# Patient Record
Sex: FEMALE | Race: BLACK OR AFRICAN AMERICAN | NOT HISPANIC OR LATINO | URBAN - METROPOLITAN AREA
[De-identification: names, ages, dates, MRNs, and addresses within clinical notes are randomized per-mention and may not be internally consistent; named-entity substitution may affect disease eponyms.]

---

## 2017-02-08 ENCOUNTER — INPATIENT (INPATIENT)
Facility: HOSPITAL | Age: 57
LOS: 1 days | Discharge: HOME | End: 2017-02-10
Attending: INTERNAL MEDICINE | Admitting: INTERNAL MEDICINE
Payer: COMMERCIAL

## 2017-02-08 VITALS
RESPIRATION RATE: 22 BRPM | HEART RATE: 93 BPM | WEIGHT: 190.04 LBS | TEMPERATURE: 98 F | SYSTOLIC BLOOD PRESSURE: 166 MMHG | HEIGHT: 65 IN | OXYGEN SATURATION: 100 % | DIASTOLIC BLOOD PRESSURE: 108 MMHG

## 2017-02-08 DIAGNOSIS — Z98.89 OTHER SPECIFIED POSTPROCEDURAL STATES: Chronic | ICD-10-CM

## 2017-02-08 DIAGNOSIS — Z98.2 PRESENCE OF CEREBROSPINAL FLUID DRAINAGE DEVICE: Chronic | ICD-10-CM

## 2017-02-08 DIAGNOSIS — Z43.4 ENCOUNTER FOR ATTENTION TO OTHER ARTIFICIAL OPENINGS OF DIGESTIVE TRACT: Chronic | ICD-10-CM

## 2017-02-08 DIAGNOSIS — I72.9 ANEURYSM OF UNSPECIFIED SITE: Chronic | ICD-10-CM

## 2017-02-08 DIAGNOSIS — Z95.5 PRESENCE OF CORONARY ANGIOPLASTY IMPLANT AND GRAFT: Chronic | ICD-10-CM

## 2017-02-08 LAB
ALBUMIN SERPL ELPH-MCNC: 3.9 G/DL — SIGNIFICANT CHANGE UP (ref 3.3–5)
ALP SERPL-CCNC: 124 U/L — HIGH (ref 40–120)
ALT FLD-CCNC: 19 U/L — SIGNIFICANT CHANGE UP (ref 12–78)
ANION GAP SERPL CALC-SCNC: 11 MMOL/L — SIGNIFICANT CHANGE UP (ref 5–17)
APPEARANCE UR: CLEAR — SIGNIFICANT CHANGE UP
AST SERPL-CCNC: 19 U/L — SIGNIFICANT CHANGE UP (ref 15–37)
BACTERIA # UR AUTO: ABNORMAL
BASOPHILS # BLD AUTO: 0.1 K/UL — SIGNIFICANT CHANGE UP (ref 0–0.2)
BASOPHILS NFR BLD AUTO: 1 % — SIGNIFICANT CHANGE UP (ref 0–2)
BILIRUB SERPL-MCNC: 0.3 MG/DL — SIGNIFICANT CHANGE UP (ref 0.2–1.2)
BILIRUB UR-MCNC: NEGATIVE — SIGNIFICANT CHANGE UP
BUN SERPL-MCNC: 13 MG/DL — SIGNIFICANT CHANGE UP (ref 7–23)
CALCIUM SERPL-MCNC: 8.5 MG/DL — SIGNIFICANT CHANGE UP (ref 8.5–10.1)
CARBAMAZEPINE SERPL-MCNC: <0.5 UG/ML — LOW (ref 4–12)
CHLORIDE SERPL-SCNC: 113 MMOL/L — HIGH (ref 96–108)
CO2 SERPL-SCNC: 22 MMOL/L — SIGNIFICANT CHANGE UP (ref 22–31)
COLOR SPEC: YELLOW — SIGNIFICANT CHANGE UP
CREAT SERPL-MCNC: 0.93 MG/DL — SIGNIFICANT CHANGE UP (ref 0.5–1.3)
DIFF PNL FLD: ABNORMAL
EOSINOPHIL # BLD AUTO: 0.1 K/UL — SIGNIFICANT CHANGE UP (ref 0–0.5)
EOSINOPHIL NFR BLD AUTO: 1.2 % — SIGNIFICANT CHANGE UP (ref 0–6)
EPI CELLS # UR: SIGNIFICANT CHANGE UP
ETHANOL SERPL-MCNC: <10 MG/DL — SIGNIFICANT CHANGE UP (ref 0–10)
GLUCOSE SERPL-MCNC: 96 MG/DL — SIGNIFICANT CHANGE UP (ref 70–99)
GLUCOSE UR QL: NEGATIVE MG/DL — SIGNIFICANT CHANGE UP
HCT VFR BLD CALC: 38.4 % — SIGNIFICANT CHANGE UP (ref 34.5–45)
HGB BLD-MCNC: 12.9 G/DL — SIGNIFICANT CHANGE UP (ref 11.5–15.5)
INR BLD: 1.52 RATIO — HIGH (ref 0.88–1.16)
KETONES UR-MCNC: NEGATIVE — SIGNIFICANT CHANGE UP
LEUKOCYTE ESTERASE UR-ACNC: ABNORMAL
LYMPHOCYTES # BLD AUTO: 2.5 K/UL — SIGNIFICANT CHANGE UP (ref 1–3.3)
LYMPHOCYTES # BLD AUTO: 35.2 % — SIGNIFICANT CHANGE UP (ref 13–44)
MAGNESIUM SERPL-MCNC: 2.6 MG/DL — HIGH (ref 1.8–2.4)
MCHC RBC-ENTMCNC: 30.6 PG — SIGNIFICANT CHANGE UP (ref 27–34)
MCHC RBC-ENTMCNC: 33.6 GM/DL — SIGNIFICANT CHANGE UP (ref 32–36)
MCV RBC AUTO: 91 FL — SIGNIFICANT CHANGE UP (ref 80–100)
MONOCYTES # BLD AUTO: 0.7 K/UL — SIGNIFICANT CHANGE UP (ref 0–0.9)
MONOCYTES NFR BLD AUTO: 9.6 % — SIGNIFICANT CHANGE UP (ref 2–14)
NEUTROPHILS # BLD AUTO: 3.8 K/UL — SIGNIFICANT CHANGE UP (ref 1.8–7.4)
NEUTROPHILS NFR BLD AUTO: 53.1 % — SIGNIFICANT CHANGE UP (ref 43–77)
NITRITE UR-MCNC: NEGATIVE — SIGNIFICANT CHANGE UP
PH UR: 7 — SIGNIFICANT CHANGE UP (ref 4.8–8)
PHENOBARB SERPL-MCNC: <2.1 UG/ML — LOW (ref 15–40)
PLATELET # BLD AUTO: 248 K/UL — SIGNIFICANT CHANGE UP (ref 150–400)
POTASSIUM SERPL-MCNC: 3.7 MMOL/L — SIGNIFICANT CHANGE UP (ref 3.5–5.3)
POTASSIUM SERPL-SCNC: 3.7 MMOL/L — SIGNIFICANT CHANGE UP (ref 3.5–5.3)
PROT SERPL-MCNC: 8.8 GM/DL — HIGH (ref 6–8.3)
PROT UR-MCNC: 100 MG/DL
PROTHROM AB SERPL-ACNC: 17.1 SEC — HIGH (ref 10–13.1)
RBC # BLD: 4.22 M/UL — SIGNIFICANT CHANGE UP (ref 3.8–5.2)
RBC # FLD: 13.8 % — SIGNIFICANT CHANGE UP (ref 11–15)
RBC CASTS # UR COMP ASSIST: ABNORMAL /HPF (ref 0–4)
SODIUM SERPL-SCNC: 146 MMOL/L — HIGH (ref 135–145)
SP GR SPEC: 1 — LOW (ref 1.01–1.02)
UROBILINOGEN FLD QL: NEGATIVE MG/DL — SIGNIFICANT CHANGE UP
VALPROATE SERPL-MCNC: 3 UG/ML — LOW (ref 50–100)
WBC # BLD: 7.1 K/UL — SIGNIFICANT CHANGE UP (ref 3.8–10.5)
WBC # FLD AUTO: 7.1 K/UL — SIGNIFICANT CHANGE UP (ref 3.8–10.5)
WBC UR QL: SIGNIFICANT CHANGE UP

## 2017-02-08 PROCEDURE — 70450 CT HEAD/BRAIN W/O DYE: CPT | Mod: 26

## 2017-02-08 PROCEDURE — 99285 EMERGENCY DEPT VISIT HI MDM: CPT

## 2017-02-08 PROCEDURE — 99223 1ST HOSP IP/OBS HIGH 75: CPT

## 2017-02-08 PROCEDURE — 71010: CPT | Mod: 26

## 2017-02-08 RX ORDER — LEVETIRACETAM 250 MG/1
1000 TABLET, FILM COATED ORAL
Qty: 0 | Refills: 0 | Status: DISCONTINUED | OUTPATIENT
Start: 2017-02-08 | End: 2017-02-09

## 2017-02-08 RX ORDER — LEVETIRACETAM 250 MG/1
1000 TABLET, FILM COATED ORAL ONCE
Qty: 0 | Refills: 0 | Status: COMPLETED | OUTPATIENT
Start: 2017-02-08 | End: 2017-02-08

## 2017-02-08 RX ORDER — SODIUM CHLORIDE 9 MG/ML
2000 INJECTION INTRAMUSCULAR; INTRAVENOUS; SUBCUTANEOUS ONCE
Qty: 0 | Refills: 0 | Status: COMPLETED | OUTPATIENT
Start: 2017-02-08 | End: 2017-02-08

## 2017-02-08 RX ADMIN — SODIUM CHLORIDE 2000 MILLILITER(S): 9 INJECTION INTRAMUSCULAR; INTRAVENOUS; SUBCUTANEOUS at 17:38

## 2017-02-08 RX ADMIN — LEVETIRACETAM 400 MILLIGRAM(S): 250 TABLET, FILM COATED ORAL at 19:30

## 2017-02-08 NOTE — H&P ADULT. - RS GEN PE MLT RESP DETAILS PC
no rhonchi/good air movement/no rales/clear to auscultation bilaterally/no wheezes/airway patent/respirations non-labored/breath sounds equal

## 2017-02-08 NOTE — H&P ADULT. - RADIOLOGY RESULTS AND INTERPRETATION
CXR no infiltrate  CT head: There is no acute intracranial hemorrhage, mass effect, midline shift, or   herniation.

## 2017-02-08 NOTE — ED ADULT NURSE NOTE - ED STAT RN HANDOFF DETAILS
Report given to JAMIE Azevedo of 2C for continuity of care. patient is alert and oriented x3 no acute distress and vitals stable.

## 2017-02-08 NOTE — ED PROVIDER NOTE - OBJECTIVE STATEMENT
Pertinent PMH/PSH/FHx/SHx and Review of Systems contained within:  56f hx of seizures pw seizure. ems arrived and patient had been seizing for 10 minutes already. they gave 5mg valium in, then 2 and 2 iv. patient finally stopped seizing. she was with a relative at the house who could not account for her symptoms before the seziing.    Fh and Sh not otherwise contributory  ROS otherwise negative Pertinent PMH/PSH/FHx/SHx and Review of Systems contained within:  56f hx of seizures, brain aneurysm, dvt on warfarin pw seizure. ems arrived and patient had been seizing for 10 minutes already. they gave 5mg valium in, then 2 and 2 iv. patient finally stopped seizing. she was with a relative at the house who could not account for her symptoms before the seziing.    Fh and Sh not otherwise contributory  ROS otherwise negative

## 2017-02-08 NOTE — ED PROVIDER NOTE - MEDICAL DECISION MAKING DETAILS
seizure. unable to acquire further history in present state. reassess. seizure. unable to acquire further history in present state. admit for status. rx keppra.

## 2017-02-08 NOTE — ED ADULT NURSE NOTE - OBJECTIVE STATEMENT
Received pt in bed F, Pt A&Ox2 w/ aphasia. Pt was post-ictal upon arrival had witness seizure by family. Pt has hx of brain aneurysms and seizures.  Pt has 4cm by 3cm wound on left leg, being tx by wound care, hx of venous statis ulcers

## 2017-02-08 NOTE — H&P ADULT. - ASSESSMENT
Pt with seizure possibly due to antiseizure medication, head CT shows no acute event, pt seen by neurologist in ED, given keppra.

## 2017-02-08 NOTE — ED PROVIDER NOTE - PHYSICAL EXAMINATION
Gen: obtunded.   Head: NC, AT   Eyes: PERRL, EOMI, normal lids/conjunctiva  ENT: normal hearing, patent oropharynx without erythema/exudate, uvula midline  Neck: supple, no tenderness, Trachea midline  Pulm: Bilateral BS, normal resp effort, no wheeze/stridor/retractions  CV: RRR, no M/R/G, 2+ radial and dp pulses bl, no edema  Abd: soft, NT/ND, +BS, no hepatosplenomegaly  Mskel: extremities x4 with normal ROM and no joint effusions. no ctl spine ttp.   Skin: no rash, no bruising   Neuro: obtunded (pharmacologic) no seizure activity. doesn't follow commands

## 2017-02-08 NOTE — H&P ADULT. - HISTORY OF PRESENT ILLNESS
55 y/o woman PMH seizures, brain aneurysm, DVT on warfarin had seizure today witnessed by family, no aura, pt became non verbal, then developed clonic activity of extremities. EMS called, when they  arrived  patient had been seizing for about 10 minutes already according to family. They gave 5mg valium IV, then additional 2mg IV x2, then patient stopped seizing.    Pt awake and alert, claims compliance with seizure medications, no recent fever, headache, neck pain, trauma chest pain, cough, SOB chest or abdominal pain. Pt on coumadin for DVT, no bleeding noted, no melena or hematuria. 57 y/o woman PMH seizures, brain aneurysm, DVT on warfarin had seizure today witnessed by family, no aura, pt became non verbal, then developed clonic activity of extremities. EMS called, when they  arrived  patient had been seizing for about 10 minutes already according to family. They gave 5mg valium IV, then additional 2mg IV x2, then patient stopped seizing.    Pt awake and alert, claims compliance with seizure medications, no recent fever, headache, neck pain, trauma chest pain, cough, SOB chest or abdominal pain. Pt on coumadin for DVT, no bleeding noted, no melena or hematuria. Pt given keppra in ER.

## 2017-02-08 NOTE — ED ADULT NURSE NOTE - PSH
Aneurysm  see HPI  s/p cerebral aneurysm clipping in Dec 2013, after which pt was in 30 day induced coma, had  shunt,  tracheostomy and reversal, followed by rehab  Cholecystostomy care    H/O craniotomy    History of cranioplasty  B/L 04/14  Presence of IVC filter    S/P  shunt

## 2017-02-08 NOTE — CONSULT NOTE ADULT - SUBJECTIVE AND OBJECTIVE BOX
Subjective Complaints:  Historian:  Patient is poor historian.  ER notes reviewed.     HPI:    BELLE LONG.   · Chief Complaint: The patient is a 56y Female complaining of seizures.  · HPI Objective Statement: Pertinent PMH/PSH/FHx/SHx and Review of Systems contained within:  56f hx of seizures, brain aneurysm, dvt on warfarin pw seizure. ems arrived and patient had been seizing for 10 minutes already. they gave 5mg valium in, then 2 and 2 iv. patient finally stopped seizing. she was with a relative at the house who could not account for her symptoms before the seziing.    Fh and Sh not otherwise contributory. ROS otherwise negative.  RADIOLOGY & ADDITIONAL STUDIES:  Reviewed brain CT:  There is extensive bifrontal encephalomalacia and gliosis which appears similar to the prior CT examination. There is also encephalomalacia and gliosis within the left occipital lobe compatible with a prior infarct. There is also a small focal area of encephalomalacia and gliosis involving the right cingulate gyrus at its posterior aspect. A chronic lacunar infarct within the left putamen is again notable.  There is no acute intracranial hemorrhage, mass effect, midline shift, or herniation.    PAST MEDICAL & SURGICAL HISTORY:  Hyperlipidemia; Brain aneurysm; Coronary artery disease; Epilepsy; HTN (hypertension); Presence of IVC filter;   H/O craniotomy; History of cranioplasty: B/L ; S/P  shunt; Aneurysm: see HPI  s/p cerebral aneurysm clipping in Dec 2013, after which pt was in 30 day induced coma, had  shunt,  tracheostomy and reversal, followed by rehab  Cholecystostomy care    MEDICATIONS  (STANDING):  MEDICATIONS  (PRN):    Allergies: penicillin (Hives)  sulfa drugs (Other)  Intolerances  FAMILY HISTORY:    Vital Signs Last 24 Hrs  T(C): 36.8, Max: 36.8 (02-08 @ 19:55)  T(F): 98.2, Max: 98.2 (02-08 @ 19:55)  HR: 88 (71 - 93)  BP: 160/90 (160/90 - 171/94)  BP(mean): --  RR: 18 (18 - 22)  SpO2: 100% (98% - 100%)    NEUROLOGICAL EXAM:  HENT:  Normocephalic head;  Neck supple.  ENT: normal looking.  Mental State:    Alert and awake.  Poorly oriented to person, place and date.  Incoherent with inappropriate responses. Speech appears intact.  Cooperative.      Cranial Nerves:  II-XII:   Pupils round and reactive to light and accommodation.  Extraocular movements full.  Visual fields full.  Facial symmetry intact.  Tongue midline.  Motor Functions:  Moves all extremities.  No pronator drift of UE.  Claps hand well.  Hand  intact bilaterally.      Sensory Functions:   Intact to touch and pinprick to face and extremities.    Reflexes:  Deep tendon reflexes normoactive to knees and ankles.  Babinski absent   Cerebellar Testing:    Finger to nose intact.  Nystagmus absent.  Neurovascular: Carotid auscultation full without bruits.      LABS:                        12.9   7.1   )-----------( 248      ( 2017 17:45 )             38.4     2017 17:45    146    |  113    |  13     ----------------------------<  96     3.7     |  22     |  0.93     Ca    8.5        2017 17:45  Mg     2.6       2017 17:45    TPro  8.8    /  Alb  3.9    /  TBili  0.3    /  DBili  x      /  AST  19     /  ALT  19     /  AlkPhos  124    2017 17:45    PT/INR - ( 2017 18:23 )   PT: 17.1 sec;   INR: 1.52 ratio       Urinalysis Basic - ( 2017 19:28 )    Color: Yellow / Appearance: Clear / S.005 / pH: x  Gluc: x / Ketone: Negative  / Bili: Negative / Urobili: Negative mg/dL   Blood: x / Protein: 100 mg/dL / Nitrite: Negative   Leuk Esterase: Small / RBC: 3-5 /HPF / WBC 3-5   Sq Epi: x / Non Sq Epi: Few / Bacteria: Few    Complete Blood Count + Automated Diff: STAT ( @ 17:17)  Comprehensive Metabolic Panel: STAT ( @ 17:17)  Magnesium, Serum: STAT ( @ 17:17)  Carbamazepine Level, Serum: STAT ( @ 17:17)  Phenobarbital Level, Serum: STAT ( @ 17:17)  Phenytoin Level, Free: STAT ( @ 17:17)  Valproic Acid Level, Serum: STAT ( @ 17:17)  Culture - Urine: Routine  Specimen Source: Catheterized ( @ 17:17)  Urinalysis: STAT ( @ 17:17)  sodium chloride 0.9% Bolus:   2000 milliLiter(s), IV Bolus, once, infuse over 1 Hour(s), Stop After 1 Doses  Provider&#x27;s Contact #: [phone] ( @ 17:17) [completed]  Xray Chest 1 View AP/PA.: Urgent   Indication: seizure  Transport: Portable  Exam Completed  Provider&#x27;s Contact #: [phone] ( @ 17:17)  CT Head No Cont: Urgent   Indication: seizure  Transport: Stretcher-Crib  Exam Completed  Provider&#x27;s Contact #: [phone] ( @ 17:17)  Alcohol, Blood: STAT  Cancel Reason: Lab Reord. ( @ 17:21)  Prothrombin Time and INR, Plasma:  Start Date:2017. STAT ( @ 17:33)  Alcohol, Blood: 17:20 ( @ 17:48)  levETIRAcetam  IVPB: [Ordered as KEPPRA IVPB]  1000 milliGRAM(s) in IV Solution 100 milliLiter(s), IV Intermittent, once, infuse over 15 Minute(s), Stop After 1 Doses  Provider&#x27;s Contact #: [phone] ( @ 18:59) [completed]  Admit to Inpatient Level of Care:     Service:  MEDICAL/SURGICAL    Physician:  Danis Bryant    Diagnosis:  Seizure disorder    Additional Instructions:  Diagnosis: Seizure disorder  Isolation: ,None ( @ 19:09)  (Floorstock):   Qty Removed: 1 each ( @ 19:29) [completed]  Urine Microscopic-Add On (NC): 19:20 ( @ 19:31)  Admit from ED ( @ 19:49)    Assessment & Opinion:  Status Epilepticus.  Known  Brain aneurysm; Epilepsy; HTN (hypertension); H/O craniotomy;   History of cranioplasty: B/L ; S/P  shunt;     Recommendations:  Brain MRI.  EEG.   DVT prophylaxis as ordered.  PT/OT.  Medications:  Keppra 1000 mg IV load.  Maintain on 1000 mg BID.

## 2017-02-08 NOTE — H&P ADULT. - PROBLEM SELECTOR PROBLEM 2
Deep vein thrombosis (DVT) of lower extremity, unspecified chronicity, unspecified laterality, unspecified vein

## 2017-02-08 NOTE — H&P ADULT. - MUSCULOSKELETAL
details… detailed exam no calf tenderness/normal strength/no joint swelling/no joint erythema/ROM intact

## 2017-02-09 DIAGNOSIS — I10 ESSENTIAL (PRIMARY) HYPERTENSION: ICD-10-CM

## 2017-02-09 DIAGNOSIS — G40.909 EPILEPSY, UNSPECIFIED, NOT INTRACTABLE, WITHOUT STATUS EPILEPTICUS: ICD-10-CM

## 2017-02-09 DIAGNOSIS — I82.409 ACUTE EMBOLISM AND THROMBOSIS OF UNSPECIFIED DEEP VEINS OF UNSPECIFIED LOWER EXTREMITY: ICD-10-CM

## 2017-02-09 DIAGNOSIS — T14.90 INJURY, UNSPECIFIED: ICD-10-CM

## 2017-02-09 LAB
ALBUMIN SERPL ELPH-MCNC: 3.5 G/DL — SIGNIFICANT CHANGE UP (ref 3.3–5)
ALP SERPL-CCNC: 112 U/L — SIGNIFICANT CHANGE UP (ref 40–120)
ALT FLD-CCNC: 19 U/L — SIGNIFICANT CHANGE UP (ref 12–78)
ANION GAP SERPL CALC-SCNC: 8 MMOL/L — SIGNIFICANT CHANGE UP (ref 5–17)
AST SERPL-CCNC: 16 U/L — SIGNIFICANT CHANGE UP (ref 15–37)
BASOPHILS # BLD AUTO: 0 K/UL — SIGNIFICANT CHANGE UP (ref 0–0.2)
BASOPHILS NFR BLD AUTO: 0.5 % — SIGNIFICANT CHANGE UP (ref 0–2)
BILIRUB SERPL-MCNC: 0.2 MG/DL — SIGNIFICANT CHANGE UP (ref 0.2–1.2)
BUN SERPL-MCNC: 8 MG/DL — SIGNIFICANT CHANGE UP (ref 7–23)
CALCIUM SERPL-MCNC: 8.3 MG/DL — LOW (ref 8.5–10.1)
CHLORIDE SERPL-SCNC: 117 MMOL/L — HIGH (ref 96–108)
CO2 SERPL-SCNC: 26 MMOL/L — SIGNIFICANT CHANGE UP (ref 22–31)
CREAT SERPL-MCNC: 0.65 MG/DL — SIGNIFICANT CHANGE UP (ref 0.5–1.3)
EOSINOPHIL # BLD AUTO: 0 K/UL — SIGNIFICANT CHANGE UP (ref 0–0.5)
EOSINOPHIL NFR BLD AUTO: 1 % — SIGNIFICANT CHANGE UP (ref 0–6)
GLUCOSE SERPL-MCNC: 84 MG/DL — SIGNIFICANT CHANGE UP (ref 70–99)
HCT VFR BLD CALC: 34.8 % — SIGNIFICANT CHANGE UP (ref 34.5–45)
HGB BLD-MCNC: 11.9 G/DL — SIGNIFICANT CHANGE UP (ref 11.5–15.5)
INR BLD: 1.46 RATIO — HIGH (ref 0.88–1.16)
LYMPHOCYTES # BLD AUTO: 1.2 K/UL — SIGNIFICANT CHANGE UP (ref 1–3.3)
LYMPHOCYTES # BLD AUTO: 26.2 % — SIGNIFICANT CHANGE UP (ref 13–44)
MCHC RBC-ENTMCNC: 30.2 PG — SIGNIFICANT CHANGE UP (ref 27–34)
MCHC RBC-ENTMCNC: 34.1 GM/DL — SIGNIFICANT CHANGE UP (ref 32–36)
MCV RBC AUTO: 88.5 FL — SIGNIFICANT CHANGE UP (ref 80–100)
MONOCYTES # BLD AUTO: 0.5 K/UL — SIGNIFICANT CHANGE UP (ref 0–0.9)
MONOCYTES NFR BLD AUTO: 9.4 % — SIGNIFICANT CHANGE UP (ref 2–14)
NEUTROPHILS # BLD AUTO: 3 K/UL — SIGNIFICANT CHANGE UP (ref 1.8–7.4)
NEUTROPHILS NFR BLD AUTO: 62.8 % — SIGNIFICANT CHANGE UP (ref 43–77)
PHENYTOIN FREE SERPL-MCNC: 16 UG/ML — SIGNIFICANT CHANGE UP (ref 10–20)
PLATELET # BLD AUTO: 249 K/UL — SIGNIFICANT CHANGE UP (ref 150–400)
POTASSIUM SERPL-MCNC: 3.4 MMOL/L — LOW (ref 3.5–5.3)
POTASSIUM SERPL-SCNC: 3.4 MMOL/L — LOW (ref 3.5–5.3)
PROT SERPL-MCNC: 7.9 GM/DL — SIGNIFICANT CHANGE UP (ref 6–8.3)
PROTHROM AB SERPL-ACNC: 16.4 SEC — HIGH (ref 10–13.1)
RBC # BLD: 3.93 M/UL — SIGNIFICANT CHANGE UP (ref 3.8–5.2)
RBC # FLD: 13.7 % — SIGNIFICANT CHANGE UP (ref 11–15)
SODIUM SERPL-SCNC: 151 MMOL/L — HIGH (ref 135–145)
WBC # BLD: 4.8 K/UL — SIGNIFICANT CHANGE UP (ref 3.8–10.5)
WBC # FLD AUTO: 4.8 K/UL — SIGNIFICANT CHANGE UP (ref 3.8–10.5)

## 2017-02-09 PROCEDURE — 99233 SBSQ HOSP IP/OBS HIGH 50: CPT

## 2017-02-09 RX ORDER — POTASSIUM CHLORIDE 20 MEQ
40 PACKET (EA) ORAL ONCE
Qty: 0 | Refills: 0 | Status: COMPLETED | OUTPATIENT
Start: 2017-02-09 | End: 2017-02-09

## 2017-02-09 RX ORDER — WARFARIN SODIUM 2.5 MG/1
5 TABLET ORAL ONCE
Qty: 0 | Refills: 0 | Status: COMPLETED | OUTPATIENT
Start: 2017-02-09 | End: 2018-01-08

## 2017-02-09 RX ORDER — WARFARIN SODIUM 2.5 MG/1
5 TABLET ORAL ONCE
Qty: 0 | Refills: 0 | Status: COMPLETED | OUTPATIENT
Start: 2017-02-09 | End: 2017-02-09

## 2017-02-09 RX ORDER — SODIUM CHLORIDE 9 MG/ML
1000 INJECTION, SOLUTION INTRAVENOUS
Qty: 0 | Refills: 0 | Status: DISCONTINUED | OUTPATIENT
Start: 2017-02-09 | End: 2017-02-10

## 2017-02-09 RX ORDER — LOSARTAN POTASSIUM 100 MG/1
25 TABLET, FILM COATED ORAL DAILY
Qty: 0 | Refills: 0 | Status: DISCONTINUED | OUTPATIENT
Start: 2017-02-09 | End: 2017-02-10

## 2017-02-09 RX ORDER — PANTOPRAZOLE SODIUM 20 MG/1
40 TABLET, DELAYED RELEASE ORAL DAILY
Qty: 0 | Refills: 0 | Status: DISCONTINUED | OUTPATIENT
Start: 2017-02-09 | End: 2017-02-10

## 2017-02-09 RX ORDER — LEVETIRACETAM 250 MG/1
1250 TABLET, FILM COATED ORAL
Qty: 0 | Refills: 0 | Status: DISCONTINUED | OUTPATIENT
Start: 2017-02-09 | End: 2017-02-10

## 2017-02-09 RX ORDER — HEPARIN SODIUM 5000 [USP'U]/ML
5000 INJECTION INTRAVENOUS; SUBCUTANEOUS EVERY 12 HOURS
Qty: 0 | Refills: 0 | Status: DISCONTINUED | OUTPATIENT
Start: 2017-02-09 | End: 2017-02-10

## 2017-02-09 RX ORDER — LABETALOL HCL 100 MG
100 TABLET ORAL EVERY 12 HOURS
Qty: 0 | Refills: 0 | Status: DISCONTINUED | OUTPATIENT
Start: 2017-02-09 | End: 2017-02-10

## 2017-02-09 RX ADMIN — HEPARIN SODIUM 5000 UNIT(S): 5000 INJECTION INTRAVENOUS; SUBCUTANEOUS at 17:48

## 2017-02-09 RX ADMIN — Medication 100 MILLIGRAM(S): at 14:23

## 2017-02-09 RX ADMIN — Medication 40 MILLIEQUIVALENT(S): at 14:22

## 2017-02-09 RX ADMIN — LEVETIRACETAM 1000 MILLIGRAM(S): 250 TABLET, FILM COATED ORAL at 06:09

## 2017-02-09 RX ADMIN — Medication 100 MILLIGRAM(S): at 17:48

## 2017-02-09 RX ADMIN — Medication 100 MILLIGRAM(S): at 06:09

## 2017-02-09 RX ADMIN — LOSARTAN POTASSIUM 25 MILLIGRAM(S): 100 TABLET, FILM COATED ORAL at 06:09

## 2017-02-09 RX ADMIN — Medication 100 MILLIGRAM(S): at 21:38

## 2017-02-09 RX ADMIN — LEVETIRACETAM 1250 MILLIGRAM(S): 250 TABLET, FILM COATED ORAL at 17:48

## 2017-02-09 RX ADMIN — PANTOPRAZOLE SODIUM 40 MILLIGRAM(S): 20 TABLET, DELAYED RELEASE ORAL at 12:39

## 2017-02-09 RX ADMIN — SODIUM CHLORIDE 50 MILLILITER(S): 9 INJECTION, SOLUTION INTRAVENOUS at 14:22

## 2017-02-09 NOTE — PROGRESS NOTE ADULT - PROBLEM SELECTOR PLAN 3
on coumadin, monitor inr, (for chronic DVT in 2013)  7.5 mg x T,Th, Sat, and 5 mg x 4 times a week rest of the days.

## 2017-02-09 NOTE — PROGRESS NOTE ADULT - ATTENDING COMMENTS
DVT Px with coumadin. DVT Px with coumadin.  Hypotonic fluids to correct sodium and potassium supplementation for hypokalemia.  labs and imaging reviewed, care discussed with consultants , labs ordered for am.

## 2017-02-09 NOTE — PROGRESS NOTE ADULT - SUBJECTIVE AND OBJECTIVE BOX
INTERVAL HPI/OVERNIGHT EVENTS:  Subjective Complaints:  No overnight events.  No seizures.  Now fully oriented and with intact speech.  Normal neurologic examination.  RECENT RADIOLOGY & ADDITIONAL TESTS:  EEG done with no seizure foci.  Now on Keppra 1250 mg BID and Dilantin 100 mg TID.    NEUROLOGICAL EXAM (Pertinent):  Vital Signs Last 24 Hrs  T(C): 36.7, Max: 37.4 ( @ 02:45)  T(F): 98.1, Max: 99.4 ( @ 02:45)  HR: 64 (62 - 93)  BP: 148/75 (138/75 - 172/87)  BP(mean): --  RR: 16 (16 - 22)  SpO2: 100% (98% - 100%)      MEDICATIONS  (STANDING):  losartan 25milliGRAM(s) Oral daily  labetalol 100milliGRAM(s) Oral every 12 hours  pantoprazole  Injectable 40milliGRAM(s) IV Push daily  levETIRAcetam 1250milliGRAM(s) Oral two times a day  phenytoin   Capsule 100milliGRAM(s) Oral three times a day  dextrose 5% + sodium chloride 0.45%. 1000milliLiter(s) IV Continuous <Continuous>  heparin  Injectable 5000Unit(s) SubCutaneous every 12 hours  warfarin 5milliGRAM(s) Oral once    MEDICATIONS  (PRN):    LABS:                        11.9   4.8   )-----------( 249      ( 2017 05:11 )             34.8     2017 05:11    151    |  117    |  8      ----------------------------<  84     3.4     |  26     |  0.65     Ca    8.3        2017 05:11  Mg     2.6       2017 17:45    TPro  7.9    /  Alb  3.5    /  TBili  0.2    /  DBili  x      /  AST  16     /  ALT  19     /  AlkPhos  112    2017 05:11    PT/INR - ( 2017 15:09 )   PT: 16.4 sec;   INR: 1.46 ratio         Urinalysis Basic - ( 2017 19:28 )    Color: Yellow / Appearance: Clear / S.005 / pH: x  Gluc: x / Ketone: Negative  / Bili: Negative / Urobili: Negative mg/dL   Blood: x / Protein: 100 mg/dL / Nitrite: Negative   Leuk Esterase: Small / RBC: 3-5 /HPF / WBC 3-5   Sq Epi: x / Non Sq Epi: Few / Bacteria: Few    ASSESSMENT & OPINION:  Stable SEizure Disorder.  No overnight issues.    RECOMMENDATIONS: Continue current anti-convulsants.  Neurologically stable for discharge by 2/10/17.  Patient has own neurologist for follow up care.

## 2017-02-09 NOTE — PROGRESS NOTE ADULT - SUBJECTIVE AND OBJECTIVE BOX
CHIEF COMPLAINT/INTERVAL HISTORY:    Patient is a 56y old  Female who presents with a chief complaint of Seizure today. (2017 23:58)      HPI:  55 y/o woman PMH seizures, brain aneurysm, DVT on warfarin had seizure today witnessed by family, no aura, pt became non verbal, then developed clonic activity of extremities. EMS called, when they  arrived  patient had been seizing for about 10 minutes already according to family. They gave 5mg valium IV, then additional 2mg IV x2, then patient stopped seizing.    Pt awake and alert, claims compliance with seizure medications, no recent fever, headache, neck pain, trauma chest pain, cough, SOB chest or abdominal pain. Pt on coumadin for DVT, no bleeding noted, no melena or hematuria. Pt given keppra in ER. (2017 23:58)      SUBJECTIVE & OBJECTIVE: Pt seen and examined at bedside.   Denies chest pain/SOB, nausea/vomiting/diarrhea, No cough, dizziness, HA or blurry vision, all other ROS negative.  No further seizures.On keppra 1250mg bid and dilantin 100 mg tid at home (Dr. pruitt is neuro)    Vital Signs Last 24 Hrs  T(C): 36.7, Max: 37.4 (02- @ 02:45)  T(F): 98.1, Max: 99.4 (02- @ 02:45)  HR: 64 (62 - 93)  BP: 148/75 (138/75 - 172/87)  BP(mean): --  ABP: --  ABP(mean): --  RR: 16 (16 - 22)  SpO2: 100% (98% - 100%)        MEDICATIONS  (STANDING):  losartan 25milliGRAM(s) Oral daily  labetalol 100milliGRAM(s) Oral every 12 hours  pantoprazole  Injectable 40milliGRAM(s) IV Push daily  levETIRAcetam 1250milliGRAM(s) Oral two times a day  phenytoin   Capsule 100milliGRAM(s) Oral three times a day  potassium chloride    Tablet ER 40milliEquivalent(s) Oral once  dextrose 5% + sodium chloride 0.45%. 1000milliLiter(s) IV Continuous <Continuous>  heparin  Injectable 5000Unit(s) SubCutaneous every 12 hours    MEDICATIONS  (PRN):        PHYSICAL EXAM:    GENERAL: NAD, well-groomed, well-developed  HEAD:  Atraumatic, Normocephalic  EYES: EOMI, PERRLA, conjunctiva and sclera clear  ENMT: Moist mucous membranes  NECK: Supple, No JVD  NERVOUS SYSTEM:  Alert & Oriented X3, Motor Strength 5/5 B/L upper and lower extremities; DTRs 2+ intact and symmetric  CHEST/LUNG: Clear to auscultation bilaterally; No rales, rhonchi, wheezing, or rubs  HEART: Regular rate and rhythm; No murmurs, rubs, or gallops  ABDOMEN: Soft, Nontender, Nondistended; Bowel sounds present  EXTREMITIES:  No cyanosis, or edema, chronic venous ulcer LLE for >2 years as per pt.    LABS:                        11.9   4.8   )-----------( 249      ( 2017 05:11 )             34.8     2017 05:11    151    |  117    |  8      ----------------------------<  84     3.4     |  26     |  0.65     Ca    8.3        2017 05:11  Mg     2.6       2017 17:45    TPro  7.9    /  Alb  3.5    /  TBili  0.2    /  DBili  x      /  AST  16     /  ALT  19     /  AlkPhos  112    2017 05:11    PT/INR - ( 2017 18:23 )   PT: 17.1 sec;   INR: 1.52 ratio           Urinalysis Basic - ( 2017 19:28 )    Color: Yellow / Appearance: Clear / S.005 / pH: x  Gluc: x / Ketone: Negative  / Bili: Negative / Urobili: Negative mg/dL   Blood: x / Protein: 100 mg/dL / Nitrite: Negative   Leuk Esterase: Small / RBC: 3-5 /HPF / WBC 3-5   Sq Epi: x / Non Sq Epi: Few / Bacteria: Few

## 2017-02-09 NOTE — PROGRESS NOTE ADULT - ASSESSMENT
57 y/o F with pmh of HTN, brain aneurysm s/p surgery in 2013, seizure d/o and h/o DVT p/w seizure at home, now doing fine, and stable, CT brain negative for acute pathology, await EEG and neuro eval. also sodium high today likely ivf NS induced received in ED.

## 2017-02-10 VITALS
HEART RATE: 72 BPM | DIASTOLIC BLOOD PRESSURE: 75 MMHG | RESPIRATION RATE: 16 BRPM | SYSTOLIC BLOOD PRESSURE: 149 MMHG | TEMPERATURE: 98 F | OXYGEN SATURATION: 98 %

## 2017-02-10 LAB
ANION GAP SERPL CALC-SCNC: 8 MMOL/L — SIGNIFICANT CHANGE UP (ref 5–17)
BUN SERPL-MCNC: 11 MG/DL — SIGNIFICANT CHANGE UP (ref 7–23)
CALCIUM SERPL-MCNC: 8.7 MG/DL — SIGNIFICANT CHANGE UP (ref 8.5–10.1)
CHLORIDE SERPL-SCNC: 111 MMOL/L — HIGH (ref 96–108)
CO2 SERPL-SCNC: 27 MMOL/L — SIGNIFICANT CHANGE UP (ref 22–31)
CREAT SERPL-MCNC: 0.79 MG/DL — SIGNIFICANT CHANGE UP (ref 0.5–1.3)
CULTURE RESULTS: SIGNIFICANT CHANGE UP
GLUCOSE SERPL-MCNC: 95 MG/DL — SIGNIFICANT CHANGE UP (ref 70–99)
INR BLD: 1.28 RATIO — HIGH (ref 0.88–1.16)
POTASSIUM SERPL-MCNC: 3.7 MMOL/L — SIGNIFICANT CHANGE UP (ref 3.5–5.3)
POTASSIUM SERPL-SCNC: 3.7 MMOL/L — SIGNIFICANT CHANGE UP (ref 3.5–5.3)
PROTHROM AB SERPL-ACNC: 14.4 SEC — HIGH (ref 10–13.1)
SODIUM SERPL-SCNC: 146 MMOL/L — HIGH (ref 135–145)
SPECIMEN SOURCE: SIGNIFICANT CHANGE UP

## 2017-02-10 PROCEDURE — 99239 HOSP IP/OBS DSCHRG MGMT >30: CPT

## 2017-02-10 RX ORDER — LEVETIRACETAM 250 MG/1
5 TABLET, FILM COATED ORAL
Qty: 0 | Refills: 0 | DISCHARGE
Start: 2017-02-10

## 2017-02-10 RX ORDER — LABETALOL HCL 100 MG
1 TABLET ORAL
Qty: 0 | Refills: 0 | DISCHARGE
Start: 2017-02-10

## 2017-02-10 RX ORDER — COLLAGENASE CLOSTRIDIUM HIST. 250 UNIT/G
1 OINTMENT (GRAM) TOPICAL
Qty: 1 | Refills: 0
Start: 2017-02-10

## 2017-02-10 RX ADMIN — PANTOPRAZOLE SODIUM 40 MILLIGRAM(S): 20 TABLET, DELAYED RELEASE ORAL at 12:31

## 2017-02-10 RX ADMIN — Medication 100 MILLIGRAM(S): at 14:33

## 2017-02-10 RX ADMIN — Medication 100 MILLIGRAM(S): at 05:26

## 2017-02-10 RX ADMIN — LEVETIRACETAM 1250 MILLIGRAM(S): 250 TABLET, FILM COATED ORAL at 05:25

## 2017-02-10 RX ADMIN — LOSARTAN POTASSIUM 25 MILLIGRAM(S): 100 TABLET, FILM COATED ORAL at 05:26

## 2017-02-10 RX ADMIN — HEPARIN SODIUM 5000 UNIT(S): 5000 INJECTION INTRAVENOUS; SUBCUTANEOUS at 05:26

## 2017-02-10 RX ADMIN — WARFARIN SODIUM 5 MILLIGRAM(S): 2.5 TABLET ORAL at 00:07

## 2017-02-10 NOTE — PHYSICAL THERAPY INITIAL EVALUATION ADULT - GENERAL OBSERVATIONS, REHAB EVAL
Patient encountered supine in bed, vital signs as charted. AAOx4.Denies pain or shortness of breath at rest. Attachments: cardiac monitor.. PT wound care initial evaluation performed with all wounds documented in flowsheet 2 4.0 A&I.

## 2017-02-10 NOTE — PHYSICAL THERAPY INITIAL EVALUATION ADULT - CRITERIA FOR SKILLED THERAPEUTIC INTERVENTIONS
risk reduction/prevention/functional limitations in following categories/predicted duration of therapy intervention/rehab potential/anticipated discharge recommendation/impairments found/therapy frequency

## 2017-02-10 NOTE — DISCHARGE NOTE ADULT - MEDICATION SUMMARY - MEDICATIONS TO CHANGE
I will SWITCH the dose or number of times a day I take the medications listed below when I get home from the hospital:    labetalol  -- 400 milligram(s) by mouth 2 times a day

## 2017-02-10 NOTE — DISCHARGE NOTE ADULT - CARE PROVIDER_API CALL
Lynette,   Follow with PCP x 3 days  Phone: (   )    -  Fax: (   )    -    Wolfgang,   Follow with Neurologist within one week  Phone: (   )    -  Fax: (   )    -    Sheryl,   Follow with vascular surgeon/wound clinic within 1 week  Phone: (   )    -  Fax: (   )    -

## 2017-02-10 NOTE — DISCHARGE NOTE ADULT - PLAN OF CARE
avoid further seizures take medications as directed, follow with primary neurologist Dr. Goss. take medications as directed, follow with PCP Chronic wound LLE, daily wound care, follow with primary vascular surgeon/wound clinic Dr. sandy Saucedo continue coumadin, (take 7.5 mg dose tonight and tomorrow night and get INR checked by PCP on Monday 2/13 as your INR is less than 2,) follow with PCP for dose adjustment. s/p craniotomy  stable mental status, no symptoms

## 2017-02-10 NOTE — DISCHARGE NOTE ADULT - ADDITIONAL INSTRUCTIONS
Follow with PCP x 3 days, get INR checked on 2/13 by PCP office  Follow with primary neurologist Dr. pruitt within one week   follow with vascular surgeon/wound clinic within 1 week.

## 2017-02-10 NOTE — DISCHARGE NOTE ADULT - MEDICATION SUMMARY - MEDICATIONS TO STOP TAKING
I will STOP taking the medications listed below when I get home from the hospital:    amlodipine 10 mg oral tablet  -- 1 tab(s) by mouth once a day

## 2017-02-10 NOTE — DISCHARGE NOTE ADULT - CONDITION (STATED IN TERMS THAT PERMIT A SPECIFIC MEASURABLE COMPARISON WITH CONDITION ON ADMISSION):
Patient is stable: tolerating diet, voiding, ambulating, no pain  Vital Signs Last 24 Hrs  T(C): 36.7, Max: 36.9 (02-09 @ 17:19)  T(F): 98, Max: 98.4 (02-09 @ 17:19)  HR: 72 (60 - 76)  BP: 149/75 (138/74 - 149/75)  BP(mean): --  RR: 16 (15 - 16)  SpO2: 98% (94% - 100%)  Gen: NAD, AAOX3, well developed  HEENT: PERRLA, EOMI, No pallor, no icterus  CVS: S1, S2, Regular  Lungs: CTA b/l, No crepts/wheezing/rhonchi  Abd: soft, non tender, no organomegaly, BS present  Extremities: No cyanosis, no edema  Neuro: Non Focal, strength 5/5 all 4 extremities, Cranial nerves intact  70 minutes spent in direct patient care including physical examination, discharge instructions , medication instructions and follow up, patient verbalized understanding and agreed.

## 2017-02-10 NOTE — PHYSICAL THERAPY INITIAL EVALUATION ADULT - PERTINENT HX OF CURRENT PROBLEM, REHAB EVAL
Patient brought to ED c witnessed seizure activity. Chart reviewed and noted cerebral slowing on EEG, no acute changes on CT scan head, CXR: NAD, cardiac enlargement.

## 2017-02-10 NOTE — PHYSICAL THERAPY INITIAL EVALUATION ADULT - MODIFIED CLINICAL TEST OF SENSORY INTEGRATION IN BALANCE TEST
Barthel Index: Feeding Score _10__, Bathing Score _5__, Grooming Score _5__, Dressing Score _10__, Bowels Score _10__, Bladder Score _10__, Toilet Score _ 10__, Transfers Score _10__, Mobility Score _0__, Stairs Score _5__,     Total Score _75__

## 2017-02-10 NOTE — DISCHARGE NOTE ADULT - CARE PLAN
Principal Discharge DX:	Seizure disorder  Goal:	avoid further seizures  Instructions for follow-up, activity and diet:	take medications as directed, follow with primary neurologist Dr. Goss.  Secondary Diagnosis:	Essential hypertension  Instructions for follow-up, activity and diet:	take medications as directed, follow with PCP  Secondary Diagnosis:	Wound  Instructions for follow-up, activity and diet:	Chronic wound LLE, daily wound care, follow with primary vascular surgeon/wound clinic Dr. sandy Saucedo  Secondary Diagnosis:	Deep vein thrombosis (DVT) of lower extremity, unspecified chronicity, unspecified laterality, unspecified vein  Instructions for follow-up, activity and diet:	continue coumadin, (take 7.5 mg dose tonight and tomorrow night and get INR checked by PCP on Monday 2/13 as your INR is less than 2,) follow with PCP for dose adjustment.  Secondary Diagnosis:	Brain aneurysm  Instructions for follow-up, activity and diet:	s/p craniotomy  stable mental status, no symptoms

## 2017-02-10 NOTE — DISCHARGE NOTE ADULT - PATIENT PORTAL LINK FT
“You can access the [facility] Patient Portal, offered by [facility], by registering with the following website: [email or web address]”

## 2017-02-10 NOTE — PHYSICAL THERAPY INITIAL EVALUATION ADULT - PLANNED THERAPY INTERVENTIONS, PT EVAL
bed mobility training/stretching/transfer training/gait training/manual therapy techniques/ROM/strengthening/balance training/neuromuscular re-education

## 2017-02-10 NOTE — DISCHARGE NOTE ADULT - PROVIDER TOKENS
FREE:[LAST:[Lynette],PHONE:[(   )    -],FAX:[(   )    -],ADDRESS:[Follow with PCP x 3 days]],FREE:[LAST:[Wolfgang],PHONE:[(   )    -],FAX:[(   )    -],ADDRESS:[Follow with Neurologist within one week]],FREE:[LAST:[Sheryl],PHONE:[(   )    -],FAX:[(   )    -],ADDRESS:[Follow with vascular surgeon/wound clinic within 1 week]]

## 2017-02-10 NOTE — PHYSICAL THERAPY INITIAL EVALUATION ADULT - IMPAIRMENTS FOUND, PT EVAL
posture/ROM/cranial and peripheral nerve integrity/neuromotor development and sensory integration/joint integrity and mobility/aerobic capacity/endurance/circulation/integumentary integrity/poor safety awareness

## 2017-02-10 NOTE — PHYSICAL THERAPY INITIAL EVALUATION ADULT - ADDITIONAL COMMENTS
Patient reports lives c family in private house c 3 steps to enter home. Independent in all ADLs and gait without difficulties. Goes to a vascular physician who had been taking care of her wound c collagenase previously.

## 2017-02-10 NOTE — DISCHARGE NOTE ADULT - SECONDARY DIAGNOSIS.
Essential hypertension Wound Deep vein thrombosis (DVT) of lower extremity, unspecified chronicity, unspecified laterality, unspecified vein Brain aneurysm

## 2017-02-10 NOTE — DISCHARGE NOTE ADULT - MEDICATION SUMMARY - MEDICATIONS TO TAKE
I will START or STAY ON the medications listed below when I get home from the hospital:    losartan 50 mg oral tablet  -- 1 tab(s) by mouth once a day  -- Indication: For Essential hypertension    clonidine 0.1 mg oral tablet  --  by mouth every 8 hours  -- Indication: For Essential hypertension    Dilantin  --  by mouth 3 times a day  --  not sure of dose  -- Indication: For Seizure disorder    warfarin 7.5 mg oral tablet  -- 1 tab(s) by mouth once  -- Indication: For Deep vein thrombosis (DVT) of lower extremity, unspecified chronicity, unspecified laterality, unspecified vein    levETIRAcetam 250 mg oral tablet  -- 5 tab(s) by mouth 2 times a day (1250 mg BID)  -- Indication: For Seizure disorder    labetalol 100 mg oral tablet  -- 1 tab(s) by mouth every 12 hours  -- Indication: For Essential hypertension    Santyl 250 units/g topical ointment  -- Apply on skin to affected area /left leg ulcer once a day  -- For external use only.    -- Indication: For Wound    Protonix 40 mg oral delayed release tablet  -- 1 tab(s) by mouth once a day  -- Indication: For gerd

## 2017-02-10 NOTE — DISCHARGE NOTE ADULT - HOSPITAL COURSE
57 y/o woman PMH seizures, brain aneurysm, DVT on warfarin had seizure today witnessed by family, no aura, pt became non verbal, then developed clonic activity of extremities. EMS called, when they  arrived  patient had been seizing for about 10 minutes already according to family. They gave 5mg valium IV, then additional 2mg IV x2, then patient stopped seizing.    On admission, Pt awake and alert, claims compliance with seizure medications, no recent fever, headache, neck pain, trauma chest pain, cough, SOB chest or abdominal pain. Pt on coumadin for DVT, no bleeding noted, no melena or hematuria. Pt given keppra in ER. .  admitted to telemetry, neuro checks done, Neurology evaluation, continued AED as per home, CT h negative for acute changes, for hypernatremia given IV hydration, she remained seizure free, as per neuro Ok for DC home with f/u with primary neurologist.

## 2017-02-11 LAB
LEVETIRACETAM SERPL-MCNC: 17.6 MCG/ML — SIGNIFICANT CHANGE UP (ref 12–46)
PHENYTOIN FREE SERPL-MCNC: 1.2 MG/L — SIGNIFICANT CHANGE UP (ref 1–2)

## 2017-02-14 DIAGNOSIS — Z88.2 ALLERGY STATUS TO SULFONAMIDES: ICD-10-CM

## 2017-02-14 DIAGNOSIS — Z79.01 LONG TERM (CURRENT) USE OF ANTICOAGULANTS: ICD-10-CM

## 2017-02-14 DIAGNOSIS — I82.5Z2 CHRONIC EMBOLISM AND THROMBOSIS OF UNSPECIFIED DEEP VEINS OF LEFT DISTAL LOWER EXTREMITY: ICD-10-CM

## 2017-02-14 DIAGNOSIS — E78.5 HYPERLIPIDEMIA, UNSPECIFIED: ICD-10-CM

## 2017-02-14 DIAGNOSIS — I25.10 ATHEROSCLEROTIC HEART DISEASE OF NATIVE CORONARY ARTERY WITHOUT ANGINA PECTORIS: ICD-10-CM

## 2017-02-14 DIAGNOSIS — E87.6 HYPOKALEMIA: ICD-10-CM

## 2017-02-14 DIAGNOSIS — G40.409 OTHER GENERALIZED EPILEPSY AND EPILEPTIC SYNDROMES, NOT INTRACTABLE, WITHOUT STATUS EPILEPTICUS: ICD-10-CM

## 2017-02-14 DIAGNOSIS — Z88.0 ALLERGY STATUS TO PENICILLIN: ICD-10-CM

## 2017-02-14 DIAGNOSIS — I10 ESSENTIAL (PRIMARY) HYPERTENSION: ICD-10-CM

## 2018-06-19 ENCOUNTER — RESULT REVIEW (OUTPATIENT)
Age: 58
End: 2018-06-19

## 2018-06-20 ENCOUNTER — EMERGENCY (EMERGENCY)
Facility: HOSPITAL | Age: 58
LOS: 0 days | Discharge: HOME | End: 2018-06-20
Attending: EMERGENCY MEDICINE
Payer: COMMERCIAL

## 2018-06-20 VITALS
TEMPERATURE: 98 F | HEART RATE: 68 BPM | DIASTOLIC BLOOD PRESSURE: 112 MMHG | SYSTOLIC BLOOD PRESSURE: 157 MMHG | WEIGHT: 166.89 LBS | RESPIRATION RATE: 17 BRPM | OXYGEN SATURATION: 100 % | HEIGHT: 61 IN

## 2018-06-20 VITALS
OXYGEN SATURATION: 99 % | HEART RATE: 66 BPM | TEMPERATURE: 98 F | SYSTOLIC BLOOD PRESSURE: 153 MMHG | RESPIRATION RATE: 18 BRPM | DIASTOLIC BLOOD PRESSURE: 87 MMHG

## 2018-06-20 DIAGNOSIS — R22.31 LOCALIZED SWELLING, MASS AND LUMP, RIGHT UPPER LIMB: ICD-10-CM

## 2018-06-20 DIAGNOSIS — Z98.89 OTHER SPECIFIED POSTPROCEDURAL STATES: Chronic | ICD-10-CM

## 2018-06-20 DIAGNOSIS — I80.8 PHLEBITIS AND THROMBOPHLEBITIS OF OTHER SITES: ICD-10-CM

## 2018-06-20 DIAGNOSIS — Z88.0 ALLERGY STATUS TO PENICILLIN: ICD-10-CM

## 2018-06-20 DIAGNOSIS — Z88.2 ALLERGY STATUS TO SULFONAMIDES: ICD-10-CM

## 2018-06-20 DIAGNOSIS — Z98.2 PRESENCE OF CEREBROSPINAL FLUID DRAINAGE DEVICE: Chronic | ICD-10-CM

## 2018-06-20 DIAGNOSIS — I72.9 ANEURYSM OF UNSPECIFIED SITE: Chronic | ICD-10-CM

## 2018-06-20 DIAGNOSIS — I10 ESSENTIAL (PRIMARY) HYPERTENSION: ICD-10-CM

## 2018-06-20 DIAGNOSIS — Z43.4 ENCOUNTER FOR ATTENTION TO OTHER ARTIFICIAL OPENINGS OF DIGESTIVE TRACT: Chronic | ICD-10-CM

## 2018-06-20 DIAGNOSIS — Z79.82 LONG TERM (CURRENT) USE OF ASPIRIN: ICD-10-CM

## 2018-06-20 DIAGNOSIS — I67.1 CEREBRAL ANEURYSM, NONRUPTURED: ICD-10-CM

## 2018-06-20 DIAGNOSIS — Z95.5 PRESENCE OF CORONARY ANGIOPLASTY IMPLANT AND GRAFT: Chronic | ICD-10-CM

## 2018-06-20 LAB
ALBUMIN SERPL ELPH-MCNC: 4 G/DL — SIGNIFICANT CHANGE UP (ref 3.3–5)
ALP SERPL-CCNC: 157 U/L — HIGH (ref 40–120)
ALT FLD-CCNC: 32 U/L — SIGNIFICANT CHANGE UP (ref 12–78)
ANION GAP SERPL CALC-SCNC: 6 MMOL/L — SIGNIFICANT CHANGE UP (ref 5–17)
APTT BLD: 29.2 SEC — SIGNIFICANT CHANGE UP (ref 27.5–37.4)
AST SERPL-CCNC: 24 U/L — SIGNIFICANT CHANGE UP (ref 15–37)
BILIRUB SERPL-MCNC: 0.3 MG/DL — SIGNIFICANT CHANGE UP (ref 0.2–1.2)
BUN SERPL-MCNC: 10 MG/DL — SIGNIFICANT CHANGE UP (ref 7–23)
CALCIUM SERPL-MCNC: 8.8 MG/DL — SIGNIFICANT CHANGE UP (ref 8.5–10.1)
CHLORIDE SERPL-SCNC: 111 MMOL/L — HIGH (ref 96–108)
CO2 SERPL-SCNC: 27 MMOL/L — SIGNIFICANT CHANGE UP (ref 22–31)
CREAT SERPL-MCNC: 0.78 MG/DL — SIGNIFICANT CHANGE UP (ref 0.5–1.3)
GLUCOSE SERPL-MCNC: 86 MG/DL — SIGNIFICANT CHANGE UP (ref 70–99)
HCT VFR BLD CALC: 37.4 % — SIGNIFICANT CHANGE UP (ref 34.5–45)
HGB BLD-MCNC: 12.5 G/DL — SIGNIFICANT CHANGE UP (ref 11.5–15.5)
INR BLD: 1.16 RATIO — SIGNIFICANT CHANGE UP (ref 0.88–1.16)
MCHC RBC-ENTMCNC: 29.6 PG — SIGNIFICANT CHANGE UP (ref 27–34)
MCHC RBC-ENTMCNC: 33.4 GM/DL — SIGNIFICANT CHANGE UP (ref 32–36)
MCV RBC AUTO: 88.6 FL — SIGNIFICANT CHANGE UP (ref 80–100)
NRBC # BLD: 0 /100 WBCS — SIGNIFICANT CHANGE UP (ref 0–0)
PLATELET # BLD AUTO: 234 K/UL — SIGNIFICANT CHANGE UP (ref 150–400)
POTASSIUM SERPL-MCNC: 4 MMOL/L — SIGNIFICANT CHANGE UP (ref 3.5–5.3)
POTASSIUM SERPL-SCNC: 4 MMOL/L — SIGNIFICANT CHANGE UP (ref 3.5–5.3)
PROT SERPL-MCNC: 9.1 GM/DL — HIGH (ref 6–8.3)
PROTHROM AB SERPL-ACNC: 12.7 SEC — SIGNIFICANT CHANGE UP (ref 9.8–12.7)
RBC # BLD: 4.22 M/UL — SIGNIFICANT CHANGE UP (ref 3.8–5.2)
RBC # FLD: 15.7 % — HIGH (ref 10.3–14.5)
SODIUM SERPL-SCNC: 144 MMOL/L — SIGNIFICANT CHANGE UP (ref 135–145)
WBC # BLD: 4.47 K/UL — SIGNIFICANT CHANGE UP (ref 3.8–10.5)
WBC # FLD AUTO: 4.47 K/UL — SIGNIFICANT CHANGE UP (ref 3.8–10.5)

## 2018-06-20 PROCEDURE — 93971 EXTREMITY STUDY: CPT | Mod: 26,RT

## 2018-06-20 PROCEDURE — 99284 EMERGENCY DEPT VISIT MOD MDM: CPT

## 2018-06-20 RX ORDER — IBUPROFEN 200 MG
600 TABLET ORAL ONCE
Qty: 0 | Refills: 0 | Status: COMPLETED | OUTPATIENT
Start: 2018-06-20 | End: 2018-06-20

## 2018-06-20 RX ORDER — DIPHENHYDRAMINE HCL 50 MG
25 CAPSULE ORAL ONCE
Qty: 0 | Refills: 0 | Status: COMPLETED | OUTPATIENT
Start: 2018-06-20 | End: 2018-06-20

## 2018-06-20 RX ADMIN — Medication 600 MILLIGRAM(S): at 17:31

## 2018-06-20 RX ADMIN — Medication 25 MILLIGRAM(S): at 17:31

## 2018-06-20 NOTE — ED ADULT TRIAGE NOTE - CHIEF COMPLAINT QUOTE
right arm , redness and swollen , was given clindamycin yesterday and had an allergy reaction to it as per pt.

## 2018-06-20 NOTE — ED PROVIDER NOTE - OBJECTIVE STATEMENT
58 yo F with 1 days of RUE swelling, erythema, tenderness, pain from the wrist down.  Pt. says it started shortly after getting IV clindamycin in the R hand.  Her doctors at the time were concerned for allergic reaction.  Clinda was given because pt. is pen/sulfa allergic and she was post operative procedure--LLE ulcer wash and cleanout.  They told her to take benadryl prn.  Her pcp was consulted today, and he told her to come to the ER to r/o DVT in the RUE.  Pt. has no other complaints.  She feels well otherwise, no constitutional symptoms.   ROS: negative for fever, cough, headache, chest pain, shortness of breath, abd pain, nausea, vomiting, diarrhea, rash, paresthesia, and weakness--all other systems reviewed are negative.   PMH: HTN, HLD, 5 brain aneurisms (2 coiled), Sz, Meds: warfarin, Benicar, Crestor, clonidine, amlodipine, labetalol, irbesartan, asa, levetiracetam, phenytoin, fluocinonide cream; SH: Denies smoking/drinking/drug use

## 2018-06-20 NOTE — ED ADULT NURSE NOTE - PMH
Aneurysm    Brain aneurysm    Coronary artery disease    Coronary artery disease    Deep vein thrombosis (DVT)    Epilepsy    HTN (hypertension)    Hyperlipidemia    Hypertension    Seizure    Stroke

## 2018-06-20 NOTE — ED ADULT NURSE NOTE - PSH
Aneurysm  see HPI  s/p cerebral aneurysm clipping in Dec 2013, after which pt was in 30 day induced coma, had  shunt,  tracheostomy and reversal, followed by rehab  Cholecystostomy care    H/O cerebral aneurysm repair  clipping  H/O craniotomy    History of cranioplasty  B/L 04/14  Presence of IVC filter    S/P primary angioplasty with coronary stent    S/P  shunt

## 2018-06-20 NOTE — ED PROVIDER NOTE - PROGRESS NOTE DETAILS
Pt endorsed by Dr Hurst, present for eval of swelling & erythema to RUE, pending USresults.  US neg for DVT.  Discussed results and outcome of testing with the patient, given copy as well.  Patient advised to please follow up with their primary care doctor within the next 24 hours and return to the Emergency Department for worsening symptoms or any other concerns.  Patient advised that their doctor may call  to follow up on the specific results of the tests performed today in the emergency department.

## 2018-06-20 NOTE — ED ADULT NURSE NOTE - OBJECTIVE STATEMENT
patient received, alert and oriented x4, noted arm redness and swelling, denies chest pain. denies sob.

## 2018-06-20 NOTE — ED PROVIDER NOTE - PHYSICAL EXAMINATION
Vitals: WNL  Gen: AAOx3, NAD, sitting comfortably in stretcher  Head: ncat, perrla, eomi b/l  Neck: supple, no lymphadenopathy, no midline deviation  Heart: rrr, no m/r/g  Lungs: CTA b/l, no rales/ronchi/wheezes  Abd: soft, nontender, non-distended, no rebound or guarding  Ext: no clubbing/cyanosis, RUE has soft tissue edema from R forearm down to wrist and dorsal hand, area is tender to palpation, dark erythema (contused) on surface, no skin break, normal rom at r wrist, normal cap refill in r hand, sensation and muscle strength intact throughout RUE and hand and fingers or RUE  Neuro: sensation and muscle strength intact b/l, steady gait

## 2018-06-20 NOTE — ED PROVIDER NOTE - MEDICAL DECISION MAKING DETAILS
56 yo F with likely thrombophlebitis of RUE, r/o DVT  -basic labs, coags, us duplex RUE, US soft tissue wrist RUE, benadryl for itching, ibuprofen for pain, ice pack  -f/u results, reeval

## 2018-06-20 NOTE — ED PROVIDER NOTE - NS_EDPROVIDERDISPOUSERTYPE_ED_A_ED
First visit with breastfeeding mom. Discussed feeding baby on cue. Opening mouth, turning head from side to side, bringing hands to mouth and sucking movements are all early hunger cues. Crying is a late hunger cue. Do lots of skin to skin to help recognize early feeding cues. If baby does not nurse, continue skin to skin and offer again later. On average newborns eat at least 8 or more times per day without restriction. Cluster feeding is normal.  Reviewed positioning techniques and signs of a good latch. Discussed holding baby so that ear, shoulder and hip are in a straight line. Baby is held close and nose lines up with mom's nipple. Discussed supporting baby's neck and mom's breast.  When baby opens wide bring baby quickly onto the breast.  Try for an assymetrical latch with nipple pointed towards the roof of baby's mouth. Mouth should be wide and lips flanged around the breast.  Encouraged to nurse on the first breast until baby finishes that side. If baby comes off the breast quickly, you can re-offer that same side to ensure good stimulation before moving baby to next side. Offer the second breast, baby can take the second breast as long as desired. It is ok if they do not take the second side when offered. Listen and look for swallows. Once milk is in over the next few days, swallowing will become more frequent and obvious. If baby pausing on the breast longer than 10 seconds, remind baby to nurse. Attempt to burp between breasts and after feeding. Rotate which breast the baby starts on. Discussed expected output based on age. Discussed feed on demand. If necessary rouse for feedings at least until above birth weight. Reviewed Breastfeeding Packet and encouraged mom to write down feedings and output at least until first Ped visit.   In accordance with the 6400 AAP Policy Statement on Breastfeeding, Mothers of healthy term  infants should be delay pacifier use until breastfeeding is well-established, usually about 3 to 4 wk after birth. Pacifiers are not available on the Mother Infant Unit. Artificial nipples should also be avoided until breastfeeding is well established. Attending Attestation (For Attendings USE Only)...

## 2019-03-15 ENCOUNTER — APPOINTMENT (OUTPATIENT)
Dept: CARDIOLOGY | Facility: CLINIC | Age: 59
End: 2019-03-15

## 2019-03-27 LAB
INR PPP: 3.6
PT BLD: 35

## 2019-04-03 LAB
INR PPP: 4.1
PT BLD: 39.3

## 2019-04-10 ENCOUNTER — RX RENEWAL (OUTPATIENT)
Age: 59
End: 2019-04-10

## 2019-04-10 LAB
INR PPP: 1.9
PT BLD: 19.5

## 2019-04-17 LAB
INR PPP: 2.3
PT BLD: 22.6

## 2019-04-20 NOTE — H&P ADULT. - PRESSURE ULCER(S)
----- Message from Chary Weaver MD sent at 4/18/2019  1:41 PM CDT -----  Due for htn visit  
Called patient, no answer.     Left voicemail message for patient to call clinic to schedule an appointment.   
no

## 2019-04-24 ENCOUNTER — RX RENEWAL (OUTPATIENT)
Age: 59
End: 2019-04-24

## 2019-04-24 LAB
INR PPP: 2.1
PT BLD: 20.7

## 2019-05-01 LAB
INR PPP: 1.4
PT BLD: 14.6

## 2019-05-06 ENCOUNTER — MEDICATION RENEWAL (OUTPATIENT)
Age: 59
End: 2019-05-06

## 2019-05-08 LAB
INR PPP: 2.1
PT BLD: 20.9

## 2019-05-16 LAB
INR PPP: 2.7
PT BLD: 26.3

## 2019-05-31 LAB
INR PPP: 1.7
PT BLD: 16.9

## 2019-06-06 LAB — INR PPP: 1.4

## 2019-06-20 LAB
INR PPP: 3.2
PT BLD: 31.1

## 2019-06-28 LAB — INR PPP: 3.5

## 2019-07-03 LAB — INR PPP: 2.3

## 2019-07-10 LAB — INR PPP: 2.8

## 2019-07-25 LAB
INR PPP: 2.6
INR PPP: 2.8
PT BLD: 25.8
PT BLD: 27.8

## 2019-08-02 LAB — INR PPP: 1.7

## 2019-08-07 LAB
INR PPP: 2.9
PT BLD: 27.3

## 2019-08-15 LAB
INR PPP: 3.4
PT BLD: 31.3

## 2019-09-04 DIAGNOSIS — Z79.01 LONG TERM (CURRENT) USE OF ANTICOAGULANTS: ICD-10-CM

## 2019-09-04 LAB
INR PPP: 3.1
INR PPP: 3.2
PT BLD: 28.7
PT BLD: 29.6

## 2019-09-12 PROBLEM — Z79.01 LONG TERM (CURRENT) USE OF ANTICOAGULANTS: Status: ACTIVE | Noted: 2019-09-12

## 2019-09-24 LAB
INR PPP: 2.1
PT BLD: 20.7

## 2019-10-23 LAB
INR PPP: 2.4
INR PPP: 2.8
INR PPP: 3.2

## 2019-11-05 ENCOUNTER — APPOINTMENT (OUTPATIENT)
Dept: CARDIOLOGY | Facility: CLINIC | Age: 59
End: 2019-11-05
Payer: COMMERCIAL

## 2019-11-05 ENCOUNTER — NON-APPOINTMENT (OUTPATIENT)
Age: 59
End: 2019-11-05

## 2019-11-05 VITALS
HEART RATE: 69 BPM | BODY MASS INDEX: 30.55 KG/M2 | SYSTOLIC BLOOD PRESSURE: 140 MMHG | WEIGHT: 166 LBS | OXYGEN SATURATION: 96 % | HEIGHT: 62 IN | DIASTOLIC BLOOD PRESSURE: 90 MMHG | TEMPERATURE: 97.6 F

## 2019-11-05 DIAGNOSIS — Z82.49 FAMILY HISTORY OF ISCHEMIC HEART DISEASE AND OTHER DISEASES OF THE CIRCULATORY SYSTEM: ICD-10-CM

## 2019-11-05 DIAGNOSIS — L03.119 CELLULITIS OF UNSPECIFIED PART OF LIMB: ICD-10-CM

## 2019-11-05 DIAGNOSIS — G47.00 INSOMNIA, UNSPECIFIED: ICD-10-CM

## 2019-11-05 DIAGNOSIS — H53.9 UNSPECIFIED VISUAL DISTURBANCE: ICD-10-CM

## 2019-11-05 PROCEDURE — 93000 ELECTROCARDIOGRAM COMPLETE: CPT

## 2019-11-05 PROCEDURE — 99214 OFFICE O/P EST MOD 30 MIN: CPT

## 2019-11-05 RX ORDER — OLMESARTAN MEDOXOMIL 40 MG/1
40 TABLET, FILM COATED ORAL
Refills: 0 | Status: COMPLETED | COMMUNITY

## 2019-11-05 NOTE — PHYSICAL EXAM
[No Acute Distress] : no acute distress [Well Nourished] : well nourished [Well Developed] : well developed [Well-Appearing] : well-appearing [Normal Sclera/Conjunctiva] : normal sclera/conjunctiva [PERRL] : pupils equal round and reactive to light [EOMI] : extraocular movements intact [Normal Outer Ear/Nose] : the outer ears and nose were normal in appearance [Normal Oropharynx] : the oropharynx was normal [No JVD] : no jugular venous distention [No Lymphadenopathy] : no lymphadenopathy [Supple] : supple [Thyroid Normal, No Nodules] : the thyroid was normal and there were no nodules present [No Respiratory Distress] : no respiratory distress  [No Accessory Muscle Use] : no accessory muscle use [Clear to Auscultation] : lungs were clear to auscultation bilaterally [Normal Rate] : normal rate  [Regular Rhythm] : with a regular rhythm [Normal S1, S2] : normal S1 and S2 [No Murmur] : no murmur heard [No Carotid Bruits] : no carotid bruits [No Abdominal Bruit] : a ~M bruit was not heard ~T in the abdomen [No Varicosities] : no varicosities [Pedal Pulses Present] : the pedal pulses are present [No Edema] : there was no peripheral edema [No Palpable Aorta] : no palpable aorta [No Extremity Clubbing/Cyanosis] : no extremity clubbing/cyanosis [Normal Appearance] : normal in appearance [No Nipple Discharge] : no nipple discharge [No Axillary Lymphadenopathy] : no axillary lymphadenopathy [Soft] : abdomen soft [Non Tender] : non-tender [Non-distended] : non-distended [No Masses] : no abdominal mass palpated [No HSM] : no HSM [Normal Bowel Sounds] : normal bowel sounds [Normal Posterior Cervical Nodes] : no posterior cervical lymphadenopathy [Normal Anterior Cervical Nodes] : no anterior cervical lymphadenopathy [No CVA Tenderness] : no CVA  tenderness [No Spinal Tenderness] : no spinal tenderness [No Joint Swelling] : no joint swelling [Grossly Normal Strength/Tone] : grossly normal strength/tone [No Rash] : no rash [Coordination Grossly Intact] : coordination grossly intact [No Focal Deficits] : no focal deficits [Normal Gait] : normal gait [Deep Tendon Reflexes (DTR)] : deep tendon reflexes were 2+ and symmetric [Normal Affect] : the affect was normal [Normal Insight/Judgement] : insight and judgment were intact [de-identified] : Dense breasts

## 2019-11-05 NOTE — HISTORY OF PRESENT ILLNESS
[Post-hospitalization from ___ Hospital] : Post-hospitalization from [unfilled] Hospital [Admitted on: ___] : The patient was admitted on [unfilled] [FreeTextEntry2] : This is a 59 year old lady with a PMH of Brain Aneurysms, TIA, Epilepsy, HTN, HLD, Chronic Venous Insufficiency, and Vitamin D deficiency presents today after recent hospitalization to [facility]. Patient states that she was following up with her Wound DR on 10/28/2019 and was sent to the ER for Cellulitis. Patient was given Lovenox and IV antibiotics. Patient was started on Levaquin 500 mg for 7 days. Patient states that she is going to follow up with the wound care center at [facility] with Dr. Can Dietrich on 11/08/2019. Patient states that a visiting nurse service is supposed to come to her house tomorrow to change the bandage. pt denies any chest  pain dizziness ,lightheadedness ,nausea vomiting diaphoresis\par

## 2019-11-09 LAB
25(OH)D3 SERPL-MCNC: 13.2 NG/ML
ALBUMIN SERPL ELPH-MCNC: 4.6 G/DL
ALP BLD-CCNC: 177 U/L
ALT SERPL-CCNC: 41 U/L
ANION GAP SERPL CALC-SCNC: 13 MMOL/L
APPEARANCE: CLEAR
AST SERPL-CCNC: 33 U/L
BACTERIA: NEGATIVE
BASOPHILS # BLD AUTO: 0.02 K/UL
BASOPHILS NFR BLD AUTO: 0.4 %
BILIRUB DIRECT SERPL-MCNC: 0.1 MG/DL
BILIRUB INDIRECT SERPL-MCNC: 0.2 MG/DL
BILIRUB SERPL-MCNC: 0.2 MG/DL
BILIRUBIN URINE: NEGATIVE
BLOOD URINE: NEGATIVE
BUN SERPL-MCNC: 13 MG/DL
CALCIUM SERPL-MCNC: 9.5 MG/DL
CHLORIDE SERPL-SCNC: 109 MMOL/L
CHOLEST SERPL-MCNC: 186 MG/DL
CHOLEST/HDLC SERPL: 2.3 RATIO
CK SERPL-CCNC: 101 U/L
CO2 SERPL-SCNC: 24 MMOL/L
COLOR: NORMAL
CREAT SERPL-MCNC: 0.85 MG/DL
EOSINOPHIL # BLD AUTO: 0.08 K/UL
EOSINOPHIL NFR BLD AUTO: 1.4 %
ESTIMATED AVERAGE GLUCOSE: 111 MG/DL
GLUCOSE QUALITATIVE U: NEGATIVE
GLUCOSE SERPL-MCNC: 76 MG/DL
HBA1C MFR BLD HPLC: 5.5 %
HCT VFR BLD CALC: 36.4 %
HDLC SERPL-MCNC: 82 MG/DL
HGB BLD-MCNC: 11.3 G/DL
HYALINE CASTS: 2 /LPF
IMM GRANULOCYTES NFR BLD AUTO: 0.2 %
INR PPP: 1.79 RATIO
KETONES URINE: NEGATIVE
LDLC SERPL CALC-MCNC: 84 MG/DL
LEUKOCYTE ESTERASE URINE: ABNORMAL
LYMPHOCYTES # BLD AUTO: 1.62 K/UL
LYMPHOCYTES NFR BLD AUTO: 28.4 %
MAN DIFF?: NORMAL
MCHC RBC-ENTMCNC: 29.7 PG
MCHC RBC-ENTMCNC: 31 GM/DL
MCV RBC AUTO: 95.5 FL
MICROSCOPIC-UA: NORMAL
MONOCYTES # BLD AUTO: 0.48 K/UL
MONOCYTES NFR BLD AUTO: 8.4 %
NEUTROPHILS # BLD AUTO: 3.49 K/UL
NEUTROPHILS NFR BLD AUTO: 61.2 %
NITRITE URINE: NEGATIVE
PH URINE: 6
PLATELET # BLD AUTO: 245 K/UL
POTASSIUM SERPL-SCNC: 3.9 MMOL/L
PROT SERPL-MCNC: 8.4 G/DL
PROTEIN URINE: ABNORMAL
PT BLD: 20.6 SEC
RBC # BLD: 3.81 M/UL
RBC # FLD: 14.7 %
RED BLOOD CELLS URINE: 2 /HPF
SODIUM SERPL-SCNC: 146 MMOL/L
SPECIFIC GRAVITY URINE: 1.02
SQUAMOUS EPITHELIAL CELLS: 7 /HPF
T4 FREE SERPL-MCNC: 0.9 NG/DL
TRIGL SERPL-MCNC: 98 MG/DL
TSH SERPL-ACNC: 2.35 UIU/ML
UROBILINOGEN URINE: NORMAL
WBC # FLD AUTO: 5.7 K/UL
WHITE BLOOD CELLS URINE: 12 /HPF

## 2019-11-12 DIAGNOSIS — R89.9 UNSPECIFIED ABNORMAL FINDING IN SPECIMENS FROM OTHER ORGANS, SYSTEMS AND TISSUES: ICD-10-CM

## 2019-11-14 LAB — INR PPP: 2.8

## 2019-11-21 LAB
INR PPP: 2.5
PT BLD: 24

## 2019-11-27 LAB
INR PPP: 2.5
PT BLD: 23.6

## 2020-01-09 ENCOUNTER — NON-APPOINTMENT (OUTPATIENT)
Age: 60
End: 2020-01-09

## 2020-01-09 ENCOUNTER — APPOINTMENT (OUTPATIENT)
Dept: CARDIOLOGY | Facility: CLINIC | Age: 60
End: 2020-01-09
Payer: COMMERCIAL

## 2020-01-09 VITALS
BODY MASS INDEX: 30.91 KG/M2 | SYSTOLIC BLOOD PRESSURE: 180 MMHG | WEIGHT: 168 LBS | TEMPERATURE: 97.8 F | DIASTOLIC BLOOD PRESSURE: 90 MMHG | HEART RATE: 55 BPM | HEIGHT: 62 IN | OXYGEN SATURATION: 98 %

## 2020-01-09 PROCEDURE — 93000 ELECTROCARDIOGRAM COMPLETE: CPT

## 2020-01-09 PROCEDURE — 99213 OFFICE O/P EST LOW 20 MIN: CPT

## 2020-01-09 NOTE — HISTORY OF PRESENT ILLNESS
[FreeTextEntry1] : pt presents for f/u pt here prior to cerebral angiogram scheduled .pt with hx of multiple brain aneurysms s/p surgery in past .pt with hx of dvt s/p ivc filter .pt s/p recent seizure 12/25 s/p hospitalization pt denies any chest  pain dizziness ,lightheadedness ,nausea vomiting diaphoresis\par

## 2020-01-09 NOTE — PHYSICAL EXAM
[Normal Appearance] : normal appearance [General Appearance - Well Developed] : well developed [No Deformities] : no deformities [Well Groomed] : well groomed [General Appearance - Well Nourished] : well nourished [General Appearance - In No Acute Distress] : no acute distress [Eyelids - No Xanthelasma] : the eyelids demonstrated no xanthelasmas [Normal Conjunctiva] : the conjunctiva exhibited no abnormalities [No Oral Pallor] : no oral pallor [Normal Oral Mucosa] : normal oral mucosa [No Oral Cyanosis] : no oral cyanosis [Normal Jugular Venous A Waves Present] : normal jugular venous A waves present [No Jugular Venous Navarro A Waves] : no jugular venous navarro A waves [Normal Jugular Venous V Waves Present] : normal jugular venous V waves present [Heart Rate And Rhythm] : heart rate and rhythm were normal [Heart Sounds] : normal S1 and S2 [Murmurs] : no murmurs present [Auscultation Breath Sounds / Voice Sounds] : lungs were clear to auscultation bilaterally [Respiration, Rhythm And Depth] : normal respiratory rhythm and effort [Exaggerated Use Of Accessory Muscles For Inspiration] : no accessory muscle use [Abdomen Tenderness] : non-tender [Abdomen Soft] : soft [Abdomen Mass (___ Cm)] : no abdominal mass palpated [Abnormal Walk] : normal gait [Gait - Sufficient For Exercise Testing] : the gait was sufficient for exercise testing [Skin Color & Pigmentation] : normal skin color and pigmentation [] : no rash [No Venous Stasis] : no venous stasis [Skin Lesions] : no skin lesions [No Skin Ulcers] : no skin ulcer [No Xanthoma] : no  xanthoma was observed [Oriented To Time, Place, And Person] : oriented to person, place, and time [Affect] : the affect was normal [No Anxiety] : not feeling anxious [Mood] : the mood was normal [FreeTextEntry1] : left leg in wrap

## 2020-01-12 LAB
25(OH)D3 SERPL-MCNC: 12.6 NG/ML
ALBUMIN SERPL ELPH-MCNC: 4.4 G/DL
ALP BLD-CCNC: 152 U/L
ALT SERPL-CCNC: 21 U/L
ANION GAP SERPL CALC-SCNC: 11 MMOL/L
APPEARANCE: CLEAR
AST SERPL-CCNC: 17 U/L
BACTERIA UR CULT: NORMAL
BACTERIA: NEGATIVE
BASOPHILS # BLD AUTO: 0.02 K/UL
BASOPHILS NFR BLD AUTO: 0.5 %
BILIRUB DIRECT SERPL-MCNC: 0.1 MG/DL
BILIRUB INDIRECT SERPL-MCNC: 0.1 MG/DL
BILIRUB SERPL-MCNC: <0.2 MG/DL
BILIRUBIN URINE: NEGATIVE
BLOOD URINE: NEGATIVE
BUN SERPL-MCNC: 15 MG/DL
CALCIUM SERPL-MCNC: 9.2 MG/DL
CHLORIDE SERPL-SCNC: 110 MMOL/L
CHOLEST SERPL-MCNC: 236 MG/DL
CHOLEST/HDLC SERPL: 3.3 RATIO
CK SERPL-CCNC: 67 U/L
CO2 SERPL-SCNC: 25 MMOL/L
COLOR: NORMAL
CREAT SERPL-MCNC: 0.8 MG/DL
EOSINOPHIL # BLD AUTO: 0.05 K/UL
EOSINOPHIL NFR BLD AUTO: 1.2 %
ESTIMATED AVERAGE GLUCOSE: 117 MG/DL
GGT SERPL-CCNC: 221 U/L
GLUCOSE QUALITATIVE U: NEGATIVE
GLUCOSE SERPL-MCNC: 75 MG/DL
HBA1C MFR BLD HPLC: 5.7 %
HCT VFR BLD CALC: 37 %
HDLC SERPL-MCNC: 72 MG/DL
HGB BLD-MCNC: 11.6 G/DL
HYALINE CASTS: 2 /LPF
IMM GRANULOCYTES NFR BLD AUTO: 0.2 %
INR PPP: 2.55 RATIO
KETONES URINE: NEGATIVE
LDLC SERPL CALC-MCNC: 128 MG/DL
LDLC SERPL DIRECT ASSAY-MCNC: 135 MG/DL
LEUKOCYTE ESTERASE URINE: ABNORMAL
LEVETIRACETAM SERPL-MCNC: 40.2 MCG/ML
LYMPHOCYTES # BLD AUTO: 1.66 K/UL
LYMPHOCYTES NFR BLD AUTO: 40.2 %
MAN DIFF?: NORMAL
MCHC RBC-ENTMCNC: 29.7 PG
MCHC RBC-ENTMCNC: 31.4 GM/DL
MCV RBC AUTO: 94.9 FL
MICROSCOPIC-UA: NORMAL
MONOCYTES # BLD AUTO: 0.4 K/UL
MONOCYTES NFR BLD AUTO: 9.7 %
NEUTROPHILS # BLD AUTO: 1.99 K/UL
NEUTROPHILS NFR BLD AUTO: 48.2 %
NITRITE URINE: NEGATIVE
PH URINE: 6.5
PHENYTOIN SERPL QL: 17.4 UG/ML
PLATELET # BLD AUTO: 227 K/UL
POTASSIUM SERPL-SCNC: 4.2 MMOL/L
PROT SERPL-MCNC: 7.9 G/DL
PROTEIN URINE: NORMAL
PT BLD: 29.7 SEC
RBC # BLD: 3.9 M/UL
RBC # FLD: 16.1 %
RED BLOOD CELLS URINE: 2 /HPF
SODIUM SERPL-SCNC: 146 MMOL/L
SPECIFIC GRAVITY URINE: 1.02
SQUAMOUS EPITHELIAL CELLS: 10 /HPF
TRIGL SERPL-MCNC: 179 MG/DL
URATE SERPL-MCNC: 5.3 MG/DL
UROBILINOGEN URINE: NORMAL
WBC # FLD AUTO: 4.13 K/UL
WHITE BLOOD CELLS URINE: 10 /HPF

## 2020-01-24 DIAGNOSIS — R74.8 ABNORMAL LEVELS OF OTHER SERUM ENZYMES: ICD-10-CM

## 2020-02-05 ENCOUNTER — APPOINTMENT (OUTPATIENT)
Dept: CARDIOLOGY | Facility: CLINIC | Age: 60
End: 2020-02-05

## 2020-02-14 LAB — INR PPP: 2

## 2020-02-21 LAB
INR PPP: 2.6
PT BLD: 24.4

## 2020-02-28 LAB
INR PPP: 2.6
PT BLD: 24.6

## 2020-03-14 LAB — INR PPP: 2.7

## 2020-04-10 LAB
INR PPP: 3.4
PT BLD: 31.6

## 2020-05-08 LAB
INR PPP: 2.8
PT BLD: 26.6

## 2020-05-15 LAB
INR PPP: 2.1
PT BLD: 20.5

## 2020-05-22 LAB
INR PPP: 2.5
PT BLD: 23.5

## 2020-05-29 LAB
INR PPP: 3.3
PT BLD: 30.7

## 2020-06-05 LAB
INR PPP: 2.6
PT BLD: 24.8

## 2020-06-15 LAB
INR PPP: 3
PT BLD: 28.6

## 2020-06-19 LAB
INR PPP: 3.1
PT BLD: 30.3

## 2020-06-26 LAB
INR PPP: 2
PT BLD: 19.3

## 2020-07-07 LAB
INR PPP: 2.8
PT BLD: 27.1

## 2020-07-13 LAB
INR PPP: 2.8
PT BLD: 27.4

## 2020-10-13 ENCOUNTER — NON-APPOINTMENT (OUTPATIENT)
Age: 60
End: 2020-10-13

## 2020-10-13 ENCOUNTER — APPOINTMENT (OUTPATIENT)
Dept: CARDIOLOGY | Facility: CLINIC | Age: 60
End: 2020-10-13
Payer: COMMERCIAL

## 2020-10-13 VITALS
OXYGEN SATURATION: 98 % | DIASTOLIC BLOOD PRESSURE: 90 MMHG | HEART RATE: 54 BPM | TEMPERATURE: 97.9 F | BODY MASS INDEX: 30.36 KG/M2 | WEIGHT: 165 LBS | SYSTOLIC BLOOD PRESSURE: 180 MMHG | HEIGHT: 62 IN

## 2020-10-13 DIAGNOSIS — R74.8 ABNORMAL LEVELS OF OTHER SERUM ENZYMES: ICD-10-CM

## 2020-10-13 PROCEDURE — 99214 OFFICE O/P EST MOD 30 MIN: CPT

## 2020-10-13 PROCEDURE — 93000 ELECTROCARDIOGRAM COMPLETE: CPT

## 2020-10-13 NOTE — HISTORY OF PRESENT ILLNESS
[FreeTextEntry1] : This is a 60 year old lady with a PMH of Brain Aneurysms, Seizures, HTN, HLD, CAD, Vitamin D deficiency and DVT, presents today for hospital follow up. Patient states that she had a seizure on October 4, 3030. She was admitted to [facility] for workup. She has not followed up with Dr. Jenkins (Neurologist). Patient states that she has recently not been watching her diet, and she has been eating foods high in salt. Patient states that since last visit, she did not have abdominal sonogram or follow up with Urologist. Patient denies dyspnea, palpitations, chest pain, nausea, vomiting, dizziness and lightheadedness.\par

## 2020-10-13 NOTE — PHYSICAL EXAM
[General Appearance - Well Developed] : well developed [Normal Appearance] : normal appearance [Well Groomed] : well groomed [General Appearance - Well Nourished] : well nourished [No Deformities] : no deformities [General Appearance - In No Acute Distress] : no acute distress [Normal Conjunctiva] : the conjunctiva exhibited no abnormalities [Eyelids - No Xanthelasma] : the eyelids demonstrated no xanthelasmas [Normal Oral Mucosa] : normal oral mucosa [No Oral Pallor] : no oral pallor [No Oral Cyanosis] : no oral cyanosis [Normal Jugular Venous A Waves Present] : normal jugular venous A waves present [Normal Jugular Venous V Waves Present] : normal jugular venous V waves present [No Jugular Venous Navarro A Waves] : no jugular venous navarro A waves [Respiration, Rhythm And Depth] : normal respiratory rhythm and effort [Exaggerated Use Of Accessory Muscles For Inspiration] : no accessory muscle use [Auscultation Breath Sounds / Voice Sounds] : lungs were clear to auscultation bilaterally [Heart Rate And Rhythm] : heart rate and rhythm were normal [Heart Sounds] : normal S1 and S2 [Murmurs] : no murmurs present [Abdomen Soft] : soft [Abdomen Tenderness] : non-tender [Abdomen Mass (___ Cm)] : no abdominal mass palpated [Abnormal Walk] : normal gait [Gait - Sufficient For Exercise Testing] : the gait was sufficient for exercise testing [Nail Clubbing] : no clubbing of the fingernails [Cyanosis, Localized] : no localized cyanosis [Petechial Hemorrhages (___cm)] : no petechial hemorrhages [Skin Color & Pigmentation] : normal skin color and pigmentation [] : no rash [No Venous Stasis] : no venous stasis [Skin Lesions] : no skin lesions [No Skin Ulcers] : no skin ulcer [No Xanthoma] : no  xanthoma was observed [Oriented To Time, Place, And Person] : oriented to person, place, and time [Affect] : the affect was normal [Mood] : the mood was normal [No Anxiety] : not feeling anxious

## 2020-11-06 ENCOUNTER — NON-APPOINTMENT (OUTPATIENT)
Age: 60
End: 2020-11-06

## 2020-11-11 ENCOUNTER — NON-APPOINTMENT (OUTPATIENT)
Age: 60
End: 2020-11-11

## 2020-11-11 ENCOUNTER — APPOINTMENT (OUTPATIENT)
Dept: CARDIOLOGY | Facility: CLINIC | Age: 60
End: 2020-11-11
Payer: COMMERCIAL

## 2020-11-11 VITALS
BODY MASS INDEX: 30.36 KG/M2 | RESPIRATION RATE: 17 BRPM | HEIGHT: 62 IN | TEMPERATURE: 97.2 F | HEART RATE: 77 BPM | SYSTOLIC BLOOD PRESSURE: 160 MMHG | OXYGEN SATURATION: 99 % | WEIGHT: 165 LBS | DIASTOLIC BLOOD PRESSURE: 92 MMHG

## 2020-11-11 DIAGNOSIS — S81.809A UNSPECIFIED OPEN WOUND, UNSPECIFIED LOWER LEG, INITIAL ENCOUNTER: ICD-10-CM

## 2020-11-11 PROCEDURE — 99214 OFFICE O/P EST MOD 30 MIN: CPT

## 2020-11-11 PROCEDURE — 93000 ELECTROCARDIOGRAM COMPLETE: CPT

## 2020-11-11 NOTE — PHYSICAL EXAM
[General Appearance - Well Developed] : well developed [Normal Appearance] : normal appearance [Well Groomed] : well groomed [General Appearance - Well Nourished] : well nourished [No Deformities] : no deformities [General Appearance - In No Acute Distress] : no acute distress [Normal Conjunctiva] : the conjunctiva exhibited no abnormalities [Eyelids - No Xanthelasma] : the eyelids demonstrated no xanthelasmas [Normal Oral Mucosa] : normal oral mucosa [No Oral Pallor] : no oral pallor [No Oral Cyanosis] : no oral cyanosis [Normal Jugular Venous A Waves Present] : normal jugular venous A waves present [Normal Jugular Venous V Waves Present] : normal jugular venous V waves present [No Jugular Venous Navarro A Waves] : no jugular venous navarro A waves [Respiration, Rhythm And Depth] : normal respiratory rhythm and effort [Exaggerated Use Of Accessory Muscles For Inspiration] : no accessory muscle use [Auscultation Breath Sounds / Voice Sounds] : lungs were clear to auscultation bilaterally [Heart Rate And Rhythm] : heart rate and rhythm were normal [Heart Sounds] : normal S1 and S2 [Murmurs] : no murmurs present [Abdomen Soft] : soft [Abdomen Tenderness] : non-tender [Abdomen Mass (___ Cm)] : no abdominal mass palpated [Abnormal Walk] : normal gait [Gait - Sufficient For Exercise Testing] : the gait was sufficient for exercise testing [Nail Clubbing] : no clubbing of the fingernails [Cyanosis, Localized] : no localized cyanosis [Petechial Hemorrhages (___cm)] : no petechial hemorrhages [Skin Color & Pigmentation] : normal skin color and pigmentation [] : no rash [No Venous Stasis] : no venous stasis [Skin Lesions] : no skin lesions [No Skin Ulcers] : no skin ulcer [No Xanthoma] : no  xanthoma was observed [Oriented To Time, Place, And Person] : oriented to person, place, and time [Affect] : the affect was normal [Mood] : the mood was normal [No Anxiety] : not feeling anxious [FreeTextEntry1] : Wounds to the left leg

## 2020-11-11 NOTE — HISTORY OF PRESENT ILLNESS
[FreeTextEntry1] : This is a 60 year old lady with a PMH of Brain Aneurysms, Seizures, HTN, HLD, CAD, Vitamin D deficiency and DVT, presents today for follow up. Patient states that she is currently taking Cardura and is doing well on the Hydralazine 25 mg BID. Patient to have Dr. Martinez follow up appointment on Nov 30, 2020.  Patient states that she did not have mammogram or sonogram schedule. She states that she has been to Dr. Jenkins, who requested blood work. She did not follow up with Heme or GI as instructed. Patient also states that she is scheduled for a skin graft on 11/16/2020 with a wound specialist Dr. Rowell. Patient inquiring about holding blood thinners. Patient denies dyspnea, palpitations, chest pain, nausea, vomiting, dizziness and lightheadedness.\par

## 2020-11-13 ENCOUNTER — NON-APPOINTMENT (OUTPATIENT)
Age: 60
End: 2020-11-13

## 2020-11-16 ENCOUNTER — NON-APPOINTMENT (OUTPATIENT)
Age: 60
End: 2020-11-16

## 2020-11-19 LAB — INR PPP: 1.3

## 2020-11-30 ENCOUNTER — APPOINTMENT (OUTPATIENT)
Dept: UROLOGY | Facility: CLINIC | Age: 60
End: 2020-11-30
Payer: COMMERCIAL

## 2020-11-30 VITALS
SYSTOLIC BLOOD PRESSURE: 179 MMHG | TEMPERATURE: 97.6 F | BODY MASS INDEX: 30.91 KG/M2 | RESPIRATION RATE: 16 BRPM | HEART RATE: 75 BPM | WEIGHT: 168 LBS | DIASTOLIC BLOOD PRESSURE: 90 MMHG | HEIGHT: 62 IN

## 2020-11-30 DIAGNOSIS — N20.0 CALCULUS OF KIDNEY: ICD-10-CM

## 2020-11-30 LAB — INR PPP: 2.2

## 2020-11-30 PROCEDURE — 99203 OFFICE O/P NEW LOW 30 MIN: CPT

## 2020-11-30 PROCEDURE — 99072 ADDL SUPL MATRL&STAF TM PHE: CPT

## 2020-11-30 NOTE — REVIEW OF SYSTEMS
[Negative] : Heme/Lymph [Eyesight Problems] : eyesight problems [Told you have blood in urine on a urine test] : told blood was present in a urine test [History of kidney stones] : history of kidney stones [Skin Wound] : skin wound

## 2020-11-30 NOTE — PHYSICAL EXAM
[General Appearance - Well Developed] : well developed [General Appearance - Well Nourished] : well nourished [General Appearance - In No Acute Distress] : no acute distress [Edema] : no peripheral edema [Exaggerated Use Of Accessory Muscles For Inspiration] : no accessory muscle use [Abdomen Soft] : soft [Abdomen Tenderness] : non-tender [Costovertebral Angle Tenderness] : no ~M costovertebral angle tenderness [Normal Station and Gait] : the gait and station were normal for the patient's age [Oriented To Time, Place, And Person] : oriented to person, place, and time [Cervical Lymph Nodes Enlarged Anterior Bilaterally] : anterior cervical [Supraclavicular Lymph Nodes Enlarged Bilaterally] : supraclavicular [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

## 2020-11-30 NOTE — HISTORY OF PRESENT ILLNESS
[FreeTextEntry1] : 60 year old F with cc of microscopic hematuria and pyuria. Pt was seen by PCP for routine exam and found to have blood in the urine and WBC. Review of urine also shows many epis so suspect contamination. She has seen  (Dr Tapia) within the last couple of years and had a cystoscopy that was negative by report. She reports that she had a CT and was told she had stones. Denies gross hematuria. Possibly passed one stone prior. ? mom with hx of stones. No issues with UTIs. No tobacco hx. No exposure hx. No family hx of  malignancy. Mom with hx of breast and colon cancers. \par \par At baseline, no urinary complaints. Drinks water, juice and a can of soda with dinner. On a low sodium diet.

## 2020-11-30 NOTE — ASSESSMENT
[FreeTextEntry1] : Microscopic hematuria and pyuria--suspect is from contamination with many epis. In any event, pt has had eval within the last couple of years. Has hx of stones per report and this needs follow-up. \par --renal US and follow-up\par --cont low sodium diet and increase water intake

## 2021-01-03 ENCOUNTER — NON-APPOINTMENT (OUTPATIENT)
Age: 61
End: 2021-01-03

## 2021-01-03 LAB
25(OH)D3 SERPL-MCNC: 17 NG/ML
ALBUMIN SERPL ELPH-MCNC: 4.4 G/DL
ALP BLD-CCNC: 118 U/L
ALT SERPL-CCNC: 19 U/L
ANION GAP SERPL CALC-SCNC: 11 MMOL/L
ANION GAP SERPL CALC-SCNC: 13 MMOL/L
APPEARANCE: CLEAR
AST SERPL-CCNC: 21 U/L
BACTERIA UR CULT: NORMAL
BACTERIA: NEGATIVE
BASOPHILS # BLD AUTO: 0.02 K/UL
BASOPHILS # BLD AUTO: 0.02 K/UL
BASOPHILS NFR BLD AUTO: 0.4 %
BASOPHILS NFR BLD AUTO: 0.5 %
BILIRUB DIRECT SERPL-MCNC: 0.1 MG/DL
BILIRUB INDIRECT SERPL-MCNC: 0.1 MG/DL
BILIRUB SERPL-MCNC: 0.2 MG/DL
BILIRUBIN URINE: NEGATIVE
BLOOD URINE: NEGATIVE
BUN SERPL-MCNC: 11 MG/DL
BUN SERPL-MCNC: 13 MG/DL
CALCIUM SERPL-MCNC: 9.4 MG/DL
CALCIUM SERPL-MCNC: 9.8 MG/DL
CHLORIDE SERPL-SCNC: 105 MMOL/L
CHLORIDE SERPL-SCNC: 111 MMOL/L
CHOLEST SERPL-MCNC: 228 MG/DL
CHOLEST/HDLC SERPL: 3.6 RATIO
CO2 SERPL-SCNC: 24 MMOL/L
CO2 SERPL-SCNC: 24 MMOL/L
COLOR: NORMAL
CREAT SERPL-MCNC: 0.78 MG/DL
CREAT SERPL-MCNC: 0.88 MG/DL
EOSINOPHIL # BLD AUTO: 0.07 K/UL
EOSINOPHIL # BLD AUTO: 0.08 K/UL
EOSINOPHIL NFR BLD AUTO: 1.6 %
EOSINOPHIL NFR BLD AUTO: 1.6 %
ESTIMATED AVERAGE GLUCOSE: 117 MG/DL
FERRITIN SERPL-MCNC: 207 NG/ML
FOLATE SERPL-MCNC: 11.7 NG/ML
GGT SERPL-CCNC: 192 U/L
GLUCOSE QUALITATIVE U: NEGATIVE
GLUCOSE SERPL-MCNC: 69 MG/DL
GLUCOSE SERPL-MCNC: 79 MG/DL
HBA1C MFR BLD HPLC: 5.7 %
HCT VFR BLD CALC: 32.8 %
HCT VFR BLD CALC: 33 %
HDLC SERPL-MCNC: 64 MG/DL
HGB BLD-MCNC: 10.1 G/DL
HGB BLD-MCNC: 10.5 G/DL
HYALINE CASTS: 1 /LPF
IMM GRANULOCYTES NFR BLD AUTO: 0.2 %
IMM GRANULOCYTES NFR BLD AUTO: 0.5 %
INR PPP: 1.4
INR PPP: 1.7 RATIO
INR PPP: 2.81 RATIO
IRON SERPL-MCNC: 56 UG/DL
KETONES URINE: NEGATIVE
LDLC SERPL CALC-MCNC: 141 MG/DL
LEUKOCYTE ESTERASE URINE: NEGATIVE
LEVETIRACETAM SERPL-MCNC: 103.4 UG/ML
LEVETIRACETAM SERPL-MCNC: 39.6 MCG/ML
LYMPHOCYTES # BLD AUTO: 1.27 K/UL
LYMPHOCYTES # BLD AUTO: 1.4 K/UL
LYMPHOCYTES NFR BLD AUTO: 28.6 %
LYMPHOCYTES NFR BLD AUTO: 28.7 %
MAN DIFF?: NORMAL
MAN DIFF?: NORMAL
MCHC RBC-ENTMCNC: 30 PG
MCHC RBC-ENTMCNC: 30.3 PG
MCHC RBC-ENTMCNC: 30.6 GM/DL
MCHC RBC-ENTMCNC: 32 GM/DL
MCV RBC AUTO: 94.5 FL
MCV RBC AUTO: 97.9 FL
MICROSCOPIC-UA: NORMAL
MONOCYTES # BLD AUTO: 0.33 K/UL
MONOCYTES # BLD AUTO: 0.48 K/UL
MONOCYTES NFR BLD AUTO: 10.8 %
MONOCYTES NFR BLD AUTO: 6.8 %
NEUTROPHILS # BLD AUTO: 2.58 K/UL
NEUTROPHILS # BLD AUTO: 3.03 K/UL
NEUTROPHILS NFR BLD AUTO: 58 %
NEUTROPHILS NFR BLD AUTO: 62.3 %
NITRITE URINE: NEGATIVE
PH URINE: 6.5
PHENYTOIN SERPL QL: 4.1 UG/ML
PHENYTOIN SERPL QL: <0.8 UG/ML
PLATELET # BLD AUTO: 219 K/UL
PLATELET # BLD AUTO: 251 K/UL
POTASSIUM SERPL-SCNC: 3.9 MMOL/L
POTASSIUM SERPL-SCNC: 4.1 MMOL/L
PROT SERPL-MCNC: 8.3 G/DL
PROTEIN URINE: ABNORMAL
PT BLD: 14.8
PT BLD: 19.6 SEC
PT BLD: 31.8 SEC
RBC # BLD: 3.37 M/UL
RBC # BLD: 3.47 M/UL
RBC # FLD: 14.6 %
RBC # FLD: 15.4 %
RED BLOOD CELLS URINE: 4 /HPF
SODIUM SERPL-SCNC: 142 MMOL/L
SODIUM SERPL-SCNC: 146 MMOL/L
SPECIFIC GRAVITY URINE: 1.02
SQUAMOUS EPITHELIAL CELLS: 11 /HPF
T4 FREE SERPL-MCNC: 0.9 NG/DL
TRIGL SERPL-MCNC: 113 MG/DL
TSH SERPL-ACNC: 2.85 UIU/ML
UROBILINOGEN URINE: NORMAL
VIT B12 SERPL-MCNC: 414 PG/ML
WBC # FLD AUTO: 4.44 K/UL
WBC # FLD AUTO: 4.87 K/UL
WHITE BLOOD CELLS URINE: 7 /HPF

## 2021-01-06 LAB — INR PPP: 1.6

## 2021-01-14 LAB — INR PPP: 3

## 2021-01-20 LAB — INR PPP: 3.8

## 2021-01-27 LAB — INR PPP: 2.7

## 2021-02-05 LAB — INR PPP: 3.4

## 2021-02-07 LAB
25(OH)D3 SERPL-MCNC: 15.5 NG/ML
ALBUMIN SERPL ELPH-MCNC: 4.2 G/DL
ALP BLD-CCNC: 125 U/L
ALT SERPL-CCNC: 13 U/L
ANION GAP SERPL CALC-SCNC: 13 MMOL/L
AST SERPL-CCNC: 19 U/L
BASOPHILS # BLD AUTO: 0.02 K/UL
BASOPHILS NFR BLD AUTO: 0.5 %
BILIRUB SERPL-MCNC: <0.2 MG/DL
BUN SERPL-MCNC: 14 MG/DL
CALCIUM SERPL-MCNC: 9.4 MG/DL
CHLORIDE SERPL-SCNC: 109 MMOL/L
CHOLEST SERPL-MCNC: 194 MG/DL
CK SERPL-CCNC: 94 U/L
CO2 SERPL-SCNC: 23 MMOL/L
CREAT SERPL-MCNC: 1 MG/DL
EOSINOPHIL # BLD AUTO: 0.04 K/UL
EOSINOPHIL NFR BLD AUTO: 0.9 %
FERRITIN SERPL-MCNC: 131 NG/ML
FOLATE SERPL-MCNC: 9.5 NG/ML
GGT SERPL-CCNC: 38 U/L
GLUCOSE SERPL-MCNC: 109 MG/DL
HAPTOGLOB SERPL-MCNC: 137 MG/DL
HCT VFR BLD CALC: 32.8 %
HDLC SERPL-MCNC: 62 MG/DL
HGB BLD-MCNC: 10.7 G/DL
IMM GRANULOCYTES NFR BLD AUTO: 0.2 %
IRON SERPL-MCNC: 51 UG/DL
LDH SERPL-CCNC: 233 U/L
LDLC SERPL DIRECT ASSAY-MCNC: 113 MG/DL
LEVETIRACETAM SERPL-MCNC: 142 UG/ML
LYMPHOCYTES # BLD AUTO: 1.04 K/UL
LYMPHOCYTES NFR BLD AUTO: 23.8 %
MAN DIFF?: NORMAL
MCHC RBC-ENTMCNC: 29.4 PG
MCHC RBC-ENTMCNC: 32.6 GM/DL
MCV RBC AUTO: 90.1 FL
MONOCYTES # BLD AUTO: 0.34 K/UL
MONOCYTES NFR BLD AUTO: 7.8 %
NEUTROPHILS # BLD AUTO: 2.92 K/UL
NEUTROPHILS NFR BLD AUTO: 66.8 %
PHENYTOIN SERPL QL: <0.8 UG/ML
PLATELET # BLD AUTO: 247 K/UL
POTASSIUM SERPL-SCNC: 3.9 MMOL/L
PROT SERPL-MCNC: 7.7 G/DL
RBC # BLD: 3.64 M/UL
RBC # FLD: 14.6 %
SODIUM SERPL-SCNC: 145 MMOL/L
TRANSFERRIN SERPL-MCNC: 216 MG/DL
TRIGL SERPL-MCNC: 153 MG/DL
VIT B12 SERPL-MCNC: 375 PG/ML
WBC # FLD AUTO: 4.37 K/UL

## 2021-02-08 ENCOUNTER — NON-APPOINTMENT (OUTPATIENT)
Age: 61
End: 2021-02-08

## 2021-02-08 RX ORDER — ERGOCALCIFEROL 1.25 MG/1
1.25 MG CAPSULE ORAL
Qty: 6 | Refills: 0 | Status: DISCONTINUED | COMMUNITY
Start: 2020-01-24 | End: 2021-02-08

## 2021-02-08 RX ORDER — ERGOCALCIFEROL 1.25 MG/1
1.25 MG CAPSULE ORAL
Qty: 6 | Refills: 0 | Status: DISCONTINUED | COMMUNITY
Start: 2019-11-12 | End: 2021-02-08

## 2021-02-08 RX ORDER — EXTENDED PHENYTOIN SODIUM 30 MG/1
30 CAPSULE ORAL
Refills: 0 | Status: DISCONTINUED | COMMUNITY
End: 2021-02-08

## 2021-02-09 ENCOUNTER — LABORATORY RESULT (OUTPATIENT)
Age: 61
End: 2021-02-09

## 2021-02-10 LAB — PROTHROMBIN TIME AND INR, PLASMA: 2.7

## 2021-02-17 LAB — INR PPP: 3.4

## 2021-02-26 LAB — INR PPP: 2.2

## 2021-03-05 LAB — INR PPP: 3.1

## 2021-03-16 NOTE — H&P ADULT. - NS MD HP IMMU CONTRA FLU 1
Is patient requesting a call when authorization has been obtained? Patient did not request a call  Surgery Date: 4/13/21  Primary Surgeon: Melinda Henning (NPI: [id number])  Assisting Surgeon: Not Applicable (N/A)  Facility: [facility] (Tax: [id number] / [id number]: [id number])  Inpatient / Outpatient: Outpatient  Level: 3    Clearance Received: No clearance ordered  Consent Received: Yes, scanned into Epic on 3/16/21  Medication Hold / Last Dose: Hold on Xarelto 2 days prior  Last dose 4/10/21  VQI Spreadsheet: Not Applicable (N/A)  IR Notified: 3/16/21  Rep  Notified: [employer] notified 3/16/21  Equipment Needs: Not Applicable (N/A)  Vas Lab Requested: Not Applicable (N/A)  Patient Contacted: 3/16/21    Diagnosis: [id number]  Procedure/ CPT Code(s): Angiogram // CPT: 10185, [id number], [id number] and 77073    For varicose vein related procedures, last LEVDR? Not Applicable (N/A)    Post Operative Date/ Time: 4/27/21 , 10:30am Lanny Da Silva) with Melinda Henning (NPI: [id number])     S/w pt and scheduled him for his LLE angiogram w/possible intervention on 4/13/21 in [facility] with Dr Neida Diop  He is aware NPO after midnight on 4/12/21  He will have his blood work done at [facility]  no

## 2021-03-24 LAB — INR PPP: 3.7

## 2021-04-08 LAB — INR PPP: 1.9

## 2021-04-14 ENCOUNTER — NON-APPOINTMENT (OUTPATIENT)
Age: 61
End: 2021-04-14

## 2021-04-19 ENCOUNTER — NON-APPOINTMENT (OUTPATIENT)
Age: 61
End: 2021-04-19

## 2021-05-16 ENCOUNTER — NON-APPOINTMENT (OUTPATIENT)
Age: 61
End: 2021-05-16

## 2021-05-20 LAB — INR PPP: 1.5

## 2021-06-01 ENCOUNTER — NON-APPOINTMENT (OUTPATIENT)
Age: 61
End: 2021-06-01

## 2021-06-05 LAB — INR PPP: 1.9

## 2021-06-14 LAB — INR PPP: 2.4

## 2021-06-17 ENCOUNTER — NON-APPOINTMENT (OUTPATIENT)
Age: 61
End: 2021-06-17

## 2021-06-17 ENCOUNTER — LABORATORY RESULT (OUTPATIENT)
Age: 61
End: 2021-06-17

## 2021-06-17 ENCOUNTER — APPOINTMENT (OUTPATIENT)
Dept: CARDIOLOGY | Facility: CLINIC | Age: 61
End: 2021-06-17
Payer: COMMERCIAL

## 2021-06-17 VITALS
DIASTOLIC BLOOD PRESSURE: 80 MMHG | BODY MASS INDEX: 32.02 KG/M2 | TEMPERATURE: 98.4 F | SYSTOLIC BLOOD PRESSURE: 118 MMHG | OXYGEN SATURATION: 98 % | HEART RATE: 50 BPM | HEIGHT: 62 IN | WEIGHT: 174 LBS

## 2021-06-17 DIAGNOSIS — Z12.39 ENCOUNTER FOR OTHER SCREENING FOR MALIGNANT NEOPLASM OF BREAST: ICD-10-CM

## 2021-06-17 PROCEDURE — 99214 OFFICE O/P EST MOD 30 MIN: CPT

## 2021-06-17 PROCEDURE — 93000 ELECTROCARDIOGRAM COMPLETE: CPT

## 2021-06-17 NOTE — HISTORY OF PRESENT ILLNESS
[FreeTextEntry1] : pt presents for f/u pt feels well .pt with ocassional dyspnea ..pt on coumadin for hx of dvt multiple on coumadin pt denies any chest  pain dizziness ,lightheadedness ,nausea vomiting diaphoresis\par

## 2021-06-24 ENCOUNTER — APPOINTMENT (OUTPATIENT)
Dept: CARDIOLOGY | Facility: CLINIC | Age: 61
End: 2021-06-24
Payer: COMMERCIAL

## 2021-06-24 PROCEDURE — 99072 ADDL SUPL MATRL&STAF TM PHE: CPT

## 2021-06-24 PROCEDURE — A9500: CPT

## 2021-06-24 PROCEDURE — 93015 CV STRESS TEST SUPVJ I&R: CPT

## 2021-06-24 PROCEDURE — 78452 HT MUSCLE IMAGE SPECT MULT: CPT

## 2021-06-26 LAB
25(OH)D3 SERPL-MCNC: 16.9 NG/ML
ALBUMIN SERPL ELPH-MCNC: 4.3 G/DL
ALP BLD-CCNC: 99 U/L
ALT SERPL-CCNC: 14 U/L
ANION GAP SERPL CALC-SCNC: 13 MMOL/L
APPEARANCE: CLEAR
AST SERPL-CCNC: 17 U/L
BACTERIA UR CULT: NORMAL
BACTERIA: NEGATIVE
BASOPHILS # BLD AUTO: 0.02 K/UL
BASOPHILS NFR BLD AUTO: 0.4 %
BILIRUB SERPL-MCNC: 0.3 MG/DL
BILIRUBIN URINE: NEGATIVE
BLOOD URINE: NEGATIVE
BUN SERPL-MCNC: 12 MG/DL
CALCIUM SERPL-MCNC: 9.3 MG/DL
CHLORIDE SERPL-SCNC: 109 MMOL/L
CHOLEST SERPL-MCNC: 203 MG/DL
CK SERPL-CCNC: 118 U/L
CO2 SERPL-SCNC: 22 MMOL/L
COLOR: YELLOW
CREAT SERPL-MCNC: 1.02 MG/DL
EOSINOPHIL # BLD AUTO: 0.04 K/UL
EOSINOPHIL NFR BLD AUTO: 0.8 %
ESTIMATED AVERAGE GLUCOSE: 117 MG/DL
FERRITIN SERPL-MCNC: 97 NG/ML
FOLATE SERPL-MCNC: 10.6 NG/ML
GGT SERPL-CCNC: 33 U/L
GLUCOSE QUALITATIVE U: NEGATIVE
GLUCOSE SERPL-MCNC: 93 MG/DL
HAPTOGLOB SERPL-MCNC: 172 MG/DL
HBA1C MFR BLD HPLC: 5.7 %
HCT VFR BLD CALC: 35.2 %
HDLC SERPL-MCNC: 55 MG/DL
HGB BLD-MCNC: 11.5 G/DL
HYALINE CASTS: 1 /LPF
IMM GRANULOCYTES NFR BLD AUTO: 0.2 %
INR PPP: 2.78 RATIO
INR PPP: 3.53 RATIO
IRON SERPL-MCNC: 50 UG/DL
KETONES URINE: NEGATIVE
LDH SERPL-CCNC: 247 U/L
LDLC SERPL CALC-MCNC: 126 MG/DL
LDLC SERPL DIRECT ASSAY-MCNC: 125 MG/DL
LEUKOCYTE ESTERASE URINE: NEGATIVE
LYMPHOCYTES # BLD AUTO: 1.22 K/UL
LYMPHOCYTES NFR BLD AUTO: 22.9 %
MAGNESIUM SERPL-MCNC: 2.2 MG/DL
MAN DIFF?: NORMAL
MCHC RBC-ENTMCNC: 29.6 PG
MCHC RBC-ENTMCNC: 32.7 GM/DL
MCV RBC AUTO: 90.5 FL
MICROSCOPIC-UA: NORMAL
MONOCYTES # BLD AUTO: 0.47 K/UL
MONOCYTES NFR BLD AUTO: 8.8 %
NEUTROPHILS # BLD AUTO: 3.56 K/UL
NEUTROPHILS NFR BLD AUTO: 66.9 %
NITRITE URINE: NEGATIVE
NONHDLC SERPL-MCNC: 147 MG/DL
PH URINE: 6.5
PLATELET # BLD AUTO: 292 K/UL
POTASSIUM SERPL-SCNC: 3.8 MMOL/L
PROT SERPL-MCNC: 7.7 G/DL
PROTEIN URINE: ABNORMAL
PT BLD: 31.5 SEC
PT BLD: 39.6 SEC
RBC # BLD: 3.89 M/UL
RBC # FLD: 15.5 %
RED BLOOD CELLS URINE: 2 /HPF
SODIUM SERPL-SCNC: 143 MMOL/L
SPECIFIC GRAVITY URINE: 1.02
SQUAMOUS EPITHELIAL CELLS: 3 /HPF
T4 FREE SERPL-MCNC: 1 NG/DL
TRANSFERRIN SERPL-MCNC: 228 MG/DL
TRIGL SERPL-MCNC: 107 MG/DL
TSH SERPL-ACNC: 2.08 UIU/ML
UROBILINOGEN URINE: NORMAL
VIT B12 SERPL-MCNC: 427 PG/ML
WBC # FLD AUTO: 5.32 K/UL
WHITE BLOOD CELLS URINE: 2 /HPF

## 2021-06-28 ENCOUNTER — NON-APPOINTMENT (OUTPATIENT)
Age: 61
End: 2021-06-28

## 2021-07-02 ENCOUNTER — APPOINTMENT (OUTPATIENT)
Dept: CARDIOLOGY | Facility: CLINIC | Age: 61
End: 2021-07-02
Payer: COMMERCIAL

## 2021-07-02 DIAGNOSIS — R59.9 ENLARGED LYMPH NODES, UNSPECIFIED: ICD-10-CM

## 2021-07-02 DIAGNOSIS — I31.3 PERICARDIAL EFFUSION (NONINFLAMMATORY): ICD-10-CM

## 2021-07-02 PROCEDURE — 93306 TTE W/DOPPLER COMPLETE: CPT

## 2021-07-02 PROCEDURE — 99072 ADDL SUPL MATRL&STAF TM PHE: CPT

## 2021-07-02 PROCEDURE — 93880 EXTRACRANIAL BILAT STUDY: CPT

## 2021-07-02 PROCEDURE — 93970 EXTREMITY STUDY: CPT

## 2021-07-06 ENCOUNTER — NON-APPOINTMENT (OUTPATIENT)
Age: 61
End: 2021-07-06

## 2021-07-06 LAB
CCP AB SER IA-ACNC: <8 UNITS
CRP SERPL-MCNC: 10 MG/L
DSDNA AB SER-ACNC: 12 IU/ML
ERYTHROCYTE [SEDIMENTATION RATE] IN BLOOD BY WESTERGREN METHOD: 116 MM/HR
RF+CCP IGG SER-IMP: NEGATIVE
RHEUMATOID FACT SER QL: <10 IU/ML

## 2021-07-14 LAB — INR PPP: 1.8

## 2021-07-16 ENCOUNTER — NON-APPOINTMENT (OUTPATIENT)
Age: 61
End: 2021-07-16

## 2021-07-27 LAB — PROTHROMBIN TIME AND INR, PLASMA: 1.8

## 2021-08-09 LAB — INR PPP: 2.3

## 2021-08-15 LAB
ANA PAT FLD IF-IMP: ABNORMAL
ANA PATTERN: ABNORMAL
ANA SER IF-ACNC: ABNORMAL
ANA TITER: ABNORMAL
INR PPP: 2.12 RATIO
PT BLD: 24.1 SEC

## 2021-08-16 LAB — INR PPP: 1.8

## 2021-08-30 LAB — INR PPP: 1.1

## 2021-09-02 LAB — INR PPP: 1.37

## 2021-09-21 LAB
INR PPP: 1.1
INR PPP: 2

## 2021-10-04 LAB — INR PPP: 1.1

## 2021-10-08 ENCOUNTER — NON-APPOINTMENT (OUTPATIENT)
Age: 61
End: 2021-10-08

## 2021-10-12 LAB — INR PPP: 3.22

## 2021-10-18 ENCOUNTER — APPOINTMENT (OUTPATIENT)
Dept: CARDIOLOGY | Facility: CLINIC | Age: 61
End: 2021-10-18
Payer: COMMERCIAL

## 2021-10-18 ENCOUNTER — NON-APPOINTMENT (OUTPATIENT)
Age: 61
End: 2021-10-18

## 2021-10-18 VITALS
WEIGHT: 170 LBS | BODY MASS INDEX: 31.28 KG/M2 | OXYGEN SATURATION: 99 % | DIASTOLIC BLOOD PRESSURE: 70 MMHG | SYSTOLIC BLOOD PRESSURE: 130 MMHG | TEMPERATURE: 97.8 F | HEIGHT: 62 IN | HEART RATE: 50 BPM

## 2021-10-18 DIAGNOSIS — Z86.73 PERSONAL HISTORY OF TRANSIENT ISCHEMIC ATTACK (TIA), AND CEREBRAL INFARCTION W/OUT RESIDUAL DEFICITS: ICD-10-CM

## 2021-10-18 DIAGNOSIS — Z13.820 ENCOUNTER FOR SCREENING FOR OSTEOPOROSIS: ICD-10-CM

## 2021-10-18 DIAGNOSIS — Z23 ENCOUNTER FOR IMMUNIZATION: ICD-10-CM

## 2021-10-18 PROCEDURE — 93000 ELECTROCARDIOGRAM COMPLETE: CPT

## 2021-10-18 PROCEDURE — G0008: CPT

## 2021-10-18 PROCEDURE — 90686 IIV4 VACC NO PRSV 0.5 ML IM: CPT

## 2021-10-18 PROCEDURE — 99214 OFFICE O/P EST MOD 30 MIN: CPT | Mod: 25

## 2021-10-18 NOTE — HISTORY OF PRESENT ILLNESS
[FreeTextEntry1] : This is a 61 year old lady with a PMH of Anemia, Brain Aneurysms, Seizures, HTN, HLD, CAD, Vitamin D deficiency and DVT presents today for follow up. Patient was last seen in our office in July 2021. Patient states that she is overall feeling good. She explains that she has started on her new regimen for Coumadin alternating 7.5 mg with 5 mg. Patient states that since the last visit she has not scheduled her bone density, breast studies,  and colonoscopy. Patient denies dyspnea, palpitations, chest pain, nausea, vomiting, dizziness and lightheadedness.\par

## 2021-10-24 LAB
INR PPP: 3.92 RATIO
PT BLD: 43.7 SEC

## 2021-11-02 LAB — INR PPP: 3.4

## 2022-01-11 LAB — INR PPP: 1.36

## 2022-01-19 LAB — INR PPP: 2.49

## 2022-01-26 LAB — INR PPP: 2.65

## 2022-02-04 ENCOUNTER — NON-APPOINTMENT (OUTPATIENT)
Age: 62
End: 2022-02-04

## 2022-02-10 ENCOUNTER — NON-APPOINTMENT (OUTPATIENT)
Age: 62
End: 2022-02-10

## 2022-02-10 ENCOUNTER — APPOINTMENT (OUTPATIENT)
Dept: CARDIOLOGY | Facility: CLINIC | Age: 62
End: 2022-02-10
Payer: COMMERCIAL

## 2022-02-10 VITALS
WEIGHT: 172 LBS | BODY MASS INDEX: 31.65 KG/M2 | DIASTOLIC BLOOD PRESSURE: 90 MMHG | TEMPERATURE: 97.8 F | HEART RATE: 51 BPM | OXYGEN SATURATION: 99 % | HEIGHT: 62 IN | SYSTOLIC BLOOD PRESSURE: 152 MMHG

## 2022-02-10 DIAGNOSIS — I87.2 VENOUS INSUFFICIENCY (CHRONIC) (PERIPHERAL): ICD-10-CM

## 2022-02-10 DIAGNOSIS — Z12.11 ENCOUNTER FOR SCREENING FOR MALIGNANT NEOPLASM OF COLON: ICD-10-CM

## 2022-02-10 DIAGNOSIS — Z71.85 ENCOUNTER FOR IMMUNIZATION SAFETY COUNSELING: ICD-10-CM

## 2022-02-10 PROCEDURE — 93000 ELECTROCARDIOGRAM COMPLETE: CPT

## 2022-02-10 PROCEDURE — 99214 OFFICE O/P EST MOD 30 MIN: CPT

## 2022-02-10 NOTE — PHYSICAL EXAM

## 2022-02-10 NOTE — HISTORY OF PRESENT ILLNESS
[FreeTextEntry1] : This is a 61 year old lady with a PMH of Anemia, Brain Aneurysms, Seizures, HTN, HLD, CAD, Vitamin D deficiency and DVT presents today for follow up\par \par pt reports she is getting oral surgery with Dr Shabana Coto. - Surgical extraction and curettage and bone graft and endosseous implant \par \par pt with recent left leg debridement of ulcer on 2/7/22\par \par Pt denies any CP, SOB, heart palpitations, dizziness, abdominal pain N/V/D fever or chills\par

## 2022-02-14 ENCOUNTER — NON-APPOINTMENT (OUTPATIENT)
Age: 62
End: 2022-02-14

## 2022-02-17 ENCOUNTER — NON-APPOINTMENT (OUTPATIENT)
Age: 62
End: 2022-02-17

## 2022-02-22 LAB — INR PPP: 2.15

## 2022-03-20 ENCOUNTER — RX RENEWAL (OUTPATIENT)
Age: 62
End: 2022-03-20

## 2022-03-23 LAB — INR PPP: 1.64

## 2022-03-30 LAB — INR PPP: 1.66

## 2022-04-06 LAB — INR PPP: 2.2

## 2022-04-13 ENCOUNTER — APPOINTMENT (OUTPATIENT)
Dept: CARDIOLOGY | Facility: CLINIC | Age: 62
End: 2022-04-13

## 2022-04-21 ENCOUNTER — RX RENEWAL (OUTPATIENT)
Age: 62
End: 2022-04-21

## 2022-05-03 ENCOUNTER — NON-APPOINTMENT (OUTPATIENT)
Age: 62
End: 2022-05-03

## 2022-05-05 LAB
INR PPP: 2.68
PT BLD: 32

## 2022-05-07 ENCOUNTER — INPATIENT (INPATIENT)
Facility: HOSPITAL | Age: 62
LOS: 3 days | Discharge: NURSING, REHAB OR LONG-TERM CARE | End: 2022-05-11
Attending: HOSPITALIST | Admitting: HOSPITALIST
Payer: COMMERCIAL

## 2022-05-07 VITALS
DIASTOLIC BLOOD PRESSURE: 100 MMHG | OXYGEN SATURATION: 100 % | SYSTOLIC BLOOD PRESSURE: 180 MMHG | HEIGHT: 61 IN | RESPIRATION RATE: 16 BRPM | WEIGHT: 195.11 LBS | HEART RATE: 80 BPM

## 2022-05-07 DIAGNOSIS — Z98.2 PRESENCE OF CEREBROSPINAL FLUID DRAINAGE DEVICE: Chronic | ICD-10-CM

## 2022-05-07 DIAGNOSIS — Z98.89 OTHER SPECIFIED POSTPROCEDURAL STATES: Chronic | ICD-10-CM

## 2022-05-07 DIAGNOSIS — Z95.5 PRESENCE OF CORONARY ANGIOPLASTY IMPLANT AND GRAFT: Chronic | ICD-10-CM

## 2022-05-07 DIAGNOSIS — Z43.4 ENCOUNTER FOR ATTENTION TO OTHER ARTIFICIAL OPENINGS OF DIGESTIVE TRACT: Chronic | ICD-10-CM

## 2022-05-07 DIAGNOSIS — I72.9 ANEURYSM OF UNSPECIFIED SITE: Chronic | ICD-10-CM

## 2022-05-07 DIAGNOSIS — I80.229 PHLEBITIS AND THROMBOPHLEBITIS OF UNSPECIFIED POPLITEAL VEIN: ICD-10-CM

## 2022-05-07 LAB
ALBUMIN SERPL ELPH-MCNC: 3.1 G/DL — LOW (ref 3.3–5)
ALP SERPL-CCNC: 99 U/L — SIGNIFICANT CHANGE UP (ref 40–120)
ALT FLD-CCNC: 31 U/L — SIGNIFICANT CHANGE UP (ref 12–78)
ANION GAP SERPL CALC-SCNC: 8 MMOL/L — SIGNIFICANT CHANGE UP (ref 5–17)
APPEARANCE UR: CLEAR — SIGNIFICANT CHANGE UP
APTT BLD: 43.9 SEC — HIGH (ref 27.5–35.5)
AST SERPL-CCNC: 91 U/L — HIGH (ref 15–37)
BACTERIA # UR AUTO: ABNORMAL
BASOPHILS # BLD AUTO: 0.01 K/UL — SIGNIFICANT CHANGE UP (ref 0–0.2)
BASOPHILS NFR BLD AUTO: 0.1 % — SIGNIFICANT CHANGE UP (ref 0–2)
BILIRUB SERPL-MCNC: 0.5 MG/DL — SIGNIFICANT CHANGE UP (ref 0.2–1.2)
BILIRUB UR-MCNC: NEGATIVE — SIGNIFICANT CHANGE UP
BLD GP AB SCN SERPL QL: SIGNIFICANT CHANGE UP
BUN SERPL-MCNC: 13 MG/DL — SIGNIFICANT CHANGE UP (ref 7–23)
CALCIUM SERPL-MCNC: 8.6 MG/DL — SIGNIFICANT CHANGE UP (ref 8.5–10.1)
CHLORIDE SERPL-SCNC: 107 MMOL/L — SIGNIFICANT CHANGE UP (ref 96–108)
CK SERPL-CCNC: 4177 U/L — HIGH (ref 26–192)
CO2 SERPL-SCNC: 25 MMOL/L — SIGNIFICANT CHANGE UP (ref 22–31)
COD CRY URNS QL: ABNORMAL
COLOR SPEC: YELLOW — SIGNIFICANT CHANGE UP
COMMENT - URINE: SIGNIFICANT CHANGE UP
CREAT SERPL-MCNC: 1.23 MG/DL — SIGNIFICANT CHANGE UP (ref 0.5–1.3)
DIFF PNL FLD: ABNORMAL
EGFR: 50 ML/MIN/1.73M2 — LOW
EOSINOPHIL # BLD AUTO: 0 K/UL — SIGNIFICANT CHANGE UP (ref 0–0.5)
EOSINOPHIL NFR BLD AUTO: 0 % — SIGNIFICANT CHANGE UP (ref 0–6)
EPI CELLS # UR: SIGNIFICANT CHANGE UP
FLUAV AG NPH QL: SIGNIFICANT CHANGE UP
FLUBV AG NPH QL: SIGNIFICANT CHANGE UP
GLUCOSE BLDC GLUCOMTR-MCNC: 149 MG/DL — HIGH (ref 70–99)
GLUCOSE SERPL-MCNC: 96 MG/DL — SIGNIFICANT CHANGE UP (ref 70–99)
GLUCOSE UR QL: NEGATIVE MG/DL — SIGNIFICANT CHANGE UP
GRAN CASTS # UR COMP ASSIST: ABNORMAL /LPF
HCT VFR BLD CALC: 33.8 % — LOW (ref 34.5–45)
HGB BLD-MCNC: 11.2 G/DL — LOW (ref 11.5–15.5)
HYALINE CASTS # UR AUTO: ABNORMAL /LPF
IMM GRANULOCYTES NFR BLD AUTO: 0.5 % — SIGNIFICANT CHANGE UP (ref 0–1.5)
INR BLD: 2.69 RATIO — HIGH (ref 0.88–1.16)
KETONES UR-MCNC: NEGATIVE — SIGNIFICANT CHANGE UP
LEUKOCYTE ESTERASE UR-ACNC: NEGATIVE — SIGNIFICANT CHANGE UP
LYMPHOCYTES # BLD AUTO: 1.24 K/UL — SIGNIFICANT CHANGE UP (ref 1–3.3)
LYMPHOCYTES # BLD AUTO: 9.6 % — LOW (ref 13–44)
MCHC RBC-ENTMCNC: 27.6 PG — SIGNIFICANT CHANGE UP (ref 27–34)
MCHC RBC-ENTMCNC: 33.1 G/DL — SIGNIFICANT CHANGE UP (ref 32–36)
MCV RBC AUTO: 83.3 FL — SIGNIFICANT CHANGE UP (ref 80–100)
MONOCYTES # BLD AUTO: 0.7 K/UL — SIGNIFICANT CHANGE UP (ref 0–0.9)
MONOCYTES NFR BLD AUTO: 5.4 % — SIGNIFICANT CHANGE UP (ref 2–14)
NEUTROPHILS # BLD AUTO: 10.85 K/UL — HIGH (ref 1.8–7.4)
NEUTROPHILS NFR BLD AUTO: 84.4 % — HIGH (ref 43–77)
NITRITE UR-MCNC: NEGATIVE — SIGNIFICANT CHANGE UP
NRBC # BLD: 0 /100 WBCS — SIGNIFICANT CHANGE UP (ref 0–0)
PH UR: 7 — SIGNIFICANT CHANGE UP (ref 5–8)
PLATELET # BLD AUTO: 318 K/UL — SIGNIFICANT CHANGE UP (ref 150–400)
POTASSIUM SERPL-MCNC: 5.1 MMOL/L — SIGNIFICANT CHANGE UP (ref 3.5–5.3)
POTASSIUM SERPL-SCNC: 5.1 MMOL/L — SIGNIFICANT CHANGE UP (ref 3.5–5.3)
PROT SERPL-MCNC: 8.2 GM/DL — SIGNIFICANT CHANGE UP (ref 6–8.3)
PROT UR-MCNC: 30 MG/DL
PROTHROM AB SERPL-ACNC: 32.6 SEC — HIGH (ref 10.5–13.4)
RBC # BLD: 4.06 M/UL — SIGNIFICANT CHANGE UP (ref 3.8–5.2)
RBC # FLD: 16.2 % — HIGH (ref 10.3–14.5)
RBC CASTS # UR COMP ASSIST: ABNORMAL /HPF (ref 0–4)
SARS-COV-2 RNA SPEC QL NAA+PROBE: SIGNIFICANT CHANGE UP
SODIUM SERPL-SCNC: 140 MMOL/L — SIGNIFICANT CHANGE UP (ref 135–145)
SP GR SPEC: 1 — LOW (ref 1.01–1.02)
TROPONIN I, HIGH SENSITIVITY RESULT: 139.3 NG/L — HIGH
TROPONIN I, HIGH SENSITIVITY RESULT: 163.1 NG/L — HIGH
UROBILINOGEN FLD QL: NEGATIVE MG/DL — SIGNIFICANT CHANGE UP
WBC # BLD: 12.86 K/UL — HIGH (ref 3.8–10.5)
WBC # FLD AUTO: 12.86 K/UL — HIGH (ref 3.8–10.5)
WBC UR QL: SIGNIFICANT CHANGE UP

## 2022-05-07 PROCEDURE — 70486 CT MAXILLOFACIAL W/O DYE: CPT | Mod: 26,MA

## 2022-05-07 PROCEDURE — 99285 EMERGENCY DEPT VISIT HI MDM: CPT

## 2022-05-07 PROCEDURE — 99222 1ST HOSP IP/OBS MODERATE 55: CPT

## 2022-05-07 PROCEDURE — 0042T: CPT

## 2022-05-07 PROCEDURE — 72125 CT NECK SPINE W/O DYE: CPT | Mod: 26,MA

## 2022-05-07 PROCEDURE — 70498 CT ANGIOGRAPHY NECK: CPT | Mod: 26,MA

## 2022-05-07 PROCEDURE — 99253 IP/OBS CNSLTJ NEW/EST LOW 45: CPT

## 2022-05-07 PROCEDURE — 93010 ELECTROCARDIOGRAM REPORT: CPT

## 2022-05-07 PROCEDURE — 71045 X-RAY EXAM CHEST 1 VIEW: CPT | Mod: 26

## 2022-05-07 PROCEDURE — 72170 X-RAY EXAM OF PELVIS: CPT | Mod: 26

## 2022-05-07 PROCEDURE — 70496 CT ANGIOGRAPHY HEAD: CPT | Mod: 26,MA

## 2022-05-07 RX ORDER — LEVETIRACETAM 250 MG/1
1000 TABLET, FILM COATED ORAL EVERY 12 HOURS
Refills: 0 | Status: DISCONTINUED | OUTPATIENT
Start: 2022-05-07 | End: 2022-05-08

## 2022-05-07 RX ORDER — LEVETIRACETAM 250 MG/1
1250 TABLET, FILM COATED ORAL
Refills: 0 | Status: DISCONTINUED | OUTPATIENT
Start: 2022-05-07 | End: 2022-05-07

## 2022-05-07 RX ORDER — ASPIRIN/CALCIUM CARB/MAGNESIUM 324 MG
81 TABLET ORAL DAILY
Refills: 0 | Status: DISCONTINUED | OUTPATIENT
Start: 2022-05-07 | End: 2022-05-11

## 2022-05-07 RX ORDER — SODIUM CHLORIDE 9 MG/ML
1000 INJECTION INTRAMUSCULAR; INTRAVENOUS; SUBCUTANEOUS
Refills: 0 | Status: DISCONTINUED | OUTPATIENT
Start: 2022-05-07 | End: 2022-05-10

## 2022-05-07 RX ORDER — METOPROLOL TARTRATE 50 MG
50 TABLET ORAL DAILY
Refills: 0 | Status: DISCONTINUED | OUTPATIENT
Start: 2022-05-07 | End: 2022-05-11

## 2022-05-07 RX ORDER — WARFARIN SODIUM 2.5 MG/1
5 TABLET ORAL ONCE
Refills: 0 | Status: COMPLETED | OUTPATIENT
Start: 2022-05-07 | End: 2022-05-07

## 2022-05-07 RX ORDER — LOSARTAN POTASSIUM 100 MG/1
50 TABLET, FILM COATED ORAL DAILY
Refills: 0 | Status: DISCONTINUED | OUTPATIENT
Start: 2022-05-07 | End: 2022-05-11

## 2022-05-07 RX ORDER — ONDANSETRON 8 MG/1
4 TABLET, FILM COATED ORAL ONCE
Refills: 0 | Status: COMPLETED | OUTPATIENT
Start: 2022-05-07 | End: 2022-05-07

## 2022-05-07 RX ORDER — LEVETIRACETAM 250 MG/1
1000 TABLET, FILM COATED ORAL ONCE
Refills: 0 | Status: COMPLETED | OUTPATIENT
Start: 2022-05-07 | End: 2022-05-07

## 2022-05-07 RX ORDER — LEVETIRACETAM 250 MG/1
1000 TABLET, FILM COATED ORAL ONCE
Refills: 0 | Status: DISCONTINUED | OUTPATIENT
Start: 2022-05-07 | End: 2022-05-07

## 2022-05-07 RX ORDER — AMLODIPINE BESYLATE 2.5 MG/1
10 TABLET ORAL DAILY
Refills: 0 | Status: DISCONTINUED | OUTPATIENT
Start: 2022-05-07 | End: 2022-05-11

## 2022-05-07 RX ORDER — SODIUM CHLORIDE 9 MG/ML
1000 INJECTION, SOLUTION INTRAVENOUS
Refills: 0 | Status: DISCONTINUED | OUTPATIENT
Start: 2022-05-07 | End: 2022-05-07

## 2022-05-07 RX ORDER — ATORVASTATIN CALCIUM 80 MG/1
10 TABLET, FILM COATED ORAL AT BEDTIME
Refills: 0 | Status: DISCONTINUED | OUTPATIENT
Start: 2022-05-07 | End: 2022-05-11

## 2022-05-07 RX ADMIN — Medication 81 MILLIGRAM(S): at 13:25

## 2022-05-07 RX ADMIN — LEVETIRACETAM 400 MILLIGRAM(S): 250 TABLET, FILM COATED ORAL at 09:55

## 2022-05-07 RX ADMIN — LEVETIRACETAM 400 MILLIGRAM(S): 250 TABLET, FILM COATED ORAL at 12:57

## 2022-05-07 RX ADMIN — ATORVASTATIN CALCIUM 10 MILLIGRAM(S): 80 TABLET, FILM COATED ORAL at 21:12

## 2022-05-07 RX ADMIN — WARFARIN SODIUM 5 MILLIGRAM(S): 2.5 TABLET ORAL at 21:13

## 2022-05-07 RX ADMIN — SODIUM CHLORIDE 80 MILLILITER(S): 9 INJECTION INTRAMUSCULAR; INTRAVENOUS; SUBCUTANEOUS at 17:08

## 2022-05-07 RX ADMIN — LEVETIRACETAM 400 MILLIGRAM(S): 250 TABLET, FILM COATED ORAL at 20:20

## 2022-05-07 RX ADMIN — ONDANSETRON 4 MILLIGRAM(S): 8 TABLET, FILM COATED ORAL at 09:38

## 2022-05-07 RX ADMIN — AMLODIPINE BESYLATE 10 MILLIGRAM(S): 2.5 TABLET ORAL at 17:45

## 2022-05-07 NOTE — CONSULT NOTE ADULT - ASSESSMENT
60 y/o F PMHx of Brain Aneurysm s/p Hemicraniectomy and Clipping, seizures , on Keppra, DVT and HTN found unresponsive, now improving PE with aaox1 psychomotor slowing esotropia RLE > LE weakness CTH, CTA encephalomalacia and gliosis in anterior frontal lobes s/p crani, clips, chronic left occipital clips no LVA      Impression: seizure    please clarify home Keppra dosage  currently on Keppra 1000 mg BID, consider increasing to 1250 mg BID  VEEG if possible, if not REEG
62 yo lady with a PMH of a prior brain aneurysm s/p hemicraniectomy and clipping, seizure d/o on Keppra - last documented seizure requiring admission was in 2017, DVT on Coumadin, and HTN; found unresponsive,  on the floor by her  on arriving home this morning after working a night shift.  Unknown down time; last known normal was 4 PM last night when the pt spoke to her daughter.  ER: pt with decreased mental status, a hematoma to the forehead, blood on the face, able to answer some commands, but not able to communicate,  Trop 140 -> 160  EKG non-ischemic  CTA  brain unremarkable    Suspect seizure and not ACS.  -monitor on tele  -tend Facundo  -2D echo  -EEG  -neuro eval pending  -suspect a component of rhabdo with elevated CPK... would cont gentle IVFs; creat stable   -Keppra loading  -cont home asa/statin/metoprolol/losartan  -can cont coumadin... would not start hep gtt for +troponin - do not suspect MI

## 2022-05-07 NOTE — CONSULT NOTE ADULT - SUBJECTIVE AND OBJECTIVE BOX
CARDIOLOGY CONSULT NOTE    Patient is a 61y Female with a known history of :  Thrombophlebitis of popliteal vein [[id number]]      HPI:  62 yo lady with a PMH of a prior brain aneurysm s/p hemicraniectomy and clipping, seizure d/o on Keppra - last documented seizure requiring admission was in 2017, DVT on Coumadin, and HTN; found unresponsive,  on the floor by her  on arriving home this morning after working a night shift.  Unknown down time; last known normal was 4 PM last night when the pt spoke to her daughter.  ER: pt with decreased mental status, a hematoma to the forehead, blood on the face, able to answer some commands, but not able to communicate,  Trop 140 -> 160  EKG non-ischemic  CTA  brain unremarkable      REVIEW OF SYSTEMS:  unable to obtain from pt    MEDICATIONS  (STANDING):  amLODIPine   Tablet 10 milliGRAM(s) Oral daily  aspirin enteric coated 81 milliGRAM(s) Oral daily  atorvastatin 10 milliGRAM(s) Oral at bedtime  levETIRAcetam  IVPB 1000 milliGRAM(s) IV Intermittent every 12 hours  losartan 50 milliGRAM(s) Oral daily  metoprolol succinate ER 50 milliGRAM(s) Oral daily  warfarin 5 milliGRAM(s) Oral once    MEDICATIONS  (PRN):      ALLERGIES: penicillin (Hives)  penicillin (Unknown)  sulfa drugs (Other)      FAMILY HISTORY:      PHYSICAL EXAMINATION:  -----------------------------  T(C): 37.2 (05-07-22 @ 13:10), Max: 37.2 (05-07-22 @ 13:10)  HR: 93 (05-07-22 @ 13:10) (80 - 93)  BP: 165/65 (05-07-22 @ 13:10) (160/70 - 180/100)  RR: 18 (05-07-22 @ 13:10) (14 - 18)  SpO2: 98% (05-07-22 @ 13:10) (95% - 100%)    Constitutional: no acute distress.   Eyes: the conjunctiva exhibited no abnormalities and the eyelids demonstrated no xanthelasmas.   HEENT: normal oral mucosa, no oral pallor and no oral cyanosis.   Neck: normal jugular venous A waves present, normal jugular venous V waves present and no jugular venous blum A waves.   Pulmonary: no respiratory distress, normal respiratory rhythm and effort, no accessory muscle use and lungs were clear to auscultation bilaterally.   Cardiovascular: heart rate and rhythm were normal, normal S1 and S2 and no murmur, gallop, rub, heave or thrill are present.   Abdomen: soft, non-tender, no hepato-splenomegaly and no abdominal mass palpated.   Musculoskeletal: the gait could not be assessed..   Extremities: no clubbing of the fingernails, no localized cyanosis, no petechial hemorrhages and no ischemic changes.   Skin: normal skin color and pigmentation, no rash, no venous stasis, no skin lesions, no skin ulcer and no xanthoma was observed.       LABS:   --------  05-07    140  |  107  |  13  ----------------------------<  96  5.1   |  25  |  1.23    Ca    8.6      07 May 2022 09:38    TPro  8.2  /  Alb  3.1<L>  /  TBili  0.5  /  DBili  x   /  AST  91<H>  /  ALT  31  /  AlkPhos  99  05-07                         11.2   12.86 )-----------( 318      ( 07 May 2022 09:38 )             33.8     PT/INR - ( 07 May 2022 09:38 )   PT: 32.6 sec;   INR: 2.69 ratio         PTT - ( 07 May 2022 09:38 )  PTT:43.9 sec            RADIOLOGY:  -----------------    < from: TTE Echo Doppler w/o Cont (02.09.17 @ 11:19) >        Summary:   1. Left ventricular ejection fraction, by visual estimation, is 60 to   65%.   2. There is mild concentric left ventricular hypertrophy.     [id number] Colby Bang MD  Electronically signed on 2/10/2017 at 2:49:21 PM    < end of copied text >      c< from: CT Angio Neck w/ IV Cont (05.07.22 @ 08:58) >    IMPRESSION:        1.   Right carotid system:  No hemodynamically significant stenosis.        2.   Left carotid system:  No hemodynamically significant stenosis.        3.   Intracranial circulation:  No significant vascular lesion.    Aneurysm clips are seen in the anterior cerebral artery cistern        4.   Brain:  Encephalomalacia and gliosis in the anterior frontal   lobes with overlying craniectomy and calcification of the dura and falx.   Aneurysm clips are seen in the anterior cerebral artery cistern.   Old   infarction seen in the LEFT occipital lobe.        5. Perfusion: No core infarct or critically hypoperfused tissue at   risk is identified. Perfusion values listed above are likely artifactual.    < end of copied text >

## 2022-05-07 NOTE — H&P ADULT - NSHPLABSRESULTS_GEN_ALL_CORE
LABS:                        11.2   12.86 )-----------( 318      ( 07 May 2022 09:38 )             33.8     05-07    140  |  107  |  13  ----------------------------<  96  5.1   |  25  |  1.23    Ca    8.6      07 May 2022 09:38    TPro  8.2  /  Alb  3.1<L>  /  TBili  0.5  /  DBili  x   /  AST  91<H>  /  ALT  31  /  AlkPhos  99  05-07    PT/INR - ( 07 May 2022 09:38 )   PT: 32.6 sec;   INR: 2.69 ratio         PTT - ( 07 May 2022 09:38 )  PTT:43.9 sec      Urinalysis Basic - ( 07 May 2022 09:43 )    Color: Yellow / Appearance: Clear / S.005 / pH: x  Gluc: x / Ketone: Negative  / Bili: Negative / Urobili: Negative mg/dL   Blood: x / Protein: 30 mg/dL / Nitrite: Negative   Leuk Esterase: Negative / RBC: x / WBC x   Sq Epi: x / Non Sq Epi: x / Bacteria: x

## 2022-05-07 NOTE — ED ADULT NURSE NOTE - NSIMPLEMENTINTERV_GEN_ALL_ED
Implemented All Fall with Harm Risk Interventions:  [place] to call system. Call bell, personal items and telephone within reach. Instruct patient to call for assistance. Room bathroom lighting operational. Non-slip footwear when patient is off stretcher. Physically safe environment: no spills, clutter or unnecessary equipment. Stretcher in lowest position, wheels locked, appropriate side rails in place. Provide visual cue, wrist band, yellow gown, etc. Monitor gait and stability. Monitor for mental status changes and reorient to person, place, and time. Review medications for side effects contributing to fall risk. Reinforce activity limits and safety measures with patient and family. Provide visual clues: red socks.

## 2022-05-07 NOTE — H&P ADULT - HISTORY OF PRESENT ILLNESS
62 y/o F   with PMHx of Brain Aneurysm s/p Hemicraniectomy and Clipping,   s sarah , on Keppra), DVT  on Coumadin) and HTN     presents to the ED BIBEMS for AMS since earlier today  .As per EMS, pt was found unresponsive,  on the floor  by .. 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   Pt's  works overnight, came home at 745 this morning when he found the pt with decreased mental status, a hematoma to the forehead, blood on the face   able to answer some commands, but not able to communicate.

## 2022-05-07 NOTE — ED PROVIDER NOTE - NSICDXPASTSURGICALHX_GEN_ALL_CORE_FT
PAST SURGICAL HISTORY:  Aneurysm see HPI  s/p cerebral aneurysm clipping in Dec 2013, after which pt was in 30 day induced coma, had  shunt,  tracheostomy and reversal, followed by rehab    Cholecystostomy care     H/O cerebral aneurysm repair clipping    H/O craniotomy     History of cranioplasty B/L 04/14    Presence of IVC filter     S/P primary angioplasty with coronary stent     S/P  shunt

## 2022-05-07 NOTE — ED PROVIDER NOTE - PROGRESS NOTE DETAILS
Pt is more reactive,  says similar to appearance after a seizure in the past.  CT negative for bleed, labs significant for elevated troponin. Pt loaded with keppra, cardiology and neurology consulted, admitted for further care.

## 2022-05-07 NOTE — ED PROVIDER NOTE - CONSTITUTIONAL, MLM
Large hematoma to forehead, dry blood around face, no laceration noted, eyes open and alert, follows some commands. normal...

## 2022-05-07 NOTE — H&P ADULT - NSHPPHYSICALEXAM_GEN_ALL_CORE
PHYSICAL EXAMINATION:  Vital Signs Last 24 Hrs  T(C): 37.1 (07 May 2022 10:05), Max: 37.1 (07 May 2022 10:05)  T(F): 98.7 (07 May 2022 10:05), Max: 98.7 (07 May 2022 10:05)  HR: 87 (07 May 2022 10:05) (80 - 87)  BP: 178/98 (07 May 2022 10:05) (160/70 - 180/100)  BP(mean): --  RR: 16 (07 May 2022 10:05) (16 - 17)  SpO2: 100% (07 May 2022 10:05) (95% - 100%)  CAPILLARY BLOOD GLUCOSE      POCT Blood Glucose.: 149 mg/dL (07 May 2022 08:26)        GENERAL: NAD, well-groomed,  HEAD:  atraumatic, normocephalic  EYES: sclera anicteric  ENMT: mucous membranes moist  NECK: supple, No JVD  CHEST/LUNG: clear to auscultation bilaterally;    no      rales   ,   no rhonchi,   HEART: normal S1, S2  ABDOMEN: BS+, soft, ND, NT   EXTREMITIES:    no    edema    b/l LEs  NEURO: awake, ,/  no focal  deficits  SKIN: no     rash PHYSICAL EXAMINATION:  Vital Signs Last 24 Hrs  T(C): 37.1 (07 May 2022 10:05), Max: 37.1 (07 May 2022 10:05)  T(F): 98.7 (07 May 2022 10:05), Max: 98.7 (07 May 2022 10:05)  HR: 87 (07 May 2022 10:05) (80 - 87)  BP: 178/98 (07 May 2022 10:05) (160/70 - 180/100)  BP(mean): --  RR: 16 (07 May 2022 10:05) (16 - 17)  SpO2: 100% (07 May 2022 10:05) (95% - 100%)  CAPILLARY BLOOD GLUCOSE      POCT Blood Glucose.: 149 mg/dL (07 May 2022 08:26)        GENERAL: NAD, well-groomed,  HEAD:  atraumatic, normocephalic  EYES: sclera anicteric  ENMT: mucous membranes moist  NECK: supple, No JVD  CHEST/LUNG: clear to auscultation bilaterally;    no      rales   ,   no rhonchi,   HEART: normal S1, S2  ABDOMEN: BS+, soft, ND, NT   EXTREMITIES:    no    edema    b/l LEs   c/c  hyperpoigmenetation/   c/c  wound. left  lateral   NEURO: awake, ,/  no focal  deficits  SKIN: no     rash

## 2022-05-07 NOTE — ED ADULT NURSE NOTE - OBJECTIVE STATEMENT
Pt found laying face forward on the floor unknown downtime, as per  hx of seizures in the past, neurologist changed AED because of influx of seizures, BIBA with vomitus and AMS, code stroke called, suctioned placed on ACLS at all times #18 gauge needle to right AC placed by medics, +Incontinence, No active seizures at this time, seizure precautions maintained

## 2022-05-07 NOTE — H&P ADULT - ASSESSMENT
60 y/o F    h/o f Brain Aneurysm s/p Hemicraniectomy and Clipping,   seizures, on Keppra), DVT  on coumadin   and HTN     presents to the r, bibems,  for AMS/  syncope    .As per EMS, pt was found unresponsive,  on the floor  by .. 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     *  syncope/  found  on the   floor   tele   no  witnessed  seizures  CT head,  old  infracts,  aneurysm  clips    *  Seizures   neuro  d r kalakstein   s/p  keppra  loading  by  er/  on keppra   * Syncope    echo pending   *  elevated Troponin/  not  from mi    ekg,  no  acute  ischemic  changes   card  d pearl ramos  called by  er   on asa/  lipitor  *  HTN,    on norvasc.  torpol  *  elevated wbc.  is reactive   *  prior h/o  dvt, on coumadin   follow  daily   inr  neurochecks/   seiure /  fall precautions       rad< from: CT Brain Stroke Protocol (05.07.22 @ 08:32) >  IMPRESSION:      1.   Right carotid system:  No hemodynamically significant stenosis.      2.   Left carotid system:  No hemodynamically significant stenosis.      3.   Intracranial circulation:  No significant vascular lesion.    Aneurysm clips are seen in the anterior cerebral artery cistern      4.   Brain:  Encephalomalacia and gliosis in the anterior frontal   lobes with overlying craniectomy and calcification of the dura and falx.   Aneurysm clips are seen in the anterior cerebral artery cistern.   Old   infarction seen in the LEFT occipital lobe.        5. Perfusion: No core infarct or critically hypoperfused tissue at   risk is identified. Perfusion values listed above are likely artifactual.  Critical value:  I discussedthe finding of this report with Dr. Shen at   9:20 AM on 05/07/2022.  Critical value policy of the hospital was   followed.  Read back and confirmation of receipt of this communication   was performed.  This verbal communication supplements the text report of   this document.  < end of copied text >        60 y/o F    h/o f Brain Aneurysm s/p Hemicraniectomy and Clipping,   seizures, on Keppra), DVT  on coumadin   and HTN     presents to the r, bibems,  for AMS/  syncope    .As per EMS, pt was found unresponsive,  on the floor  by .. 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     *  syncope/  found  on the   floor   tele   no  witnessed  seizures  CT head,  old  infracts,  aneurysm  clips    *  Seizures   neuro  d r kalakstein   s/p  keppra  loading  by  er/  on keppra   * Syncope    echo pending   *  elevated Troponin/  not  from mi    ekg,  no  acute  ischemic  changes   card  d pearl ramos  called by  er   on asa/  lipitor  *  HTN,    on norvasc.  torpol  *  elevated wbc.  is reactive   chronic  wound.  distal  left leg/  f/p  qt wound  care in [place]   *  prior h/o  dvt, on coumadin   follow  daily   inr  neurochecks/   seiure /  fall precautions    spoke  with daughter  at  bedside/         rad< from: CT Brain Stroke Protocol (05.07.22 @ 08:32) >  IMPRESSION:      1.   Right carotid system:  No hemodynamically significant stenosis.      2.   Left carotid system:  No hemodynamically significant stenosis.      3.   Intracranial circulation:  No significant vascular lesion.    Aneurysm clips are seen in the anterior cerebral artery cistern      4.   Brain:  Encephalomalacia and gliosis in the anterior frontal   lobes with overlying craniectomy and calcification of the dura and falx.   Aneurysm clips are seen in the anterior cerebral artery cistern.   Old   infarction seen in the LEFT occipital lobe.        5. Perfusion: No core infarct or critically hypoperfused tissue at   risk is identified. Perfusion values listed above are likely artifactual.  Critical value:  I discussedthe finding of this report with Dr. Shen at   9:20 AM on 05/07/2022.  Critical value policy of the hospital was   followed.  Read back and confirmation of receipt of this communication   was performed.  This verbal communication supplements the text report of   this document.  < end of copied text >        60 y/o F    h/o f Brain Aneurysm s/p Hemicraniectomy and Clipping,   seizures, on Keppra), DVT  on coumadin   and HTN     presents to the r, bibems,  for AMS/  syncope    .As per EMS, pt was found unresponsive,  on the floor  by .. 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     *  syncope/  found  on the   floor   tele   no  witnessed  seizures  CT head,  old  infracts,  aneurysm  clips    *  Seizures   neuro  d r kalakstein   s/p  keppra  loading  by  er/  on keppra   * Syncope    echo pending   *  elevated Troponin/  not  from mi    ekg,  no  acute  ischemic  changes   card  d r richard  called by  er   on asa/  lipitor  *  Elevated cpk  from fall/  rhabdomyolysis.  cpk> 4,000   on iv  fluids  *  HTN,    on norvasc.  torpol  *  elevated wbc.  is reactive   chronic  wound.  distal  left leg/  f/p  qt wound  care in [place]   *  prior h/o  dvt, on coumadin   follow  daily   inr  neurochecks/   seiure /  fall precautions    spoke  with daughter  at  bedside/         rad< from: CT Brain Stroke Protocol (05.07.22 @ 08:32) >  IMPRESSION:      1.   Right carotid system:  No hemodynamically significant stenosis.      2.   Left carotid system:  No hemodynamically significant stenosis.      3.   Intracranial circulation:  No significant vascular lesion.    Aneurysm clips are seen in the anterior cerebral artery cistern      4.   Brain:  Encephalomalacia and gliosis in the anterior frontal   lobes with overlying craniectomy and calcification of the dura and falx.   Aneurysm clips are seen in the anterior cerebral artery cistern.   Old   infarction seen in the LEFT occipital lobe.        5. Perfusion: No core infarct or critically hypoperfused tissue at   risk is identified. Perfusion values listed above are likely artifactual.  Critical value:  I discussedthe finding of this report with Dr. Shen at   9:20 AM on 05/07/2022.  Critical value policy of the hospital was   followed.  Read back and confirmation of receipt of this communication   was performed.  This verbal communication supplements the text report of   this document.  < end of copied text >

## 2022-05-07 NOTE — CONSULT NOTE ADULT - SUBJECTIVE AND OBJECTIVE BOX
Neurology consult    BELLE [id number]     Patient is a 61y old  Female who presents with a chief complaint of ayncope (07 May 2022 14:13)      HPI:  60 y/o F   with PMHx of Brain Aneurysm s/p Hemicraniectomy and Clipping,   s sarah , on Keppra), DVT  on Coumadin) and HTN     presents to the ED BIBEMS for AMS since earlier today  .As per EMS, pt was found unresponsive,  on the floor  by .. 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   Pt's  works overnight, came home at 745 this morning when he found the pt with decreased mental status, a hematoma to the forehead, blood on the face   able to answer some commands, but not able to communicate. (07 May 2022 10:50)      REVIEW OF SYSTEMS:    limited    MEDICATIONS    amLODIPine   Tablet 10 milliGRAM(s) Oral daily  aspirin enteric coated 81 milliGRAM(s) Oral daily  atorvastatin 10 milliGRAM(s) Oral at bedtime  levETIRAcetam  IVPB 1000 milliGRAM(s) IV Intermittent every 12 hours  losartan 50 milliGRAM(s) Oral daily  metoprolol succinate ER 50 milliGRAM(s) Oral daily  sodium chloride 0.9%. 1000 milliLiter(s) IV Continuous <Continuous>      PMH: HTN (hypertension)    Epilepsy    Coronary artery disease    Brain aneurysm    Hyperlipidemia    Coronary artery disease    Deep vein thrombosis (DVT)    Seizure    Hypertension    Stroke    Aneurysm         PSH: Cholecystostomy care    Aneurysm    S/P  shunt    History of cranioplasty    H/O craniotomy    Presence of IVC filter    S/P primary angioplasty with coronary stent    H/O cerebral aneurysm repair        Family history: No history of dementia, strokes, or seizures   FAMILY HISTORY:      SOCIAL HISTORY:  No history of tobacco or alcohol use     Allergies    penicillin (Hives)  penicillin (Unknown)  sulfa drugs (Other)    Intolerances        Height (cm): 154.9 ( @ 08:22)  Weight (kg): 78.1 ( @ 14:16)  BMI (kg/m2): 32.5 ( @ 14:16)    Vital Signs Last 24 Hrs  T(C): 37.4 (07 May 2022 16:00), Max: 37.4 (07 May 2022 14:16)  T(F): 99.4 (07 May 2022 16:00), Max: 99.4 (07 May 2022 14:16)  HR: 96 (07 May 2022 19:44) (80 - 96)  BP: 155/93 (07 May 2022 16:00) (141/91 - 180/100)  BP(mean): --  RR: 18 (07 May 2022 19:44) (14 - 18)  SpO2: 100% (07 May 2022 19:44) (95% - 100%)      On Neurological Examination:    Neuro: AAOto self  could not say age or month; obeys simple commands, no dysarthria psychomotor slowing     CN: PERRL, esotropia, VFF normal gaze preference, no facial palsy,     Motor: no drift in UEs, RLE 4-/5 LLe 4/5    Sensory: Intact to LT and PP no extinction    Coordination: dysmerria on the right      NIHSS 9       LABS:  CBC Full  -  ( 07 May 2022 09:38 )  WBC Count : 12.86 K/uL  RBC Count : 4.06 M/uL  Hemoglobin : 11.2 g/dL  Hematocrit : 33.8 %  Platelet Count - Automated : 318 K/uL  Mean Cell Volume : 83.3 fl  Mean Cell Hemoglobin : 27.6 pg  Mean Cell Hemoglobin Concentration : 33.1 g/dL  Auto Neutrophil # : 10.85 K/uL  Auto Lymphocyte # : 1.24 K/uL  Auto Monocyte # : 0.70 K/uL  Auto Eosinophil # : 0.00 K/uL  Auto Basophil # : 0.01 K/uL  Auto Neutrophil % : 84.4 %  Auto Lymphocyte % : 9.6 %  Auto Monocyte % : 5.4 %  Auto Eosinophil % : 0.0 %  Auto Basophil % : 0.1 %    Urinalysis Basic - ( 07 May 2022 09:43 )    Color: Yellow / Appearance: Clear / S.005 / pH: x  Gluc: x / Ketone: Negative  / Bili: Negative / Urobili: Negative mg/dL   Blood: x / Protein: 30 mg/dL / Nitrite: Negative   Leuk Esterase: Negative / RBC: 6-10 /HPF / WBC 3-5   Sq Epi: x / Non Sq Epi: Few / Bacteria: Few      05-07    140  |  107  |  13  ----------------------------<  96  5.1   |  25  |  1.23    Ca    8.6      07 May 2022 09:38    TPro  8.2  /  Alb  3.1<L>  /  TBili  0.5  /  DBili  x   /  AST  91<H>  /  ALT  31  /  AlkPhos  99  05-07    LIVER FUNCTIONS - ( 07 May 2022 09:38 )  Alb: 3.1 g/dL / Pro: 8.2 gm/dL / ALK PHOS: 99 U/L / ALT: 31 U/L / AST: 91 U/L / GGT: x           Hemoglobin A1C:       PT/INR - ( 07 May 2022 09:38 )   PT: 32.6 sec;   INR: 2.69 ratio         PTT - ( 07 May 2022 09:38 )  PTT:43.9 sec      RADIOLOGY  CTH     < from: CT Angio Head w/ IV Cont (22 @ 08:58) >  IMPRESSION:        1.   Right carotid system:  No hemodynamically significant stenosis.        2.   Left carotid system:  No hemodynamically significant stenosis.        3.   Intracranial circulation:  No significant vascular lesion.    Aneurysm clips are seen in the anterior cerebral artery cistern        4.   Brain:  Encephalomalacia and gliosis in the anterior frontal   lobes with overlying craniectomy and calcification of the dura and falx.   Aneurysm clips are seen in the anterior cerebral artery cistern.   Old   infarction seen in the LEFT occipital lobe.        5. Perfusion: No core infarct or critically hypoperfused tissue at   risk is identified. Perfusion values listed above are likely artifactual.    < end of copied text >

## 2022-05-07 NOTE — ED ADULT TRIAGE NOTE - CHIEF COMPLAINT QUOTE
found on the floor face down by  . unknown how long the patient is down for . last time the family spoke to her was 4pm on 5/6/2022. h/o HTN, brain aneurysms and seizures . pt is currently on coumadin.

## 2022-05-07 NOTE — ED ADULT NURSE NOTE - NSICDXPASTMEDICALHX_GEN_ALL_CORE_FT
PAST MEDICAL HISTORY:  Aneurysm     Brain aneurysm     Coronary artery disease     Coronary artery disease     Deep vein thrombosis (DVT)     Epilepsy     HTN (hypertension)     Hyperlipidemia     Hypertension     Seizure     Stroke

## 2022-05-07 NOTE — ED PROVIDER NOTE - CLINICAL SUMMARY MEDICAL DECISION MAKING FREE TEXT BOX
DDx: r/o ICH, no focal sx suggestive of CVA, post-dictal s/p seizure,   Plan: stroke code, CT head, C-spine, neck brace, labs, reassess. DDx: r/o ICH, no focal sx suggestive of CVA, post-dictal s/p seizure, syncope  Plan: stroke code, CT head, C-spine, neck brace, labs, reassess.

## 2022-05-07 NOTE — PATIENT PROFILE ADULT - FALL HARM RISK - HARM RISK INTERVENTIONS

## 2022-05-08 LAB
ANION GAP SERPL CALC-SCNC: 6 MMOL/L — SIGNIFICANT CHANGE UP (ref 5–17)
BUN SERPL-MCNC: 16 MG/DL — SIGNIFICANT CHANGE UP (ref 7–23)
CALCIUM SERPL-MCNC: 9.1 MG/DL — SIGNIFICANT CHANGE UP (ref 8.5–10.1)
CHLORIDE SERPL-SCNC: 116 MMOL/L — HIGH (ref 96–108)
CHOLEST SERPL-MCNC: 200 MG/DL — HIGH
CK SERPL-CCNC: 5478 U/L — HIGH (ref 26–192)
CO2 SERPL-SCNC: 26 MMOL/L — SIGNIFICANT CHANGE UP (ref 22–31)
CREAT SERPL-MCNC: 1.2 MG/DL — SIGNIFICANT CHANGE UP (ref 0.5–1.3)
EGFR: 52 ML/MIN/1.73M2 — LOW
GLUCOSE SERPL-MCNC: 99 MG/DL — SIGNIFICANT CHANGE UP (ref 70–99)
HCT VFR BLD CALC: 37.4 % — SIGNIFICANT CHANGE UP (ref 34.5–45)
HCV AB S/CO SERPL IA: 0.16 S/CO — SIGNIFICANT CHANGE UP (ref 0–0.99)
HCV AB SERPL-IMP: SIGNIFICANT CHANGE UP
HDLC SERPL-MCNC: 63 MG/DL — SIGNIFICANT CHANGE UP
HGB BLD-MCNC: 12.1 G/DL — SIGNIFICANT CHANGE UP (ref 11.5–15.5)
INR BLD: 1.98 RATIO — HIGH (ref 0.88–1.16)
LIPID PNL WITH DIRECT LDL SERPL: 111 MG/DL — HIGH
MCHC RBC-ENTMCNC: 27.1 PG — SIGNIFICANT CHANGE UP (ref 27–34)
MCHC RBC-ENTMCNC: 32.4 G/DL — SIGNIFICANT CHANGE UP (ref 32–36)
MCV RBC AUTO: 83.9 FL — SIGNIFICANT CHANGE UP (ref 80–100)
NON HDL CHOLESTEROL: 137 MG/DL — HIGH
NRBC # BLD: 0 /100 WBCS — SIGNIFICANT CHANGE UP (ref 0–0)
PLATELET # BLD AUTO: 294 K/UL — SIGNIFICANT CHANGE UP (ref 150–400)
POTASSIUM SERPL-MCNC: 3.3 MMOL/L — LOW (ref 3.5–5.3)
POTASSIUM SERPL-SCNC: 3.3 MMOL/L — LOW (ref 3.5–5.3)
PROTHROM AB SERPL-ACNC: 23.7 SEC — HIGH (ref 10.5–13.4)
RBC # BLD: 4.46 M/UL — SIGNIFICANT CHANGE UP (ref 3.8–5.2)
RBC # FLD: 16.3 % — HIGH (ref 10.3–14.5)
SODIUM SERPL-SCNC: 148 MMOL/L — HIGH (ref 135–145)
TRIGL SERPL-MCNC: 130 MG/DL — SIGNIFICANT CHANGE UP
TSH SERPL-MCNC: 0.41 UIU/ML — SIGNIFICANT CHANGE UP (ref 0.36–3.74)
WBC # BLD: 9.77 K/UL — SIGNIFICANT CHANGE UP (ref 3.8–10.5)
WBC # FLD AUTO: 9.77 K/UL — SIGNIFICANT CHANGE UP (ref 3.8–10.5)

## 2022-05-08 PROCEDURE — 93306 TTE W/DOPPLER COMPLETE: CPT | Mod: 26

## 2022-05-08 PROCEDURE — 99233 SBSQ HOSP IP/OBS HIGH 50: CPT

## 2022-05-08 RX ORDER — LEVETIRACETAM 250 MG/1
1000 TABLET, FILM COATED ORAL EVERY 12 HOURS
Refills: 0 | Status: DISCONTINUED | OUTPATIENT
Start: 2022-05-08 | End: 2022-05-10

## 2022-05-08 RX ORDER — LEVETIRACETAM 250 MG/1
1250 TABLET, FILM COATED ORAL EVERY 12 HOURS
Refills: 0 | Status: DISCONTINUED | OUTPATIENT
Start: 2022-05-08 | End: 2022-05-08

## 2022-05-08 RX ORDER — WARFARIN SODIUM 2.5 MG/1
5 TABLET ORAL ONCE
Refills: 0 | Status: COMPLETED | OUTPATIENT
Start: 2022-05-08 | End: 2022-05-08

## 2022-05-08 RX ADMIN — ATORVASTATIN CALCIUM 10 MILLIGRAM(S): 80 TABLET, FILM COATED ORAL at 21:22

## 2022-05-08 RX ADMIN — LEVETIRACETAM 400 MILLIGRAM(S): 250 TABLET, FILM COATED ORAL at 18:29

## 2022-05-08 RX ADMIN — AMLODIPINE BESYLATE 10 MILLIGRAM(S): 2.5 TABLET ORAL at 06:29

## 2022-05-08 RX ADMIN — Medication 50 MILLIGRAM(S): at 06:30

## 2022-05-08 RX ADMIN — LEVETIRACETAM 400 MILLIGRAM(S): 250 TABLET, FILM COATED ORAL at 06:27

## 2022-05-08 RX ADMIN — SODIUM CHLORIDE 80 MILLILITER(S): 9 INJECTION INTRAMUSCULAR; INTRAVENOUS; SUBCUTANEOUS at 12:01

## 2022-05-08 RX ADMIN — LOSARTAN POTASSIUM 50 MILLIGRAM(S): 100 TABLET, FILM COATED ORAL at 06:30

## 2022-05-08 RX ADMIN — SODIUM CHLORIDE 80 MILLILITER(S): 9 INJECTION INTRAMUSCULAR; INTRAVENOUS; SUBCUTANEOUS at 03:18

## 2022-05-08 RX ADMIN — SODIUM CHLORIDE 80 MILLILITER(S): 9 INJECTION INTRAMUSCULAR; INTRAVENOUS; SUBCUTANEOUS at 17:58

## 2022-05-08 RX ADMIN — WARFARIN SODIUM 5 MILLIGRAM(S): 2.5 TABLET ORAL at 21:22

## 2022-05-08 RX ADMIN — Medication 81 MILLIGRAM(S): at 12:02

## 2022-05-09 LAB
ALBUMIN SERPL ELPH-MCNC: 3 G/DL — LOW (ref 3.3–5)
ALP SERPL-CCNC: 87 U/L — SIGNIFICANT CHANGE UP (ref 40–120)
ALT FLD-CCNC: 39 U/L — SIGNIFICANT CHANGE UP (ref 12–78)
ANION GAP SERPL CALC-SCNC: 6 MMOL/L — SIGNIFICANT CHANGE UP (ref 5–17)
AST SERPL-CCNC: 161 U/L — HIGH (ref 15–37)
BILIRUB SERPL-MCNC: 0.7 MG/DL — SIGNIFICANT CHANGE UP (ref 0.2–1.2)
BUN SERPL-MCNC: 13 MG/DL — SIGNIFICANT CHANGE UP (ref 7–23)
CALCIUM SERPL-MCNC: 9.2 MG/DL — SIGNIFICANT CHANGE UP (ref 8.5–10.1)
CHLORIDE SERPL-SCNC: 119 MMOL/L — HIGH (ref 96–108)
CK SERPL-CCNC: SIGNIFICANT CHANGE UP U/L (ref 26–192)
CO2 SERPL-SCNC: 26 MMOL/L — SIGNIFICANT CHANGE UP (ref 22–31)
CREAT SERPL-MCNC: 1.02 MG/DL — SIGNIFICANT CHANGE UP (ref 0.5–1.3)
EGFR: 63 ML/MIN/1.73M2 — SIGNIFICANT CHANGE UP
GLUCOSE SERPL-MCNC: 90 MG/DL — SIGNIFICANT CHANGE UP (ref 70–99)
HCT VFR BLD CALC: 35.4 % — SIGNIFICANT CHANGE UP (ref 34.5–45)
HGB BLD-MCNC: 11.5 G/DL — SIGNIFICANT CHANGE UP (ref 11.5–15.5)
INR BLD: 1.78 RATIO — HIGH (ref 0.88–1.16)
MCHC RBC-ENTMCNC: 27.7 PG — SIGNIFICANT CHANGE UP (ref 27–34)
MCHC RBC-ENTMCNC: 32.5 G/DL — SIGNIFICANT CHANGE UP (ref 32–36)
MCV RBC AUTO: 85.3 FL — SIGNIFICANT CHANGE UP (ref 80–100)
NRBC # BLD: 0 /100 WBCS — SIGNIFICANT CHANGE UP (ref 0–0)
PLATELET # BLD AUTO: 260 K/UL — SIGNIFICANT CHANGE UP (ref 150–400)
POTASSIUM SERPL-MCNC: 3.7 MMOL/L — SIGNIFICANT CHANGE UP (ref 3.5–5.3)
POTASSIUM SERPL-SCNC: 3.7 MMOL/L — SIGNIFICANT CHANGE UP (ref 3.5–5.3)
PROT SERPL-MCNC: 7.7 GM/DL — SIGNIFICANT CHANGE UP (ref 6–8.3)
PROTHROM AB SERPL-ACNC: 21.5 SEC — HIGH (ref 10.5–13.4)
RBC # BLD: 4.15 M/UL — SIGNIFICANT CHANGE UP (ref 3.8–5.2)
RBC # FLD: 16.5 % — HIGH (ref 10.3–14.5)
SODIUM SERPL-SCNC: 151 MMOL/L — HIGH (ref 135–145)
WBC # BLD: 7.9 K/UL — SIGNIFICANT CHANGE UP (ref 3.8–10.5)
WBC # FLD AUTO: 7.9 K/UL — SIGNIFICANT CHANGE UP (ref 3.8–10.5)

## 2022-05-09 PROCEDURE — 99233 SBSQ HOSP IP/OBS HIGH 50: CPT

## 2022-05-09 RX ADMIN — LEVETIRACETAM 400 MILLIGRAM(S): 250 TABLET, FILM COATED ORAL at 06:39

## 2022-05-09 RX ADMIN — LOSARTAN POTASSIUM 50 MILLIGRAM(S): 100 TABLET, FILM COATED ORAL at 06:43

## 2022-05-09 RX ADMIN — Medication 50 MILLIGRAM(S): at 06:43

## 2022-05-09 RX ADMIN — SODIUM CHLORIDE 80 MILLILITER(S): 9 INJECTION INTRAMUSCULAR; INTRAVENOUS; SUBCUTANEOUS at 18:01

## 2022-05-09 RX ADMIN — SODIUM CHLORIDE 80 MILLILITER(S): 9 INJECTION INTRAMUSCULAR; INTRAVENOUS; SUBCUTANEOUS at 06:39

## 2022-05-09 RX ADMIN — LEVETIRACETAM 400 MILLIGRAM(S): 250 TABLET, FILM COATED ORAL at 17:12

## 2022-05-09 RX ADMIN — Medication 81 MILLIGRAM(S): at 11:55

## 2022-05-09 RX ADMIN — ATORVASTATIN CALCIUM 10 MILLIGRAM(S): 80 TABLET, FILM COATED ORAL at 22:23

## 2022-05-09 RX ADMIN — AMLODIPINE BESYLATE 10 MILLIGRAM(S): 2.5 TABLET ORAL at 06:43

## 2022-05-09 NOTE — SWALLOW BEDSIDE ASSESSMENT ADULT - H & P REVIEW
60 y/o F with PMHx of Brain Aneurysm s/p Hemicraniectomy and Clipping, Seizures (Keppra), DVT (Coumadin) and HTN presents to the ED BIBEMS for AMS since earlier today. As per EMS, pt was found unresponsive by . Last known normal was 1600 PM last night when the pt spoke to her daughter. Pt's  works overnight, came home at 745 this morning when he found the pt with decreased mental status, a hematoma to the forehead, blood on the face and tachypneic. EMS found pt to be GCS: 12, able to answer some commands, but not able to communicate/yes

## 2022-05-09 NOTE — PHYSICAL THERAPY INITIAL EVALUATION ADULT - ORIENTATION, REHAB EVAL
PT to Eval/Treat for Balance Training    Signed, faxed to [phone] and sent to Scanning. Jazzmine Camacho on 6/9/2020 at 1:54 PM    
person/time/situation

## 2022-05-09 NOTE — SWALLOW BEDSIDE ASSESSMENT ADULT - SWALLOW EVAL: DIAGNOSIS
oropharyngeal phases of swallow WFL except slightly increased mastication time however no clinical signs of laryngeal penetration/aspiration.

## 2022-05-09 NOTE — SWALLOW BEDSIDE ASSESSMENT ADULT - SWALLOW EVAL: PATIENT/FAMILY GOALS STATEMENT
pt reported h/o "brain surgery" and "I had a stroke during that" and "I usually have memory loss" after a seizure. pt reported h/o "brain surgery" and "I had a stroke during that" and "I usually have memory loss" after a seizure. pt stated "I feel fine but I fell" with soreness on the right face ie cheek, eye and reported right leg weakness.

## 2022-05-09 NOTE — PHYSICAL THERAPY INITIAL EVALUATION ADULT - PERTINENT HX OF CURRENT PROBLEM, REHAB EVAL
Patient is a 62 y/o female admitted to [facility] due to unwitnessed fall, AMS.

## 2022-05-09 NOTE — PHYSICAL THERAPY INITIAL EVALUATION ADULT - IMPAIRMENTS FOUND, PT EVAL
aerobic capacity/endurance/gait, locomotion, and balance/joint integrity and mobility/muscle strength/neuromotor development and sensory integration/poor safety awareness/posture/ROM

## 2022-05-09 NOTE — PHYSICAL THERAPY INITIAL EVALUATION ADULT - ADDITIONAL COMMENTS
Patient lives c  in a pvt house c 3 entry steps c B/Ls(far apart), all amenities on the 1st floor. Independent c all ADL's and ambulation without device. Pt has own straight cane. As per patient, goes to wound doctor once a week for left leg ulcer/wound.

## 2022-05-09 NOTE — PHYSICAL THERAPY INITIAL EVALUATION ADULT - MODALITIES TREATMENT COMMENTS
Pt presents with hemosiderin staining to left leg consistent with chronic venous insufficiency. Pt presents with venous ulceration over anterolateral aspect of left leg c scant exudate, hyperpigmented periwound,  and no odor.

## 2022-05-09 NOTE — SWALLOW BEDSIDE ASSESSMENT ADULT - SLP GENERAL OBSERVATIONS
pt seen bedside alert and oriented x4. pt responded to autobiographical/orientation questions with good accuracy, she verbalized wants and was able to follow one step directions for oral mech. pt presented with delayed responses ?slow processing and/or STM deficits and noted good to fair speech intelligibility 2/2 imprecise articulation.

## 2022-05-09 NOTE — PHYSICAL THERAPY INITIAL EVALUATION ADULT - GENERAL OBSERVATIONS, REHAB EVAL
Chart (EMR) reviewed. Pt seen for wound care assessment. Received supine c HOB elevated, NAD. +cardiac monitor donned, +primafit, +IV intact, +dressing to left leg intact. Alert. Ox3. Able to follow simple commands/directions.

## 2022-05-10 LAB
ANION GAP SERPL CALC-SCNC: 4 MMOL/L — LOW (ref 5–17)
BUN SERPL-MCNC: 12 MG/DL — SIGNIFICANT CHANGE UP (ref 7–23)
CALCIUM SERPL-MCNC: 8.6 MG/DL — SIGNIFICANT CHANGE UP (ref 8.5–10.1)
CHLORIDE SERPL-SCNC: 113 MMOL/L — HIGH (ref 96–108)
CK SERPL-CCNC: 9585 U/L — HIGH (ref 26–192)
CO2 SERPL-SCNC: 26 MMOL/L — SIGNIFICANT CHANGE UP (ref 22–31)
CREAT SERPL-MCNC: 0.89 MG/DL — SIGNIFICANT CHANGE UP (ref 0.5–1.3)
EGFR: 74 ML/MIN/1.73M2 — SIGNIFICANT CHANGE UP
GLUCOSE SERPL-MCNC: 110 MG/DL — HIGH (ref 70–99)
HCT VFR BLD CALC: 35.9 % — SIGNIFICANT CHANGE UP (ref 34.5–45)
HGB BLD-MCNC: 11.5 G/DL — SIGNIFICANT CHANGE UP (ref 11.5–15.5)
INR BLD: 1.68 RATIO — HIGH (ref 0.88–1.16)
MCHC RBC-ENTMCNC: 27.3 PG — SIGNIFICANT CHANGE UP (ref 27–34)
MCHC RBC-ENTMCNC: 32 G/DL — SIGNIFICANT CHANGE UP (ref 32–36)
MCV RBC AUTO: 85.3 FL — SIGNIFICANT CHANGE UP (ref 80–100)
NRBC # BLD: 0 /100 WBCS — SIGNIFICANT CHANGE UP (ref 0–0)
PLATELET # BLD AUTO: 295 K/UL — SIGNIFICANT CHANGE UP (ref 150–400)
POTASSIUM SERPL-MCNC: 3.4 MMOL/L — LOW (ref 3.5–5.3)
POTASSIUM SERPL-SCNC: 3.4 MMOL/L — LOW (ref 3.5–5.3)
PROTHROM AB SERPL-ACNC: 20.1 SEC — HIGH (ref 10.5–13.4)
RBC # BLD: 4.21 M/UL — SIGNIFICANT CHANGE UP (ref 3.8–5.2)
RBC # FLD: 16.4 % — HIGH (ref 10.3–14.5)
SODIUM SERPL-SCNC: 143 MMOL/L — SIGNIFICANT CHANGE UP (ref 135–145)
WBC # BLD: 7.28 K/UL — SIGNIFICANT CHANGE UP (ref 3.8–10.5)
WBC # FLD AUTO: 7.28 K/UL — SIGNIFICANT CHANGE UP (ref 3.8–10.5)

## 2022-05-10 PROCEDURE — 99233 SBSQ HOSP IP/OBS HIGH 50: CPT

## 2022-05-10 RX ORDER — SODIUM CHLORIDE 9 MG/ML
1000 INJECTION INTRAMUSCULAR; INTRAVENOUS; SUBCUTANEOUS
Refills: 0 | Status: DISCONTINUED | OUTPATIENT
Start: 2022-05-10 | End: 2022-05-11

## 2022-05-10 RX ORDER — WARFARIN SODIUM 2.5 MG/1
5 TABLET ORAL ONCE
Refills: 0 | Status: COMPLETED | OUTPATIENT
Start: 2022-05-10 | End: 2022-05-10

## 2022-05-10 RX ORDER — LEVETIRACETAM 250 MG/1
1000 TABLET, FILM COATED ORAL
Refills: 0 | Status: DISCONTINUED | OUTPATIENT
Start: 2022-05-10 | End: 2022-05-11

## 2022-05-10 RX ORDER — SODIUM CHLORIDE 9 MG/ML
1000 INJECTION INTRAMUSCULAR; INTRAVENOUS; SUBCUTANEOUS
Refills: 0 | Status: DISCONTINUED | OUTPATIENT
Start: 2022-05-10 | End: 2022-05-10

## 2022-05-10 RX ADMIN — ATORVASTATIN CALCIUM 10 MILLIGRAM(S): 80 TABLET, FILM COATED ORAL at 22:57

## 2022-05-10 RX ADMIN — SODIUM CHLORIDE 150 MILLILITER(S): 9 INJECTION INTRAMUSCULAR; INTRAVENOUS; SUBCUTANEOUS at 18:03

## 2022-05-10 RX ADMIN — SODIUM CHLORIDE 80 MILLILITER(S): 9 INJECTION INTRAMUSCULAR; INTRAVENOUS; SUBCUTANEOUS at 11:38

## 2022-05-10 RX ADMIN — WARFARIN SODIUM 5 MILLIGRAM(S): 2.5 TABLET ORAL at 22:58

## 2022-05-10 RX ADMIN — LOSARTAN POTASSIUM 50 MILLIGRAM(S): 100 TABLET, FILM COATED ORAL at 05:26

## 2022-05-10 RX ADMIN — LEVETIRACETAM 1000 MILLIGRAM(S): 250 TABLET, FILM COATED ORAL at 17:32

## 2022-05-10 RX ADMIN — AMLODIPINE BESYLATE 10 MILLIGRAM(S): 2.5 TABLET ORAL at 05:26

## 2022-05-10 RX ADMIN — LEVETIRACETAM 400 MILLIGRAM(S): 250 TABLET, FILM COATED ORAL at 05:31

## 2022-05-10 RX ADMIN — SODIUM CHLORIDE 80 MILLILITER(S): 9 INJECTION INTRAMUSCULAR; INTRAVENOUS; SUBCUTANEOUS at 04:40

## 2022-05-10 RX ADMIN — SODIUM CHLORIDE 150 MILLILITER(S): 9 INJECTION INTRAMUSCULAR; INTRAVENOUS; SUBCUTANEOUS at 23:00

## 2022-05-10 RX ADMIN — Medication 50 MILLIGRAM(S): at 11:36

## 2022-05-10 RX ADMIN — Medication 81 MILLIGRAM(S): at 11:36

## 2022-05-10 NOTE — PROGRESS NOTE ADULT - SUBJECTIVE AND OBJECTIVE BOX
HPI:  62 y/o F   with PMHx of Brain Aneurysm s/p Hemicraniectomy and Clipping,   s sarah , on Keppra), DVT  on Coumadin) and HTN     presents to the ED BIBEMS for AMS since earlier today  .As per EMS, pt was found unresponsive,  on the floor  by .. 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   Pt's  works overnight, came home at 745 this morning when he found the pt with decreased mental status, a hematoma to the forehead, blood on the face   able to answer some commands, but not able to communicate. (07 May 2022 10:50)    Patient is a 61y old  Female who presents with a chief complaint of syncope (09 May 2022 20:44)      INTERVAL HPI/OVERNIGHT EVENTS: no acute events back to baseline     MEDICATIONS  (STANDING):  amLODIPine   Tablet 10 milliGRAM(s) Oral daily  aspirin enteric coated 81 milliGRAM(s) Oral daily  atorvastatin 10 milliGRAM(s) Oral at bedtime  levETIRAcetam 1000 milliGRAM(s) Oral two times a day  losartan 50 milliGRAM(s) Oral daily  metoprolol succinate ER 50 milliGRAM(s) Oral daily  sodium chloride 0.9%. 1000 milliLiter(s) (150 mL/Hr) IV Continuous <Continuous>  warfarin 5 milliGRAM(s) Oral once    MEDICATIONS  (PRN):      Allergies    penicillin (Hives)  penicillin (Unknown)  sulfa drugs (Other)    Intolerances        REVIEW OF SYSTEMS:  CONSTITUTIONAL: No fever, weight loss, or fatigue  EYES: No eye pain, visual disturbances, or discharge  ENMT:  No difficulty hearing, tinnitus, vertigo; No sinus or throat pain  NECK: No pain or stiffness  BREASTS: No pain, masses, or nipple discharge  RESPIRATORY: No cough, wheezing, chills or hemoptysis; No shortness of breath  CARDIOVASCULAR: No chest pain, palpitations, dizziness, or leg swelling  GASTROINTESTINAL: No abdominal or epigastric pain. No nausea, vomiting, or hematemesis; No diarrhea or constipation. No melena or hematochezia.  GENITOURINARY: No dysuria, frequency, hematuria, or incontinence  NEUROLOGICAL: No headaches, memory loss, loss of strength, numbness, or tremors  SKIN: No itching, burning, rashes, or lesions   LYMPH NODES: No enlarged glands  ENDOCRINE: No heat or cold intolerance; No hair loss  MUSCULOSKELETAL: No joint pain or swelling; No muscle, back, or extremity pain  PSYCHIATRIC: No depression, anxiety, mood swings, or difficulty sleeping  HEME/LYMPH: No easy bruising, or bleeding gums  ALLERGY AND IMMUNOLOGIC: No hives or eczema    Vital Signs Last 24 Hrs  T(C): 37.3 (10 May 2022 16:05), Max: 37.3 (10 May 2022 16:05)  T(F): 99.2 (10 May 2022 16:05), Max: 99.2 (10 May 2022 16:05)  HR: 57 (10 May 2022 16:05) (57 - 68)  BP: 137/72 (10 May 2022 16:05) (137/72 - 164/74)  BP(mean): --  RR: 18 (10 May 2022 16:05) (18 - 18)  SpO2: 97% (10 May 2022 16:05) (96% - 99%)    PHYSICAL EXAM:  GENERAL: NAD, well-groomed, well-developed  HEAD:  Atraumatic, Normocephalic  EYES: EOMI, PERRLA, conjunctiva and sclera clear  ENMT: No tonsillar erythema, exudates, or enlargement; Moist mucous membranes, Good dentition, No lesions  NECK: Supple, No JVD, Normal thyroid  NERVOUS SYSTEM:  Alert & Oriented X3, Good concentration; Motor Strength 5/5 B/L upper and lower extremities; DTRs 2+ intact and symmetric  CHEST/LUNG: Clear to percussion bilaterally; No rales, rhonchi, wheezing, or rubs  HEART: Regular rate and rhythm; No murmurs, rubs, or gallops  ABDOMEN: Soft, Nontender, Nondistended; Bowel sounds present  EXTREMITIES:  2+ Peripheral Pulses, No clubbing, cyanosis, or edema  LYMPH: No lymphadenopathy noted  SKIN: No rashes or lesions    LABS:                        11.5   7.28  )-----------( 295      ( 10 May 2022 15:06 )             35.9     05-10    143  |  113<H>  |  12  ----------------------------<  110<H>  3.4<L>   |  26  |  0.89    Ca    8.6      10 May 2022 15:06    TPro  7.7  /  Alb  3.0<L>  /  TBili  0.7  /  DBili  x   /  AST  161<H>  /  ALT  39  /  AlkPhos  87  05-09    PT/INR - ( 10 May 2022 15:06 )   PT: 20.1 sec;   INR: 1.68 ratio             CAPILLARY BLOOD GLUCOSE          RADIOLOGY & ADDITIONAL TESTS:  < from: TTE Echo Complete w/o Contrast w/ Doppler (05.08.22 @ 10:41) >  Summary:   1. Left ventricular ejection fraction, by visual estimation, is 60 to   65%.   2. Normal global left ventricular systolic function.   3. Normal left atrial size.   4. Mild tricuspid regurgitation.   5. No evidence of any thrombus.      [id number] Tj Chris MD, [facility]  Electronically signed on 5/8/2022 at 10:40:03 PM      < end of copied text >    Imaging Personally Reviewed:  [ ] YES  [ ] NO    Consultant(s) Notes Reviewed:  [ ] YES  [ ] NO    Care Discussed with Consultants/Other Providers [ ] YES  [ ] NO
Patient is a 61y old  Female who presents with a chief complaint of syncope (08 May 2022 12:56)    INTERVAL HPI/OVERNIGHT EVENTS: Patients seen and examined at bedside this morning. No acute events overnight. Pt reports she does not recall what happened. States she was in the kitchen and then blacked out. State she bit her right side of tongue. Pt has been compliant with medications. Reports scalp hurts to touch. Hematoma on forehead not bleeding. Her right side hurts as well. Thinks she fell on that. Denies significant pain but aching when laying on that side. Denies SOB or CP.     MEDICATIONS  (STANDING):  amLODIPine   Tablet 10 milliGRAM(s) Oral daily  aspirin enteric coated 81 milliGRAM(s) Oral daily  atorvastatin 10 milliGRAM(s) Oral at bedtime  levETIRAcetam  IVPB 1000 milliGRAM(s) IV Intermittent every 12 hours  losartan 50 milliGRAM(s) Oral daily  metoprolol succinate ER 50 milliGRAM(s) Oral daily  sodium chloride 0.9%. 1000 milliLiter(s) (80 mL/Hr) IV Continuous <Continuous>  warfarin 5 milliGRAM(s) Oral once    MEDICATIONS  (PRN):    Allergies    penicillin (Hives)  penicillin (Unknown)  sulfa drugs (Other)    Intolerances      REVIEW OF SYSTEMS:  All other systems reviewed and are negative    Vital Signs Last 24 Hrs  T(C): 36.8 (08 May 2022 15:35), Max: 37.1 (08 May 2022 00:16)  T(F): 98.2 (08 May 2022 15:35), Max: 98.8 (08 May 2022 10:20)  HR: 66 (08 May 2022 15:35) (66 - 96)  BP: 142/76 (08 May 2022 15:35) (142/76 - 158/91)  BP(mean): --  RR: 18 (08 May 2022 15:35) (18 - 18)  SpO2: 97% (08 May 2022 15:35) (97% - 100%)  Daily     Daily   I&O's Summary    07 May 2022 07:01  -  08 May 2022 07:00  --------------------------------------------------------  IN: 118 mL / OUT: 0 mL / NET: 118 mL    08 May 2022 07:01  -  08 May 2022 19:26  --------------------------------------------------------  IN: 360 mL / OUT: 0 mL / NET: 360 mL      CAPILLARY BLOOD GLUCOSE    Eyes: the conjunctiva exhibited no abnormalities and the eyelids demonstrated no xanthelasmas.   HEENT: normal oral mucosa, no oral pallor and no oral cyanosis.   Neck: normal jugular venous A waves present, normal jugular venous V waves present and no jugular venous blum A waves.   Pulmonary: no respiratory distress, normal respiratory rhythm and effort, no accessory muscle use and lungs were clear to auscultation bilaterally.   Cardiovascular: heart rate and rhythm were normal, normal S1 and S2 and no murmur, gallop, rub, heave or thrill are present.   Abdomen: soft, non-tender, no hepato-splenomegaly and no abdominal mass palpated.   Musculoskeletal: the gait could not be assessed..   Extremities: no clubbing of the fingernails, no localized cyanosis, no petechial hemorrhages and no ischemic changes.   Skin: normal skin color and pigmentation, no rash, no venous stasis, no skin lesions, no skin ulcer and no xanthoma was observed  Neuro: AAOto self  could not say age or month; obeys simple commands, no dysarthria psychomotor slowing  CN: PERRL, esotropia, VFF normal gaze preference, no facial palsy,   Motor: no drift in UEs, RLE 4-/5 LLe 4/5  Sensory: Intact to LT and PP no extinction  Coordination: dysmerria on the right    Labs                          12.1   9.77  )-----------( 294      ( 08 May 2022 08:06 )             37.4     05-08    148<H>  |  116<H>  |  16  ----------------------------<  99  3.3<L>   |  26  |  1.20    Ca    9.1      08 May 2022 08:06    TPro  8.2  /  Alb  3.1<L>  /  TBili  0.5  /  DBili  x   /  AST  91<H>  /  ALT  31  /  AlkPhos  99  05-07    PT/INR - ( 08 May 2022 08:06 )   PT: 23.7 sec;   INR: 1.98 ratio         PTT - ( 07 May 2022 09:38 )  PTT:43.9 sec  CARDIAC MARKERS ( 08 May 2022 08:06 )  x     / x     / 5478 U/L / x     / x      CARDIAC MARKERS ( 07 May 2022 12:40 )  x     / x     / 4177 U/L / x     / x          Urinalysis Basic - ( 07 May 2022 09:43 )    Color: Yellow / Appearance: Clear / S.005 / pH: x  Gluc: x / Ketone: Negative  / Bili: Negative / Urobili: Negative mg/dL   Blood: x / Protein: 30 mg/dL / Nitrite: Negative   Leuk Esterase: Negative / RBC: 6-10 /HPF / WBC 3-5   Sq Epi: x / Non Sq Epi: Few / Bacteria: Few                  
Neurology consult    BELLE [id number]     Patient is a 61y old  Female who presents with a chief complaint of ayncope (07 May 2022 14:13)      HPI:  62 y/o F   with PMHx of Brain Aneurysm s/p Hemicraniectomy and Clipping,   s sarah , on Keppra), DVT  on Coumadin) and HTN     presents to the ED BIBEMS for AMS since earlier today  .As per EMS, pt was found unresponsive,  on the floor  by .. 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   Pt's  works overnight, came home at 745 this morning when he found the pt with decreased mental status, a hematoma to the forehead, blood on the face   able to answer some commands, but not able to communicate. (07 May 2022 10:50)    Patient seen and examined this am. No new events    MEDICATIONS:    amLODIPine   Tablet 10 milliGRAM(s) Oral daily  aspirin enteric coated 81 milliGRAM(s) Oral daily  atorvastatin 10 milliGRAM(s) Oral at bedtime  levETIRAcetam  IVPB 1000 milliGRAM(s) IV Intermittent every 12 hours  losartan 50 milliGRAM(s) Oral daily  metoprolol succinate ER 50 milliGRAM(s) Oral daily  sodium chloride 0.9%. 1000 milliLiter(s) IV Continuous <Continuous>      LABS:                          11.5   7.90  )-----------( 260      ( 09 May 2022 06:53 )             35.4     05-09    151<H>  |  119<H>  |  13  ----------------------------<  90  3.7   |  26  |  1.02    Ca    9.2      09 May 2022 06:53    TPro  7.7  /  Alb  3.0<L>  /  TBili  0.7  /  DBili  x   /  AST  161<H>  /  ALT  39  /  AlkPhos  87  05-09    CAPILLARY BLOOD GLUCOSE        PT/INR - ( 09 May 2022 06:53 )   PT: 21.5 sec;   INR: 1.78 ratio             I&O's Summary    08 May 2022 07:01  -  09 May 2022 07:00  --------------------------------------------------------  IN: 360 mL / OUT: 0 mL / NET: 360 mL    09 May 2022 07:01  -  09 May 2022 11:30  --------------------------------------------------------  IN: 237 mL / OUT: 0 mL / NET: 237 mL      Vital Signs Last 24 Hrs  T(C): 37.1 (09 May 2022 10:57), Max: 37.3 (09 May 2022 00:22)  T(F): 98.8 (09 May 2022 10:57), Max: 99.1 (09 May 2022 00:22)  HR: 64 (09 May 2022 10:57) (62 - 71)  BP: 129/60 (09 May 2022 10:57) (129/60 - 159/74)  BP(mean): --  RR: 18 (09 May 2022 10:57) (18 - 18)  SpO2: 96% (09 May 2022 10:57) (96% - 97%)        On Neurological Examination:    Neuro: AAOto self  could not say age or month; obeys simple commands, no dysarthria psychomotor slowing     CN: PERRL, esotropia, VFF normal gaze preference, no facial palsy,     Motor: no drift in UEs, RLE 4-/5 LLe 4/5    Sensory: Intact to LT and PP no extinction    Coordination: dysmerria on the right      NIHSS 9       RADIOLOGY  CTH     < from: CT Angio Head w/ IV Cont (05.07.22 @ 08:58) >  IMPRESSION:        1.   Right carotid system:  No hemodynamically significant stenosis.        2.   Left carotid system:  No hemodynamically significant stenosis.        3.   Intracranial circulation:  No significant vascular lesion.    Aneurysm clips are seen in the anterior cerebral artery cistern        4.   Brain:  Encephalomalacia and gliosis in the anterior frontal   lobes with overlying craniectomy and calcification of the dura and falx.   Aneurysm clips are seen in the anterior cerebral artery cistern.   Old   infarction seen in the LEFT occipital lobe.        5. Perfusion: No core infarct or critically hypoperfused tissue at   risk is identified. Perfusion values listed above are likely artifactual.    < end of copied text >            
Patient is a 61y old  Female who presents with a chief complaint of syncope (08 May 2022 12:56)    INTERVAL HPI/OVERNIGHT EVENTS: Patients seen and examined at bedside this morning. No acute events overnight. Pt reports she does not recall what happened. States she was in the kitchen and then blacked out. State she bit her right side of tongue. Pt has been compliant with medications. Reports scalp hurts to touch. Hematoma on forehead not bleeding. Her right side hurts as well. Thinks she fell on that. Denies significant pain but aching when laying on that side. Denies SOB or CP.     MEDICATIONS  (STANDING):  amLODIPine   Tablet 10 milliGRAM(s) Oral daily  aspirin enteric coated 81 milliGRAM(s) Oral daily  atorvastatin 10 milliGRAM(s) Oral at bedtime  levETIRAcetam  IVPB 1000 milliGRAM(s) IV Intermittent every 12 hours  losartan 50 milliGRAM(s) Oral daily  metoprolol succinate ER 50 milliGRAM(s) Oral daily  sodium chloride 0.9%. 1000 milliLiter(s) (80 mL/Hr) IV Continuous <Continuous>  warfarin 5 milliGRAM(s) Oral once    MEDICATIONS  (PRN):    Allergies    penicillin (Hives)  penicillin (Unknown)  sulfa drugs (Other)    Intolerances      REVIEW OF SYSTEMS:  All other systems reviewed and are negative    Vital Signs Last 24 Hrs  T(C): 36.8 (08 May 2022 15:35), Max: 37.1 (08 May 2022 00:16)  T(F): 98.2 (08 May 2022 15:35), Max: 98.8 (08 May 2022 10:20)  HR: 66 (08 May 2022 15:35) (66 - 96)  BP: 142/76 (08 May 2022 15:35) (142/76 - 158/91)  BP(mean): --  RR: 18 (08 May 2022 15:35) (18 - 18)  SpO2: 97% (08 May 2022 15:35) (97% - 100%)  Daily     Daily   I&O's Summary    07 May 2022 07:01  -  08 May 2022 07:00  --------------------------------------------------------  IN: 118 mL / OUT: 0 mL / NET: 118 mL    08 May 2022 07:01  -  08 May 2022 19:26  --------------------------------------------------------  IN: 360 mL / OUT: 0 mL / NET: 360 mL      CAPILLARY BLOOD GLUCOSE    Eyes: the conjunctiva exhibited no abnormalities and the eyelids demonstrated no xanthelasmas.   HEENT: normal oral mucosa, no oral pallor and no oral cyanosis.   Neck: normal jugular venous A waves present, normal jugular venous V waves present and no jugular venous blum A waves.   Pulmonary: no respiratory distress, normal respiratory rhythm and effort, no accessory muscle use and lungs were clear to auscultation bilaterally.   Cardiovascular: heart rate and rhythm were normal, normal S1 and S2 and no murmur, gallop, rub, heave or thrill are present.   Abdomen: soft, non-tender, no hepato-splenomegaly and no abdominal mass palpated.   Musculoskeletal: the gait could not be assessed..   Extremities: no clubbing of the fingernails, no localized cyanosis, no petechial hemorrhages and no ischemic changes.   Skin: normal skin color and pigmentation, no rash, no venous stasis, no skin lesions, no skin ulcer and no xanthoma was observed  Neuro: AAOto self  could not say age or month; obeys simple commands, no dysarthria psychomotor slowing  CN: PERRL, esotropia, VFF normal gaze preference, no facial palsy,   Motor: no drift in UEs, RLE 4-/5 LLe 4/5  Sensory: Intact to LT and PP no extinction  Coordination: dysmerria on the right    Labs                          12.1   9.77  )-----------( 294      ( 08 May 2022 08:06 )             37.4     05-08    148<H>  |  116<H>  |  16  ----------------------------<  99  3.3<L>   |  26  |  1.20    Ca    9.1      08 May 2022 08:06    TPro  8.2  /  Alb  3.1<L>  /  TBili  0.5  /  DBili  x   /  AST  91<H>  /  ALT  31  /  AlkPhos  99  05-07    PT/INR - ( 08 May 2022 08:06 )   PT: 23.7 sec;   INR: 1.98 ratio         PTT - ( 07 May 2022 09:38 )  PTT:43.9 sec  CARDIAC MARKERS ( 08 May 2022 08:06 )  x     / x     / 5478 U/L / x     / x      CARDIAC MARKERS ( 07 May 2022 12:40 )  x     / x     / 4177 U/L / x     / x          Urinalysis Basic - ( 07 May 2022 09:43 )    Color: Yellow / Appearance: Clear / S.005 / pH: x  Gluc: x / Ketone: Negative  / Bili: Negative / Urobili: Negative mg/dL   Blood: x / Protein: 30 mg/dL / Nitrite: Negative   Leuk Esterase: Negative / RBC: 6-10 /HPF / WBC 3-5   Sq Epi: x / Non Sq Epi: Few / Bacteria: Few                  
CARDIOLOGY CONSULT NOTE    Patient is a 61y Female with a known history of :  Thrombophlebitis of popliteal vein [[id number]]      HPI:  60 yo lady with a PMH of a prior brain aneurysm s/p hemicraniectomy and clipping, seizure d/o on Keppra - last documented seizure requiring admission was in 2017, DVT on Coumadin, and HTN; found unresponsive,  on the floor by her  on arriving home this morning after working a night shift.  Unknown down time; last known normal was 4 PM last night when the pt spoke to her daughter.  ER: pt with decreased mental status, a hematoma to the forehead, blood on the face, able to answer some commands, but not able to communicate,  Trop 140 -> 160  EKG non-ischemic  CTA  brain unremarkable    Today: MS improved; sitting up in bed  Echo prelim: unremarkable study with nl LVEF and no significant valvular lesions      REVIEW OF SYSTEMS:  negative    MEDICATIONS  (STANDING):  amLODIPine   Tablet 10 milliGRAM(s) Oral daily  aspirin enteric coated 81 milliGRAM(s) Oral daily  atorvastatin 10 milliGRAM(s) Oral at bedtime  levETIRAcetam  IVPB 1000 milliGRAM(s) IV Intermittent every 12 hours  losartan 50 milliGRAM(s) Oral daily  metoprolol succinate ER 50 milliGRAM(s) Oral daily  sodium chloride 0.9%. 1000 milliLiter(s) (80 mL/Hr) IV Continuous <Continuous>    MEDICATIONS  (PRN):        ALLERGIES: penicillin (Hives)  penicillin (Unknown)  sulfa drugs (Other)      FAMILY HISTORY:      PHYSICAL EXAMINATION:  -----------------------------  Vital Signs Last 24 Hrs  T(C): 37.1 (08 May 2022 10:20), Max: 37.4 (07 May 2022 14:16)  T(F): 98.8 (08 May 2022 10:20), Max: 99.4 (07 May 2022 14:16)  HR: 74 (08 May 2022 10:20) (74 - 96)  BP: 151/77 (08 May 2022 10:20) (141/91 - 165/65)  RR: 18 (08 May 2022 10:20) (18 - 18)  SpO2: 97% (08 May 2022 10:20) (97% - 100%)      Constitutional: no acute distress.   Eyes: the conjunctiva exhibited no abnormalities and the eyelids demonstrated no xanthelasmas.   HEENT: normal oral mucosa, no oral pallor and no oral cyanosis.   Neck: normal jugular venous A waves present, normal jugular venous V waves present and no jugular venous blum A waves.   Pulmonary: no respiratory distress, normal respiratory rhythm and effort, no accessory muscle use and lungs were clear to auscultation bilaterally.   Cardiovascular: heart rate and rhythm were normal, normal S1 and S2 and no murmur, gallop, rub, heave or thrill are present.   Abdomen: soft, non-tender, no hepato-splenomegaly and no abdominal mass palpated.   Musculoskeletal: the gait could not be assessed..   Extremities: no clubbing of the fingernails, no localized cyanosis, no petechial hemorrhages and no ischemic changes.   Skin: normal skin color and pigmentation, no rash, no venous stasis, no skin lesions, no skin ulcer and no xanthoma was observed.       LABS:   --------                          12.1   9.77  )-----------( 294      ( 08 May 2022 08:06 )             37.4     05-08    148<H>  |  116<H>  |  16  ----------------------------<  99  3.3<L>   |  26  |  1.20    Ca    9.1      08 May 2022 08:06    TPro  8.2  /  Alb  3.1<L>  /  TBili  0.5  /  DBili  x   /  AST  91<H>  /  ALT  31  /  AlkPhos  99  05-07        RADIOLOGY:  -----------------    < from: TTE Echo Doppler w/o Cont (02.09.17 @ 11:19) >        Summary:   1. Left ventricular ejection fraction, by visual estimation, is 60 to   65%.   2. There is mild concentric left ventricular hypertrophy.     [id number] Colby Bang MD  Electronically signed on 2/10/2017 at 2:49:21 PM    < end of copied text >      c< from: CT Angio Neck w/ IV Cont (05.07.22 @ 08:58) >    IMPRESSION:        1.   Right carotid system:  No hemodynamically significant stenosis.        2.   Left carotid system:  No hemodynamically significant stenosis.        3.   Intracranial circulation:  No significant vascular lesion.    Aneurysm clips are seen in the anterior cerebral artery cistern        4.   Brain:  Encephalomalacia and gliosis in the anterior frontal   lobes with overlying craniectomy and calcification of the dura and falx.   Aneurysm clips are seen in the anterior cerebral artery cistern.   Old   infarction seen in the LEFT occipital lobe.        5. Perfusion: No core infarct or critically hypoperfused tissue at   risk is identified. Perfusion values listed above are likely artifactual.    < end of copied text >

## 2022-05-10 NOTE — PROGRESS NOTE ADULT - ASSESSMENT
60 y/o F PMHx of Brain Aneurysm s/p Hemicraniectomy and Clipping, seizures , on Keppra, DVT and HTN found unresponsive, now improving PE with aaox1 psychomotor slowing esotropia RLE > LE weakness CTH, CTA encephalomalacia and gliosis in anterior frontal lobes s/p crani, clips, chronic left occipital clips no LVA admitted for syncope 2/2 seizure.       Syncope 2/2 Seizure  As per patient she is on Keppra 1250 mg PO BID at home.  As per CrCl, patient should be on Keppra 1000 mg PO BID  Neurology consult  EEG pending  PT/OT/SW/Speech  ECHO  ASA  High intensity statin    Rhabdomyolysis 2/2 seizure  CPK elevated  C/W IVF hydration and encourage PO hydration    DVT  C/W coumadin  F/U INR    HTN  C/W home metoprolol and losartan  C/W amlodipine  
60 y/o F PMHx of Brain Aneurysm s/p Hemicraniectomy and Clipping, seizures , on Keppra, DVT and HTN found unresponsive, now improving PE with aaox1 psychomotor slowing esotropia RLE > LE weakness CTH, CTA encephalomalacia and gliosis in anterior frontal lobes s/p crani, clips, chronic left occipital clips no LVA admitted for syncope 2/2 seizure.       Syncope 2/2 Seizure  As per patient she is on Keppra 1250 mg PO BID at home.  As per CrCl, patient should be on Keppra 1000 mg PO BID  Neurology consult  EEG  complete   PT/OT/SW/Speech complete   ECHO done   ASA  statin    Rhabdomyolysis 2/2 seizure  CPK elevated  C/W IVF hydration and encourage PO hydration    DVT  C/W coumadin 5  F/U INR    HTN  C/W home metoprolol and losartan  C/W amlodipine  
60 yo lady with a PMH of a prior brain aneurysm s/p hemicraniectomy and clipping, seizure d/o on Keppra - last documented seizure requiring admission was in 2017, DVT on Coumadin, and HTN; found unresponsive,  on the floor by her  on arriving home this morning after working a night shift.  Unknown down time; last known normal was 4 PM last night when the pt spoke to her daughter.  ER: pt with decreased mental status, a hematoma to the forehead, blood on the face, able to answer some commands, but not able to communicate,  Trop 140 -> 160  EKG non-ischemic  CTA  brain unremarkable    Suspect seizure and not ACS; seen by neuro and dx with seizure as well.  No tele events  Echo prelim: unremarkable study with nl LVEF and no significant valvular lesions  -cont home asa/statin/metoprolol/losartan/coumadin  -neuro work-up pending  -no further cardiac work-up at this time; will sign off for now; please call with any further questions 
62 y/o F PMHx of Brain Aneurysm s/p Hemicraniectomy and Clipping, seizures , on Keppra, DVT and HTN found unresponsive, now improving PE with aaox1 psychomotor slowing esotropia RLE > LE weakness CTH, CTA encephalomalacia and gliosis in anterior frontal lobes s/p crani, clips, chronic left occipital clips no LVA admitted for syncope 2/2 seizure.       Syncope 2/2 Seizure  As per patient she is on Keppra 1250 mg PO BID at home.  As per CrCl, patient should be on Keppra 1000 mg PO BID  Neurology consult  EEG pending  PT/OT/SW/Speech  ECHO  ASA  High intensity statin    Rhabdomyolysis 2/2 seizure  CPK elevated  C/W IVF hydration and encourage PO hydration    DVT  C/W coumadin  F/U INR    HTN  C/W home metoprolol and losartan  C/W amlodipine  
60 y/o F PMHx of Brain Aneurysm s/p Hemicraniectomy and Clipping, seizures , on Keppra, DVT and HTN found unresponsive, now improving PE with aaox1 psychomotor slowing esotropia RLE > LE weakness CTH, CTA encephalomalacia and gliosis in anterior frontal lobes s/p crani, clips, chronic left occipital clips no LVA      Impression: seizure    Patient states she is on Keppra 1500 mg BID, would reinstate  Patient on Zonisamide 50 mg qhs, would increase to 100  mg qhs  consider REEG  F/U with her Neurologist Dr. Jenkins

## 2022-05-11 ENCOUNTER — TRANSCRIPTION ENCOUNTER (OUTPATIENT)
Age: 62
End: 2022-05-11

## 2022-05-11 VITALS
RESPIRATION RATE: 18 BRPM | HEART RATE: 54 BPM | DIASTOLIC BLOOD PRESSURE: 86 MMHG | SYSTOLIC BLOOD PRESSURE: 148 MMHG | TEMPERATURE: 98 F | OXYGEN SATURATION: 99 %

## 2022-05-11 LAB
ALBUMIN SERPL ELPH-MCNC: 2.7 G/DL — LOW (ref 3.3–5)
ALP SERPL-CCNC: 81 U/L — SIGNIFICANT CHANGE UP (ref 40–120)
ALT FLD-CCNC: 43 U/L — SIGNIFICANT CHANGE UP (ref 12–78)
ANION GAP SERPL CALC-SCNC: 7 MMOL/L — SIGNIFICANT CHANGE UP (ref 5–17)
AST SERPL-CCNC: 115 U/L — HIGH (ref 15–37)
BILIRUB SERPL-MCNC: 0.4 MG/DL — SIGNIFICANT CHANGE UP (ref 0.2–1.2)
BUN SERPL-MCNC: 10 MG/DL — SIGNIFICANT CHANGE UP (ref 7–23)
CALCIUM SERPL-MCNC: 8.5 MG/DL — SIGNIFICANT CHANGE UP (ref 8.5–10.1)
CHLORIDE SERPL-SCNC: 115 MMOL/L — HIGH (ref 96–108)
CK SERPL-CCNC: 6918 U/L — HIGH (ref 26–192)
CO2 SERPL-SCNC: 23 MMOL/L — SIGNIFICANT CHANGE UP (ref 22–31)
CREAT SERPL-MCNC: 0.82 MG/DL — SIGNIFICANT CHANGE UP (ref 0.5–1.3)
EGFR: 81 ML/MIN/1.73M2 — SIGNIFICANT CHANGE UP
GLUCOSE SERPL-MCNC: 81 MG/DL — SIGNIFICANT CHANGE UP (ref 70–99)
HCT VFR BLD CALC: 35.6 % — SIGNIFICANT CHANGE UP (ref 34.5–45)
HGB BLD-MCNC: 11.4 G/DL — LOW (ref 11.5–15.5)
INR BLD: 1.67 RATIO — HIGH (ref 0.88–1.16)
MAGNESIUM SERPL-MCNC: 1.9 MG/DL — SIGNIFICANT CHANGE UP (ref 1.6–2.6)
MCHC RBC-ENTMCNC: 27.3 PG — SIGNIFICANT CHANGE UP (ref 27–34)
MCHC RBC-ENTMCNC: 32 G/DL — SIGNIFICANT CHANGE UP (ref 32–36)
MCV RBC AUTO: 85.4 FL — SIGNIFICANT CHANGE UP (ref 80–100)
NRBC # BLD: 0 /100 WBCS — SIGNIFICANT CHANGE UP (ref 0–0)
PHOSPHATE SERPL-MCNC: 2.2 MG/DL — LOW (ref 2.5–4.5)
PLATELET # BLD AUTO: 302 K/UL — SIGNIFICANT CHANGE UP (ref 150–400)
POTASSIUM SERPL-MCNC: 3.4 MMOL/L — LOW (ref 3.5–5.3)
POTASSIUM SERPL-SCNC: 3.4 MMOL/L — LOW (ref 3.5–5.3)
PROT SERPL-MCNC: 7.2 GM/DL — SIGNIFICANT CHANGE UP (ref 6–8.3)
PROTHROM AB SERPL-ACNC: 20.2 SEC — HIGH (ref 10.5–13.4)
RBC # BLD: 4.17 M/UL — SIGNIFICANT CHANGE UP (ref 3.8–5.2)
RBC # FLD: 16.2 % — HIGH (ref 10.3–14.5)
SODIUM SERPL-SCNC: 145 MMOL/L — SIGNIFICANT CHANGE UP (ref 135–145)
WBC # BLD: 5.22 K/UL — SIGNIFICANT CHANGE UP (ref 3.8–10.5)
WBC # FLD AUTO: 5.22 K/UL — SIGNIFICANT CHANGE UP (ref 3.8–10.5)

## 2022-05-11 PROCEDURE — 99239 HOSP IP/OBS DSCHRG MGMT >30: CPT

## 2022-05-11 RX ORDER — LOSARTAN POTASSIUM 100 MG/1
1 TABLET, FILM COATED ORAL
Qty: 0 | Refills: 0 | DISCHARGE
Start: 2022-05-11

## 2022-05-11 RX ORDER — SODIUM CHLORIDE 9 MG/ML
1000 INJECTION INTRAMUSCULAR; INTRAVENOUS; SUBCUTANEOUS
Refills: 0 | Status: DISCONTINUED | OUTPATIENT
Start: 2022-05-11 | End: 2022-05-11

## 2022-05-11 RX ORDER — POTASSIUM CHLORIDE 20 MEQ
40 PACKET (EA) ORAL ONCE
Refills: 0 | Status: COMPLETED | OUTPATIENT
Start: 2022-05-11 | End: 2022-05-11

## 2022-05-11 RX ORDER — AMLODIPINE BESYLATE 2.5 MG/1
1 TABLET ORAL
Qty: 0 | Refills: 0 | DISCHARGE
Start: 2022-05-11

## 2022-05-11 RX ORDER — LEVETIRACETAM 250 MG/1
1 TABLET, FILM COATED ORAL
Qty: 0 | Refills: 0 | DISCHARGE
Start: 2022-05-11

## 2022-05-11 RX ORDER — WARFARIN SODIUM 2.5 MG/1
7.5 TABLET ORAL ONCE
Refills: 0 | Status: COMPLETED | OUTPATIENT
Start: 2022-05-11 | End: 2022-05-11

## 2022-05-11 RX ORDER — ATORVASTATIN CALCIUM 80 MG/1
1 TABLET, FILM COATED ORAL
Qty: 0 | Refills: 0 | DISCHARGE
Start: 2022-05-11

## 2022-05-11 RX ADMIN — LEVETIRACETAM 1000 MILLIGRAM(S): 250 TABLET, FILM COATED ORAL at 05:34

## 2022-05-11 RX ADMIN — LEVETIRACETAM 1000 MILLIGRAM(S): 250 TABLET, FILM COATED ORAL at 17:05

## 2022-05-11 RX ADMIN — Medication 50 MILLIGRAM(S): at 05:34

## 2022-05-11 RX ADMIN — AMLODIPINE BESYLATE 10 MILLIGRAM(S): 2.5 TABLET ORAL at 05:33

## 2022-05-11 RX ADMIN — WARFARIN SODIUM 7.5 MILLIGRAM(S): 2.5 TABLET ORAL at 14:34

## 2022-05-11 RX ADMIN — SODIUM CHLORIDE 80 MILLILITER(S): 9 INJECTION INTRAMUSCULAR; INTRAVENOUS; SUBCUTANEOUS at 05:32

## 2022-05-11 RX ADMIN — Medication 81 MILLIGRAM(S): at 11:21

## 2022-05-11 RX ADMIN — LOSARTAN POTASSIUM 50 MILLIGRAM(S): 100 TABLET, FILM COATED ORAL at 05:34

## 2022-05-11 RX ADMIN — Medication 40 MILLIEQUIVALENT(S): at 14:02

## 2022-05-11 NOTE — DISCHARGE NOTE NURSING/CASE MANAGEMENT/SOCIAL WORK - NSDCPEFALRISK_GEN_ALL_CORE
For information on Fall & Injury Prevention, visit: [email or web address] OR  [email or web address] OR  https://www.cdc.gov/steadi/patient.html

## 2022-05-11 NOTE — DISCHARGE NOTE PROVIDER - NSDCCPCAREPLAN_GEN_ALL_CORE_FT
PRINCIPAL DISCHARGE DIAGNOSIS  Diagnosis: Metabolic encephalopathy  Assessment and Plan of Treatment: secondary to seizure      SECONDARY DISCHARGE DIAGNOSES  Diagnosis: Seizure disorder  Assessment and Plan of Treatment: stable    Diagnosis: DVT (deep venous thrombosis)  Assessment and Plan of Treatment: present on admission    Diagnosis: Rhabdomyolysis  Assessment and Plan of Treatment: secondary bto seizure

## 2022-05-11 NOTE — DISCHARGE NOTE PROVIDER - HOSPITAL COURSE
HPI:  62 y/o F   with PMHx of Brain Aneurysm s/p Hemicraniectomy and Clipping,   s eizures , on Keppra), DVT  on Coumadin) and HTN     presents to the ED BIBEMS for AMS since earlier today  .As per EMS, pt was found unresponsive,  on the floor  by .. 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   Pt's  works overnight, came home at 745 this morning when he found the pt with decreased mental status, a hematoma to the forehead, blood on the face   able to answer some commands, but not able to communicate. (07 May 2022 10:50)  62 y/o F PMHx of Brain Aneurysm s/p Hemicraniectomy and Clipping, seizures , on Keppra, DVT and HTN found unresponsive, now improving PE with aaox1 psychomotor slowing esotropia RLE > LE weakness CTH, CTA encephalomalacia and gliosis in anterior frontal lobes s/p crani, clips, chronic left occipital clips no LVA admitted for syncope 2/2 seizure.       Syncope 2/2 Seizure  As per patient she is on Keppra 1250 mg PO BID at home.  As per CrCl, patient should be on Keppra 1000 mg PO BID  Neurology consult  EEG  complete   PT/OT/SW/Speech complete   ECHO done   ASA  statin    Rhabdomyolysis 2/2 seizure  CPK elevated  C/W IVF hydration and encourage PO hydration    DVT  C/W coumadin 5  F/U INR    HTN  C/W home metoprolol and losartan  C/W amlodipine

## 2022-05-11 NOTE — DISCHARGE NOTE PROVIDER - NSDCMRMEDTOKEN_GEN_ALL_CORE_FT
amLODIPine 10 mg oral tablet: 1 tab(s) orally once a day  aspirin 81 mg oral delayed release tablet: 1 tab(s) orally once a day  atorvastatin 10 mg oral tablet: 1 tab(s) orally once a day (at bedtime)  labetalol 300 mg oral tablet: 1 tab(s) orally 2 times a day  levETIRAcetam 1000 mg oral tablet: 1 tab(s) orally 2 times a day  losartan 50 mg oral tablet: 1 tab(s) orally once a day  Protonix 40 mg oral delayed release tablet: 1 tab(s) orally once a day  warfarin 5 mg oral tablet: 1 tab(s) orally once a day

## 2022-05-11 NOTE — DISCHARGE NOTE PROVIDER - ATTENDING DISCHARGE PHYSICAL EXAMINATION:
GENERAL: NAD, well-groomed, well-developed  HEAD:  Atraumatic, Normocephalic  EYES: EOMI, PERRLA, conjunctiva and sclera clear  ENMT: No tonsillar erythema, exudates, or enlargement; Moist mucous membranes, Good dentition, No lesions  NECK: Supple, No JVD, Normal thyroid  NERVOUS SYSTEM:  Alert & Oriented X3, Good concentration; Motor Strength 5/5 B/L upper and lower extremities; DTRs 2+ intact and symmetric  CHEST/LUNG: Clear to percussion bilaterally; No rales, rhonchi, wheezing, or rubs  HEART: Regular rate and rhythm; No murmurs, rubs, or gallops  ABDOMEN: Soft, Nontender, Nondistended; Bowel sounds present  EXTREMITIES:  2+ Peripheral Pulses, No clubbing, cyanosis, or edema  LYMPH: No lymphadenopathy noted  SKIN: No rashes or lesions

## 2022-05-11 NOTE — DISCHARGE NOTE NURSING/CASE MANAGEMENT/SOCIAL WORK - PATIENT PORTAL LINK FT
You can access the FollowMyHealth Patient Portal offered by [facility] by registering at the following website: [email or web address]. By joining [email or web address] FollowMyHealth portal, you will also be able to view your health information using other applications (apps) compatible with our system.

## 2022-05-17 LAB — INR PPP: 1.9

## 2022-05-19 DIAGNOSIS — G93.41 METABOLIC ENCEPHALOPATHY: ICD-10-CM

## 2022-05-19 DIAGNOSIS — H50.00 UNSPECIFIED ESOTROPIA: ICD-10-CM

## 2022-05-19 DIAGNOSIS — G40.909 EPILEPSY, UNSPECIFIED, NOT INTRACTABLE, WITHOUT STATUS EPILEPTICUS: ICD-10-CM

## 2022-05-19 DIAGNOSIS — I25.10 ATHEROSCLEROTIC HEART DISEASE OF NATIVE CORONARY ARTERY WITHOUT ANGINA PECTORIS: ICD-10-CM

## 2022-05-19 DIAGNOSIS — S00.83XA CONTUSION OF OTHER PART OF HEAD, INITIAL ENCOUNTER: ICD-10-CM
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
## 2022-05-19 DIAGNOSIS — Z86.73 PERSONAL HISTORY OF TRANSIENT ISCHEMIC ATTACK (TIA), AND CEREBRAL INFARCTION WITHOUT RESIDUAL DEFICITS: ICD-10-CM

## 2022-05-19 DIAGNOSIS — R41.82 ALTERED MENTAL STATUS, UNSPECIFIED: ICD-10-CM

## 2022-05-19 DIAGNOSIS — Z79.01 LONG TERM (CURRENT) USE OF ANTICOAGULANTS: ICD-10-CM

## 2022-05-19 DIAGNOSIS — I10 ESSENTIAL (PRIMARY) HYPERTENSION: ICD-10-CM

## 2022-05-19 DIAGNOSIS — Z88.0 ALLERGY STATUS TO PENICILLIN: ICD-10-CM

## 2022-05-19 DIAGNOSIS — Z88.2 ALLERGY STATUS TO SULFONAMIDES: ICD-10-CM

## 2022-05-19 DIAGNOSIS — Z95.5 PRESENCE OF CORONARY ANGIOPLASTY IMPLANT AND GRAFT: ICD-10-CM

## 2022-05-19 DIAGNOSIS — M62.82 RHABDOMYOLYSIS: ICD-10-CM

## 2022-05-19 DIAGNOSIS — E78.5 HYPERLIPIDEMIA, UNSPECIFIED: ICD-10-CM
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
## 2022-05-19 DIAGNOSIS — Z86.718 PERSONAL HISTORY OF OTHER VENOUS THROMBOSIS AND EMBOLISM: ICD-10-CM

## 2022-05-25 LAB
INR PPP: 2.4
PT BLD: 25.3

## 2022-05-26 ENCOUNTER — RX RENEWAL (OUTPATIENT)
Age: 62
End: 2022-05-26

## 2022-06-01 LAB
INR PPP: 2.5
PT BLD: 25.5

## 2022-06-07 LAB — INR PPP: 2.8

## 2022-06-16 ENCOUNTER — APPOINTMENT (OUTPATIENT)
Dept: CARDIOLOGY | Facility: CLINIC | Age: 62
End: 2022-06-16
Payer: MEDICARE

## 2022-06-16 VITALS
OXYGEN SATURATION: 98 % | DIASTOLIC BLOOD PRESSURE: 81 MMHG | SYSTOLIC BLOOD PRESSURE: 120 MMHG | HEIGHT: 62 IN | BODY MASS INDEX: 29.44 KG/M2 | WEIGHT: 160 LBS | TEMPERATURE: 98.5 F | HEART RATE: 50 BPM

## 2022-06-16 DIAGNOSIS — Z12.39 ENCOUNTER FOR OTHER SCREENING FOR MALIGNANT NEOPLASM OF BREAST: ICD-10-CM

## 2022-06-16 DIAGNOSIS — R74.8 ABNORMAL LEVELS OF OTHER SERUM ENZYMES: ICD-10-CM

## 2022-06-16 PROCEDURE — 99214 OFFICE O/P EST MOD 30 MIN: CPT

## 2022-06-16 RX ORDER — LEVETIRACETAM 500 MG/1
500 TABLET, FILM COATED ORAL TWICE DAILY
Refills: 0 | Status: DISCONTINUED | COMMUNITY
End: 2022-06-16

## 2022-06-16 RX ORDER — ERGOCALCIFEROL 1.25 MG/1
1.25 MG CAPSULE ORAL
Qty: 6 | Refills: 0 | Status: DISCONTINUED | COMMUNITY
Start: 2021-02-08 | End: 2022-06-16

## 2022-06-16 RX ORDER — LEVETIRACETAM 1000 MG/1
1000 TABLET, FILM COATED ORAL TWICE DAILY
Refills: 0 | Status: DISCONTINUED | COMMUNITY
End: 2022-06-16

## 2022-06-16 NOTE — HISTORY OF PRESENT ILLNESS
[FreeTextEntry1] : This is a 61 year old lady with a PMH of Anemia, Brain Aneurysms, Seizures, HTN, HLD, CAD, Vitamin D deficiency and DVT presents today for follow up. Patient states that she was recently discharged from a nursing rehab center after having a seizure on 5/7/22. She was recently d/c'd on June 9, 2022. patient

## 2022-06-20 ENCOUNTER — NON-APPOINTMENT (OUTPATIENT)
Age: 62
End: 2022-06-20

## 2022-07-23 ENCOUNTER — LABORATORY RESULT (OUTPATIENT)
Age: 62
End: 2022-07-23

## 2022-07-24 LAB
25(OH)D3 SERPL-MCNC: 17.9 NG/ML
25(OH)D3 SERPL-MCNC: 19.6 NG/ML
ALBUMIN MFR SERPL ELPH: 50.5 %
ALBUMIN SERPL ELPH-MCNC: 4.3 G/DL
ALBUMIN SERPL ELPH-MCNC: 4.3 G/DL
ALBUMIN SERPL ELPH-MCNC: 4.7 G/DL
ALBUMIN SERPL-MCNC: 4.2 G/DL
ALBUMIN/GLOB SERPL: 1 RATIO
ALP BLD-CCNC: 112 U/L
ALP BLD-CCNC: 129 U/L
ALP BLD-CCNC: 87 U/L
ALPHA1 GLOB MFR SERPL ELPH: 3.9 %
ALPHA1 GLOB SERPL ELPH-MCNC: 0.3 G/DL
ALPHA2 GLOB MFR SERPL ELPH: 10.8 %
ALPHA2 GLOB SERPL ELPH-MCNC: 0.9 G/DL
ALT SERPL-CCNC: 12 U/L
ALT SERPL-CCNC: 13 U/L
ALT SERPL-CCNC: 16 U/L
ANION GAP SERPL CALC-SCNC: 10 MMOL/L
ANION GAP SERPL CALC-SCNC: 12 MMOL/L
ANION GAP SERPL CALC-SCNC: 12 MMOL/L
APPEARANCE: CLEAR
APPEARANCE: CLEAR
AST SERPL-CCNC: 14 U/L
AST SERPL-CCNC: 15 U/L
AST SERPL-CCNC: 19 U/L
B-GLOBULIN MFR SERPL ELPH: 12.1 %
B-GLOBULIN SERPL ELPH-MCNC: 1 G/DL
BACTERIA UR CULT: NORMAL
BACTERIA UR CULT: NORMAL
BACTERIA: ABNORMAL
BACTERIA: NEGATIVE
BASOPHILS # BLD AUTO: 0.02 K/UL
BASOPHILS # BLD AUTO: 0.03 K/UL
BASOPHILS # BLD AUTO: 0.03 K/UL
BASOPHILS NFR BLD AUTO: 0.3 %
BASOPHILS NFR BLD AUTO: 0.6 %
BASOPHILS NFR BLD AUTO: 0.6 %
BILIRUB DIRECT SERPL-MCNC: 0.1 MG/DL
BILIRUB INDIRECT SERPL-MCNC: 0.1 MG/DL
BILIRUB SERPL-MCNC: 0.2 MG/DL
BILIRUB SERPL-MCNC: 0.2 MG/DL
BILIRUB SERPL-MCNC: 0.3 MG/DL
BILIRUBIN URINE: NEGATIVE
BILIRUBIN URINE: NEGATIVE
BLOOD URINE: NEGATIVE
BLOOD URINE: NEGATIVE
BUN SERPL-MCNC: 15 MG/DL
BUN SERPL-MCNC: 16 MG/DL
BUN SERPL-MCNC: 21 MG/DL
CALCIUM SERPL-MCNC: 10.1 MG/DL
CALCIUM SERPL-MCNC: 9.5 MG/DL
CALCIUM SERPL-MCNC: 9.7 MG/DL
CHLORIDE SERPL-SCNC: 109 MMOL/L
CHLORIDE SERPL-SCNC: 109 MMOL/L
CHLORIDE SERPL-SCNC: 110 MMOL/L
CHOLEST SERPL-MCNC: 151 MG/DL
CHOLEST SERPL-MCNC: 189 MG/DL
CHOLEST SERPL-MCNC: 207 MG/DL
CK SERPL-CCNC: 170 U/L
CK SERPL-CCNC: 75 U/L
CO2 SERPL-SCNC: 21 MMOL/L
CO2 SERPL-SCNC: 22 MMOL/L
CO2 SERPL-SCNC: 22 MMOL/L
COLOR: NORMAL
COLOR: YELLOW
CREAT SERPL-MCNC: 0.99 MG/DL
CREAT SERPL-MCNC: 1.1 MG/DL
CREAT SERPL-MCNC: 1.22 MG/DL
EGFR: 50 ML/MIN/1.73M2
EOSINOPHIL # BLD AUTO: 0.1 K/UL
EOSINOPHIL NFR BLD AUTO: 1.4 %
EOSINOPHIL NFR BLD AUTO: 1.9 %
EOSINOPHIL NFR BLD AUTO: 1.9 %
ESTIMATED AVERAGE GLUCOSE: 117 MG/DL
ESTIMATED AVERAGE GLUCOSE: 120 MG/DL
ESTIMATED AVERAGE GLUCOSE: 126 MG/DL
FERRITIN SERPL-MCNC: 111 NG/ML
FERRITIN SERPL-MCNC: 117 NG/ML
FERRITIN SERPL-MCNC: 48 NG/ML
FOLATE SERPL-MCNC: 10.7 NG/ML
FOLATE SERPL-MCNC: 9.3 NG/ML
FOLATE SERPL-MCNC: 9.3 NG/ML
GAMMA GLOB FLD ELPH-MCNC: 1.9 G/DL
GAMMA GLOB MFR SERPL ELPH: 22.7 %
GGT SERPL-CCNC: 26 U/L
GGT SERPL-CCNC: 32 U/L
GLUCOSE QUALITATIVE U: NEGATIVE
GLUCOSE QUALITATIVE U: NEGATIVE
GLUCOSE SERPL-MCNC: 64 MG/DL
GLUCOSE SERPL-MCNC: 84 MG/DL
GLUCOSE SERPL-MCNC: 85 MG/DL
HAPTOGLOB SERPL-MCNC: 188 MG/DL
HBA1C MFR BLD HPLC: 5.7 %
HBA1C MFR BLD HPLC: 5.8 %
HBA1C MFR BLD HPLC: 6 %
HCT VFR BLD CALC: 35.7 %
HCT VFR BLD CALC: 36.2 %
HCT VFR BLD CALC: 37.9 %
HDLC SERPL-MCNC: 52 MG/DL
HDLC SERPL-MCNC: 58 MG/DL
HDLC SERPL-MCNC: 64 MG/DL
HGB BLD-MCNC: 11.3 G/DL
HGB BLD-MCNC: 11.7 G/DL
HGB BLD-MCNC: 11.7 G/DL
HYALINE CASTS: 0 /LPF
HYALINE CASTS: 0 /LPF
IMM GRANULOCYTES NFR BLD AUTO: 0.2 %
IMM GRANULOCYTES NFR BLD AUTO: 0.2 %
IMM GRANULOCYTES NFR BLD AUTO: 0.3 %
INR PPP: 1.15 RATIO
INR PPP: 1.6 RATIO
INR PPP: 1.66 RATIO
INR PPP: 2.2 RATIO
INR PPP: 2.7 RATIO
INR PPP: 3.09 RATIO
INTERPRETATION SERPL IEP-IMP: NORMAL
IRON SATN MFR SERPL: 20 %
IRON SATN MFR SERPL: 24 %
IRON SERPL-MCNC: 61 UG/DL
IRON SERPL-MCNC: 64 UG/DL
IRON SERPL-MCNC: 69 UG/DL
KETONES URINE: NEGATIVE
KETONES URINE: NEGATIVE
LDH SERPL-CCNC: 257 U/L
LDLC SERPL CALC-MCNC: 112 MG/DL
LDLC SERPL CALC-MCNC: 120 MG/DL
LDLC SERPL CALC-MCNC: 64 MG/DL
LDLC SERPL DIRECT ASSAY-MCNC: 64 MG/DL
LEUKOCYTE ESTERASE URINE: ABNORMAL
LEUKOCYTE ESTERASE URINE: ABNORMAL
LYMPHOCYTES # BLD AUTO: 1.34 K/UL
LYMPHOCYTES # BLD AUTO: 1.6 K/UL
LYMPHOCYTES # BLD AUTO: 2.36 K/UL
LYMPHOCYTES NFR BLD AUTO: 26.1 %
LYMPHOCYTES NFR BLD AUTO: 31.2 %
LYMPHOCYTES NFR BLD AUTO: 33.6 %
M PROTEIN SPEC IFE-MCNC: NORMAL
MAN DIFF?: NORMAL
MCHC RBC-ENTMCNC: 27.1 PG
MCHC RBC-ENTMCNC: 27.7 PG
MCHC RBC-ENTMCNC: 28.3 PG
MCHC RBC-ENTMCNC: 30.9 GM/DL
MCHC RBC-ENTMCNC: 31.7 GM/DL
MCHC RBC-ENTMCNC: 32.3 GM/DL
MCV RBC AUTO: 85.6 FL
MCV RBC AUTO: 85.6 FL
MCV RBC AUTO: 91.5 FL
MICROSCOPIC-UA: NORMAL
MICROSCOPIC-UA: NORMAL
MONOCYTES # BLD AUTO: 0.35 K/UL
MONOCYTES # BLD AUTO: 0.38 K/UL
MONOCYTES # BLD AUTO: 0.45 K/UL
MONOCYTES NFR BLD AUTO: 6.4 %
MONOCYTES NFR BLD AUTO: 6.8 %
MONOCYTES NFR BLD AUTO: 7.4 %
NEUTROPHILS # BLD AUTO: 3.04 K/UL
NEUTROPHILS # BLD AUTO: 3.27 K/UL
NEUTROPHILS # BLD AUTO: 4.07 K/UL
NEUTROPHILS NFR BLD AUTO: 58 %
NEUTROPHILS NFR BLD AUTO: 59.3 %
NEUTROPHILS NFR BLD AUTO: 63.8 %
NITRITE URINE: NEGATIVE
NITRITE URINE: NEGATIVE
NONHDLC SERPL-MCNC: 131 MG/DL
NONHDLC SERPL-MCNC: 155 MG/DL
NONHDLC SERPL-MCNC: 87 MG/DL
PH URINE: 6
PH URINE: 6
PLATELET # BLD AUTO: 268 K/UL
PLATELET # BLD AUTO: 270 K/UL
PLATELET # BLD AUTO: 277 K/UL
POTASSIUM SERPL-SCNC: 3.9 MMOL/L
POTASSIUM SERPL-SCNC: 4 MMOL/L
POTASSIUM SERPL-SCNC: 4.3 MMOL/L
PROT SERPL-MCNC: 8.3 G/DL
PROT SERPL-MCNC: 8.3 G/DL
PROT SERPL-MCNC: 8.4 G/DL
PROT SERPL-MCNC: 8.7 G/DL
PROTEIN URINE: ABNORMAL
PROTEIN URINE: NORMAL
PT BLD: 13.6 SEC
PT BLD: 18.7 SEC
PT BLD: 19.3 SEC
PT BLD: 26.2 SEC
PT BLD: 30.6 SEC
PT BLD: 36.6 SEC
RBC # BLD: 4.14 M/UL
RBC # BLD: 4.17 M/UL
RBC # BLD: 4.23 M/UL
RBC # FLD: 16.3 %
RBC # FLD: 16.3 %
RBC # FLD: 17.2 %
RED BLOOD CELLS URINE: 1 /HPF
RED BLOOD CELLS URINE: 2 /HPF
SODIUM SERPL-SCNC: 142 MMOL/L
SODIUM SERPL-SCNC: 142 MMOL/L
SODIUM SERPL-SCNC: 144 MMOL/L
SPECIFIC GRAVITY URINE: 1.02
SPECIFIC GRAVITY URINE: 1.02
SQUAMOUS EPITHELIAL CELLS: 14 /HPF
SQUAMOUS EPITHELIAL CELLS: 2 /HPF
T4 SERPL-MCNC: 6.7 UG/DL
TIBC SERPL-MCNC: 291 UG/DL
TIBC SERPL-MCNC: 312 UG/DL
TRANSFERRIN SERPL-MCNC: 217 MG/DL
TRIGL SERPL-MCNC: 115 MG/DL
TRIGL SERPL-MCNC: 174 MG/DL
TRIGL SERPL-MCNC: 92 MG/DL
TSH SERPL-ACNC: 2.89 UIU/ML
TSH SERPL-ACNC: 3.97 UIU/ML
UIBC SERPL-MCNC: 222 UG/DL
UIBC SERPL-MCNC: 251 UG/DL
UROBILINOGEN URINE: NORMAL
UROBILINOGEN URINE: NORMAL
VIT B12 SERPL-MCNC: 373 PG/ML
VIT B12 SERPL-MCNC: 429 PG/ML
VIT B12 SERPL-MCNC: 487 PG/ML
WBC # FLD AUTO: 5.13 K/UL
WBC # FLD AUTO: 5.13 K/UL
WBC # FLD AUTO: 7.02 K/UL
WHITE BLOOD CELLS URINE: 13 /HPF
WHITE BLOOD CELLS URINE: 43 /HPF

## 2022-08-18 ENCOUNTER — LABORATORY RESULT (OUTPATIENT)
Age: 62
End: 2022-08-18

## 2022-08-22 ENCOUNTER — NON-APPOINTMENT (OUTPATIENT)
Age: 62
End: 2022-08-22

## 2022-09-13 LAB
INR PPP: 1.6
INR PPP: 1.9

## 2022-10-06 ENCOUNTER — APPOINTMENT (OUTPATIENT)
Dept: INTERNAL MEDICINE | Facility: CLINIC | Age: 62
End: 2022-10-06
Payer: COMMERCIAL

## 2022-10-06 ENCOUNTER — NON-APPOINTMENT (OUTPATIENT)
Age: 62
End: 2022-10-06

## 2022-10-06 VITALS
OXYGEN SATURATION: 99 % | SYSTOLIC BLOOD PRESSURE: 120 MMHG | WEIGHT: 160 LBS | HEART RATE: 56 BPM | BODY MASS INDEX: 29.44 KG/M2 | HEIGHT: 62 IN | DIASTOLIC BLOOD PRESSURE: 86 MMHG | TEMPERATURE: 98.7 F

## 2022-10-06 LAB
ALBUMIN SERPL ELPH-MCNC: 4.6 G/DL
ALP BLD-CCNC: 111 U/L
ALT SERPL-CCNC: 13 U/L
ANION GAP SERPL CALC-SCNC: 15 MMOL/L
AST SERPL-CCNC: 15 U/L
BASOPHILS # BLD AUTO: 0.03 K/UL
BASOPHILS NFR BLD AUTO: 0.5 %
BILIRUB SERPL-MCNC: 0.2 MG/DL
BUN SERPL-MCNC: 18 MG/DL
CALCIUM SERPL-MCNC: 10 MG/DL
CHLORIDE SERPL-SCNC: 112 MMOL/L
CHOLEST SERPL-MCNC: 162 MG/DL
CK SERPL-CCNC: 91 U/L
CO2 SERPL-SCNC: 22 MMOL/L
CREAT SERPL-MCNC: 1.18 MG/DL
EGFR: 52 ML/MIN/1.73M2
EOSINOPHIL # BLD AUTO: 0.08 K/UL
EOSINOPHIL NFR BLD AUTO: 1.4 %
ESTIMATED AVERAGE GLUCOSE: 114 MG/DL
GLUCOSE SERPL-MCNC: 72 MG/DL
HBA1C MFR BLD HPLC: 5.6 %
HCT VFR BLD CALC: 35.4 %
HDLC SERPL-MCNC: 74 MG/DL
HGB BLD-MCNC: 11.1 G/DL
IMM GRANULOCYTES NFR BLD AUTO: 0.4 %
INR PPP: 1.88 RATIO
LDLC SERPL CALC-MCNC: 69 MG/DL
LYMPHOCYTES # BLD AUTO: 1.36 K/UL
LYMPHOCYTES NFR BLD AUTO: 24.1 %
MAN DIFF?: NORMAL
MCHC RBC-ENTMCNC: 27.7 PG
MCHC RBC-ENTMCNC: 31.4 GM/DL
MCV RBC AUTO: 88.3 FL
MONOCYTES # BLD AUTO: 0.36 K/UL
MONOCYTES NFR BLD AUTO: 6.4 %
NEUTROPHILS # BLD AUTO: 3.79 K/UL
NEUTROPHILS NFR BLD AUTO: 67.2 %
NONHDLC SERPL-MCNC: 88 MG/DL
PLATELET # BLD AUTO: 203 K/UL
POTASSIUM SERPL-SCNC: 3.7 MMOL/L
PROT SERPL-MCNC: 8.7 G/DL
PT BLD: 22.1 SEC
RBC # BLD: 4.01 M/UL
RBC # FLD: 17 %
SODIUM SERPL-SCNC: 149 MMOL/L
T4 FREE SERPL-MCNC: 0.7 NG/DL
TRIGL SERPL-MCNC: 93 MG/DL
TSH SERPL-ACNC: 1.5 UIU/ML
WBC # FLD AUTO: 5.64 K/UL

## 2022-10-06 PROCEDURE — 93000 ELECTROCARDIOGRAM COMPLETE: CPT

## 2022-10-06 PROCEDURE — 99214 OFFICE O/P EST MOD 30 MIN: CPT

## 2022-10-06 RX ORDER — OXCARBAZEPINE 300 MG/1
300 TABLET, FILM COATED ORAL TWICE DAILY
Qty: 2 | Refills: 0 | Status: ACTIVE | COMMUNITY
Start: 2022-10-06

## 2022-10-06 RX ORDER — MELATONIN 5 MG
5 CAPSULE ORAL
Refills: 0 | Status: DISCONTINUED | COMMUNITY
Start: 2019-11-05 | End: 2022-10-06

## 2022-10-06 RX ORDER — HYDRALAZINE HYDROCHLORIDE 50 MG/1
50 TABLET ORAL
Qty: 180 | Refills: 1 | Status: DISCONTINUED | COMMUNITY
Start: 2020-10-13 | End: 2022-10-06

## 2022-10-06 NOTE — HISTORY OF PRESENT ILLNESS
Is This A New Presentation, Or A Follow-Up?: Skin Lesions [FreeTextEntry1] : followup after hospital discharge How Severe Is Your Skin Lesion?: mild [de-identified] : BELLE LONG is a 62 year old female with  PMH PMH of Anemia, Brain Aneurysms, Seizures, HTN, HLD, CAD, Vitamin D deficiency and DVT p who presented to the office today for followup after recent hospitalization at [facility] and rehab stay. Pt was admitted at [facility] August for multiple seizures.  says 2 meds were added. Came home 1 week ago from rehab\par since being home, no seizures. Has appt with neurologist Dr Jenkins today\par INR 4.5 on 9/30, says she held Coumadin past 3 nights, denies bleeding, hematochezia, melena.\par Patient feels well. No complaints. Denies chest pain, sob, mace, dizziness, palpitations, LE swelling, syncope, n/v, headache.\par \par  Have Your Skin Lesions Been Treated?: not been treated

## 2022-10-06 NOTE — PHYSICAL EXAM
[No Acute Distress] : no acute distress [Well Nourished] : well nourished [Well Developed] : well developed [Well-Appearing] : well-appearing [Normal Sclera/Conjunctiva] : normal sclera/conjunctiva [Normal Outer Ear/Nose] : the outer ears and nose were normal in appearance [Normal Oropharynx] : the oropharynx was normal [No JVD] : no jugular venous distention [Supple] : supple [No Respiratory Distress] : no respiratory distress  [No Accessory Muscle Use] : no accessory muscle use [Clear to Auscultation] : lungs were clear to auscultation bilaterally [Normal Rate] : normal rate  [Regular Rhythm] : with a regular rhythm [Normal S1, S2] : normal S1 and S2 [No Murmur] : no murmur heard [No Carotid Bruits] : no carotid bruits [No Varicosities] : no varicosities [Pedal Pulses Present] : the pedal pulses are present [No Edema] : there was no peripheral edema [No Extremity Clubbing/Cyanosis] : no extremity clubbing/cyanosis [Soft] : abdomen soft [Non Tender] : non-tender [Non-distended] : non-distended [Normal Bowel Sounds] : normal bowel sounds [No CVA Tenderness] : no CVA  tenderness [No Spinal Tenderness] : no spinal tenderness [No Joint Swelling] : no joint swelling [Grossly Normal Strength/Tone] : grossly normal strength/tone [No Rash] : no rash [Coordination Grossly Intact] : coordination grossly intact [No Focal Deficits] : no focal deficits

## 2022-10-10 ENCOUNTER — APPOINTMENT (OUTPATIENT)
Dept: ENDOCRINOLOGY | Facility: CLINIC | Age: 62
End: 2022-10-10

## 2022-10-12 ENCOUNTER — LABORATORY RESULT (OUTPATIENT)
Age: 62
End: 2022-10-12

## 2022-10-19 ENCOUNTER — LABORATORY RESULT (OUTPATIENT)
Age: 62
End: 2022-10-19

## 2022-10-24 ENCOUNTER — APPOINTMENT (OUTPATIENT)
Dept: CARDIOLOGY | Facility: CLINIC | Age: 62
End: 2022-10-24
Payer: COMMERCIAL

## 2022-10-24 VITALS
HEART RATE: 61 BPM | DIASTOLIC BLOOD PRESSURE: 86 MMHG | BODY MASS INDEX: 28.52 KG/M2 | SYSTOLIC BLOOD PRESSURE: 148 MMHG | OXYGEN SATURATION: 99 % | HEIGHT: 62 IN | TEMPERATURE: 98.3 F | WEIGHT: 155 LBS

## 2022-10-24 PROCEDURE — 99214 OFFICE O/P EST MOD 30 MIN: CPT

## 2022-10-24 NOTE — HISTORY OF PRESENT ILLNESS
[FreeTextEntry1] : This is a 62 year old female with a PMH of Anemia, Brain Aneurysms, Seizures, HTN, HLD, CAD, Vitamin D deficiency and DVT presents today for follow up. pt reports feelinh well Denies any complaints\par Denies any chest pian or SOB. Denies any dizZzness

## 2022-11-03 ENCOUNTER — LABORATORY RESULT (OUTPATIENT)
Age: 62
End: 2022-11-03

## 2022-11-09 ENCOUNTER — LABORATORY RESULT (OUTPATIENT)
Age: 62
End: 2022-11-09

## 2022-12-03 ENCOUNTER — RX RENEWAL (OUTPATIENT)
Age: 62
End: 2022-12-03

## 2022-12-06 ENCOUNTER — APPOINTMENT (OUTPATIENT)
Dept: INTERNAL MEDICINE | Facility: CLINIC | Age: 62
End: 2022-12-06

## 2022-12-13 ENCOUNTER — NON-APPOINTMENT (OUTPATIENT)
Age: 62
End: 2022-12-13

## 2022-12-14 ENCOUNTER — NON-APPOINTMENT (OUTPATIENT)
Age: 62
End: 2022-12-14

## 2022-12-14 ENCOUNTER — APPOINTMENT (OUTPATIENT)
Dept: CARDIOLOGY | Facility: CLINIC | Age: 62
End: 2022-12-14
Payer: COMMERCIAL

## 2022-12-14 VITALS
BODY MASS INDEX: 28.34 KG/M2 | WEIGHT: 154 LBS | TEMPERATURE: 97.8 F | DIASTOLIC BLOOD PRESSURE: 90 MMHG | SYSTOLIC BLOOD PRESSURE: 160 MMHG | HEART RATE: 55 BPM | HEIGHT: 62 IN | OXYGEN SATURATION: 99 %

## 2022-12-14 DIAGNOSIS — I67.1 CEREBRAL ANEURYSM, NONRUPTURED: ICD-10-CM

## 2022-12-14 DIAGNOSIS — Z13.228 ENCOUNTER FOR SCREENING FOR OTHER METABOLIC DISORDERS: ICD-10-CM

## 2022-12-14 DIAGNOSIS — R79.89 OTHER SPECIFIED ABNORMAL FINDINGS OF BLOOD CHEMISTRY: ICD-10-CM

## 2022-12-14 DIAGNOSIS — E87.0 HYPEROSMOLALITY AND HYPERNATREMIA: ICD-10-CM

## 2022-12-14 PROCEDURE — 93000 ELECTROCARDIOGRAM COMPLETE: CPT

## 2022-12-14 PROCEDURE — 99214 OFFICE O/P EST MOD 30 MIN: CPT

## 2022-12-14 NOTE — HISTORY OF PRESENT ILLNESS
[FreeTextEntry1] : This is a 61 y/o female with a pmhx of Anemia, Brain Aneurysms, Seizures, HTN, HLD, CAD, Vitamin D deficiency and DVT presents today for follow up. PAtient with INR of 1.35, was advised to come into the office today\par Patient denies chest pain, dyspnea, palpitations, dizziness, syncope, changes in bowel/bladder habits or appetite.

## 2022-12-28 ENCOUNTER — APPOINTMENT (OUTPATIENT)
Dept: CARDIOLOGY | Facility: CLINIC | Age: 62
End: 2022-12-28
Payer: COMMERCIAL

## 2022-12-28 ENCOUNTER — NON-APPOINTMENT (OUTPATIENT)
Age: 62
End: 2022-12-28

## 2022-12-28 ENCOUNTER — APPOINTMENT (OUTPATIENT)
Dept: PULMONOLOGY | Facility: CLINIC | Age: 62
End: 2022-12-28

## 2022-12-28 VITALS
TEMPERATURE: 99 F | BODY MASS INDEX: 27.77 KG/M2 | DIASTOLIC BLOOD PRESSURE: 60 MMHG | HEIGHT: 62 IN | WEIGHT: 150.9 LBS | OXYGEN SATURATION: 97 % | HEART RATE: 67 BPM | SYSTOLIC BLOOD PRESSURE: 100 MMHG

## 2022-12-28 DIAGNOSIS — R05.9 COUGH, UNSPECIFIED: ICD-10-CM

## 2022-12-28 DIAGNOSIS — R82.90 UNSPECIFIED ABNORMAL FINDINGS IN URINE: ICD-10-CM

## 2022-12-28 DIAGNOSIS — R79.89 OTHER SPECIFIED ABNORMAL FINDINGS OF BLOOD CHEMISTRY: ICD-10-CM

## 2022-12-28 PROCEDURE — 93000 ELECTROCARDIOGRAM COMPLETE: CPT

## 2022-12-28 PROCEDURE — 99214 OFFICE O/P EST MOD 30 MIN: CPT

## 2022-12-28 PROCEDURE — 71046 X-RAY EXAM CHEST 2 VIEWS: CPT

## 2022-12-28 NOTE — HISTORY OF PRESENT ILLNESS
[FreeTextEntry1] : This is a 63 y/o female with a pmhx of Anemia, Brain Aneurysms, Seizures, HTN, HLD, CAD, Vitamin D deficiency and DVT presents today for follow up. She was last seen in the office on 12/14/22 with elevated BP and clonidine was increased to 0.2 mg po BID. PAtient tolerating medication well. Patient reports a few day history of a dry cough, no fever, chills or dyspnea.

## 2022-12-28 NOTE — REVIEW OF SYSTEMS
[Negative] : Heme/Lymph [Cough] : cough [Wheezing] : no wheezing [Coughing Up Blood] : no hemoptysis [Snoring] : no snoring

## 2023-01-23 ENCOUNTER — APPOINTMENT (OUTPATIENT)
Dept: CARDIOLOGY | Facility: CLINIC | Age: 63
End: 2023-01-23
Payer: COMMERCIAL

## 2023-01-23 VITALS
SYSTOLIC BLOOD PRESSURE: 150 MMHG | WEIGHT: 149 LBS | TEMPERATURE: 98.5 F | OXYGEN SATURATION: 99 % | HEIGHT: 62 IN | DIASTOLIC BLOOD PRESSURE: 80 MMHG | HEART RATE: 64 BPM | BODY MASS INDEX: 27.42 KG/M2

## 2023-01-23 DIAGNOSIS — Z51.81 ENCOUNTER FOR THERAPEUTIC DRUG LVL MONITORING: ICD-10-CM

## 2023-01-23 DIAGNOSIS — Z79.01 ENCOUNTER FOR THERAPEUTIC DRUG LVL MONITORING: ICD-10-CM

## 2023-01-23 PROCEDURE — 99213 OFFICE O/P EST LOW 20 MIN: CPT

## 2023-01-23 NOTE — HISTORY OF PRESENT ILLNESS
[FreeTextEntry1] : This is a 63 y/o female with a pmhx of  Anemia, Brain Aneurysms, Seizures, HTN, HLD, CAD, Vitamin D deficiency and DVT presents today for follow up. Patient feels well and has no acute concerns or complaints. Patient denies chest pain, dyspnea, palpitations, dizziness, syncope, changes in bowel/bladder habits or appetite.

## 2023-01-24 ENCOUNTER — NON-APPOINTMENT (OUTPATIENT)
Age: 63
End: 2023-01-24

## 2023-01-24 ENCOUNTER — INPATIENT (INPATIENT)
Facility: HOSPITAL | Age: 63
LOS: 14 days | Discharge: HOME HEALTH | DRG: 309 | End: 2023-02-08
Attending: HOSPITALIST | Admitting: STUDENT IN AN ORGANIZED HEALTH CARE EDUCATION/TRAINING PROGRAM
Payer: COMMERCIAL

## 2023-01-24 ENCOUNTER — APPOINTMENT (OUTPATIENT)
Dept: CARDIOLOGY | Facility: CLINIC | Age: 63
End: 2023-01-24
Payer: COMMERCIAL

## 2023-01-24 VITALS
HEIGHT: 62 IN | HEART RATE: 36 BPM | SYSTOLIC BLOOD PRESSURE: 80 MMHG | OXYGEN SATURATION: 99 % | TEMPERATURE: 97.5 F | BODY MASS INDEX: 27.6 KG/M2 | DIASTOLIC BLOOD PRESSURE: 60 MMHG | WEIGHT: 150 LBS

## 2023-01-24 VITALS
RESPIRATION RATE: 16 BRPM | HEART RATE: 48 BPM | TEMPERATURE: 97 F | WEIGHT: 154.98 LBS | HEIGHT: 62 IN | SYSTOLIC BLOOD PRESSURE: 148 MMHG | OXYGEN SATURATION: 96 % | DIASTOLIC BLOOD PRESSURE: 79 MMHG

## 2023-01-24 VITALS
HEIGHT: 62 IN | WEIGHT: 168 LBS | BODY MASS INDEX: 30.91 KG/M2 | OXYGEN SATURATION: 100 % | SYSTOLIC BLOOD PRESSURE: 78 MMHG | DIASTOLIC BLOOD PRESSURE: 70 MMHG | HEART RATE: 47 BPM

## 2023-01-24 VITALS — SYSTOLIC BLOOD PRESSURE: 93 MMHG | DIASTOLIC BLOOD PRESSURE: 70 MMHG | HEART RATE: 39 BPM

## 2023-01-24 DIAGNOSIS — Z43.4 ENCOUNTER FOR ATTENTION TO OTHER ARTIFICIAL OPENINGS OF DIGESTIVE TRACT: Chronic | ICD-10-CM

## 2023-01-24 DIAGNOSIS — Z98.89 OTHER SPECIFIED POSTPROCEDURAL STATES: Chronic | ICD-10-CM

## 2023-01-24 DIAGNOSIS — I72.9 ANEURYSM OF UNSPECIFIED SITE: Chronic | ICD-10-CM

## 2023-01-24 DIAGNOSIS — Z86.718 PERSONAL HISTORY OF OTHER VENOUS THROMBOSIS AND EMBOLISM: ICD-10-CM

## 2023-01-24 DIAGNOSIS — I10 ESSENTIAL (PRIMARY) HYPERTENSION: ICD-10-CM

## 2023-01-24 DIAGNOSIS — Z95.5 PRESENCE OF CORONARY ANGIOPLASTY IMPLANT AND GRAFT: Chronic | ICD-10-CM

## 2023-01-24 DIAGNOSIS — R00.1 BRADYCARDIA, UNSPECIFIED: ICD-10-CM

## 2023-01-24 DIAGNOSIS — Z98.2 PRESENCE OF CEREBROSPINAL FLUID DRAINAGE DEVICE: Chronic | ICD-10-CM

## 2023-01-24 DIAGNOSIS — R56.9 UNSPECIFIED CONVULSIONS: ICD-10-CM

## 2023-01-24 DIAGNOSIS — I25.10 ATHEROSCLEROTIC HEART DISEASE OF NATIVE CORONARY ARTERY WITHOUT ANGINA PECTORIS: ICD-10-CM

## 2023-01-24 LAB
ALBUMIN SERPL ELPH-MCNC: 4.2 G/DL — SIGNIFICANT CHANGE UP (ref 3.3–5)
ALP SERPL-CCNC: 105 U/L — SIGNIFICANT CHANGE UP (ref 40–120)
ALT FLD-CCNC: 9 U/L — LOW (ref 10–45)
ANION GAP SERPL CALC-SCNC: 12 MMOL/L — SIGNIFICANT CHANGE UP (ref 5–17)
AST SERPL-CCNC: 12 U/L — SIGNIFICANT CHANGE UP (ref 10–40)
BASE EXCESS BLDV CALC-SCNC: -5.1 MMOL/L — LOW (ref -2–3)
BASOPHILS # BLD AUTO: 0.02 K/UL — SIGNIFICANT CHANGE UP (ref 0–0.2)
BASOPHILS NFR BLD AUTO: 0.4 % — SIGNIFICANT CHANGE UP (ref 0–2)
BILIRUB SERPL-MCNC: 0.4 MG/DL — SIGNIFICANT CHANGE UP (ref 0.2–1.2)
BUN SERPL-MCNC: 18 MG/DL — SIGNIFICANT CHANGE UP (ref 7–23)
CA-I SERPL-SCNC: 1.27 MMOL/L — SIGNIFICANT CHANGE UP (ref 1.15–1.33)
CALCIUM SERPL-MCNC: 9.7 MG/DL — SIGNIFICANT CHANGE UP (ref 8.4–10.5)
CHLORIDE BLDV-SCNC: 113 MMOL/L — HIGH (ref 96–108)
CHLORIDE SERPL-SCNC: 111 MMOL/L — HIGH (ref 96–108)
CO2 BLDV-SCNC: 23 MMOL/L — SIGNIFICANT CHANGE UP (ref 22–26)
CO2 SERPL-SCNC: 19 MMOL/L — LOW (ref 22–31)
CREAT SERPL-MCNC: 1.27 MG/DL — SIGNIFICANT CHANGE UP (ref 0.5–1.3)
EGFR: 48 ML/MIN/1.73M2 — LOW
EOSINOPHIL # BLD AUTO: 0.04 K/UL — SIGNIFICANT CHANGE UP (ref 0–0.5)
EOSINOPHIL NFR BLD AUTO: 0.8 % — SIGNIFICANT CHANGE UP (ref 0–6)
FLUAV AG NPH QL: SIGNIFICANT CHANGE UP
FLUBV AG NPH QL: SIGNIFICANT CHANGE UP
GAS PNL BLDV: 142 MMOL/L — SIGNIFICANT CHANGE UP (ref 136–145)
GAS PNL BLDV: SIGNIFICANT CHANGE UP
GAS PNL BLDV: SIGNIFICANT CHANGE UP
GLUCOSE BLDV-MCNC: 81 MG/DL — SIGNIFICANT CHANGE UP (ref 70–99)
GLUCOSE SERPL-MCNC: 82 MG/DL — SIGNIFICANT CHANGE UP (ref 70–99)
HCO3 BLDV-SCNC: 22 MMOL/L — SIGNIFICANT CHANGE UP (ref 22–29)
HCT VFR BLD CALC: 32.4 % — LOW (ref 34.5–45)
HCT VFR BLDA CALC: 32 % — LOW (ref 34.5–46.5)
HGB BLD CALC-MCNC: 10.8 G/DL — LOW (ref 11.7–16.1)
HGB BLD-MCNC: 10.4 G/DL — LOW (ref 11.5–15.5)
IMM GRANULOCYTES NFR BLD AUTO: 0.4 % — SIGNIFICANT CHANGE UP (ref 0–0.9)
LACTATE BLDV-MCNC: 0.8 MMOL/L — SIGNIFICANT CHANGE UP (ref 0.5–2)
LYMPHOCYTES # BLD AUTO: 1.54 K/UL — SIGNIFICANT CHANGE UP (ref 1–3.3)
LYMPHOCYTES # BLD AUTO: 31.6 % — SIGNIFICANT CHANGE UP (ref 13–44)
MCHC RBC-ENTMCNC: 28.7 PG — SIGNIFICANT CHANGE UP (ref 27–34)
MCHC RBC-ENTMCNC: 32.1 GM/DL — SIGNIFICANT CHANGE UP (ref 32–36)
MCV RBC AUTO: 89.3 FL — SIGNIFICANT CHANGE UP (ref 80–100)
MONOCYTES # BLD AUTO: 0.32 K/UL — SIGNIFICANT CHANGE UP (ref 0–0.9)
MONOCYTES NFR BLD AUTO: 6.6 % — SIGNIFICANT CHANGE UP (ref 2–14)
NEUTROPHILS # BLD AUTO: 2.93 K/UL — SIGNIFICANT CHANGE UP (ref 1.8–7.4)
NEUTROPHILS NFR BLD AUTO: 60.2 % — SIGNIFICANT CHANGE UP (ref 43–77)
NRBC # BLD: 0 /100 WBCS — SIGNIFICANT CHANGE UP (ref 0–0)
PCO2 BLDV: 46 MMHG — HIGH (ref 39–42)
PH BLDV: 7.28 — LOW (ref 7.32–7.43)
PLATELET # BLD AUTO: 163 K/UL — SIGNIFICANT CHANGE UP (ref 150–400)
PO2 BLDV: 31 MMHG — SIGNIFICANT CHANGE UP (ref 25–45)
POTASSIUM BLDV-SCNC: 4.2 MMOL/L — SIGNIFICANT CHANGE UP (ref 3.5–5.1)
POTASSIUM SERPL-MCNC: 4.3 MMOL/L — SIGNIFICANT CHANGE UP (ref 3.5–5.3)
POTASSIUM SERPL-SCNC: 4.3 MMOL/L — SIGNIFICANT CHANGE UP (ref 3.5–5.3)
PROT SERPL-MCNC: 7.9 G/DL — SIGNIFICANT CHANGE UP (ref 6–8.3)
RBC # BLD: 3.63 M/UL — LOW (ref 3.8–5.2)
RBC # FLD: 15.6 % — HIGH (ref 10.3–14.5)
RSV RNA NPH QL NAA+NON-PROBE: SIGNIFICANT CHANGE UP
SAO2 % BLDV: 44.7 % — LOW (ref 67–88)
SARS-COV-2 RNA SPEC QL NAA+PROBE: SIGNIFICANT CHANGE UP
SODIUM SERPL-SCNC: 142 MMOL/L — SIGNIFICANT CHANGE UP (ref 135–145)
TROPONIN T, HIGH SENSITIVITY RESULT: 18 NG/L — SIGNIFICANT CHANGE UP (ref 0–51)
TROPONIN T, HIGH SENSITIVITY RESULT: 19 NG/L — SIGNIFICANT CHANGE UP (ref 0–51)
WBC # BLD: 4.87 K/UL — SIGNIFICANT CHANGE UP (ref 3.8–10.5)
WBC # FLD AUTO: 4.87 K/UL — SIGNIFICANT CHANGE UP (ref 3.8–10.5)

## 2023-01-24 PROCEDURE — 99223 1ST HOSP IP/OBS HIGH 75: CPT

## 2023-01-24 PROCEDURE — 71045 X-RAY EXAM CHEST 1 VIEW: CPT | Mod: 26

## 2023-01-24 PROCEDURE — 99285 EMERGENCY DEPT VISIT HI MDM: CPT

## 2023-01-24 PROCEDURE — 99254 IP/OBS CNSLTJ NEW/EST MOD 60: CPT

## 2023-01-24 PROCEDURE — 93000 ELECTROCARDIOGRAM COMPLETE: CPT

## 2023-01-24 PROCEDURE — 99244 OFF/OP CNSLTJ NEW/EST MOD 40: CPT

## 2023-01-24 PROCEDURE — 99214 OFFICE O/P EST MOD 30 MIN: CPT

## 2023-01-24 RX ORDER — LEVETIRACETAM 250 MG/1
1500 TABLET, FILM COATED ORAL EVERY 12 HOURS
Refills: 0 | Status: DISCONTINUED | OUTPATIENT
Start: 2023-01-24 | End: 2023-01-29

## 2023-01-24 RX ORDER — AMLODIPINE BESYLATE 2.5 MG/1
10 TABLET ORAL DAILY
Refills: 0 | Status: DISCONTINUED | OUTPATIENT
Start: 2023-01-24 | End: 2023-02-08

## 2023-01-24 RX ORDER — LACOSAMIDE 50 MG/1
100 TABLET ORAL
Refills: 0 | Status: DISCONTINUED | OUTPATIENT
Start: 2023-01-24 | End: 2023-02-08

## 2023-01-24 RX ORDER — WARFARIN SODIUM 2.5 MG/1
7.5 TABLET ORAL ONCE
Refills: 0 | Status: COMPLETED | OUTPATIENT
Start: 2023-01-24 | End: 2023-01-24

## 2023-01-24 RX ORDER — LANOLIN ALCOHOL/MO/W.PET/CERES
3 CREAM (GRAM) TOPICAL AT BEDTIME
Refills: 0 | Status: DISCONTINUED | OUTPATIENT
Start: 2023-01-24 | End: 2023-02-08

## 2023-01-24 RX ORDER — ACETAMINOPHEN 500 MG
650 TABLET ORAL EVERY 6 HOURS
Refills: 0 | Status: DISCONTINUED | OUTPATIENT
Start: 2023-01-24 | End: 2023-02-08

## 2023-01-24 RX ORDER — OXCARBAZEPINE 300 MG/1
300 TABLET, FILM COATED ORAL
Refills: 0 | Status: DISCONTINUED | OUTPATIENT
Start: 2023-01-24 | End: 2023-02-08

## 2023-01-24 RX ORDER — ZONISAMIDE 100 MG
1 CAPSULE ORAL
Qty: 0 | Refills: 0 | DISCHARGE

## 2023-01-24 RX ORDER — DOXAZOSIN MESYLATE 4 MG
4 TABLET ORAL AT BEDTIME
Refills: 0 | Status: DISCONTINUED | OUTPATIENT
Start: 2023-01-24 | End: 2023-02-08

## 2023-01-24 RX ORDER — ZONISAMIDE 100 MG
100 CAPSULE ORAL
Refills: 0 | Status: DISCONTINUED | OUTPATIENT
Start: 2023-01-24 | End: 2023-02-08

## 2023-01-24 RX ORDER — LABETALOL HCL 100 MG
200 TABLET ORAL DAILY
Refills: 0 | Status: DISCONTINUED | OUTPATIENT
Start: 2023-01-25 | End: 2023-01-25

## 2023-01-24 RX ORDER — ATORVASTATIN CALCIUM 80 MG/1
80 TABLET, FILM COATED ORAL AT BEDTIME
Refills: 0 | Status: DISCONTINUED | OUTPATIENT
Start: 2023-01-24 | End: 2023-02-08

## 2023-01-24 RX ORDER — LOSARTAN POTASSIUM 100 MG/1
100 TABLET, FILM COATED ORAL DAILY
Refills: 0 | Status: DISCONTINUED | OUTPATIENT
Start: 2023-01-24 | End: 2023-02-08

## 2023-01-24 RX ORDER — SENNA PLUS 8.6 MG/1
2 TABLET ORAL AT BEDTIME
Refills: 0 | Status: DISCONTINUED | OUTPATIENT
Start: 2023-01-24 | End: 2023-02-08

## 2023-01-24 RX ORDER — ASPIRIN/CALCIUM CARB/MAGNESIUM 324 MG
81 TABLET ORAL DAILY
Refills: 0 | Status: DISCONTINUED | OUTPATIENT
Start: 2023-01-24 | End: 2023-02-08

## 2023-01-24 RX ORDER — ONDANSETRON 8 MG/1
4 TABLET, FILM COATED ORAL EVERY 8 HOURS
Refills: 0 | Status: DISCONTINUED | OUTPATIENT
Start: 2023-01-24 | End: 2023-01-30

## 2023-01-24 RX ADMIN — LACOSAMIDE 100 MILLIGRAM(S): 50 TABLET ORAL at 22:40

## 2023-01-24 RX ADMIN — OXCARBAZEPINE 300 MILLIGRAM(S): 300 TABLET, FILM COATED ORAL at 22:54

## 2023-01-24 RX ADMIN — Medication 4 MILLIGRAM(S): at 22:48

## 2023-01-24 RX ADMIN — Medication 100 MILLIGRAM(S): at 22:53

## 2023-01-24 RX ADMIN — Medication 81 MILLIGRAM(S): at 22:49

## 2023-01-24 RX ADMIN — ATORVASTATIN CALCIUM 80 MILLIGRAM(S): 80 TABLET, FILM COATED ORAL at 22:40

## 2023-01-24 RX ADMIN — Medication 0.2 MILLIGRAM(S): at 22:53

## 2023-01-24 RX ADMIN — LEVETIRACETAM 400 MILLIGRAM(S): 250 TABLET, FILM COATED ORAL at 22:39

## 2023-01-24 RX ADMIN — WARFARIN SODIUM 7.5 MILLIGRAM(S): 2.5 TABLET ORAL at 22:52

## 2023-01-24 NOTE — H&P ADULT - PROBLEM SELECTOR PLAN 2
Hx/o Htn though did have hypotensive episode today   - On multiple bp meds, will continue except  BB iso bradycardia   - Ep rec decreasing BB to  Labetalol 200mg qd ( was BID), can resume in Am   - Apprec EP rec: renal artery U/S to r/o DULCE as source of htn Hx/o Htn though did have hypotensive episode today   - On multiple bp meds, will continue except  BB iso bradycardia   - Ep rec decreasing BB to  Labetalol 200mg qd ( was BID), can resume in Am   - Apprec EP rec: renal artery U/S (ordered) to r/o DULCE

## 2023-01-24 NOTE — ED ADULT NURSE NOTE - OBJECTIVE STATEMENT
62 y female w/ PMH HLD, HTN, CAD presents to ED w/ bradycardia. As per EMS, pt was picked up from MD office for low heart rate of 35. Pt states she went to see her MD to discuss a different blood thinner to be put on for chronic DVT's. Pt states she felt dizziness today when standing. EMS states pt looked fatigued en route to ED and pressure was 80s/60s. IN the ED pt's BP was 141/88. Pt placed on cardiac monitor sinus bradycardia at rate of 57. Pt is mentating efficiently in the ED is responsive and following commands. Pt is ambulatory at baseline but has not ambulated in the ED. Pt denies chest pain, sob, abdominal pain, n/v/d, bloody stools, urinary symptoms.

## 2023-01-24 NOTE — CONSULT NOTE ADULT - SUBJECTIVE AND OBJECTIVE BOX
CHIEF COMPLAINT: bradycardia     HISTORY OF PRESENT ILLNESS:      62-year-old F history of HTN, CAD s/p PCI 2003, DVT w/ chronic ulcer LLE, SAH s/p hemicraniectomy and aneurysm clipping 2013 further c/b seizure d/o and R eye blindness presenting from Dr. Ojeda's office for sinus bradycardia and hypotension, SBP 80's which improved after a milligram Atropine and small fluid bolus. As per patient, she was asymptomatic at that time. On admission to ED, telemetry revealing SB high 40's - 50's, w/o evidence of AVB or sinus pauses. She is hypertensive. She denies dizziness, lightheadedness, syncope, near syncope, ARREDONDO, CP w/ exertion. At home, she states she gets around w/ rolling walker but is mostly sedentary and seldom does chores. She states no difficulty in performing her ADL's.      Allergies    penicillin (Hives)  penicillin (Unknown)  sulfa drugs (Other)    Intolerances    	    MEDICATIONS:                  PAST MEDICAL & SURGICAL HISTORY:  HTN (hypertension)      Epilepsy      Coronary artery disease      Brain aneurysm      Hyperlipidemia      Coronary artery disease      Deep vein thrombosis (DVT)      Seizure      Hypertension      Stroke      Aneurysm      Cholecystostomy care      Aneurysm  see HPI  s/p cerebral aneurysm clipping in Dec 2013, after which pt was in 30 day induced coma, had  shunt,  tracheostomy and reversal, followed by rehab      S/P  shunt      History of cranioplasty  B/L 04/14      H/O craniotomy      Presence of IVC filter      S/P primary angioplasty with coronary stent      H/O cerebral aneurysm repair  clipping          FAMILY HISTORY:      SOCIAL HISTORY:    [ ]Non-smoker  [ ] Smoker  [ ] Alcohol      REVIEW OF SYSTEMS:  See HPI. Otherwise, 10 point ROS done and otherwise negative.    PHYSICAL EXAM:  T(C): 36.4 (01-24-23 @ 15:30), Max: 36.4 (01-24-23 @ 15:30)  HR: 42 (01-24-23 @ 17:03) (42 - 57)  BP: 143/66 (01-24-23 @ 17:03) (141/88 - 148/79)  RR: 17 (01-24-23 @ 17:03) (16 - 17)  SpO2: 100% (01-24-23 @ 17:03) (96% - 100%)  Wt(kg): --  I&O's Summary      Appearance: Alert. NAD	  HEENT:   NC/AT	  Cardiovascular: +S1S2 RRR no m/g/r  Respiratory: CTA B/L	  Psychiatry: A & O x 3, Mood & affect appropriate  Gastrointestinal:  Soft, NT.ND., + BS	  Skin: No rashes	  Neurologic: Non-focal  Extremities: No edema BLE  Vascular: Peripheral pulses palpable 2+ bilaterally      LABS:	 	    CBC Full  -  ( 24 Jan 2023 15:53 )  WBC Count : 4.87 K/uL  Hemoglobin : 10.4 g/dL  Hematocrit : 32.4 %  Platelet Count - Automated : 163 K/uL  Mean Cell Volume : 89.3 fl  Mean Cell Hemoglobin : 28.7 pg  Mean Cell Hemoglobin Concentration : 32.1 gm/dL  Auto Neutrophil # : 2.93 K/uL  Auto Lymphocyte # : 1.54 K/uL  Auto Monocyte # : 0.32 K/uL  Auto Eosinophil # : 0.04 K/uL  Auto Basophil # : 0.02 K/uL  Auto Neutrophil % : 60.2 %  Auto Lymphocyte % : 31.6 %  Auto Monocyte % : 6.6 %  Auto Eosinophil % : 0.8 %  Auto Basophil % : 0.4 %    01-24    142  |  111<H>  |  18  ----------------------------<  82  4.3   |  19<L>  |  1.27    Ca    9.7      24 Jan 2023 15:53  Mg     2.3     01-24    TPro  7.9  /  Alb  4.2  /  TBili  0.4  /  DBili  x   /  AST  12  /  ALT  9<L>  /  AlkPhos  105  01-24      proBNP:   Lipid Profile:   HgA1c:   TSH:       CARDIAC MARKERS:                TELEMETRY: 	    ECG:  	  RADIOLOGY:  OTHER: 	    PREVIOUS DIAGNOSTIC TESTING:    Echocardiogram:    Catheterization:    Stress Test:  	  	  ASSESSMENT/PLAN: 	     CHIEF COMPLAINT: bradycardia     HISTORY OF PRESENT ILLNESS:  62-year-old F history of HTN, CAD s/p PCI 2003, DVT w/ chronic ulcer LLE, SAH s/p hemicraniectomy and aneurysm clipping 2013 further c/b seizure d/o and R eye blindness presenting from Dr. Ojeda's office for sinus bradycardia 38bpm and hypotension, SBP 80's which improved after a milligram Atropine and small fluid bolus. As per patient, she was asymptomatic at that time. On admission to ED, telemetry revealing SB high 40's - 50's, w/o evidence of AVB or sinus pauses. She is hypertensive. She denies dizziness, lightheadedness, syncope, near syncope, ARREDONDO, CP w/ exertion. At home, she states she gets around w/ rolling walker but is mostly sedentary and seldom does chores. She states no difficulty in performing her ADL's. Review of outpatient EKG's in [employer] reveal baseline sinus gema 40's-50's, she is maintained on Labetalol for HTN. She had a normal TTE in 2021. She states she has never worn a holter monitor.     EP consulted for bradycardia.       Allergies    penicillin (Hives)  penicillin (Unknown)  sulfa drugs (Other)    Intolerances    	    MEDICATIONS:                  PAST MEDICAL & SURGICAL HISTORY:  HTN (hypertension)      Epilepsy      Coronary artery disease      Brain aneurysm      Hyperlipidemia      Coronary artery disease      Deep vein thrombosis (DVT)      Seizure      Hypertension      Stroke      Aneurysm      Cholecystostomy care      Aneurysm  see HPI  s/p cerebral aneurysm clipping in Dec 2013, after which pt was in 30 day induced coma, had  shunt,  tracheostomy and reversal, followed by rehab      S/P  shunt      History of cranioplasty  B/L 04/14      H/O craniotomy      Presence of IVC filter      S/P primary angioplasty with coronary stent      H/O cerebral aneurysm repair  clipping          FAMILY HISTORY:      SOCIAL HISTORY:    Denies smoking, illicit drug use and EtOH use     REVIEW OF SYSTEMS:  See HPI. Otherwise, 10 point ROS done and otherwise negative.    PHYSICAL EXAM:  T(C): 36.4 (01-24-23 @ 15:30), Max: 36.4 (01-24-23 @ 15:30)  HR: 42 (01-24-23 @ 17:03) (42 - 57)  BP: 143/66 (01-24-23 @ 17:03) (141/88 - 148/79)  RR: 17 (01-24-23 @ 17:03) (16 - 17)  SpO2: 100% (01-24-23 @ 17:03) (96% - 100%)  Wt(kg): --  I&O's Summary      Appearance: Alert. NAD	  HEENT:   NC/AT	  Cardiovascular: +S1S2 RRR no m/g/r  Respiratory: CTA B/L	  Psychiatry: A & O x 3, Mood & affect appropriate  Gastrointestinal:  Soft, NT.ND., + BS	  Skin: No rashes	  Neurologic: Non-focal  Extremities: No edema BLE  Vascular: Peripheral pulses palpable 2+ bilaterally      LABS:	 	    CBC Full  -  ( 24 Jan 2023 15:53 )  WBC Count : 4.87 K/uL  Hemoglobin : 10.4 g/dL  Hematocrit : 32.4 %  Platelet Count - Automated : 163 K/uL  Mean Cell Volume : 89.3 fl  Mean Cell Hemoglobin : 28.7 pg  Mean Cell Hemoglobin Concentration : 32.1 gm/dL  Auto Neutrophil # : 2.93 K/uL  Auto Lymphocyte # : 1.54 K/uL  Auto Monocyte # : 0.32 K/uL  Auto Eosinophil # : 0.04 K/uL  Auto Basophil # : 0.02 K/uL  Auto Neutrophil % : 60.2 %  Auto Lymphocyte % : 31.6 %  Auto Monocyte % : 6.6 %  Auto Eosinophil % : 0.8 %  Auto Basophil % : 0.4 %    01-24    142  |  111<H>  |  18  ----------------------------<  82  4.3   |  19<L>  |  1.27    Ca    9.7      24 Jan 2023 15:53  Mg     2.3     01-24    TPro  7.9  /  Alb  4.2  /  TBili  0.4  /  DBili  x   /  AST  12  /  ALT  9<L>  /  AlkPhos  105  01-24      TSH: Thyroid Stimulating Hormone, Serum: 0.411 uIU/mL (05.08.22 @ 08:06)    TELEMETRY: 	  SB 40-50's   	    PREVIOUS DIAGNOSTIC TESTING:    Echocardiogram: 7/2021 normal LV systolic function, incr LV wall thickness, mild concentric LVH, mild MR, mild LA enlargement     Stress Test:  	6/2021 Normal wall motion s/p stress, LVEF 80%, no evidence of infarct or ischemia   	     CHIEF COMPLAINT: bradycardia     HISTORY OF PRESENT ILLNESS:  62-year-old F history of HTN, CAD s/p PCI 2003, DVT w/ chronic ulcer LLE, SAH s/p hemicraniectomy and aneurysm clipping 2013 further c/b seizure d/o and R eye blindness presenting from Dr. Ojeda's office for sinus bradycardia 38bpm and hypotension, SBP 80's which improved after a milligram Atropine and small fluid bolus. As per patient, she was asymptomatic at that time. On admission to ED, telemetry revealing SB high 40's - 50's, w/o evidence of AVB or sinus pauses. She is hypertensive. She denies dizziness, lightheadedness, syncope, near syncope, ARREDONDO, CP w/ exertion. At home, she states she gets around w/ rolling walker but is mostly sedentary and seldom does chores. She states no difficulty in performing her ADL's. Review of outpatient EKG's in [employer] reveal baseline sinus gema 40's-50's, she is maintained on Labetalol for HTN. She had a normal TTE in 2021. She states she has never worn a holter monitor.     EP consulted for bradycardia.       Allergies    penicillin (Hives)  penicillin (Unknown)  sulfa drugs (Other)    Intolerances    	    MEDICATIONS:                  PAST MEDICAL & SURGICAL HISTORY:  HTN (hypertension)      Epilepsy      Coronary artery disease      Brain aneurysm      Hyperlipidemia      Coronary artery disease      Deep vein thrombosis (DVT)      Seizure      Hypertension      Stroke      Aneurysm      Cholecystostomy care      Aneurysm  see HPI  s/p cerebral aneurysm clipping in Dec 2013, after which pt was in 30 day induced coma, had  shunt,  tracheostomy and reversal, followed by rehab      S/P  shunt      History of cranioplasty  B/L 04/14      H/O craniotomy      Presence of IVC filter      S/P primary angioplasty with coronary stent      H/O cerebral aneurysm repair  clipping          FAMILY HISTORY:      SOCIAL HISTORY:    Denies smoking, illicit drug use and EtOH use     REVIEW OF SYSTEMS:  See HPI. Otherwise, 10 point ROS done and otherwise negative.    PHYSICAL EXAM:  T(C): 36.4 (01-24-23 @ 15:30), Max: 36.4 (01-24-23 @ 15:30)  HR: 42 (01-24-23 @ 17:03) (42 - 57)  BP: 143/66 (01-24-23 @ 17:03) (141/88 - 148/79)  RR: 17 (01-24-23 @ 17:03) (16 - 17)  SpO2: 100% (01-24-23 @ 17:03) (96% - 100%)  Wt(kg): --  I&O's Summary      Appearance: Alert. NAD	  HEENT:   NC/AT	  Cardiovascular: +S1S2 RRR no m/g/r  Respiratory: CTA B/L	  Psychiatry: A & O x 3, Mood & affect appropriate  Gastrointestinal:  Soft, NT.ND., + BS	  Skin: No rashes, masses or lesions  Neurologic: Non-focal  Extremities: No edema BLE  Vascular: Peripheral pulses palpable 2+ bilaterally      LABS:	 	    CBC Full  -  ( 24 Jan 2023 15:53 )  WBC Count : 4.87 K/uL  Hemoglobin : 10.4 g/dL  Hematocrit : 32.4 %  Platelet Count - Automated : 163 K/uL  Mean Cell Volume : 89.3 fl  Mean Cell Hemoglobin : 28.7 pg  Mean Cell Hemoglobin Concentration : 32.1 gm/dL  Auto Neutrophil # : 2.93 K/uL  Auto Lymphocyte # : 1.54 K/uL  Auto Monocyte # : 0.32 K/uL  Auto Eosinophil # : 0.04 K/uL  Auto Basophil # : 0.02 K/uL  Auto Neutrophil % : 60.2 %  Auto Lymphocyte % : 31.6 %  Auto Monocyte % : 6.6 %  Auto Eosinophil % : 0.8 %  Auto Basophil % : 0.4 %    01-24    142  |  111<H>  |  18  ----------------------------<  82  4.3   |  19<L>  |  1.27    Ca    9.7      24 Jan 2023 15:53  Mg     2.3     01-24    TPro  7.9  /  Alb  4.2  /  TBili  0.4  /  DBili  x   /  AST  12  /  ALT  9<L>  /  AlkPhos  105  01-24      TSH: Thyroid Stimulating Hormone, Serum: 0.411 uIU/mL (05.08.22 @ 08:06)    TELEMETRY: 	  SB 40-50's   	    PREVIOUS DIAGNOSTIC TESTING:    Echocardiogram: 7/2021 normal LV systolic function, incr LV wall thickness, mild concentric LVH, mild MR, mild LA enlargement     Stress Test:  	6/2021 Normal wall motion s/p stress, LVEF 80%, no evidence of infarct or ischemia

## 2023-01-24 NOTE — H&P ADULT - NSHPLABSRESULTS_GEN_ALL_CORE
10.4   4.87  )-----------( 163      ( 24 Jan 2023 15:53 )             32.4       01-24    142  |  111<H>  |  18  ----------------------------<  82  4.3   |  19<L>  |  1.27    Ca    9.7      24 Jan 2023 15:53  Mg     2.3     01-24    TPro  7.9  /  Alb  4.2  /  TBili  0.4  /  DBili  x   /  AST  12  /  ALT  9<L>  /  AlkPhos  105  01-24      EKG personally reviewed:      Images personally reviewed:   < from: Xray Chest 1 View- PORTABLE-Urgent (01.24.23 @ 15:45) >    IMPRESSION:  Clear lungs. 10.4   4.87  )-----------( 163      ( 24 Jan 2023 15:53 )             32.4       01-24    142  |  111<H>  |  18  ----------------------------<  82  4.3   |  19<L>  |  1.27    Ca    9.7      24 Jan 2023 15:53  Mg     2.3     01-24    TPro  7.9  /  Alb  4.2  /  TBili  0.4  /  DBili  x   /  AST  12  /  ALT  9<L>  /  AlkPhos  105  01-24      EKG personally reviewed:   sinus gema 40bpm       Images personally reviewed:   < from: Xray Chest 1 View- PORTABLE-Urgent (01.24.23 @ 15:45) >    IMPRESSION:  Clear lungs.

## 2023-01-24 NOTE — H&P ADULT - PROBLEM SELECTOR PLAN 4
- c/w coumadin dose based on INR - On coumadin 7.5mg Mon - Fri, 5mg Sat & Sun   - C/w coumadin, daily dosing based on INR goal of 2-3

## 2023-01-24 NOTE — H&P ADULT - NSHPPHYSICALEXAM_GEN_ALL_CORE
T(C): 36.4 (01-24-23 @ 15:30), Max: 36.4 (01-24-23 @ 15:30)  HR: 42 (01-24-23 @ 17:03) (42 - 57)  BP: 143/66 (01-24-23 @ 17:03) (141/88 - 148/79)  RR: 17 (01-24-23 @ 17:03) (16 - 17)  SpO2: 100% (01-24-23 @ 17:03) (96% - 100%)    CONSTITUTIONAL: Well groomed, no apparent distress  EYES: PERRLA and symmetric, EOMI, No conjunctival or scleral injection, non-icteric  ENMT: MMM. Normal dentition; no pharyngeal injection or exudates  NECK: Supple, symmetric and without tracheal deviation   RESP: No respiratory distress, no use of accessory muscles; CTA b/l, no WRR  CV: +S1S2, RRR, no MRG; no JVD; no peripheral edema  GI: Soft, NTND, no RGR; no palpable masses; no hepatosplenomegaly  LYMPH: No cervical or axillary LAD or tenderness  MSK: Normal gait; No digital clubbing or cyanosis; normal pain free ROM x4 extremities   SKIN: No rashes or ulcers noted; no subcutaneous nodules or induration palpable  NEURO: CN II-XII grossly intact; normal reflexes, sensation intact throughout   PSYCH: Appropriate insight/judgment; A+O x 3, mood and affect appropriate T(C): 36.4 (01-24-23 @ 15:30), Max: 36.4 (01-24-23 @ 15:30)  HR: 42 (01-24-23 @ 17:03) (42 - 57)  BP: 143/66 (01-24-23 @ 17:03) (141/88 - 148/79)  RR: 17 (01-24-23 @ 17:03) (16 - 17)  SpO2: 100% (01-24-23 @ 17:03) (96% - 100%)    CONSTITUTIONAL: Well groomed, no apparent distress  EYES: PERRLA and symmetric, EOMI, No conjunctival or scleral injection, non-icteric  ENMT: MMM. Normal dentition; no pharyngeal injection or exudates  NECK: Supple, symmetric and without tracheal deviation   RESP: No respiratory distress, no use of accessory muscles; CTA b/l, no WRR  CV: +S1S2, RRR, no MRG; no JVD; no peripheral edema  GI: Soft, NTND, no RGR; no palpable masses; no hepatosplenomegaly  LYMPH: No cervical or axillary LAD or tenderness  MSK: Normal gait; No digital clubbing or cyanosis; normal pain free ROM x4 extremities   SKIN: dark discoloration of LLE, skin otherwise intact, no rashes or ulcers   NEURO: CN II-XII grossly intact; normal reflexes, sensation intact throughout   PSYCH: Appropriate insight/judgment; A+O x 3, mood and affect appropriate

## 2023-01-24 NOTE — H&P ADULT - PROBLEM SELECTOR PLAN 5
Following Mammogram/US done on 2/12/20, there is recommendation for 6 month follow-up. Please place the following order(s) to allow us to schedule the patient. Put in an \"Expected By Date of 5/12/20\" in the future status.  This will defer the order from getting scheduled too soon for the patient's insurance.     [id number] US Breast Left       Please answer order hard stop with \"Perform add'l diagnostic test and/or interventional procedure per radiologist\".     Once order is placed, Central Scheduling department will coordinate scheduling the patient.    
Orders placed by Marianela Shea on 4/30/2020  
Hx/o CAD s/p PCI 2003  - GDMT

## 2023-01-24 NOTE — H&P ADULT - PROBLEM SELECTOR PLAN 1
Found to be bradycardic to 30s in doctor's office, also had episode of hypotension , remained asx   - Clinically nontoxic, asymptomatic, BP improved-- now normotensive  - EKG: ????  - H&H  seems about BL  - trop neg x2, electrolyte wnl   - Afebrile, no elevated wbc, RVP neg, cxr clear-- infection unlikely   - hx/o Htn on BB,  which could be contributing to bradycardia, will for   - Telemetry monitor, check Echo  - Consideration for ILR for symptomatic bradycardia   - Cardio & EP on consult, f/u for further rec Found to be bradycardic to 30s in doctor's office, also had episode of hypotension , remained asx   - Clinically nontoxic, asymptomatic, BP improved-- now normotensive  - EKG: ????  - H&H  seems about BL  - trop neg x2, electrolyte wnl   - Afebrile, no elevated wbc, RVP neg, cxr clear-- infection unlikely   - hx/o Htn on BB,  which could be contributing to bradycardia, will for   - Telemetry monitor, check Echo (ordered)  - Consideration for ILR for symptomatic bradycardia   - Cardio & EP on consult, f/u for further rec

## 2023-01-24 NOTE — HISTORY OF PRESENT ILLNESS
[FreeTextEntry1] : pt presents for scute visit pt here as saw dr melissa and bradycardia .pt with dizziness .pt with bradycardia and low bp .pt denies fevers chills n/vc diaphoresis .pt with fatigue pt denies any chest  pain dizziness ,lightheadedness ,nausea vomiting diaphoresis\par  [de-identified] : pt presents for bradycardia  [de-identified] : pt presnts for acute visit saw dr melissa pt with hr 30s and low bp .pt here secondary to above pt with fatigue denies dizzines spt with fatigue .hpi\par .hpi\par

## 2023-01-24 NOTE — ED ADULT NURSE REASSESSMENT NOTE - NS ED NURSE REASSESS COMMENT FT1
Pt heart rate dropped down to 38 bpm. MD Morris aware. As per Cardiology MD, place patient on pads. Pads have been placed on pt.

## 2023-01-24 NOTE — ED PROVIDER NOTE - OBJECTIVE STATEMENT
62-year-old female with a past medical history of brain aneurysm s/p hemicraniectomy and clipping back in 2013, seizures post hemicraniectomy (on Keppra), chronic DVTs on Coumadin and hypertension. Comes to the ER today because she was having a routine visit with her cardiologist at which time she was found to be bradycardic with her heart rate in the 30s. EMS was called. EMS found her heart rate in 35; her blood pressure systolics were in the 80s. Transported here - pt has been asymptomatic throughout the whole event. Patient does report that when she was trying to get on her stretcher with EMS she felt a bit dizzy but otherwise has not been having any symptoms at home. No nausea vomiting diarrhea fever chills.     Sinus bradycardia - mostly in the mid 40s in the ER. Blood pressure systolics have been in the 140s throughout the ED stay thus far. Rest of her vitals are within normal limits. She looks well and she is not complaining of any chest pain shortness of breath. Dr. Everett at bedside - he is recommending EP evaluation.    EP is recommending holding night dose of labetalol and to continue rest of her antihypertensives.

## 2023-01-24 NOTE — H&P ADULT - HISTORY OF PRESENT ILLNESS
62y f pmh CAD s/p PCI 2003, htn, brain aneurysm s/p hemicraniectomy and clipping (2013),   seizures post hemicraniectomy (on Keppra), chronic DVTs on Coumadin. Pt comes to the   ED today because she was having a routine visit with her cardiologist and found to be   bradycardic to the 30s. EMS was called, pt HR still in 30s and was also w/low Bp (sbp 80s) by   EMS, pt remained asymptomatic throughout. Patient does report that when she was trying to   get on the stretcher with EMS she felt a bit dizzy but otherwise has not been having any   symptoms at home. No CP, sob, palpitation, N/V, diarrhea, fever, chills    In ED, pt continues to be gema cardiac by BP improved ( sbp 140s), NAD.   Was eval by Dr Everett who rec EP eval. Was seen by EP in ED, who recommends holding   night dose of labetalol and to continue rest of her antihypertensive 62y f pmh CAD s/p PCI 2003, htn, brain aneurysm s/p hemicraniectomy and clipping (2013),   seizures post hemicraniectomy (on Keppra), chronic DVTs on Coumadin. Pt comes to the   ED today because she was having a routine visit with her cardiologist and found to be   bradycardic to the 30s. EMS was called, pt HR still in 30s and was also w/low Bp (sbp 80s) by   EMS, pt remained asymptomatic throughout. Patient does report that when she was trying to   get on the stretcher with EMS she felt a bit dizzy but otherwise has not been having any   symptoms at home. No CP, sob, palpitation, N/V, diarrhea, fever, chills    In ED, pt continues to be bradycardic but BP improved (sbp 140s -150s).  Was eval by EP in ED

## 2023-01-24 NOTE — HISTORY OF PRESENT ILLNESS
[FreeTextEntry1] : pt presents for scute visit pt here as saw dr melissa and bradycardia .pt with dizziness .pt with bradycardia and low bp .pt denies fevers chills n/vc diaphoresis .pt with fatigue pt denies any chest  pain dizziness ,lightheadedness ,nausea vomiting diaphoresis\par  [de-identified] : pt presents for bradycardia  [de-identified] : pt presnts for acute visit saw dr melissa pt with hr 30s and low bp .pt here secondary to above pt with fatigue denies dizzines spt with fatigue .hpi\par .hpi\par

## 2023-01-24 NOTE — ED PROVIDER NOTE - PHYSICAL EXAMINATION
General: Patient awake alert NAD.   HEENT: normocephalic, atraumatic, EOMI, MMM   Cardiac: RRR, S1, S2, no murmur.   LUNGS: ctab, nwob, no wheeze, rhonchi, speaking full sentences.     Abdomen: soft NT, ND, no rebound no guarding.   EXT: Moving all extremities, no edema.   Neuro: A&Ox3, no focal neurological deficits   Skin: warm, dry, no rash.

## 2023-01-24 NOTE — H&P ADULT - ASSESSMENT
62y f pmh brain aneurysm s/p hemicraniectomy and clipping (2013), seizures post   hemicraniectomy (on Keppra), chronic DVTs on Coumadin and hypertension present after   bradycardic and hypotensive episode, admitted for eval of bradycardia

## 2023-01-24 NOTE — ED PROVIDER NOTE - ATTENDING CONTRIBUTION TO CARE
Patient is a 62-year-old female history of hypertension DVT is a left lower extremity status post surgery for subarachnoid hemorrhage with seizure presenting from Dr. Ojeda's office for bradycardia otherwise asymptomatic at this time stable blood pressure apparently the pressure was in the 80s at Dr. Ojeda's office improved after a milligram atropine and small fluid bolus around maybe 200 mL.  Patient was feeling little weak at that time no history of LOC or syncope no family history EKG here with sinus bradycardia placed on the monitor check labs electrolytes speak with Dr. Caputo in cardiology for Dr. Ojeda likely will need admission and EP consult echo consider CDU. pt is on ac coumadin at this time.

## 2023-01-24 NOTE — CONSULT NOTE ADULT - SUBJECTIVE AND OBJECTIVE BOX
DATE OF SERVICE: 01-24-23 @ 22:51    CHIEF COMPLAINT:Patient is a 62y old  Female who presents with a chief complaint of bradycardia (24 Jan 2023 20:23)      HISTORY OF PRESENT ILLNESS:HPI:  62y f pmh CAD s/p PCI 2003, htn, brain aneurysm s/p hemicraniectomy and clipping (2013),   seizures post hemicraniectomy (on Keppra), chronic DVTs on Coumadin. Pt comes to the   ED today because she was having a routine visit with her cardiologist and found to be   bradycardic to the 30s. EMS was called, pt HR still in 30s and was also w/low Bp (sbp 80s) by   EMS, pt remained asymptomatic throughout. Patient does report that when she was trying to   get on the stretcher with EMS she felt a bit dizzy but otherwise has not been having any   symptoms at home. No CP, sob, palpitation, N/V, diarrhea, fever, chills    In ED, pt continues to be bradycardic but BP improved (sbp 140s -150s).  Was eval by EP in ED    (24 Jan 2023 20:23)      PAST MEDICAL & SURGICAL HISTORY:  HTN (hypertension)      Epilepsy      Coronary artery disease      Brain aneurysm      Hyperlipidemia      Coronary artery disease      Deep vein thrombosis (DVT)      Seizure      Hypertension      Stroke      Aneurysm      Cholecystostomy care      Aneurysm  see HPI  s/p cerebral aneurysm clipping in Dec 2013, after which pt was in 30 day induced coma, had  shunt,  tracheostomy and reversal, followed by rehab      S/P  shunt      History of cranioplasty  B/L 04/14      H/O craniotomy      Presence of IVC filter      S/P primary angioplasty with coronary stent      H/O cerebral aneurysm repair  clipping              MEDICATIONS:  amLODIPine   Tablet 10 milliGRAM(s) Oral daily  aspirin enteric coated 81 milliGRAM(s) Oral daily  cloNIDine 0.2 milliGRAM(s) Oral three times a day  doxazosin 4 milliGRAM(s) Oral at bedtime  hydrochlorothiazide 25 milliGRAM(s) Oral daily  losartan 100 milliGRAM(s) Oral daily  warfarin 7.5 milliGRAM(s) Oral once        acetaminophen     Tablet .. 650 milliGRAM(s) Oral every 6 hours PRN  lacosamide 100 milliGRAM(s) Oral two times a day  levETIRAcetam  IVPB 1500 milliGRAM(s) IV Intermittent every 12 hours  melatonin 3 milliGRAM(s) Oral at bedtime PRN  ondansetron Injectable 4 milliGRAM(s) IV Push every 8 hours PRN  OXcarbazepine 300 milliGRAM(s) Oral two times a day  zonisamide 100 milliGRAM(s) Oral two times a day    aluminum hydroxide/magnesium hydroxide/simethicone Suspension 30 milliLiter(s) Oral every 4 hours PRN  senna 2 Tablet(s) Oral at bedtime    atorvastatin 80 milliGRAM(s) Oral at bedtime        FAMILY HISTORY:      Non-contributory    SOCIAL HISTORY:    [ ] Tobacco  [ ] Drugs  [ ] Alcohol    Allergies    doxycycline (Rash)  penicillin (Hives)  sulfa drugs (Other)    Intolerances    	    REVIEW OF SYSTEMS:  CONSTITUTIONAL: No fever  EYES: No eye pain, visual disturbances, or discharge  ENMT:  No difficulty hearing, tinnitus  NECK: No pain or stiffness  RESPIRATORY: No cough, wheezing,  CARDIOVASCULAR: No chest pain, palpitations, passing out, dizziness, or leg swelling  GASTROINTESTINAL:  No nausea, vomiting, diarrhea or constipation. No melena.  GENITOURINARY: No dysuria, hematuria  NEUROLOGICAL: No stroke like symptoms  SKIN: No burning or lesions   ENDOCRINE: No heat or cold intolerance  MUSCULOSKELETAL: No joint pain or swelling  PSYCHIATRIC: No  anxiety, mood swings  HEME/LYMPH: No bleeding gums  ALLERGY AND IMMUNOLOGIC: No hives or eczema	    All other ROS negative    PHYSICAL EXAM:  T(C): 36.6 (01-24-23 @ 20:40), Max: 36.6 (01-24-23 @ 20:40)  HR: 41 (01-24-23 @ 20:40) (41 - 57)  BP: 150/72 (01-24-23 @ 20:40) (141/88 - 150/72)  RR: 17 (01-24-23 @ 20:40) (16 - 17)  SpO2: 100% (01-24-23 @ 20:40) (96% - 100%)  Wt(kg): --  I&O's Summary      Appearance: Normal	  HEENT:   Normal oral mucosa, EOMI	  Cardiovascular:  S1 S2, No JVD,    Respiratory: Lungs clear to auscultation	  Psychiatry: Alert  Gastrointestinal:  Soft, Non-tender, + BS	  Skin: No rashes   Neurologic: Non-focal  Extremities:  No edema  Vascular: Peripheral pulses palpable    	    	  	  CARDIAC MARKERS:  Labs personally reviewed by me                                  10.4   4.87  )-----------( 163      ( 24 Jan 2023 15:53 )             32.4     01-24    142  |  111<H>  |  18  ----------------------------<  82  4.3   |  19<L>  |  1.27    Ca    9.7      24 Jan 2023 15:53  Mg     2.3     01-24    TPro  7.9  /  Alb  4.2  /  TBili  0.4  /  DBili  x   /  AST  12  /  ALT  9<L>  /  AlkPhos  105  01-24          EKG: Personally reviewed by me -   Radiology: Personally reviewed by me -       Assessment /Plan:   Sinus gema -- EP consulted, appreciate recs  Tele  Echo  Full recs to follow             Differential diagnosis and plan of care discussed with patient after the evaluation. Counseling on diet, nutritional counseling, weight management, exercise and medication compliance was done.   Advanced care planning/advanced directives discussed with patient/family. DNR status including forceful chest compressions to attempt to restart the heart, ventilator support/artificial breathing, electric shock, artificial nutrition, health care proxy, Molst form all discussed with pt. Pt wishes to consider. More than fifteen minutes spent on discussing advanced directives.         Delfino Everett DO [facility]  Cardiovascular Medicine  [address] [place] [address], [address]  Office [phone]  Available via call/text on [employer] Teams DATE OF SERVICE: 01-24-23 @ 22:51    CHIEF COMPLAINT:Patient is a 62y old  Female who presents with a chief complaint of bradycardia (24 Jan 2023 20:23)      HISTORY OF PRESENT ILLNESS:HPI:  62y f pmh CAD s/p PCI 2003, htn, brain aneurysm s/p hemicraniectomy and clipping (2013),   seizures post hemicraniectomy (on Keppra), chronic DVTs on Coumadin. Pt comes to the   ED today because she was having a routine visit with her cardiologist and found to be   bradycardic to the 30s. EMS was called, pt HR still in 30s and was also w/low Bp (sbp 80s) by   EMS, pt remained asymptomatic throughout. Patient does report that when she was trying to   get on the stretcher with EMS she felt a bit dizzy but otherwise has not been having any   symptoms at home. No CP, sob, palpitation, N/V, diarrhea, fever, chills    In ED, pt continues to be bradycardic but BP improved (sbp 140s -150s).  Was eval by EP in ED    (24 Jan 2023 20:23)      PAST MEDICAL & SURGICAL HISTORY:  HTN (hypertension)      Epilepsy      Coronary artery disease      Brain aneurysm      Hyperlipidemia      Coronary artery disease      Deep vein thrombosis (DVT)      Seizure      Hypertension      Stroke      Aneurysm      Cholecystostomy care      Aneurysm  see HPI  s/p cerebral aneurysm clipping in Dec 2013, after which pt was in 30 day induced coma, had  shunt,  tracheostomy and reversal, followed by rehab      S/P  shunt      History of cranioplasty  B/L 04/14      H/O craniotomy      Presence of IVC filter      S/P primary angioplasty with coronary stent      H/O cerebral aneurysm repair  clipping              MEDICATIONS:  amLODIPine   Tablet 10 milliGRAM(s) Oral daily  aspirin enteric coated 81 milliGRAM(s) Oral daily  cloNIDine 0.2 milliGRAM(s) Oral three times a day  doxazosin 4 milliGRAM(s) Oral at bedtime  hydrochlorothiazide 25 milliGRAM(s) Oral daily  losartan 100 milliGRAM(s) Oral daily  warfarin 7.5 milliGRAM(s) Oral once        acetaminophen     Tablet .. 650 milliGRAM(s) Oral every 6 hours PRN  lacosamide 100 milliGRAM(s) Oral two times a day  levETIRAcetam  IVPB 1500 milliGRAM(s) IV Intermittent every 12 hours  melatonin 3 milliGRAM(s) Oral at bedtime PRN  ondansetron Injectable 4 milliGRAM(s) IV Push every 8 hours PRN  OXcarbazepine 300 milliGRAM(s) Oral two times a day  zonisamide 100 milliGRAM(s) Oral two times a day    aluminum hydroxide/magnesium hydroxide/simethicone Suspension 30 milliLiter(s) Oral every 4 hours PRN  senna 2 Tablet(s) Oral at bedtime    atorvastatin 80 milliGRAM(s) Oral at bedtime        FAMILY HISTORY:      Non-contributory    SOCIAL HISTORY:    [ ] not a smoker    Allergies    doxycycline (Rash)  penicillin (Hives)  sulfa drugs (Other)    Intolerances    	    REVIEW OF SYSTEMS:  CONSTITUTIONAL: No fever  EYES: No eye pain, visual disturbances, or discharge  ENMT:  No difficulty hearing, tinnitus  NECK: No pain or stiffness  RESPIRATORY: No cough, wheezing,  CARDIOVASCULAR: No chest pain, palpitations, passing out, dizziness, or leg swelling  GASTROINTESTINAL:  No nausea, vomiting, diarrhea or constipation. No melena.  GENITOURINARY: No dysuria, hematuria  NEUROLOGICAL: No stroke like symptoms  SKIN: No burning or lesions   ENDOCRINE: No heat or cold intolerance  MUSCULOSKELETAL: No joint pain or swelling  PSYCHIATRIC: No  anxiety, mood swings  HEME/LYMPH: No bleeding gums  ALLERGY AND IMMUNOLOGIC: No hives or eczema	    All other ROS negative    PHYSICAL EXAM:  T(C): 36.6 (01-24-23 @ 20:40), Max: 36.6 (01-24-23 @ 20:40)  HR: 41 (01-24-23 @ 20:40) (41 - 57)  BP: 150/72 (01-24-23 @ 20:40) (141/88 - 150/72)  RR: 17 (01-24-23 @ 20:40) (16 - 17)  SpO2: 100% (01-24-23 @ 20:40) (96% - 100%)  Wt(kg): --  I&O's Summary      Appearance: Normal	  HEENT:   Normal oral mucosa, EOMI	  Cardiovascular:  S1 S2, No JVD,    Respiratory: Lungs clear to auscultation	  Psychiatry: Alert  Gastrointestinal:  Soft, Non-tender, + BS	  Skin: No rashes   Neurologic: Non-focal  Extremities:  No edema  Vascular: Peripheral pulses palpable    	    	  	  CARDIAC MARKERS:  Labs personally reviewed by me                                  10.4   4.87  )-----------( 163      ( 24 Jan 2023 15:53 )             32.4     01-24    142  |  111<H>  |  18  ----------------------------<  82  4.3   |  19<L>  |  1.27    Ca    9.7      24 Jan 2023 15:53  Mg     2.3     01-24    TPro  7.9  /  Alb  4.2  /  TBili  0.4  /  DBili  x   /  AST  12  /  ALT  9<L>  /  AlkPhos  105  01-24          EKG: Personally reviewed by me - Sinus gema  Radiology: Personally reviewed by me - CXR clear lungs      ASSESSMENT/PLAN: 	    Ms. Denis is a 63 yo female with PMH of CAD s/p PCI in 2003, HTN, brain aneursyms s/p hemicraniotomy and clipping in 2013 c/b epilepsy chronic DVT now on coumadin who presents with symptomatic bradycardia.    Problem/Plan -1  Problem: Bradycardia  - ECG reveals SB; tele shows HR mostly in 30-40s but as long as 29  - EP consult appreciated. Labetolol lowered to 200mg PO daily.   - will attempt to wean down Clonidine if BP allows  - TTE pending  - Check TSH  - Cont to monitor on tele. Recommend pacing pads and atropine at bedside    Problem/Plan -2  Problem: Hypertension  - Patient hypotensive with EMS but on multiple antihypertensive meds  - c/w all home meds. Labetalol lowered to 200mg PO daily.    Problem/Plan -3  Problem: Coronary Artery Disease  - s/p remote PCI  - Denies CP or SOB  - Will check TTE to assess LV function and r/o WMA  - c/w ASA 81mg PO daily, rosuvastatin 40mg PO daily    Problem/Plan -4  Problem: hx of DVT  - c/w coumadin and dose to INR 2-3          Differential diagnosis and plan of care discussed with patient after the evaluation. Counseling on diet, nutritional counseling, weight management, exercise and medication compliance was done.   Advanced care planning/advanced directives discussed with patient/family. DNR status including forceful chest compressions to attempt to restart the heart, ventilator support/artificial breathing, electric shock, artificial nutrition, health care proxy, Molst form all discussed with pt. Pt wishes to consider. More than fifteen minutes spent on discussing advanced directives.         Delfino Javdan, DO [facility]  Cardiovascular Medicine  [address] [facility], [address]  Office [phone]  Available via call/text on [employer] Teams

## 2023-01-24 NOTE — ED ADULT NURSE NOTE - NSIMPLEMENTINTERV_GEN_ALL_ED
Implemented All Fall Risk Interventions:  [place] to call system. Call bell, personal items and telephone within reach. Instruct patient to call for assistance. Room bathroom lighting operational. Non-slip footwear when patient is off stretcher. Physically safe environment: no spills, clutter or unnecessary equipment. Stretcher in lowest position, wheels locked, appropriate side rails in place. Provide visual cue, wrist band, yellow gown, etc. Monitor gait and stability. Monitor for mental status changes and reorient to person, place, and time. Review medications for side effects contributing to fall risk. Reinforce activity limits and safety measures with patient and family.

## 2023-01-24 NOTE — CONSULT NOTE ADULT - ASSESSMENT
62-year-old F history of HTN, CAD s/p PCI 2003, DVT w/ chronic ulcer LLE, SAH s/p hemicraniectomy and aneurysm clipping 2013 further c/b seizure d/o and R eye blindness presenting from Dr. Ojeda's office for asymptomatic sinus bradycardia 38bpm and reported hypotension, SBP 80's which improved after a milligram Atropine and small fluid bolus. in ED she is SB 40-50's which is her baseline, and hypertensive, denies symptoms presently as well as dizziness, lightheadedness, syncope, near syncope, ARREDONDO, CP w/ exertion prior to presentation. At home, she states she gets around w/ rolling walker but is mostly sedentary and seldom does chores, no difficulties in performing her ADL's 2/2 fatigue. Of note, she is maintained on Labetalol. Review of outpatient EKG's in [employer] reveal baseline sinus gema 40's-50's, normal TTE/stress test in 2021. She states she has never worn a holter monitor.     1) sinus bradycardia   2) HTN     -Decrease Labetalol to 200mg qd, careful attention to BP as patient is maintained on multiple anti-HTNsives   -Monitor on telemetry with decreased beta blockade to assess sinus rates  -Will d/w patient re: ILR for symptomatic bradycardia  -Recommend renal artery ultrasound to r/o DULCE as source for HTN   -Repeat TTE  -Continue A/C (patient on Coumadin) for hx DVT, resume antihypertensives     EP will continue to follow. D/w Dr. Garber and EP provider

## 2023-01-25 ENCOUNTER — NON-APPOINTMENT (OUTPATIENT)
Age: 63
End: 2023-01-25

## 2023-01-25 LAB
ANION GAP SERPL CALC-SCNC: 11 MMOL/L — SIGNIFICANT CHANGE UP (ref 5–17)
APTT BLD: 33.7 SEC — SIGNIFICANT CHANGE UP (ref 27.5–35.5)
BUN SERPL-MCNC: 20 MG/DL — SIGNIFICANT CHANGE UP (ref 7–23)
CALCIUM SERPL-MCNC: 9.6 MG/DL — SIGNIFICANT CHANGE UP (ref 8.4–10.5)
CHLORIDE SERPL-SCNC: 112 MMOL/L — HIGH (ref 96–108)
CO2 SERPL-SCNC: 21 MMOL/L — LOW (ref 22–31)
CREAT SERPL-MCNC: 1.17 MG/DL — SIGNIFICANT CHANGE UP (ref 0.5–1.3)
EGFR: 53 ML/MIN/1.73M2 — LOW
GLUCOSE SERPL-MCNC: 89 MG/DL — SIGNIFICANT CHANGE UP (ref 70–99)
HCT VFR BLD CALC: 31.1 % — LOW (ref 34.5–45)
HGB BLD-MCNC: 10.3 G/DL — LOW (ref 11.5–15.5)
INR BLD: 1.47 RATIO — HIGH (ref 0.88–1.16)
MCHC RBC-ENTMCNC: 29.5 PG — SIGNIFICANT CHANGE UP (ref 27–34)
MCHC RBC-ENTMCNC: 33.1 GM/DL — SIGNIFICANT CHANGE UP (ref 32–36)
MCV RBC AUTO: 89.1 FL — SIGNIFICANT CHANGE UP (ref 80–100)
NRBC # BLD: 0 /100 WBCS — SIGNIFICANT CHANGE UP (ref 0–0)
PLATELET # BLD AUTO: 182 K/UL — SIGNIFICANT CHANGE UP (ref 150–400)
POTASSIUM SERPL-MCNC: 4.2 MMOL/L — SIGNIFICANT CHANGE UP (ref 3.5–5.3)
POTASSIUM SERPL-SCNC: 4.2 MMOL/L — SIGNIFICANT CHANGE UP (ref 3.5–5.3)
PROTHROM AB SERPL-ACNC: 17 SEC — HIGH (ref 10.5–13.4)
RBC # BLD: 3.49 M/UL — LOW (ref 3.8–5.2)
RBC # FLD: 15.6 % — HIGH (ref 10.3–14.5)
SODIUM SERPL-SCNC: 144 MMOL/L — SIGNIFICANT CHANGE UP (ref 135–145)
WBC # BLD: 4.55 K/UL — SIGNIFICANT CHANGE UP (ref 3.8–10.5)
WBC # FLD AUTO: 4.55 K/UL — SIGNIFICANT CHANGE UP (ref 3.8–10.5)

## 2023-01-25 PROCEDURE — 93975 VASCULAR STUDY: CPT | Mod: 26

## 2023-01-25 PROCEDURE — 99233 SBSQ HOSP IP/OBS HIGH 50: CPT

## 2023-01-25 PROCEDURE — 93306 TTE W/DOPPLER COMPLETE: CPT | Mod: 26

## 2023-01-25 PROCEDURE — 99232 SBSQ HOSP IP/OBS MODERATE 35: CPT

## 2023-01-25 RX ORDER — WARFARIN SODIUM 2.5 MG/1
7.5 TABLET ORAL ONCE
Refills: 0 | Status: COMPLETED | OUTPATIENT
Start: 2023-01-25 | End: 2023-01-25

## 2023-01-25 RX ADMIN — OXCARBAZEPINE 300 MILLIGRAM(S): 300 TABLET, FILM COATED ORAL at 06:25

## 2023-01-25 RX ADMIN — LACOSAMIDE 100 MILLIGRAM(S): 50 TABLET ORAL at 18:32

## 2023-01-25 RX ADMIN — OXCARBAZEPINE 300 MILLIGRAM(S): 300 TABLET, FILM COATED ORAL at 18:32

## 2023-01-25 RX ADMIN — Medication 100 MILLIGRAM(S): at 06:25

## 2023-01-25 RX ADMIN — Medication 4 MILLIGRAM(S): at 22:44

## 2023-01-25 RX ADMIN — LEVETIRACETAM 400 MILLIGRAM(S): 250 TABLET, FILM COATED ORAL at 06:25

## 2023-01-25 RX ADMIN — LACOSAMIDE 100 MILLIGRAM(S): 50 TABLET ORAL at 06:24

## 2023-01-25 RX ADMIN — ATORVASTATIN CALCIUM 80 MILLIGRAM(S): 80 TABLET, FILM COATED ORAL at 22:45

## 2023-01-25 RX ADMIN — Medication 100 MILLIGRAM(S): at 18:31

## 2023-01-25 RX ADMIN — SENNA PLUS 2 TABLET(S): 8.6 TABLET ORAL at 22:44

## 2023-01-25 RX ADMIN — LEVETIRACETAM 400 MILLIGRAM(S): 250 TABLET, FILM COATED ORAL at 22:45

## 2023-01-25 NOTE — PROGRESS NOTE ADULT - ASSESSMENT
62-year-old F history of HTN, CAD s/p PCI 2003, DVT w/ chronic ulcer LLE, SAH s/p hemicraniectomy and aneurysm clipping 2013 further c/b seizure d/o and R eye blindness presenting from Dr. Ojeda's office for asymptomatic sinus bradycardia 38bpm and reported hypotension, SBP 80's which improved after a milligram Atropine and small fluid bolus. in ED she is SB 40-50's which is her baseline, and hypertensive, denies symptoms presently as well as dizziness, lightheadedness, syncope, near syncope, ARREDONDO, CP w/ exertion prior to presentation. At home, she states she gets around w/ rolling walker but is mostly sedentary and seldom does chores, no difficulties in performing her ADL's 2/2 fatigue. Of note, she is maintained on Labetalol. Review of outpatient EKG's in [employer] reveal baseline sinus gema 40's-50's, normal TTE/stress test in 2021. She states she has never worn a holter monitor.     1) sinus bradycardia   2) HTN     -Remains SB today, Labetalol held today. Also on Clonidine which can be contributory to bradycardia. Currently sinus gema 40's; however, asymptomatic   -Continue to monitor on telemetry with decreased beta blockade to assess sinus rates  -At this point, patient not interested in ILR for extended monitoring. Of note, she has an iWatch and can monitor her HR that way  -Recommend renal artery ultrasound to r/o DULCE as source for HTN   -Draw thyroid function tests to r/o thyroid dz as source of bradycardia   -Repeat TTE  -Continue A/C (patient on Coumadin) for hx DVT, resume antihypertensives     EP will continue to follow. D/w Dr. Garber and EP provider 62-year-old F history of HTN, CAD s/p PCI 2003, DVT w/ chronic ulcer LLE, SAH s/p hemicraniectomy and aneurysm clipping 2013 further c/b seizure d/o and R eye blindness presenting from Dr. Ojeda's office for asymptomatic sinus bradycardia 38bpm and reported hypotension, SBP 80's which improved after a milligram Atropine and small fluid bolus. in ED she is SB 40-50's which is her baseline, and hypertensive, denies symptoms presently as well as dizziness, lightheadedness, syncope, near syncope, ARREDONDO, CP w/ exertion prior to presentation. At home, she states she gets around w/ rolling walker but is mostly sedentary and seldom does chores, no difficulties in performing her ADL's 2/2 fatigue. Of note, she is maintained on Labetalol. Review of outpatient EKG's in [employer]Scripts reveal baseline sinus gema 40's-50's, normal TTE/stress test in 2021. She states she has never worn a holter monitor.     1) sinus bradycardia   2) HTN     -Remains SB today, Labetalol held today. Also on Clonidine which can be contributory to bradycardia. Currently sinus gema 40's; however, asymptomatic   -Continue to monitor on telemetry with decreased beta blockade to assess sinus rates  -At this point, patient not interested in ILR for extended monitoring. Of note, she has an iWatch and can monitor her HR that way  -Recommend renal artery ultrasound to r/o DULCE as source for HTN   -Draw thyroid function tests to r/o thyroid dz as source of bradycardia   -Repeat TTE  -Continue A/C (patient on Coumadin) for hx DVT, resume antihypertensives     EP will continue to follow. D/w Dr. Garber and EP provider      Addendum:   Patient w/ well controlled BP's today, she did not get her Labetalol this AM secondary to bradycardia. Her HR are now low 50's   Decrease Clonidine to 0.1 tid  Will reevaluate SR in AM  62-year-old F history of HTN, CAD s/p PCI 2003, DVT w/ chronic ulcer LLE, SAH s/p hemicraniectomy and aneurysm clipping 2013 further c/b seizure d/o and R eye blindness presenting from Dr. Ojeda's office for asymptomatic sinus bradycardia 38bpm and reported hypotension, SBP 80's which improved after a milligram Atropine and small fluid bolus. in ED she is SB 40-50's which is her baseline, and hypertensive, denies symptoms presently as well as dizziness, lightheadedness, syncope, near syncope, ARREDONDO, CP w/ exertion prior to presentation. At home, she states she gets around w/ rolling walker but is mostly sedentary and seldom does chores, no difficulties in performing her ADL's 2/2 fatigue. Of note, she is maintained on Labetalol. Review of outpatient EKG's in AllScripts reveal baseline sinus gema 40's-50's, normal TTE/stress test in 2021. She states she has never worn a holter monitor.     1) sinus bradycardia   2) HTN     -Remains SB today, Labetalol held today. Also on Clonidine which can be contributory to bradycardia. Currently sinus gema 40's; however, asymptomatic   -Continue to monitor on telemetry with decreased beta blockade to assess sinus rates  -At this point, patient not interested in ILR for extended monitoring. Of note, she has an iWatch and can monitor her HR that way  -Recommend renal artery ultrasound to r/o DULCE as source for HTN   -Draw thyroid function tests to r/o thyroid dz as source of bradycardia   -Repeat TTE  -Continue A/C (patient on Coumadin) for hx DVT, resume antihypertensives     EP will continue to follow. D/w Dr. Garber and EP provider      Addendum:   Patient w/ well controlled BP's today, she did not get her Labetalol this AM secondary to bradycardia. Her HR are now low 50's   Decrease Clonidine to 0.1 tid  Will reevaluate SR in AM   Renal US w/o evidence of DULCE  TTE grossly normal

## 2023-01-25 NOTE — PHYSICAL THERAPY INITIAL EVALUATION ADULT - PERTINENT HX OF CURRENT PROBLEM, REHAB EVAL
Pt is a 62y f admitted to [facility] on 1/24/23 pmh CAD s/p PCI 2003, htn, brain aneurysm s/p hemicraniectomy and clipping (2013), seizures post hemicraniectomy (on Keppra), chronic DVTs on Coumadin. Pt comes to the ED today because she was having a routine visit with her cardiologist and found to be bradycardic to the 30s. EMS was called, pt HR still in 30s and was also w/low Bp (sbp 80s) by EMS, pt remained asymptomatic throughout. Pt does report that when she was trying to get on the stretcher with EMS she felt a bit dizzy but otherwise has not been having any symptoms at home. No CP, sob, palpitation, N/V, diarrhea, fever, chills. Hospital course: In ED, pt continues to be bradycardic but BP improved (sbp 140s -150s). Was eval by EP in ED. CXR: clear lungs.

## 2023-01-25 NOTE — PROGRESS NOTE ADULT - SUBJECTIVE AND OBJECTIVE BOX
[facility] Division of Hospital Medicine  Maty Lisa MD  Pager (M-F, 8A-5P): [phone]  Other Times:  [phone]    Patient is a 62y old  Female who presents with a chief complaint of bradycardia (25 Jan 2023 11:57)      SUBJECTIVE / OVERNIGHT EVENTS: No acute events  Tel: sinus gema    ADDITIONAL REVIEW OF SYSTEMS: negative    MEDICATIONS  (STANDING):  amLODIPine   Tablet 10 milliGRAM(s) Oral daily  aspirin enteric coated 81 milliGRAM(s) Oral daily  atorvastatin 80 milliGRAM(s) Oral at bedtime  cloNIDine 0.2 milliGRAM(s) Oral three times a day  doxazosin 4 milliGRAM(s) Oral at bedtime  hydrochlorothiazide 25 milliGRAM(s) Oral daily  labetalol 200 milliGRAM(s) Oral daily  lacosamide 100 milliGRAM(s) Oral two times a day  levETIRAcetam  IVPB 1500 milliGRAM(s) IV Intermittent every 12 hours  losartan 100 milliGRAM(s) Oral daily  OXcarbazepine 300 milliGRAM(s) Oral two times a day  senna 2 Tablet(s) Oral at bedtime  zonisamide 100 milliGRAM(s) Oral two times a day    MEDICATIONS  (PRN):  acetaminophen     Tablet .. 650 milliGRAM(s) Oral every 6 hours PRN Temp greater or equal to 38C (100.4F), Mild Pain (1 - 3)  aluminum hydroxide/magnesium hydroxide/simethicone Suspension 30 milliLiter(s) Oral every 4 hours PRN Dyspepsia  melatonin 3 milliGRAM(s) Oral at bedtime PRN Insomnia  ondansetron Injectable 4 milliGRAM(s) IV Push every 8 hours PRN Nausea and/or Vomiting      CAPILLARY BLOOD GLUCOSE        I&O's Summary      PHYSICAL EXAM:  Vital Signs Last 24 Hrs  T(C): 36.6 (25 Jan 2023 12:41), Max: 36.9 (25 Jan 2023 03:09)  T(F): 97.9 (25 Jan 2023 12:41), Max: 98.4 (25 Jan 2023 03:09)  HR: 44 (25 Jan 2023 12:41) (41 - 57)  BP: 121/64 (25 Jan 2023 12:41) (103/62 - 150/72)  BP(mean): --  RR: 18 (25 Jan 2023 12:41) (16 - 18)  SpO2: 98% (25 Jan 2023 12:41) (96% - 100%)    Parameters below as of 25 Jan 2023 12:41  Patient On (Oxygen Delivery Method): room air      CONSTITUTIONAL: NAD, well-developed, well-groomed  EYES: PERRLA; conjunctiva and sclera clear  ENMT: Moist oral mucosa, no pharyngeal injection or exudates; normal dentition  NECK: Supple, no palpable masses; no thyromegaly  RESPIRATORY: Normal respiratory effort; lungs are clear to auscultation bilaterally  CARDIOVASCULAR: Bradycardic, normal S1 and S2, no murmur/rub/gallop; No lower extremity edema  ABDOMEN: Nontender to palpation, normoactive bowel sounds, no rebound/guarding; No hepatosplenomegaly  MUSCULOSKELETAL:  No clubbing or cyanosis of digits; no joint swelling or tenderness to palpation  PSYCH: A+O to person, place, and time; affect appropriate  NEUROLOGY: CN 2-12 are intact and symmetric; no gross sensory deficits   SKIN: No rashes; no palpable lesions    LABS: reviewed                        10.3   4.55  )-----------( 182      ( 25 Jan 2023 06:49 )             31.1     01-25    144  |  112<H>  |  20  ----------------------------<  89  4.2   |  21<L>  |  1.17    Ca    9.6      25 Jan 2023 06:49  Mg     2.3     01-24    TPro  7.9  /  Alb  4.2  /  TBili  0.4  /  DBili  x   /  AST  12  /  ALT  9<L>  /  AlkPhos  105  01-24    PT/INR - ( 25 Jan 2023 06:49 )   PT: 17.0 sec;   INR: 1.47 ratio         PTT - ( 25 Jan 2023 06:49 )  PTT:33.7 sec

## 2023-01-25 NOTE — PHYSICAL THERAPY INITIAL EVALUATION ADULT - TRANSFER SKILLS, REHAB EVAL
[facility] DIVISION OF KIDNEY DISEASES AND HYPERTENSION -- FOLLOW UP NOTE  --------------------------------------------------------------------------------  Chief Complaint:  ESRD on HD    24 hour events/subjective:  Patient denies any acute events over the weekend        PAST HISTORY  --------------------------------------------------------------------------------  No significant changes to PMH, PSH, FHx, SHx, unless otherwise noted    ALLERGIES & MEDICATIONS  --------------------------------------------------------------------------------  Allergies    No Known Allergies    Intolerances      Standing Inpatient Medications    PRN Inpatient Medications      REVIEW OF SYSTEMS  --------------------------------------------------------------------------------  Gen: No fevers/chills  Skin: No rashes  Head/Eyes/Ears/Mouth: No headache  Respiratory: No dyspnea  CV: No chest pain  GI: No abdominal pain  : No dysuria  MSK: No edema  Neuro: No dizziness/lightheadedness    VITALS/PHYSICAL EXAM  --------------------------------------------------------------------------------    Physical Exam:  	Gen: NAD, well-appearing  	HEENT: MMM  	Pulm: CTA B/L  	CV: RRR, S1S2; no rub  	Abd: +BS, soft, nontender/nondistended  	: No suprapubic tenderness  	UE:  no edema  	LE:  no edema  	Neuro: No focal deficits  	Psych: Normal affect and mood  	Skin: Warm  	Vascular access:  LUE AVF + Thrill and bruit    LABS/STUDIES  --------------------------------------------------------------------------------                Creatinine Trend:  SCr 3.35 [01-17 @ 09:32]  SCr 4.13 [01-15 @ 11:02]  SCr 2.89 [01-13 @ 12:48]  SCr 4.13 [01-08 @ 11:24]  SCr 1.23 [01-06 @ 16:44]    Urinalysis - [01-17-18 @ 09:32]      Color PL Yellow / Appearance Clear / SG 1.009 / pH 8.5      Gluc Negative / Ketone Negative  / Bili Negative / Urobili Negative       Blood Small / Protein 150 / Leuk Est Negative / Nitrite Negative      RBC 5-10 / WBC  / Hyaline  / Gran  / Sq Epi  / Non Sq Epi Moderate / Bacteria       HbA1c 5.7      [07-15-16 @ 08:42]  TSH 0.01      [10-02-17 @ 23:50]    HBsAb <3.0      [01-05-18 @ 13:45]  HBsAg Nonreact      [01-05-18 @ 13:45]  HBcAb Nonreact      [01-05-18 @ 13:45]  HCV 0.07, Nonreact      [01-05-18 @ 13:45] [facility] DIVISION OF KIDNEY DISEASES AND HYPERTENSION -- FOLLOW UP NOTE  --------------------------------------------------------------------------------  Chief Complaint:  ESRD on HD    24 hour events/subjective:  Patient denies any acute events overnight        PAST HISTORY  --------------------------------------------------------------------------------  No significant changes to PMH, PSH, FHx, SHx, unless otherwise noted    ALLERGIES & MEDICATIONS  --------------------------------------------------------------------------------  Allergies    No Known Allergies    Intolerances      Standing Inpatient Medications    PRN Inpatient Medications      REVIEW OF SYSTEMS  --------------------------------------------------------------------------------  Gen: No fevers/chills  Skin: No rashes  Head/Eyes/Ears/Mouth: No headache  Respiratory: No dyspnea  CV: No chest pain  GI: No abdominal pain  : No dysuria  MSK: No edema  Neuro: No dizziness/lightheadedness    VITALS/PHYSICAL EXAM  --------------------------------------------------------------------------------  T(C): 36.4 (01-22-18 @ 13:40), Max: 36.4 (01-22-18 @ 13:40)  HR: 79 (01-22-18 @ 13:40) (79 - 79)  BP: 130/86 (01-22-18 @ 13:40) (130/86 - 130/86)  RR: 18 (01-22-18 @ 13:40) (18 - 18)  SpO2: 100% (01-22-18 @ 13:40) (100% - 100%)  Wt(kg): --  Height (cm): 144.78 (01-22-18 @ 13:36)      01-22-18 @ 07:01  -  01-23-18 @ 07:00  --------------------------------------------------------  IN: 0 mL / OUT: 0 mL / NET: 0 mL      Physical Exam:  	Gen: NAD, well-appearing  	HEENT: MMM  	Pulm: CTA B/L  	CV: RRR, S1S2; no rub  	Abd: +BS, soft, nontender/nondistended  	: No suprapubic tenderness  	UE:  no edema  	LE:  no pitting edema  	Neuro: No focal deficits  	Psych: Normal affect and mood  	Skin: Warm  	Vascular access:  LUE AVF +thril and bruit    LABS/STUDIES  --------------------------------------------------------------------------------              8.6    10.02 >-----------<  196      [01-22-18 @ 12:42]              26.6     136  |  100  |  20  ----------------------------<  76      [01-22-18 @ 12:42]  3.8   |  19  |  4.20        Ca     9.1     [01-22-18 @ 12:42]      Phos  2.6     [01-22-18 @ 12:42]    TPro  5.9  /  Alb  3.0  /  TBili  0.2  /  DBili  0.1  /  AST  12  /  ALT  7   /  AlkPhos  116  [01-22-18 @ 12:42]    PT/INR: PT 9.2  , INR 0.85       [01-22-18 @ 10:49]  PTT: 26.5       [01-22-18 @ 10:49]    Uric acid 5.1      [01-22-18 @ 12:42]        [01-22-18 @ 12:42]    Creatinine Trend:  SCr 4.20 [01-22 @ 12:42]  SCr 3.35 [01-17 @ 09:32]  SCr 4.13 [01-15 @ 11:02]  SCr 2.89 [01-13 @ 12:48]  SCr 4.13 [01-08 @ 11:24] independent

## 2023-01-25 NOTE — PROGRESS NOTE ADULT - SUBJECTIVE AND OBJECTIVE BOX
24H hour events: SB high 30's - 50's overnight/this AM  Otherwise hemodynamics stable, asymptomatic   Labetalol held this AM     MEDICATIONS:  amLODIPine   Tablet 10 milliGRAM(s) Oral daily  aspirin enteric coated 81 milliGRAM(s) Oral daily  cloNIDine 0.2 milliGRAM(s) Oral three times a day  doxazosin 4 milliGRAM(s) Oral at bedtime  hydrochlorothiazide 25 milliGRAM(s) Oral daily  labetalol 200 milliGRAM(s) Oral daily  losartan 100 milliGRAM(s) Oral daily        acetaminophen     Tablet .. 650 milliGRAM(s) Oral every 6 hours PRN  lacosamide 100 milliGRAM(s) Oral two times a day  levETIRAcetam  IVPB 1500 milliGRAM(s) IV Intermittent every 12 hours  melatonin 3 milliGRAM(s) Oral at bedtime PRN  ondansetron Injectable 4 milliGRAM(s) IV Push every 8 hours PRN  OXcarbazepine 300 milliGRAM(s) Oral two times a day  zonisamide 100 milliGRAM(s) Oral two times a day    aluminum hydroxide/magnesium hydroxide/simethicone Suspension 30 milliLiter(s) Oral every 4 hours PRN  senna 2 Tablet(s) Oral at bedtime    atorvastatin 80 milliGRAM(s) Oral at bedtime        REVIEW OF SYSTEMS:  See HPI, otherwise ROS negative.    PHYSICAL EXAM:  T(C): 36.5 (01-25-23 @ 05:56), Max: 36.9 (01-25-23 @ 03:09)  HR: 41 (01-25-23 @ 05:56) (41 - 57)  BP: 111/54 (01-25-23 @ 05:56) (103/62 - 150/72)  RR: 17 (01-25-23 @ 05:56) (16 - 18)  SpO2: 98% (01-25-23 @ 05:56) (96% - 100%)  Wt(kg): --  I&O's Summary      Appearance: Alert. NAD	  Cardiovascular: +S1S2 RRR no m/g/r  Respiratory: CTA B/L	  Psychiatry: A & O x 3, Mood & affect appropriate  Gastrointestinal:  Soft, NT. ND. +BS	  Skin: No rashes, masses or lesions   Neurologic: Non-focal  Extremities: No edema BLE  Vascular: Peripheral pulses palpable 2+ bilaterally      LABS:	 	    CBC Full  -  ( 25 Jan 2023 06:49 )  WBC Count : 4.55 K/uL  Hemoglobin : 10.3 g/dL  Hematocrit : 31.1 %  Platelet Count - Automated : 182 K/uL  Mean Cell Volume : 89.1 fl  Mean Cell Hemoglobin : 29.5 pg  Mean Cell Hemoglobin Concentration : 33.1 gm/dL  Auto Neutrophil # : x  Auto Lymphocyte # : x  Auto Monocyte # : x  Auto Eosinophil # : x  Auto Basophil # : x  Auto Neutrophil % : x  Auto Lymphocyte % : x  Auto Monocyte % : x  Auto Eosinophil % : x  Auto Basophil % : x    01-25    144  |  112<H>  |  20  ----------------------------<  89  4.2   |  21<L>  |  1.17  01-24    142  |  111<H>  |  18  ----------------------------<  82  4.3   |  19<L>  |  1.27    Ca    9.6      25 Jan 2023 06:49  Ca    9.7      24 Jan 2023 15:53  Mg     2.3     01-24  Mg     2.3     01-24    TPro  7.9  /  Alb  4.2  /  TBili  0.4  /  DBili  x   /  AST  12  /  ALT  9<L>  /  AlkPhos  105  01-24      proBNP:   Lipid Profile:   HgA1c:   TSH:       CARDIAC MARKERS:          TELEMETRY:   	    ECG:  	    RADIOLOGY:    	  ASSESSMENT/PLAN: 	     24H hour events: SB high 30's - 50's overnight/this AM  Otherwise hemodynamics stable, asymptomatic   Labetalol held this AM     MEDICATIONS:  amLODIPine   Tablet 10 milliGRAM(s) Oral daily  aspirin enteric coated 81 milliGRAM(s) Oral daily  cloNIDine 0.2 milliGRAM(s) Oral three times a day  doxazosin 4 milliGRAM(s) Oral at bedtime  hydrochlorothiazide 25 milliGRAM(s) Oral daily  labetalol 200 milliGRAM(s) Oral daily  losartan 100 milliGRAM(s) Oral daily        acetaminophen     Tablet .. 650 milliGRAM(s) Oral every 6 hours PRN  lacosamide 100 milliGRAM(s) Oral two times a day  levETIRAcetam  IVPB 1500 milliGRAM(s) IV Intermittent every 12 hours  melatonin 3 milliGRAM(s) Oral at bedtime PRN  ondansetron Injectable 4 milliGRAM(s) IV Push every 8 hours PRN  OXcarbazepine 300 milliGRAM(s) Oral two times a day  zonisamide 100 milliGRAM(s) Oral two times a day    aluminum hydroxide/magnesium hydroxide/simethicone Suspension 30 milliLiter(s) Oral every 4 hours PRN  senna 2 Tablet(s) Oral at bedtime    atorvastatin 80 milliGRAM(s) Oral at bedtime        REVIEW OF SYSTEMS:  See HPI, otherwise ROS negative.    PHYSICAL EXAM:  T(C): 36.5 (01-25-23 @ 05:56), Max: 36.9 (01-25-23 @ 03:09)  HR: 41 (01-25-23 @ 05:56) (41 - 57)  BP: 111/54 (01-25-23 @ 05:56) (103/62 - 150/72)  RR: 17 (01-25-23 @ 05:56) (16 - 18)  SpO2: 98% (01-25-23 @ 05:56) (96% - 100%)  Wt(kg): --  I&O's Summary      Appearance: Alert. NAD	  Cardiovascular: +S1S2 RRR no m/g/r  Respiratory: CTA B/L	  Psychiatry: A & O x 3, Mood & affect appropriate  Gastrointestinal:  Soft, NT. ND. +BS	  Skin: No rashes, masses or lesions   Neurologic: Non-focal  Extremities: No edema BLE  Vascular: Peripheral pulses palpable 2+ bilaterally      LABS:	 	    CBC Full  -  ( 25 Jan 2023 06:49 )  WBC Count : 4.55 K/uL  Hemoglobin : 10.3 g/dL  Hematocrit : 31.1 %  Platelet Count - Automated : 182 K/uL  Mean Cell Volume : 89.1 fl  Mean Cell Hemoglobin : 29.5 pg  Mean Cell Hemoglobin Concentration : 33.1 gm/dL  Auto Neutrophil # : x  Auto Lymphocyte # : x  Auto Monocyte # : x  Auto Eosinophil # : x  Auto Basophil # : x  Auto Neutrophil % : x  Auto Lymphocyte % : x  Auto Monocyte % : x  Auto Eosinophil % : x  Auto Basophil % : x    01-25    144  |  112<H>  |  20  ----------------------------<  89  4.2   |  21<L>  |  1.17  01-24    142  |  111<H>  |  18  ----------------------------<  82  4.3   |  19<L>  |  1.27    Ca    9.6      25 Jan 2023 06:49  Ca    9.7      24 Jan 2023 15:53  Mg     2.3     01-24  Mg     2.3     01-24    TPro  7.9  /  Alb  4.2  /  TBili  0.4  /  DBili  x   /  AST  12  /  ALT  9<L>  /  AlkPhos  105  01-24    TELEMETRY: SB 40's presently   	    ECG:  	< from: 12 Lead ECG (05.07.22 @ 10:22) >  Ventricular Rate 87 BPM    Atrial Rate 87 BPM    P-R Interval 156 ms    QRS Duration 74 ms    Q-T Interval 436 ms    QTC Calculation(Bazett) 524 ms    P Axis 54 degrees    R Axis 13 degrees    T Axis 42 degrees    Diagnosis Line Normal sinus rhythm  Possible Left atrial enlargement  Nonspecific ST and T wave abnormality  Abnormal ECG  No previous ECGs available    < end of copied text >

## 2023-01-25 NOTE — CHART NOTE - NSCHARTNOTEFT_GEN_A_CORE
Medicine NP note    CC: Bradycardia with HR in 30;s at sleep, trending upto 40's while awake  Pta symptomatic    Vital Signs Last 24 Hrs  T(C): 36.9 (25 Jan 2023 03:09), Max: 36.9 (25 Jan 2023 03:09)  T(F): 98.4 (25 Jan 2023 03:09), Max: 98.4 (25 Jan 2023 03:09)  HR: 44 (25 Jan 2023 03:09) (41 - 57)  BP: 103/62 (25 Jan 2023 03:09) (103/62 - 150/72)  BP(mean): --  RR: 16 (25 Jan 2023 03:09) (16 - 18)  SpO2: 99% (25 Jan 2023 03:09) (96% - 100%)    Parameters below as of 25 Jan 2023 03:09  Patient On (Oxygen Delivery Method): room air    A/P    62-year-old F history of HTN, CAD s/p PCI 2003, DVT w/ chronic ulcer LLE, SAH s/p hemicraniectomy and aneurysm clipping 2013 further c/b seizure d/o and R eye blindness presenting from Dr. Ojeda's office for sinus bradycardia and hypotension, SBP 80's which improved after a milligram Atropine and small fluid bolus. As per patient, she was asymptomatic at that time. On admission to ED, telemetry revealing SB high 40's - 50's, w/o evidence of AVB or sinus pauses.     Dx: SB  EP Will d/w patient re: ILR for symptomatic bradycardia    DX: HTN  Decrease Labetalol to 200mg qd, careful attention to BP as patient is maintained on multiple anti-HTNsives   Monitor on telemetry with decreased beta blockade to assess sinus rates    Sinus bradycardia  SB on tele with no HB  Pt  asymptomatic  Will hol;d am Labetalol if pt consistently gema  F/U EP am  Will continue to monitor  Will sign out to day team    Ajay Wagner [facility] [place]  [id number]

## 2023-01-25 NOTE — PATIENT PROFILE ADULT - FALL HARM RISK - HARM RISK INTERVENTIONS

## 2023-01-26 LAB
APTT BLD: 32.6 SEC — SIGNIFICANT CHANGE UP (ref 27.5–35.5)
INR BLD: 1.59 RATIO — HIGH (ref 0.88–1.16)
MRSA PCR RESULT.: SIGNIFICANT CHANGE UP
PROTHROM AB SERPL-ACNC: 18.5 SEC — HIGH (ref 10.5–13.4)
S AUREUS DNA NOSE QL NAA+PROBE: SIGNIFICANT CHANGE UP
T3 SERPL-MCNC: 88 NG/DL — SIGNIFICANT CHANGE UP (ref 80–200)
T4 AB SER-ACNC: 7 UG/DL — SIGNIFICANT CHANGE UP (ref 4.6–12)
TSH SERPL-MCNC: 1.2 UIU/ML — SIGNIFICANT CHANGE UP (ref 0.27–4.2)
TSH SERPL-MCNC: 1.88 UIU/ML — SIGNIFICANT CHANGE UP (ref 0.27–4.2)

## 2023-01-26 PROCEDURE — 99232 SBSQ HOSP IP/OBS MODERATE 35: CPT

## 2023-01-26 RX ORDER — INFLUENZA VIRUS VACCINE 15; 15; 15; 15 UG/.5ML; UG/.5ML; UG/.5ML; UG/.5ML
0.5 SUSPENSION INTRAMUSCULAR ONCE
Refills: 0 | Status: DISCONTINUED | OUTPATIENT
Start: 2023-01-26 | End: 2023-02-08

## 2023-01-26 RX ORDER — CHLORHEXIDINE GLUCONATE 213 G/1000ML
1 SOLUTION TOPICAL
Refills: 0 | Status: DISCONTINUED | OUTPATIENT
Start: 2023-01-26 | End: 2023-02-08

## 2023-01-26 RX ORDER — WARFARIN SODIUM 2.5 MG/1
7.5 TABLET ORAL ONCE
Refills: 0 | Status: COMPLETED | OUTPATIENT
Start: 2023-01-26 | End: 2023-01-26

## 2023-01-26 RX ADMIN — SENNA PLUS 2 TABLET(S): 8.6 TABLET ORAL at 21:19

## 2023-01-26 RX ADMIN — Medication 0.1 MILLIGRAM(S): at 15:19

## 2023-01-26 RX ADMIN — Medication 0.1 MILLIGRAM(S): at 05:39

## 2023-01-26 RX ADMIN — Medication 0.1 MILLIGRAM(S): at 21:21

## 2023-01-26 RX ADMIN — Medication 4 MILLIGRAM(S): at 21:20

## 2023-01-26 RX ADMIN — AMLODIPINE BESYLATE 10 MILLIGRAM(S): 2.5 TABLET ORAL at 05:38

## 2023-01-26 RX ADMIN — LACOSAMIDE 100 MILLIGRAM(S): 50 TABLET ORAL at 17:30

## 2023-01-26 RX ADMIN — LEVETIRACETAM 400 MILLIGRAM(S): 250 TABLET, FILM COATED ORAL at 11:08

## 2023-01-26 RX ADMIN — Medication 0.1 MILLIGRAM(S): at 00:22

## 2023-01-26 RX ADMIN — OXCARBAZEPINE 300 MILLIGRAM(S): 300 TABLET, FILM COATED ORAL at 05:38

## 2023-01-26 RX ADMIN — Medication 100 MILLIGRAM(S): at 05:38

## 2023-01-26 RX ADMIN — CHLORHEXIDINE GLUCONATE 1 APPLICATION(S): 213 SOLUTION TOPICAL at 15:19

## 2023-01-26 RX ADMIN — Medication 81 MILLIGRAM(S): at 15:19

## 2023-01-26 RX ADMIN — LOSARTAN POTASSIUM 100 MILLIGRAM(S): 100 TABLET, FILM COATED ORAL at 05:39

## 2023-01-26 RX ADMIN — OXCARBAZEPINE 300 MILLIGRAM(S): 300 TABLET, FILM COATED ORAL at 17:29

## 2023-01-26 RX ADMIN — Medication 100 MILLIGRAM(S): at 17:29

## 2023-01-26 RX ADMIN — LEVETIRACETAM 400 MILLIGRAM(S): 250 TABLET, FILM COATED ORAL at 21:20

## 2023-01-26 RX ADMIN — LACOSAMIDE 100 MILLIGRAM(S): 50 TABLET ORAL at 05:38

## 2023-01-26 RX ADMIN — WARFARIN SODIUM 7.5 MILLIGRAM(S): 2.5 TABLET ORAL at 00:22

## 2023-01-26 RX ADMIN — WARFARIN SODIUM 7.5 MILLIGRAM(S): 2.5 TABLET ORAL at 21:21

## 2023-01-26 RX ADMIN — ATORVASTATIN CALCIUM 80 MILLIGRAM(S): 80 TABLET, FILM COATED ORAL at 21:19

## 2023-01-26 NOTE — REVIEW OF SYSTEMS
[Negative] : Heme/Lymph [FreeTextEntry5] : see HPI [FreeTextEntry9] : see HPI [de-identified] : see HPI

## 2023-01-26 NOTE — PROGRESS NOTE ADULT - SUBJECTIVE AND OBJECTIVE BOX
DATE OF SERVICE: 01-26-23 @ 13:56    Patient is a 62y old  Female who presents with a chief complaint of bradycardia (26 Jan 2023 10:14)      INTERVAL HISTORY: Feels ok.     REVIEW OF SYSTEMS:  CONSTITUTIONAL: No weakness  EYES/ENT: No visual changes;  No throat pain   NECK: No pain or stiffness  RESPIRATORY: No cough, wheezing; No shortness of breath  CARDIOVASCULAR: No chest pain or palpitations  GASTROINTESTINAL: No abdominal  pain. No nausea, vomiting, or hematemesis  GENITOURINARY: No dysuria, frequency or hematuria  NEUROLOGICAL: No stroke like symptoms  SKIN: No rashes    TELEMETRY Personally reviewed: SB 40-60  	  MEDICATIONS:  amLODIPine   Tablet 10 milliGRAM(s) Oral daily  cloNIDine 0.1 milliGRAM(s) Oral three times a day  doxazosin 4 milliGRAM(s) Oral at bedtime  hydrochlorothiazide 25 milliGRAM(s) Oral daily  losartan 100 milliGRAM(s) Oral daily        PHYSICAL EXAM:  T(C): 36.8 (01-26-23 @ 11:19), Max: 36.8 (01-25-23 @ 19:40)  HR: 51 (01-26-23 @ 11:19) (49 - 59)  BP: 131/75 (01-26-23 @ 11:19) (115/66 - 135/78)  RR: 18 (01-26-23 @ 11:19) (18 - 18)  SpO2: 97% (01-26-23 @ 11:19) (96% - 99%)  Wt(kg): --  I&O's Summary    26 Jan 2023 07:01  -  26 Jan 2023 13:56  --------------------------------------------------------  IN: 480 mL / OUT: 0 mL / NET: 480 mL          Appearance: In no distress	  HEENT:    PERRL, EOMI	  Cardiovascular:  S1 S2, No JVD  Respiratory: Lungs clear to auscultation	  Gastrointestinal:  Soft, Non-tender, + BS	  Vascularature:  No edema of LE  Psychiatric: Appropriate affect   Neuro: no acute focal deficits                               10.3   4.55  )-----------( 182      ( 25 Jan 2023 06:49 )             31.1     01-25    144  |  112<H>  |  20  ----------------------------<  89  4.2   |  21<L>  |  1.17    Ca    9.6      25 Jan 2023 06:49  Mg     2.3     01-24    TPro  7.9  /  Alb  4.2  /  TBili  0.4  /  DBili  x   /  AST  12  /  ALT  9<L>  /  AlkPhos  105  01-24        Labs personally reviewed    e< from: Transthoracic Echocardiogram (01.25.23 @ 14:56) >  Conclusions:  1. Normal left ventricular internal dimensions and wall  thickness.  2. Normal left ventricular systolic function. No segmental  wall motion abnormalities.  3. Normal right ventricular size and function.  4. Estimated pulmonary artery systolic pressure equals 31  mm Hg, assuming right atrial pressure equals 8  mm Hg,  consistent with normal pulmonary pressures.    < end of copied text >      ASSESSMENT/PLAN: 	      Ms. Denis is a 61 yo female with PMH of CAD s/p PCI in 2003, HTN, brain aneursyms s/p hemicraniotomy and clipping in 2013 c/b epilepsy chronic DVT now on coumadin who presents with symptomatic bradycardia.    Problem/Plan -1  Problem: Bradycardia  - ECG reveals SB; tele shows HR mostly in 30-40s but as long as 29  - EP consult appreciated. Labetolol now DC'd and clonidine dose decreased. Average HR now higher around 40-50 vs 30-40 yesterday.  - will attempt to wean down Clonidine if BP allows  - TTE unremarkable  - TSH wnl  - Cont to monitor on tele. Recommend pacing pads and atropine at bedside    Problem/Plan -2  Problem: Hypertension  - Patient hypotensive with EMS but on multiple antihypertensive meds  - c/w all home meds with the exception of labetalol and reduced clonidine dose    Problem/Plan -3  Problem: Coronary Artery Disease  - s/p remote PCI  - Denies CP or SOB  - Echo unremarkable  - c/w ASA 81mg PO daily, rosuvastatin 40mg PO daily    Problem/Plan -4  Problem: hx of DVT  - c/w coumadin and dose to INR 2-3      Kaykay Goldstein, RIDDHI-NP   Delfino Everett, DO [facility]  Cardiovascular Medicine  [address], [address]  Available through call or text on [employer] TEAM[employer]  Office: [phone]   DATE OF SERVICE: 01-26-23 @ 13:56    Patient is a 62y old  Female who presents with a chief complaint of bradycardia (26 Jan 2023 10:14)      INTERVAL HISTORY: Feels ok.     REVIEW OF SYSTEMS:  CONSTITUTIONAL: No weakness  EYES/ENT: No visual changes;  No throat pain   NECK: No pain or stiffness  RESPIRATORY: No cough, wheezing; No shortness of breath  CARDIOVASCULAR: No chest pain or palpitations  GASTROINTESTINAL: No abdominal  pain. No nausea, vomiting, or hematemesis  GENITOURINARY: No dysuria, frequency or hematuria  NEUROLOGICAL: No stroke like symptoms  SKIN: No rashes    TELEMETRY Personally reviewed: SB 40-60  	  MEDICATIONS:  amLODIPine   Tablet 10 milliGRAM(s) Oral daily  cloNIDine 0.1 milliGRAM(s) Oral three times a day  doxazosin 4 milliGRAM(s) Oral at bedtime  hydrochlorothiazide 25 milliGRAM(s) Oral daily  losartan 100 milliGRAM(s) Oral daily        PHYSICAL EXAM:  T(C): 36.8 (01-26-23 @ 11:19), Max: 36.8 (01-25-23 @ 19:40)  HR: 51 (01-26-23 @ 11:19) (49 - 59)  BP: 131/75 (01-26-23 @ 11:19) (115/66 - 135/78)  RR: 18 (01-26-23 @ 11:19) (18 - 18)  SpO2: 97% (01-26-23 @ 11:19) (96% - 99%)  Wt(kg): --  I&O's Summary    26 Jan 2023 07:01  -  26 Jan 2023 13:56  --------------------------------------------------------  IN: 480 mL / OUT: 0 mL / NET: 480 mL          Appearance: In no distress	  HEENT:    PERRL, EOMI	  Cardiovascular:  S1 S2, No JVD  Respiratory: Lungs clear to auscultation	  Gastrointestinal:  Soft, Non-tender, + BS	  Vascularature:  No edema of LE  Psychiatric: Appropriate affect   Neuro: no acute focal deficits                               10.3   4.55  )-----------( 182      ( 25 Jan 2023 06:49 )             31.1     01-25    144  |  112<H>  |  20  ----------------------------<  89  4.2   |  21<L>  |  1.17    Ca    9.6      25 Jan 2023 06:49  Mg     2.3     01-24    TPro  7.9  /  Alb  4.2  /  TBili  0.4  /  DBili  x   /  AST  12  /  ALT  9<L>  /  AlkPhos  105  01-24        Labs personally reviewed    e< from: Transthoracic Echocardiogram (01.25.23 @ 14:56) >  Conclusions:  1. Normal left ventricular internal dimensions and wall  thickness.  2. Normal left ventricular systolic function. No segmental  wall motion abnormalities.  3. Normal right ventricular size and function.  4. Estimated pulmonary artery systolic pressure equals 31  mm Hg, assuming right atrial pressure equals 8  mm Hg,  consistent with normal pulmonary pressures.    < end of copied text >      ASSESSMENT/PLAN: 	      Ms. Denis is a 63 yo female with PMH of CAD s/p PCI in 2003, HTN, brain aneursyms s/p hemicraniotomy and clipping in 2013 c/b epilepsy chronic DVT now on coumadin who presents with symptomatic bradycardia.    Problem/Plan -1  Problem: Bradycardia  - ECG reveals SB; tele shows HR mostly in 30-40s but as long as 29  - EP consult appreciated. Labetolol now DC'd and clonidine dose decreased. Average HR now higher around 40-50 vs 30-40 yesterday.  - will attempt to wean down/off Clonidine if BP allows  - TTE unremarkable  - TSH wnl  - Cont to monitor on tele. Recommend pacing pads and atropine at bedside    Problem/Plan -2  Problem: Hypertension  - Patient hypotensive with EMS but on multiple antihypertensive meds  - c/w all home meds with the exception of labetalol and reduced clonidine dose    Problem/Plan -3  Problem: Coronary Artery Disease  - s/p remote PCI  - Denies CP or SOB  - Echo unremarkable  - c/w ASA 81mg PO daily, rosuvastatin 40mg PO daily    Problem/Plan -4  Problem: hx of DVT  - c/w coumadin and dose to INR 2-3      Kaykay Goldstein, RIDDHI-NP   Delfino Everett, DO [facility]  Cardiovascular Medicine  [address], [address]  Available through call or text on [employer] TEAM[employer]  Office: [phone]

## 2023-01-26 NOTE — PROGRESS NOTE ADULT - SUBJECTIVE AND OBJECTIVE BOX
24H hour events: HR 40-60 BPM overnight    MEDICATIONS:  amLODIPine   Tablet 10 milliGRAM(s) Oral daily  aspirin enteric coated 81 milliGRAM(s) Oral daily  cloNIDine 0.1 milliGRAM(s) Oral three times a day  doxazosin 4 milliGRAM(s) Oral at bedtime  hydrochlorothiazide 25 milliGRAM(s) Oral daily  losartan 100 milliGRAM(s) Oral daily  acetaminophen     Tablet .. 650 milliGRAM(s) Oral every 6 hours PRN  lacosamide 100 milliGRAM(s) Oral two times a day  levETIRAcetam  IVPB 1500 milliGRAM(s) IV Intermittent every 12 hours  melatonin 3 milliGRAM(s) Oral at bedtime PRN  ondansetron Injectable 4 milliGRAM(s) IV Push every 8 hours PRN  OXcarbazepine 300 milliGRAM(s) Oral two times a day  zonisamide 100 milliGRAM(s) Oral two times a day  aluminum hydroxide/magnesium hydroxide/simethicone Suspension 30 milliLiter(s) Oral every 4 hours PRN  senna 2 Tablet(s) Oral at bedtime  atorvastatin 80 milliGRAM(s) Oral at bedtime  chlorhexidine 2% Cloths 1 Application(s) Topical <User Schedule>  influenza   Vaccine 0.5 milliLiter(s) IntraMuscular once      REVIEW OF SYSTEMS:  See HPI, otherwise ROS negative.    PHYSICAL EXAM:  T(C): 36.5 (01-26-23 @ 04:17), Max: 36.8 (01-25-23 @ 19:40)  HR: 49 (01-26-23 @ 04:17) (44 - 59)  BP: 115/66 (01-26-23 @ 04:17) (115/66 - 135/78)  RR: 18 (01-26-23 @ 04:17) (18 - 18)  SpO2: 98% (01-26-23 @ 04:17) (96% - 99%)  Wt(kg): --  I&O's Summary      Appearance: Alert. NAD	  Cardiovascular: +S1S2 RRR no m/g/r  Respiratory: CTA B/L	  Psychiatry: A & O x 3, Mood & affect appropriate  Skin: No rashes	  Neurologic: Non-focal  Extremities: No edema BLE  Vascular: Peripheral pulses palpable 2+ bilaterally      LABS:	 	    CBC Full  -  ( 25 Jan 2023 06:49 )  WBC Count : 4.55 K/uL  Hemoglobin : 10.3 g/dL  Hematocrit : 31.1 %  Platelet Count - Automated : 182 K/uL  Mean Cell Volume : 89.1 fl  Mean Cell Hemoglobin : 29.5 pg  Mean Cell Hemoglobin Concentration : 33.1 gm/dL  Auto Neutrophil # : x  Auto Lymphocyte # : x  Auto Monocyte # : x  Auto Eosinophil # : x  Auto Basophil # : x  Auto Neutrophil % : x  Auto Lymphocyte % : x  Auto Monocyte % : x  Auto Eosinophil % : x  Auto Basophil % : x    01-25    144  |  112<H>  |  20  ----------------------------<  89  4.2   |  21<L>  |  1.17  01-24    142  |  111<H>  |  18  ----------------------------<  82  4.3   |  19<L>  |  1.27    Ca    9.6      25 Jan 2023 06:49  Ca    9.7      24 Jan 2023 15:53  Mg     2.3     01-24  Mg     2.3     01-24    TPro  7.9  /  Alb  4.2  /  TBili  0.4  /  DBili  x   /  AST  12  /  ALT  9<L>  /  AlkPhos  105  01-24    TSH: Thyroid Stimulating Hormone, Serum: 1.20 uIU/mL (01-26 @ 05:42)    TELEMETRY: SB/SR 40-60 BPM

## 2023-01-26 NOTE — PHYSICAL EXAM
[Heart Sounds] : normal S1 and S2 [Respiration, Rhythm And Depth] : normal respiratory rhythm and effort [Auscultation Breath Sounds / Voice Sounds] : lungs were clear to auscultation bilaterally [Bowel Sounds] : normal bowel sounds [Abdomen Soft] : soft [Abdomen Tenderness] : non-tender [Abnormal Walk] : normal gait [] : no ischemic changes [Oriented To Time, Place, And Person] : oriented to person, place, and time [FreeTextEntry1] : see above

## 2023-01-26 NOTE — CHART NOTE - NSCHARTNOTEFT_GEN_A_CORE
Medicine PA Note    Informed by RN that pt had one episode of vomiting. Pt seen and assessed at bedside, in no acute distress. Episode of emesis was non-bloody and non-bilious. Pt stated that she was on the phone with her  and "all off a sudden" felt nauseous and vomited up the orange juice she had for breakfast this morning. Abdomen is soft, non-tender, and non-distended. Pt stated that nausea resolved on writer's evaluation. Zofran PRN was ordered on 1/24. Will continue to monitor for further episodes of vomiting. Dr. Lisa aware.    Linda Avilez, PA-C  [id number]

## 2023-01-26 NOTE — PROGRESS NOTE ADULT - ASSESSMENT
62-year-old F history of HTN, CAD s/p PCI 2003, DVT w/ chronic ulcer LLE, SAH s/p hemicraniectomy and aneurysm clipping 2013 further c/b seizure d/o and R eye blindness presenting from Dr. Ojeda's office for asymptomatic sinus bradycardia 38bpm and reported hypotension, SBP 80's which improved after a milligram Atropine and small fluid bolus. in ED she is SB 40-50's which is her baseline, and hypertensive, denies symptoms presently as well as dizziness, lightheadedness, syncope, near syncope, ARREDONDO, CP w/ exertion prior to presentation. At home, she states she gets around w/ rolling walker but is mostly sedentary and seldom does chores, no difficulties in performing her ADL's 2/2 fatigue. Of note, she is maintained on Labetalol. Review of outpatient EKG's in [employer] reveal baseline sinus gema 40's-50's, normal TTE/stress test in 2021. She states she has never worn a holter monitor.     1) sinus bradycardia   2) HTN     - Continue to monitor on telemetry  - Keep K>4 and Mg>2  - Tele: 40-60 BPM off labetalol, on decreased clonidine dose .1mg TID, BP remains stable  - Patient is not interested in cardiac monitoring but does use iWatch and states she will keep track of heart rates  - Thyroid function tests WNL  - Continue A/C (patient on Coumadin) for hx DVT, resume antihypertensives     ROSARIO PenaC  #[phone] 62-year-old F history of HTN, CAD s/p PCI 2003, DVT w/ chronic ulcer LLE, SAH s/p hemicraniectomy and aneurysm clipping 2013 further c/b seizure d/o and R eye blindness presenting from Dr. Ojeda's office for asymptomatic sinus bradycardia 38bpm and reported hypotension, SBP 80's which improved after a milligram Atropine and small fluid bolus. in ED she is SB 40-50's which is her baseline, and hypertensive, denies symptoms presently as well as dizziness, lightheadedness, syncope, near syncope, ARREDONDO, CP w/ exertion prior to presentation. At home, she states she gets around w/ rolling walker but is mostly sedentary and seldom does chores, no difficulties in performing her ADL's 2/2 fatigue. Of note, she is maintained on Labetalol. Review of outpatient EKG's in [employer] reveal baseline sinus gema 40's-50's, normal TTE/stress test in 2021. She states she has never worn a holter monitor.     1) sinus bradycardia   2) HTN     - Continue to monitor on telemetry  - Keep K>4 and Mg>2  - Tele: 40-60 BPM off labetalol, on decreased clonidine dose .1mg TID, BP remains stable  - Patient is not interested in cardiac monitoring but does use iWatch and states she will keep track of heart rates  - Thyroid function tests WNL  - Continue A/C (patient on Coumadin) for hx DVT, resume antihypertensives   - Decrease Clonidine to 0.1mg BID  - Patient is not interested in ILR or Biotel at this time. If patient becomes agreeable, call [phone] for Biotel placement on day of discharge.  - EP will sign off. Reconsult PRN.    Jessica Ba PA-C  #[phone]

## 2023-01-26 NOTE — PROGRESS NOTE ADULT - SUBJECTIVE AND OBJECTIVE BOX
[facility] Division of Hospital Medicine  Maty Lisa MD  Pager (M-F, 9J-3L): [phone]  Other Times:  [phone]    Patient is a 62y old  Female who presents with a chief complaint of bradycardia (25 Jan 2023 11:57)      SUBJECTIVE / OVERNIGHT EVENTS: HR 40s-60s overnight, pt remains asymptomatic. This AM, had an episode of emesis after breakfast, but denies nausea by time of my visit  Tel: sinus gema    ADDITIONAL REVIEW OF SYSTEMS: otherwise negative    MEDICATIONS  (STANDING):  amLODIPine   Tablet 10 milliGRAM(s) Oral daily  aspirin enteric coated 81 milliGRAM(s) Oral daily  atorvastatin 80 milliGRAM(s) Oral at bedtime  chlorhexidine 2% Cloths 1 Application(s) Topical <User Schedule>  cloNIDine 0.1 milliGRAM(s) Oral three times a day  doxazosin 4 milliGRAM(s) Oral at bedtime  hydrochlorothiazide 25 milliGRAM(s) Oral daily  influenza   Vaccine 0.5 milliLiter(s) IntraMuscular once  lacosamide 100 milliGRAM(s) Oral two times a day  levETIRAcetam  IVPB 1500 milliGRAM(s) IV Intermittent every 12 hours  losartan 100 milliGRAM(s) Oral daily  OXcarbazepine 300 milliGRAM(s) Oral two times a day  senna 2 Tablet(s) Oral at bedtime  zonisamide 100 milliGRAM(s) Oral two times a day    MEDICATIONS  (PRN):  acetaminophen     Tablet .. 650 milliGRAM(s) Oral every 6 hours PRN Temp greater or equal to 38C (100.4F), Mild Pain (1 - 3)  aluminum hydroxide/magnesium hydroxide/simethicone Suspension 30 milliLiter(s) Oral every 4 hours PRN Dyspepsia  melatonin 3 milliGRAM(s) Oral at bedtime PRN Insomnia  ondansetron Injectable 4 milliGRAM(s) IV Push every 8 hours PRN Nausea and/or Vomiting      PHYSICAL EXAM:  T(C): 36.8 (01-26-23 @ 11:19), Max: 36.8 (01-25-23 @ 19:40)  T(F): 98.2 (01-26-23 @ 11:19), Max: 98.2 (01-25-23 @ 19:40)  HR: 60 (01-26-23 @ 15:05) (49 - 60)  BP: 111/70 (01-26-23 @ 15:05) (111/70 - 135/78)  RR: 18 (01-26-23 @ 15:05) (18 - 18)  SpO2: 98% (01-26-23 @ 15:05) (96% - 99%)  Wt(kg): --    CONSTITUTIONAL: NAD, well-developed, well-groomed  EYES: PERRLA; conjunctiva and sclera clear  ENMT: Moist oral mucosa, no pharyngeal injection or exudates; normal dentition  NECK: Supple, no palpable masses; no thyromegaly  RESPIRATORY: Normal respiratory effort; lungs are clear to auscultation bilaterally  CARDIOVASCULAR: Bradycardic, normal S1 and S2, no murmur/rub/gallop; No lower extremity edema  ABDOMEN: Nontender to palpation, normoactive bowel sounds, no rebound/guarding; No hepatosplenomegaly  MUSCULOSKELETAL:  No clubbing or cyanosis of digits; no joint swelling or tenderness to palpation  PSYCH: A+O to person, place, and time; affect appropriate  NEUROLOGY: CN 2-12 are intact and symmetric; no gross sensory deficits, slowed speech   SKIN: No rashes; no palpable lesions    LABS: reviewed                                      10.3   4.55  )-----------( 182      ( 25 Jan 2023 06:49 )             31.1       01-25    144  |  112<H>  |  20  ----------------------------<  89  4.2   |  21<L>  |  1.17    Ca    9.6      25 Jan 2023 06:49  Mg     2.3     01-24                    PT/INR - ( 26 Jan 2023 05:42 )   PT: 18.5 sec;   INR: 1.59 ratio         PTT - ( 26 Jan 2023 05:42 )  PTT:32.6 sec          CAPILLARY BLOOD GLUCOSE      < from: Transthoracic Echocardiogram (01.25.23 @ 14:56) >  Conclusions:  1. Normal left ventricular internal dimensions and wall  thickness.  2. Normal left ventricular systolic function. No segmental  wall motion abnormalities.  3. Normal right ventricular size and function.  4. Estimated pulmonary artery systolic pressure equals 31  mm Hg, assuming right atrial pressure equals 8  mm Hg,  consistent with normal pulmonary pressures.    < end of copied text >

## 2023-01-26 NOTE — PROGRESS NOTE ADULT - ASSESSMENT
62y f pmh brain aneurysm s/p hemicraniectomy and clipping (2013), seizures post hemicraniectomy (on Keppra), chronic DVTs on Coumadin and hypertension admitted for eval of bradycardia

## 2023-01-26 NOTE — HISTORY OF PRESENT ILLNESS
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 Last hospitalization was in 5/2022 for syncope, seizure and rhabdomyolysis.  Reports home SBP 130s. Initial SBP 90s by electronic cuff by medical assistant in office. Repeat manual BP 10/5/70 bilaterally.  \par \par Medications:\par Norvasc 10 mg daily\par ASA 81 mg daily\par Coumadin\par Clonidine 0.1 mg TID\par Doxazosin 4 mg QD\par HCTZ 25 mg QD\par Labetalol 200 mg BID\par Keppra\par Olmesartan 20 mg QD \par Crestor 20 mg QD\par Zosoziamide

## 2023-01-26 NOTE — ASSESSMENT
[FreeTextEntry1] : Assessment:\par 1. History of DVT in 2014 has been on Coumadin since\par 2. History of Brain Aneurysm s/p Hemicraniectomy and Clipping\par 3. History of Seizures on Keppra\par 4. H/o  multiple ulcerations to the L shin area s/p debridement last in 2021 - healed\par 5. Bradycardia in office, HR 38\par \par Plan:\par 1. LE venous duplex to assess for DVT.\par 2. If negative, will hold Coumadin 3 days, then will start Eliquis 5 mg BID.\par     After transition to Eliquis in 6 weeks after will repeat LE venous duplex.\par    If that duplex is stable will reduce Eliquis to 2.5 mg BID.\par     After Eliquis is reduced, then will repeat duplex in 1 month. \par 3. Case discussed with Dr. Ojeda, patient to see Dr. Ojeda today for Sinus Bradycardia.\par      Reports presently being asymptomatic. \par 4. Return to our office in 3 months.

## 2023-01-27 DIAGNOSIS — R11.10 VOMITING, UNSPECIFIED: ICD-10-CM

## 2023-01-27 LAB
ALBUMIN SERPL ELPH-MCNC: 4.2 G/DL — SIGNIFICANT CHANGE UP (ref 3.3–5)
ALP SERPL-CCNC: 107 U/L — SIGNIFICANT CHANGE UP (ref 40–120)
ALT FLD-CCNC: 9 U/L — LOW (ref 10–45)
APTT BLD: 35.6 SEC — HIGH (ref 27.5–35.5)
AST SERPL-CCNC: 15 U/L — SIGNIFICANT CHANGE UP (ref 10–40)
BILIRUB DIRECT SERPL-MCNC: <0.1 MG/DL — SIGNIFICANT CHANGE UP (ref 0–0.3)
BILIRUB INDIRECT FLD-MCNC: >0.1 MG/DL — LOW (ref 0.2–1)
BILIRUB SERPL-MCNC: 0.2 MG/DL — SIGNIFICANT CHANGE UP (ref 0.2–1.2)
HCT VFR BLD CALC: 38.1 % — SIGNIFICANT CHANGE UP (ref 34.5–45)
HGB BLD-MCNC: 13 G/DL — SIGNIFICANT CHANGE UP (ref 11.5–15.5)
INR BLD: 2.17 RATIO — HIGH (ref 0.88–1.16)
LIDOCAIN IGE QN: 20 U/L — SIGNIFICANT CHANGE UP (ref 7–60)
MCHC RBC-ENTMCNC: 29.8 PG — SIGNIFICANT CHANGE UP (ref 27–34)
MCHC RBC-ENTMCNC: 34.1 GM/DL — SIGNIFICANT CHANGE UP (ref 32–36)
MCV RBC AUTO: 87.4 FL — SIGNIFICANT CHANGE UP (ref 80–100)
NRBC # BLD: 0 /100 WBCS — SIGNIFICANT CHANGE UP (ref 0–0)
PLATELET # BLD AUTO: 211 K/UL — SIGNIFICANT CHANGE UP (ref 150–400)
PROT SERPL-MCNC: 8.1 G/DL — SIGNIFICANT CHANGE UP (ref 6–8.3)
PROTHROM AB SERPL-ACNC: 25.2 SEC — HIGH (ref 10.5–13.4)
RAPID RVP RESULT: DETECTED
RBC # BLD: 4.36 M/UL — SIGNIFICANT CHANGE UP (ref 3.8–5.2)
RBC # FLD: 15.3 % — HIGH (ref 10.3–14.5)
SARS-COV-2 RNA SPEC QL NAA+PROBE: DETECTED
TROPONIN T, HIGH SENSITIVITY RESULT: 17 NG/L — SIGNIFICANT CHANGE UP (ref 0–51)
WBC # BLD: 8.17 K/UL — SIGNIFICANT CHANGE UP (ref 3.8–10.5)
WBC # FLD AUTO: 8.17 K/UL — SIGNIFICANT CHANGE UP (ref 3.8–10.5)

## 2023-01-27 PROCEDURE — 99233 SBSQ HOSP IP/OBS HIGH 50: CPT

## 2023-01-27 PROCEDURE — 93010 ELECTROCARDIOGRAM REPORT: CPT

## 2023-01-27 PROCEDURE — 74018 RADEX ABDOMEN 1 VIEW: CPT | Mod: 26

## 2023-01-27 RX ORDER — WARFARIN SODIUM 2.5 MG/1
7.5 TABLET ORAL ONCE
Refills: 0 | Status: COMPLETED | OUTPATIENT
Start: 2023-01-27 | End: 2023-01-27

## 2023-01-27 RX ADMIN — OXCARBAZEPINE 300 MILLIGRAM(S): 300 TABLET, FILM COATED ORAL at 05:27

## 2023-01-27 RX ADMIN — AMLODIPINE BESYLATE 10 MILLIGRAM(S): 2.5 TABLET ORAL at 05:26

## 2023-01-27 RX ADMIN — LEVETIRACETAM 400 MILLIGRAM(S): 250 TABLET, FILM COATED ORAL at 22:23

## 2023-01-27 RX ADMIN — ONDANSETRON 4 MILLIGRAM(S): 8 TABLET, FILM COATED ORAL at 00:42

## 2023-01-27 RX ADMIN — Medication 100 MILLIGRAM(S): at 17:03

## 2023-01-27 RX ADMIN — LOSARTAN POTASSIUM 100 MILLIGRAM(S): 100 TABLET, FILM COATED ORAL at 05:26

## 2023-01-27 RX ADMIN — LACOSAMIDE 100 MILLIGRAM(S): 50 TABLET ORAL at 17:03

## 2023-01-27 RX ADMIN — Medication 0.1 MILLIGRAM(S): at 05:27

## 2023-01-27 RX ADMIN — CHLORHEXIDINE GLUCONATE 1 APPLICATION(S): 213 SOLUTION TOPICAL at 05:28

## 2023-01-27 RX ADMIN — LEVETIRACETAM 400 MILLIGRAM(S): 250 TABLET, FILM COATED ORAL at 12:21

## 2023-01-27 RX ADMIN — WARFARIN SODIUM 7.5 MILLIGRAM(S): 2.5 TABLET ORAL at 22:24

## 2023-01-27 RX ADMIN — Medication 81 MILLIGRAM(S): at 12:22

## 2023-01-27 RX ADMIN — Medication 4 MILLIGRAM(S): at 22:23

## 2023-01-27 RX ADMIN — LACOSAMIDE 100 MILLIGRAM(S): 50 TABLET ORAL at 05:27

## 2023-01-27 RX ADMIN — OXCARBAZEPINE 300 MILLIGRAM(S): 300 TABLET, FILM COATED ORAL at 17:03

## 2023-01-27 RX ADMIN — ONDANSETRON 4 MILLIGRAM(S): 8 TABLET, FILM COATED ORAL at 12:54

## 2023-01-27 RX ADMIN — Medication 100 MILLIGRAM(S): at 05:27

## 2023-01-27 RX ADMIN — ATORVASTATIN CALCIUM 80 MILLIGRAM(S): 80 TABLET, FILM COATED ORAL at 22:23

## 2023-01-27 NOTE — PROGRESS NOTE ADULT - SUBJECTIVE AND OBJECTIVE BOX
DATE OF SERVICE: 01-27-23 @ 14:07    Patient is a 62y old  Female who presents with a chief complaint of bradycardia (26 Jan 2023 16:24)      INTERVAL HISTORY: Feels ok.     REVIEW OF SYSTEMS:  CONSTITUTIONAL: No weakness  EYES/ENT: No visual changes;  No throat pain   NECK: No pain or stiffness  RESPIRATORY: No cough, wheezing; No shortness of breath  CARDIOVASCULAR: No chest pain or palpitations  GASTROINTESTINAL: No abdominal  pain. No nausea, vomiting, or hematemesis  GENITOURINARY: No dysuria, frequency or hematuria  NEUROLOGICAL: No stroke like symptoms  SKIN: No rashes    TELEMETRY Personally reviewed: SB/SR 30-60s  	  MEDICATIONS:  amLODIPine   Tablet 10 milliGRAM(s) Oral daily  doxazosin 4 milliGRAM(s) Oral at bedtime  hydrochlorothiazide 25 milliGRAM(s) Oral daily  losartan 100 milliGRAM(s) Oral daily        PHYSICAL EXAM:  T(C): 36.7 (01-27-23 @ 12:42), Max: 36.9 (01-26-23 @ 19:48)  HR: 67 (01-27-23 @ 12:42) (47 - 67)  BP: 115/72 (01-27-23 @ 12:42) (101/57 - 136/73)  RR: 18 (01-27-23 @ 12:42) (18 - 18)  SpO2: 98% (01-27-23 @ 12:42) (97% - 100%)  Wt(kg): --  I&O's Summary    26 Jan 2023 07:01  -  27 Jan 2023 07:00  --------------------------------------------------------  IN: 630 mL / OUT: 0 mL / NET: 630 mL          Appearance: In no distress	  HEENT:    PERRL, EOMI	  Cardiovascular:  S1 S2, No JVD  Respiratory: Lungs clear to auscultation	  Gastrointestinal:  Soft, Non-tender, + BS	  Vascularature:  No edema of LE  Psychiatric: Appropriate affect   Neuro: no acute focal deficits                     Labs personally reviewed      ASSESSMENT/PLAN: 	    Ms. Denis is a 63 yo female with PMH of CAD s/p PCI in 2003, HTN, brain aneursyms s/p hemicraniotomy and clipping in 2013 c/b epilepsy chronic DVT now on coumadin who presents with symptomatic bradycardia.    Problem/Plan -1  Problem: Bradycardia  - ECG reveals SB; tele shows HR mostly in 30-40s but as long as 29  - EP following  - TTE unremarkable  - TSH wnl  - Cont to monitor on tele. Recommend pacing pads and atropine at bedside  - HR still persistently bradycardic. Will DC clonidine and trend.     Problem/Plan -2  Problem: Hypertension  - Patient hypotensive with EMS but on multiple antihypertensive meds  - c/w all home meds with the exception of labetalol and clonidine     Problem/Plan -3  Problem: Coronary Artery Disease  - s/p remote PCI  - Denies CP or SOB  - Echo unremarkable  - c/w ASA 81mg PO daily, rosuvastatin 40mg PO daily    Problem/Plan -4  Problem: hx of DVT  - c/w coumadin and dose to INR 2-3        Kaykay Goldstein, AG-NP   Delfino Everett DO [facility]  Cardiovascular Medicine  [address] [place] [address], [address]  Available through call or text on [employer] TE[employer]  Office: [phone]   DATE OF SERVICE: 01-27-23 @ 14:07    Patient is a 62y old  Female who presents with a chief complaint of bradycardia (26 Jan 2023 16:24)      INTERVAL HISTORY: Feels ok.     REVIEW OF SYSTEMS:  CONSTITUTIONAL: No weakness  EYES/ENT: No visual changes;  No throat pain   NECK: No pain or stiffness  RESPIRATORY: No cough, wheezing; No shortness of breath  CARDIOVASCULAR: No chest pain or palpitations  GASTROINTESTINAL: No abdominal  pain. No nausea, vomiting, or hematemesis  GENITOURINARY: No dysuria, frequency or hematuria  NEUROLOGICAL: No stroke like symptoms  SKIN: No rashes    TELEMETRY Personally reviewed: SB/SR 30-60s  	  MEDICATIONS:  amLODIPine   Tablet 10 milliGRAM(s) Oral daily  doxazosin 4 milliGRAM(s) Oral at bedtime  hydrochlorothiazide 25 milliGRAM(s) Oral daily  losartan 100 milliGRAM(s) Oral daily        PHYSICAL EXAM:  T(C): 36.7 (01-27-23 @ 12:42), Max: 36.9 (01-26-23 @ 19:48)  HR: 67 (01-27-23 @ 12:42) (47 - 67)  BP: 115/72 (01-27-23 @ 12:42) (101/57 - 136/73)  RR: 18 (01-27-23 @ 12:42) (18 - 18)  SpO2: 98% (01-27-23 @ 12:42) (97% - 100%)  Wt(kg): --  I&O's Summary    26 Jan 2023 07:01  -  27 Jan 2023 07:00  --------------------------------------------------------  IN: 630 mL / OUT: 0 mL / NET: 630 mL          Appearance: In no distress	  HEENT:    PERRL, EOMI	  Cardiovascular:  S1 S2, No JVD  Respiratory: Lungs clear to auscultation	  Gastrointestinal:  Soft, Non-tender, + BS	  Vascularature:  No edema of LE  Psychiatric: Appropriate affect   Neuro: no acute focal deficits                     Labs personally reviewed      ASSESSMENT/PLAN: 	    Ms. Denis is a 61 yo female with PMH of CAD s/p PCI in 2003, HTN, brain aneursyms s/p hemicraniotomy and clipping in 2013 c/b epilepsy chronic DVT now on coumadin who presents with symptomatic bradycardia.    Problem/Plan -1  Problem: Bradycardia  - ECG reveals SB; tele shows HR mostly in 30-40s but as long as 29  - EP following  - TTE unremarkable  - TSH wnl  - Cont to monitor on tele. Recommend pacing pads and atropine at bedside  - HR still persistently bradycardic. Will DC clonidine and trend.     Problem/Plan -2  Problem: Hypertension  - Patient hypotensive with EMS but on multiple antihypertensive meds  - c/w all home meds with the exception of labetalol and clonidine     Problem/Plan -3  Problem: Coronary Artery Disease  - s/p remote PCI  - Denies CP or SOB  - Echo unremarkable  - c/w ASA 81mg PO daily, rosuvastatin 40mg PO daily    Problem/Plan -4  Problem: hx of DVT  - c/w coumadin and dose to INR 2-3    Problem/Plan -5  Problem: Hx of Brain Aneursyms with intervention and Epilepsy  - Now with increased forgetfulness and lethargy  - Appreciate Neuro consult    Kaykay Goldstein, AG-NP   Delfino Everett DO [facility]  Cardiovascular Medicine  [address], [address]  Available through call or text on [employer] TEAM[employer]  Office: [phone]   DATE OF SERVICE: 01-27-23 @ 14:07    Patient is a 62y old  Female who presents with a chief complaint of bradycardia (26 Jan 2023 16:24)      INTERVAL HISTORY: Feels ok.     REVIEW OF SYSTEMS:  CONSTITUTIONAL: No weakness  EYES/ENT: No visual changes;  No throat pain   NECK: No pain or stiffness  RESPIRATORY: No cough, wheezing; No shortness of breath  CARDIOVASCULAR: No chest pain or palpitations  GASTROINTESTINAL: No abdominal  pain. No nausea, vomiting, or hematemesis  GENITOURINARY: No dysuria, frequency or hematuria  NEUROLOGICAL: No stroke like symptoms  SKIN: No rashes    TELEMETRY Personally reviewed: SB/SR 30-60s  	  MEDICATIONS:  amLODIPine   Tablet 10 milliGRAM(s) Oral daily  doxazosin 4 milliGRAM(s) Oral at bedtime  hydrochlorothiazide 25 milliGRAM(s) Oral daily  losartan 100 milliGRAM(s) Oral daily        PHYSICAL EXAM:  T(C): 36.7 (01-27-23 @ 12:42), Max: 36.9 (01-26-23 @ 19:48)  HR: 67 (01-27-23 @ 12:42) (47 - 67)  BP: 115/72 (01-27-23 @ 12:42) (101/57 - 136/73)  RR: 18 (01-27-23 @ 12:42) (18 - 18)  SpO2: 98% (01-27-23 @ 12:42) (97% - 100%)  Wt(kg): --  I&O's Summary    26 Jan 2023 07:01  -  27 Jan 2023 07:00  --------------------------------------------------------  IN: 630 mL / OUT: 0 mL / NET: 630 mL          Appearance: In no distress	  HEENT:    PERRL, EOMI	  Cardiovascular:  S1 S2, No JVD  Respiratory: Lungs clear to auscultation	  Gastrointestinal:  Soft, Non-tender, + BS	  Vascularature:  No edema of LE  Psychiatric: Appropriate affect   Neuro: no acute focal deficits                     Labs personally reviewed      ASSESSMENT/PLAN: 	    Ms. Denis is a 61 yo female with PMH of CAD s/p PCI in 2003, HTN, brain aneursyms s/p hemicraniotomy and clipping in 2013 c/b epilepsy chronic DVT now on coumadin who presents with symptomatic bradycardia.    Problem/Plan -1  Problem: Bradycardia  - ECG reveals SB; tele shows HR mostly in 30-40s but as long as 29  - EP following  - TTE unremarkable  - TSH wnl  - Cont to monitor on tele. Recommend pacing pads and atropine at bedside  - HR still persistently bradycardic. Will DC clonidine and trend.     Problem/Plan -2  Problem: Hypertension  - Patient hypotensive with EMS but on multiple antihypertensive meds  - c/w all home meds with the exception of labetalol and clonidine     Problem/Plan -3  Problem: Coronary Artery Disease  - s/p remote PCI  - Denies CP or SOB  - Echo unremarkable  - c/w ASA 81mg PO daily, rosuvastatin 40mg PO daily    Problem/Plan -4  Problem: hx of DVT  - c/w coumadin and dose to INR 2-3    Problem/Plan -5  Problem: Hx of Brain Aneursyms with intervention and Epilepsy  - Now with increased forgetfulness and lethargy  - Neuro consult        Kaykay Goldstein, AG-NP   Delfino Everett DO [facility]  Cardiovascular Medicine  [address] [place] [address], [address]  Available through call or text on [employer] TEAM[employer]  Office: [phone]

## 2023-01-27 NOTE — PROGRESS NOTE ADULT - SUBJECTIVE AND OBJECTIVE BOX
[facility] Division of Hospital Medicine  Maty Lisa MD  Pager (M-F, 4K-1O): [phone]  Other Times:  [phone]    Patient is a 62y old  Female who presents with a chief complaint of bradycardia (25 Jan 2023 11:57)      SUBJECTIVE / OVERNIGHT EVENTS: HR as low as mid 30s while sleeping, but improved to 60s. Pt has been having vomiting episodes after each meal. Denies nausea, abdominal pain, diarrhea, headaches, vision changes, unilateral weakness, slurred speech.    ADDITIONAL REVIEW OF SYSTEMS: otherwise negative    MEDICATIONS  (STANDING):  amLODIPine   Tablet 10 milliGRAM(s) Oral daily  aspirin enteric coated 81 milliGRAM(s) Oral daily  atorvastatin 80 milliGRAM(s) Oral at bedtime  chlorhexidine 2% Cloths 1 Application(s) Topical <User Schedule>  doxazosin 4 milliGRAM(s) Oral at bedtime  hydrochlorothiazide 25 milliGRAM(s) Oral daily  influenza   Vaccine 0.5 milliLiter(s) IntraMuscular once  lacosamide 100 milliGRAM(s) Oral two times a day  levETIRAcetam  IVPB 1500 milliGRAM(s) IV Intermittent every 12 hours  losartan 100 milliGRAM(s) Oral daily  OXcarbazepine 300 milliGRAM(s) Oral two times a day  senna 2 Tablet(s) Oral at bedtime  warfarin 7.5 milliGRAM(s) Oral once  zonisamide 100 milliGRAM(s) Oral two times a day    MEDICATIONS  (PRN):  acetaminophen     Tablet .. 650 milliGRAM(s) Oral every 6 hours PRN Temp greater or equal to 38C (100.4F), Mild Pain (1 - 3)  aluminum hydroxide/magnesium hydroxide/simethicone Suspension 30 milliLiter(s) Oral every 4 hours PRN Dyspepsia  melatonin 3 milliGRAM(s) Oral at bedtime PRN Insomnia  ondansetron Injectable 4 milliGRAM(s) IV Push every 8 hours PRN Nausea and/or Vomiting    PHYSICAL EXAM:  T(C): 36.7 (01-27-23 @ 12:42), Max: 36.9 (01-26-23 @ 19:48)  T(F): 98.1 (01-27-23 @ 12:42), Max: 98.5 (01-26-23 @ 19:48)  HR: 55 (01-27-23 @ 14:21) (47 - 67)  BP: 127/79 (01-27-23 @ 14:21) (101/57 - 136/73)  RR: 18 (01-27-23 @ 14:21) (18 - 18)  SpO2: 98% (01-27-23 @ 14:21) (97% - 100%)  Wt(kg): --    CONSTITUTIONAL: NAD, well-developed, well-groomed  EYES: PERRLA; conjunctiva and sclera clear  ENMT: Moist oral mucosa, no pharyngeal injection or exudates; normal dentition  NECK: Supple, no palpable masses; no thyromegaly  RESPIRATORY: Normal respiratory effort; lungs are clear to auscultation bilaterally  CARDIOVASCULAR: Bradycardic, normal S1 and S2, no murmur/rub/gallop; No lower extremity edema  ABDOMEN: Nontender to palpation, normoactive bowel sounds, no rebound/guarding; No hepatosplenomegaly  MUSCULOSKELETAL:  No clubbing or cyanosis of digits; no joint swelling or tenderness to palpation  PSYCH: A+O to person, place, and time; affect appropriate  NEUROLOGY: CN 2-12 are intact and symmetric; no gross sensory deficits, slowed speech   SKIN: No rashes; no palpable lesions    LABS: reviewed                                  PT/INR - ( 27 Jan 2023 07:03 )   PT: 25.2 sec;   INR: 2.17 ratio         PTT - ( 27 Jan 2023 07:03 )  PTT:35.6 sec          CAPILLARY BLOOD GLUCOSE                  < from: Transthoracic Echocardiogram (01.25.23 @ 14:56) >  Conclusions:  1. Normal left ventricular internal dimensions and wall  thickness.  2. Normal left ventricular systolic function. No segmental  wall motion abnormalities.  3. Normal right ventricular size and function.  4. Estimated pulmonary artery systolic pressure equals 31  mm Hg, assuming right atrial pressure equals 8  mm Hg,  consistent with normal pulmonary pressures.    < end of copied text >

## 2023-01-27 NOTE — CHART NOTE - NSCHARTNOTEFT_GEN_A_CORE
Ms. Denis is a 61 yo female with PMH of CAD s/p PCI in 2003, HTN, brain aneurysm s/p hemicraniotomy and clipping in 2013 c/b epilepsy chronic DVT now on coumadin who presents with symptomatic bradycardia. Patient c/o nausea and vomiting after she eats since yesterday. No relief with Zofran, abdomen soft , nontender with hypoactive bowel sounds. Abdominal x-ray with normal gas pattern and hepatic function test normal. Will start gentle hydration until and follow with other lab results. Neurology consult per cards, attending made aware. Cardiac monitor with brief episode of junctional rhythm then back sinsus gema. Asymptomatic, vital stable as charted, 12 lead EKG on chart and troponin x1 negative (done earlier) EP signed-off but reconsult if warranted.

## 2023-01-28 LAB
ANION GAP SERPL CALC-SCNC: 12 MMOL/L — SIGNIFICANT CHANGE UP (ref 5–17)
BUN SERPL-MCNC: 17 MG/DL — SIGNIFICANT CHANGE UP (ref 7–23)
CALCIUM SERPL-MCNC: 9.9 MG/DL — SIGNIFICANT CHANGE UP (ref 8.4–10.5)
CHLORIDE SERPL-SCNC: 107 MMOL/L — SIGNIFICANT CHANGE UP (ref 96–108)
CO2 SERPL-SCNC: 24 MMOL/L — SIGNIFICANT CHANGE UP (ref 22–31)
CREAT SERPL-MCNC: 1.32 MG/DL — HIGH (ref 0.5–1.3)
EGFR: 46 ML/MIN/1.73M2 — LOW
GLUCOSE SERPL-MCNC: 90 MG/DL — SIGNIFICANT CHANGE UP (ref 70–99)
HCG SERPL-ACNC: <2 MIU/ML — SIGNIFICANT CHANGE UP
HCT VFR BLD CALC: 35.2 % — SIGNIFICANT CHANGE UP (ref 34.5–45)
HGB BLD-MCNC: 11.7 G/DL — SIGNIFICANT CHANGE UP (ref 11.5–15.5)
INR BLD: 3.01 RATIO — HIGH (ref 0.88–1.16)
LEVETIRACETAM SERPL-MCNC: 125.8 UG/ML — HIGH (ref 10–40)
MAGNESIUM SERPL-MCNC: 2.1 MG/DL — SIGNIFICANT CHANGE UP (ref 1.6–2.6)
MCHC RBC-ENTMCNC: 28.8 PG — SIGNIFICANT CHANGE UP (ref 27–34)
MCHC RBC-ENTMCNC: 33.2 GM/DL — SIGNIFICANT CHANGE UP (ref 32–36)
MCV RBC AUTO: 86.7 FL — SIGNIFICANT CHANGE UP (ref 80–100)
NRBC # BLD: 0 /100 WBCS — SIGNIFICANT CHANGE UP (ref 0–0)
PHOSPHATE SERPL-MCNC: 3.4 MG/DL — SIGNIFICANT CHANGE UP (ref 2.5–4.5)
PLATELET # BLD AUTO: 204 K/UL — SIGNIFICANT CHANGE UP (ref 150–400)
POTASSIUM SERPL-MCNC: 3.2 MMOL/L — LOW (ref 3.5–5.3)
POTASSIUM SERPL-SCNC: 3.2 MMOL/L — LOW (ref 3.5–5.3)
PROTHROM AB SERPL-ACNC: 35 SEC — HIGH (ref 10.5–13.4)
RBC # BLD: 4.06 M/UL — SIGNIFICANT CHANGE UP (ref 3.8–5.2)
RBC # FLD: 15.1 % — HIGH (ref 10.3–14.5)
SARS-COV-2 RNA SPEC QL NAA+PROBE: SIGNIFICANT CHANGE UP
SODIUM SERPL-SCNC: 143 MMOL/L — SIGNIFICANT CHANGE UP (ref 135–145)
WBC # BLD: 7.06 K/UL — SIGNIFICANT CHANGE UP (ref 3.8–10.5)
WBC # FLD AUTO: 7.06 K/UL — SIGNIFICANT CHANGE UP (ref 3.8–10.5)

## 2023-01-28 PROCEDURE — 99233 SBSQ HOSP IP/OBS HIGH 50: CPT

## 2023-01-28 RX ORDER — POTASSIUM CHLORIDE 20 MEQ
40 PACKET (EA) ORAL ONCE
Refills: 0 | Status: COMPLETED | OUTPATIENT
Start: 2023-01-28 | End: 2023-01-28

## 2023-01-28 RX ADMIN — ONDANSETRON 4 MILLIGRAM(S): 8 TABLET, FILM COATED ORAL at 10:53

## 2023-01-28 RX ADMIN — AMLODIPINE BESYLATE 10 MILLIGRAM(S): 2.5 TABLET ORAL at 06:11

## 2023-01-28 RX ADMIN — Medication 100 MILLIGRAM(S): at 17:38

## 2023-01-28 RX ADMIN — LACOSAMIDE 100 MILLIGRAM(S): 50 TABLET ORAL at 17:43

## 2023-01-28 RX ADMIN — LACOSAMIDE 100 MILLIGRAM(S): 50 TABLET ORAL at 06:12

## 2023-01-28 RX ADMIN — Medication 100 MILLIGRAM(S): at 06:11

## 2023-01-28 RX ADMIN — CHLORHEXIDINE GLUCONATE 1 APPLICATION(S): 213 SOLUTION TOPICAL at 06:12

## 2023-01-28 RX ADMIN — OXCARBAZEPINE 300 MILLIGRAM(S): 300 TABLET, FILM COATED ORAL at 06:11

## 2023-01-28 RX ADMIN — LOSARTAN POTASSIUM 100 MILLIGRAM(S): 100 TABLET, FILM COATED ORAL at 06:11

## 2023-01-28 RX ADMIN — LEVETIRACETAM 400 MILLIGRAM(S): 250 TABLET, FILM COATED ORAL at 10:26

## 2023-01-28 RX ADMIN — OXCARBAZEPINE 300 MILLIGRAM(S): 300 TABLET, FILM COATED ORAL at 17:38

## 2023-01-28 RX ADMIN — LEVETIRACETAM 400 MILLIGRAM(S): 250 TABLET, FILM COATED ORAL at 21:59

## 2023-01-28 RX ADMIN — ATORVASTATIN CALCIUM 80 MILLIGRAM(S): 80 TABLET, FILM COATED ORAL at 21:59

## 2023-01-28 RX ADMIN — Medication 4 MILLIGRAM(S): at 21:59

## 2023-01-28 RX ADMIN — Medication 81 MILLIGRAM(S): at 10:27

## 2023-01-28 RX ADMIN — Medication 40 MILLIEQUIVALENT(S): at 17:37

## 2023-01-28 NOTE — PROGRESS NOTE ADULT - TIME BILLING
- preparing to see the patient (eg, review of tests, documents)  - performing a medically appropriate examination and/or evaluation  - counseling and educating the patient  - ordering medications, tests, or procedures  - referring and communicating with other health care professionals including cardiology team  - documenting clinical information in the electronic or other health record  - independently interpreting results and communicating results to the patient  - care coordination
- preparing to see the patient (eg, review of tests, documents)  - performing a medically appropriate examination and/or evaluation  - counseling and educating the patient  - ordering medications, tests, or procedures  - referring and communicating with other health care professionals including cardiology team  - documenting clinical information in the electronic or other health record  - independently interpreting results and communicating results to the patient  - care coordination

## 2023-01-28 NOTE — PROGRESS NOTE ADULT - SUBJECTIVE AND OBJECTIVE BOX
[facility] Division of Hospital Medicine  Maty Lisa MD  Pager (M-F, 7W-1D): [phone]  Other Times:  [phone]    Patient is a 62y old  Female who presents with a chief complaint of bradycardia (25 Jan 2023 11:57)      SUBJECTIVE / OVERNIGHT EVENTS: Covid positive. Reports that she is now tolerating some food. Denies abd pain, diarrhea. Afebrile and on RA    ADDITIONAL REVIEW OF SYSTEMS: otherwise negative    MEDICATIONS  (STANDING):  amLODIPine   Tablet 10 milliGRAM(s) Oral daily  aspirin enteric coated 81 milliGRAM(s) Oral daily  atorvastatin 80 milliGRAM(s) Oral at bedtime  chlorhexidine 2% Cloths 1 Application(s) Topical <User Schedule>  doxazosin 4 milliGRAM(s) Oral at bedtime  hydrochlorothiazide 25 milliGRAM(s) Oral daily  influenza   Vaccine 0.5 milliLiter(s) IntraMuscular once  lacosamide 100 milliGRAM(s) Oral two times a day  levETIRAcetam  IVPB 1500 milliGRAM(s) IV Intermittent every 12 hours  losartan 100 milliGRAM(s) Oral daily  OXcarbazepine 300 milliGRAM(s) Oral two times a day  senna 2 Tablet(s) Oral at bedtime  warfarin 7.5 milliGRAM(s) Oral once  zonisamide 100 milliGRAM(s) Oral two times a day    MEDICATIONS  (PRN):  acetaminophen     Tablet .. 650 milliGRAM(s) Oral every 6 hours PRN Temp greater or equal to 38C (100.4F), Mild Pain (1 - 3)  aluminum hydroxide/magnesium hydroxide/simethicone Suspension 30 milliLiter(s) Oral every 4 hours PRN Dyspepsia  melatonin 3 milliGRAM(s) Oral at bedtime PRN Insomnia  ondansetron Injectable 4 milliGRAM(s) IV Push every 8 hours PRN Nausea and/or Vomiting    PHYSICAL EXAM:  T(C): 36.7 (01-28-23 @ 11:43), Max: 36.9 (01-28-23 @ 04:00)  T(F): 98.1 (01-28-23 @ 11:43), Max: 98.4 (01-28-23 @ 04:00)  HR: 58 (01-28-23 @ 13:53) (49 - 60)  BP: 107/64 (01-28-23 @ 13:53) (96/58 - 152/83)  RR: 17 (01-28-23 @ 11:43) (17 - 18)  SpO2: 98% (01-28-23 @ 11:43) (95% - 98%)  Wt(kg): --    CONSTITUTIONAL: NAD, well-developed, well-groomed  EYES: PERRLA; conjunctiva and sclera clear  ENMT: Moist oral mucosa, no pharyngeal injection or exudates; normal dentition  NECK: Supple, no palpable masses; no thyromegaly  RESPIRATORY: Normal respiratory effort; lungs are clear to auscultation bilaterally  CARDIOVASCULAR: Bradycardic, normal S1 and S2, no murmur/rub/gallop; No lower extremity edema  ABDOMEN: Nontender to palpation, normoactive bowel sounds, no rebound/guarding; No hepatosplenomegaly  MUSCULOSKELETAL:  No clubbing or cyanosis of digits; no joint swelling or tenderness to palpation  PSYCH: A+O to person, place, and time; affect appropriate  NEUROLOGY: CN 2-12 are intact and symmetric; no gross sensory deficits, slowed speech   SKIN: No rashes; no palpable lesions    LABS: reviewed                                  11.7   7.06  )-----------( 204      ( 28 Jan 2023 06:55 )             35.2       01-28    143  |  107  |  17  ----------------------------<  90  3.2<L>   |  24  |  1.32<H>    Ca    9.9      28 Jan 2023 06:55  Phos  3.4     01-28  Mg     2.1     01-28    TPro  8.1  /  Alb  4.2  /  TBili  0.2  /  DBili  <0.1  /  AST  15  /  ALT  9<L>  /  AlkPhos  107  01-27                  PT/INR - ( 28 Jan 2023 06:55 )   PT: 35.0 sec;   INR: 3.01 ratio         PTT - ( 27 Jan 2023 07:03 )  PTT:35.6 sec          CAPILLARY BLOOD GLUCOSE                                    PT/INR - ( 27 Jan 2023 07:03 )   PT: 25.2 sec;   INR: 2.17 ratio         PTT - ( 27 Jan 2023 07:03 )  PTT:35.6 sec          CAPILLARY BLOOD GLUCOSE                  < from: Transthoracic Echocardiogram (01.25.23 @ 14:56) >  Conclusions:  1. Normal left ventricular internal dimensions and wall  thickness.  2. Normal left ventricular systolic function. No segmental  wall motion abnormalities.  3. Normal right ventricular size and function.  4. Estimated pulmonary artery systolic pressure equals 31  mm Hg, assuming right atrial pressure equals 8  mm Hg,  consistent with normal pulmonary pressures.    < end of copied text >

## 2023-01-28 NOTE — CONSULT NOTE ADULT - ASSESSMENT
61yo black F with CAD s/p PCI 2003, htn, brain aneurysm s/p hemicraniectomy and clipping (2013), seizures post hemicraniectomy, chronic DVTs on Coumadin p/w bradycardia .   CTH and CTA H/N 5/2022: frontal lobes encephalomalacia, old L occipital infarct, CTA H/N neg, aneusym clipi in [place]   TTE as above 1/25    Impression:   1) aneurysm s/p clipping and hemicrani  2) epilepsy  3) occipital stroke, ESUS     - c/w home AEDs --> on keppra 1500mg BID, vimpat 100mg BID, Oxcarbazepine 300mg BID, and zonisamide 100mg BID   - for secondary stroke prevention c/w ASA and statin therapy with LDL goal < 70    - cardiac workup in progress for gema   - Hemoglobin A1c and lipid panel  - rEEG if there is concern for seizure acitivity   - EP , would benefit from ILR for gema cardiac and to r/o occult AFib as cause of her occipital stroke   - telemetry  - PT/OT/SS/SLP, OOBC  - permissive HTN, -180mmHg.  - check FS, glucose control <180  - GI/DVT ppx  - Counseling on diet, exercise, and medication adherence was done  - Counseling on smoking cessation and alcohol consumption offered when appropriate.  - Pain assessed and judicious use of narcotics when appropriate was discussed.    - Stroke education given when appropriate.  - Importance of fall prevention discussed.   - Differential diagnosis and plan of care discussed with patient and/or family and primary team  - Thank you for allowing me to participate in the care of this patient. Call with questions.   - sees Dr doran outaptient   Tristan Braun MD  Vascular Neurology  Office: [phone]

## 2023-01-28 NOTE — PROGRESS NOTE ADULT - SUBJECTIVE AND OBJECTIVE BOX
DATE OF SERVICE: 01-28-23 @ 15:53    Patient is a 62y old  Female who presents with a chief complaint of bradycardia (28 Jan 2023 07:14)      INTERVAL HISTORY: no complaints    REVIEW OF SYSTEMS:  CONSTITUTIONAL: No weakness  EYES/ENT: No visual changes;  No throat pain   NECK: No pain or stiffness  RESPIRATORY: No cough, wheezing; No shortness of breath  CARDIOVASCULAR: No chest pain or palpitations  GASTROINTESTINAL: No abdominal  pain. No nausea, vomiting, or hematemesis  GENITOURINARY: No dysuria, frequency or hematuria  NEUROLOGICAL: No stroke like symptoms  SKIN: No rashes    	  MEDICATIONS:  amLODIPine   Tablet 10 milliGRAM(s) Oral daily  doxazosin 4 milliGRAM(s) Oral at bedtime  hydrochlorothiazide 25 milliGRAM(s) Oral daily  losartan 100 milliGRAM(s) Oral daily        PHYSICAL EXAM:  T(C): 36.7 (01-28-23 @ 11:43), Max: 36.9 (01-28-23 @ 04:00)  HR: 58 (01-28-23 @ 13:53) (49 - 60)  BP: 107/64 (01-28-23 @ 13:53) (96/58 - 152/83)  RR: 17 (01-28-23 @ 11:43) (17 - 18)  SpO2: 98% (01-28-23 @ 11:43) (95% - 98%)  Wt(kg): --  I&O's Summary    27 Jan 2023 07:01  -  28 Jan 2023 07:00  --------------------------------------------------------  IN: 680 mL / OUT: 0 mL / NET: 680 mL    28 Jan 2023 07:01  -  28 Jan 2023 15:53  --------------------------------------------------------  IN: 100 mL / OUT: 0 mL / NET: 100 mL          Appearance: In no distress	  HEENT:    PERRL, EOMI	  Cardiovascular:  S1 S2, No JVD  Respiratory: Lungs clear to auscultation	  Gastrointestinal:  Soft, Non-tender, + BS	  Vascularature:  No edema of LE  Psychiatric: Appropriate affect   Neuro: no acute focal deficits                               11.7   7.06  )-----------( 204      ( 28 Jan 2023 06:55 )             35.2     01-28    143  |  107  |  17  ----------------------------<  90  3.2<L>   |  24  |  1.32<H>    Ca    9.9      28 Jan 2023 06:55  Phos  3.4     01-28  Mg     2.1     01-28    TPro  8.1  /  Alb  4.2  /  TBili  0.2  /  DBili  <0.1  /  AST  15  /  ALT  9<L>  /  AlkPhos  107  01-27        Labs personally reviewed      ASSESSMENT/PLAN: 	    Ms. Denis is a 61 yo female with PMH of CAD s/p PCI in 2003, HTN, brain aneursyms s/p hemicraniotomy and clipping in 2013 c/b epilepsy chronic DVT now on coumadin who presents with symptomatic bradycardia.    Problem/Plan -1  Problem: Bradycardia  - ECG reveals SB; tele shows HR mostly in 30-40s but as long as 29  - EP following  - TTE unremarkable  - TSH wnl  - Cont to monitor on tele. Recommend pacing pads and atropine at bedside  - HR still persistently bradycardic. Will DC clonidine and trend.   - 1/28 HR slightly improved today 50-60 bpm    Problem/Plan -2  Problem: Hypertension  - Patient hypotensive with EMS but on multiple antihypertensive meds  - c/w all home meds with the exception of labetalol and clonidine     Problem/Plan -3  Problem: Coronary Artery Disease  - s/p remote PCI  - Denies CP or SOB  - Echo unremarkable  - c/w ASA 81mg PO daily, rosuvastatin 40mg PO daily    Problem/Plan -4  Problem: hx of DVT  - c/w coumadin and dose to INR 2-3    Problem/Plan -5  Problem: Hx of Brain Aneursyms with intervention and Epilepsy  - Now with increased forgetfulness and lethargy  - Neuro recs appreciated. Pt defers ILR placement at this time.           RENÉE Magana DO [facility]  Cardiovascular Medicine  [address], [address]  Office: [phone]  Available via call/text on [employer] Teams  DATE OF SERVICE: 01-28-23 @ 15:53    Patient is a 62y old  Female who presents with a chief complaint of bradycardia (28 Jan 2023 07:14)      INTERVAL HISTORY: no complaints    REVIEW OF SYSTEMS:  CONSTITUTIONAL: No weakness  EYES/ENT: No visual changes;  No throat pain   NECK: No pain or stiffness  RESPIRATORY: No cough, wheezing; No shortness of breath  CARDIOVASCULAR: No chest pain or palpitations  GASTROINTESTINAL: No abdominal  pain. No nausea, vomiting, or hematemesis  GENITOURINARY: No dysuria, frequency or hematuria  NEUROLOGICAL: No stroke like symptoms  SKIN: No rashes    	  MEDICATIONS:  amLODIPine   Tablet 10 milliGRAM(s) Oral daily  doxazosin 4 milliGRAM(s) Oral at bedtime  hydrochlorothiazide 25 milliGRAM(s) Oral daily  losartan 100 milliGRAM(s) Oral daily        PHYSICAL EXAM:  T(C): 36.7 (01-28-23 @ 11:43), Max: 36.9 (01-28-23 @ 04:00)  HR: 58 (01-28-23 @ 13:53) (49 - 60)  BP: 107/64 (01-28-23 @ 13:53) (96/58 - 152/83)  RR: 17 (01-28-23 @ 11:43) (17 - 18)  SpO2: 98% (01-28-23 @ 11:43) (95% - 98%)  Wt(kg): --  I&O's Summary    27 Jan 2023 07:01  -  28 Jan 2023 07:00  --------------------------------------------------------  IN: 680 mL / OUT: 0 mL / NET: 680 mL    28 Jan 2023 07:01  -  28 Jan 2023 15:53  --------------------------------------------------------  IN: 100 mL / OUT: 0 mL / NET: 100 mL          Appearance: In no distress	  HEENT:    PERRL, EOMI	  Cardiovascular:  S1 S2, No JVD  Respiratory: Lungs clear to auscultation	  Gastrointestinal:  Soft, Non-tender, + BS	  Vascularature:  No edema of LE  Psychiatric: Appropriate affect   Neuro: no acute focal deficits                               11.7   7.06  )-----------( 204      ( 28 Jan 2023 06:55 )             35.2     01-28    143  |  107  |  17  ----------------------------<  90  3.2<L>   |  24  |  1.32<H>    Ca    9.9      28 Jan 2023 06:55  Phos  3.4     01-28  Mg     2.1     01-28    TPro  8.1  /  Alb  4.2  /  TBili  0.2  /  DBili  <0.1  /  AST  15  /  ALT  9<L>  /  AlkPhos  107  01-27        Labs personally reviewed      ASSESSMENT/PLAN: 	    Ms. Denis is a 63 yo female with PMH of CAD s/p PCI in 2003, HTN, brain aneursyms s/p hemicraniotomy and clipping in 2013 c/b epilepsy chronic DVT now on coumadin who presents with symptomatic bradycardia.    Problem/Plan -1  Problem: Bradycardia  - ECG reveals SB; tele shows HR mostly in 30-40s but as long as 29  - EP following  - TTE unremarkable  - TSH wnl  - Cont to monitor on tele. Recommend pacing pads and atropine at bedside  - HR still persistently bradycardic. Will DC clonidine and trend.   - 1/28 HR slightly improved today 50-60 bpm    Problem/Plan -2  Problem: Hypertension  - Patient hypotensive with EMS but on multiple antihypertensive meds  - c/w all home meds with the exception of labetalol and clonidine     Problem/Plan -3  Problem: Coronary Artery Disease  - s/p remote PCI  - Denies CP or SOB  - Echo unremarkable  - c/w ASA 81mg PO daily, rosuvastatin 40mg PO daily    Problem/Plan -4  Problem: hx of DVT  - c/w coumadin and dose to INR 2-3    Problem/Plan -5  Problem: Hx of Brain Aneursyms with intervention and Epilepsy  - Now with increased forgetfulness and lethargy  - Neuro recs appreciated. Pt defers ILR placement at this time.           RENÉE Magana DO [facility]  Cardiovascular Medicine  [address], [address]  Office: [phone]  Available via call/text on [employer] Teams

## 2023-01-28 NOTE — CONSULT NOTE ADULT - SUBJECTIVE AND OBJECTIVE BOX
Neurology Consult    Reason for Consult: Patient is a 62y old  Female who presents with a chief complaint of bradycardia (27 Jan 2023 16:46)      HPI:  62y f pmh CAD s/p PCI 2003, htn, brain aneurysm s/p hemicraniectomy and clipping (2013),   seizures post hemicraniectomy (on Keppra), chronic DVTs on Coumadin. Pt comes to the   ED today because she was having a routine visit with her cardiologist and found to be   bradycardic to the 30s. EMS was called, pt HR still in 30s and was also w/low Bp (sbp 80s) by   EMS, pt remained asymptomatic throughout. Patient does report that when she was trying to   get on the stretcher with EMS she felt a bit dizzy but otherwise has not been having any   symptoms at home. No CP, sob, palpitation, N/V, diarrhea, fever, chills    In ED, pt continues to be bradycardic but BP improved (sbp 140s -150s).  Was eval by EP in ED    (24 Jan 2023 20:23)       PAST MEDICAL & SURGICAL HISTORY:  HTN (hypertension)      Epilepsy      Coronary artery disease      Brain aneurysm      Hyperlipidemia      Coronary artery disease      Deep vein thrombosis (DVT)      Seizure      Hypertension      Stroke      Aneurysm      Cholecystostomy care      Aneurysm  see HPI  s/p cerebral aneurysm clipping in Dec 2013, after which pt was in 30 day induced coma, had  shunt,  tracheostomy and reversal, followed by rehab      S/P  shunt      History of cranioplasty  B/L 04/14      H/O craniotomy      Presence of IVC filter      S/P primary angioplasty with coronary stent      H/O cerebral aneurysm repair  clipping          Allergies: Allergies    [This allergen will not trigger allergy alert] IV DYE, IODINE CONTRAST (Unknown)  [This allergen will not trigger allergy alert] Sulfa (Sulfonamide Antibiotics) (Hives)  Clindamycin Phosphate (Rash)  doxycycline (Rash)  latex (Unknown)  penicillin (Hives)  sulfa drugs (Other)    Intolerances        Social History: Denies toxic habits including tobacco, ETOH or illicit drugs.    Family History: FAMILY HISTORY:  . No family history of strokes    Medications: MEDICATIONS  (STANDING):  amLODIPine   Tablet 10 milliGRAM(s) Oral daily  aspirin enteric coated 81 milliGRAM(s) Oral daily  atorvastatin 80 milliGRAM(s) Oral at bedtime  chlorhexidine 2% Cloths 1 Application(s) Topical <User Schedule>  doxazosin 4 milliGRAM(s) Oral at bedtime  hydrochlorothiazide 25 milliGRAM(s) Oral daily  influenza   Vaccine 0.5 milliLiter(s) IntraMuscular once  lacosamide 100 milliGRAM(s) Oral two times a day  levETIRAcetam  IVPB 1500 milliGRAM(s) IV Intermittent every 12 hours  losartan 100 milliGRAM(s) Oral daily  OXcarbazepine 300 milliGRAM(s) Oral two times a day  senna 2 Tablet(s) Oral at bedtime  zonisamide 100 milliGRAM(s) Oral two times a day    MEDICATIONS  (PRN):  acetaminophen     Tablet .. 650 milliGRAM(s) Oral every 6 hours PRN Temp greater or equal to 38C (100.4F), Mild Pain (1 - 3)  aluminum hydroxide/magnesium hydroxide/simethicone Suspension 30 milliLiter(s) Oral every 4 hours PRN Dyspepsia  melatonin 3 milliGRAM(s) Oral at bedtime PRN Insomnia  ondansetron Injectable 4 milliGRAM(s) IV Push every 8 hours PRN Nausea and/or Vomiting      Review of Systems:  CONSTITUTIONAL:  No weight loss, fever, chills, weakness or fatigue.  HEENT:  Eyes:  No visual loss, blurred vision, double vision or yellow sclera. Ears, Nose, Throat:  No hearing loss, sneezing, congestion, runny nose or sore throat.  SKIN:  No rash or itching.  CARDIOVASCULAR:  No chest pain, chest pressure or chest discomfort. No palpitations or edema.  RESPIRATORY:  No shortness of breath, cough or sputum.  GASTROINTESTINAL:  No anorexia, nausea, vomiting or diarrhea. No abdominal pain or blood.  GENITOURINARY:  No burning on urination or incontinence   NEUROLOGICAL:  No headache, dizziness, syncope, paralysis, ataxia, numbness or tingling in the extremities. No change in bowel or bladder control. no limb weakness. no vision changes.   MUSCULOSKELETAL:  No muscle, back pain, joint pain or stiffness.  HEMATOLOGIC:  No anemia, bleeding or bruising.  LYMPHATICS:  No enlarged nodes. No history of splenectomy.  PSYCHIATRIC:  No history of depression or anxiety.  ENDOCRINOLOGIC:  No reports of sweating, cold or heat intolerance. No polyuria or polydipsia.      Vitals:  Vital Signs Last 24 Hrs  T(C): 36.9 (28 Jan 2023 04:00), Max: 36.9 (28 Jan 2023 04:00)  T(F): 98.4 (28 Jan 2023 04:00), Max: 98.4 (28 Jan 2023 04:00)  HR: 59 (28 Jan 2023 04:00) (47 - 67)  BP: 144/75 (28 Jan 2023 04:00) (101/57 - 152/83)  BP(mean): --  RR: 18 (28 Jan 2023 04:00) (18 - 18)  SpO2: 98% (28 Jan 2023 04:00) (95% - 100%)    Parameters below as of 28 Jan 2023 04:00  Patient On (Oxygen Delivery Method): room air        General Exam:   General Appearance: Appropriately dressed and in no acute distress       Head: Normocephalic, atraumatic and no dysmorphic features  Ear, Nose, and Throat: Moist mucous membranes  CVS: S1S2+  Resp: No SOB, no wheeze or rhonchi  GI: soft NT/ND  Extremities: No edema or cyanosis  Skin: No bruises or rashes     Neurological Exam:  Mental Status: Awake, alert and oriented x 3.  Able to follow simple and complex verbal commands. Able to name and repeat. fluent speech. No obvious aphasia or dysarthria noted.   Cranial Nerves: PERRL, EOMI, VFFC, sensation V1-V3 intact,  no obvious facial asymmetry, equal elevation of palate, scm/trap 5/5, tongue is midline on protrusion. no obvious papilledema on fundoscopic exam. hearing is grossly intact.   Motor: Normal bulk, tone and strength throughout. Fine finger movements were intact and symmetric. no tremors or drift noted.    Sensation: Intact to light touch and pinprick throughout. no right/left confusion. no extinction to tactile on DSS.    Reflexes: 1+ throughout at biceps, brachioradialis, triceps, patellars and ankles bilaterally and equal. No clonus. R toe and L toe were both downgoing.  Coordination: No dysmetria on FNF   Gait: deferred     Data/Labs/Imaging which I personally reviewed.     Labs:     CBC Full  -  ( 28 Jan 2023 06:55 )  WBC Count : 7.06 K/uL  RBC Count : 4.06 M/uL  Hemoglobin : 11.7 g/dL  Hematocrit : 35.2 %  Platelet Count - Automated : 204 K/uL  Mean Cell Volume : 86.7 fl  Mean Cell Hemoglobin : 28.8 pg  Mean Cell Hemoglobin Concentration : 33.2 gm/dL  Auto Neutrophil # : x  Auto Lymphocyte # : x  Auto Monocyte # : x  Auto Eosinophil # : x  Auto Basophil # : x  Auto Neutrophil % : x  Auto Lymphocyte % : x  Auto Monocyte % : x  Auto Eosinophil % : x  Auto Basophil % : x        TPro  8.1  /  Alb  4.2  /  TBili  0.2  /  DBili  <0.1  /  AST  15  /  ALT  9<L>  /  AlkPhos  107  01-27    LIVER FUNCTIONS - ( 27 Jan 2023 15:14 )  Alb: 4.2 g/dL / Pro: 8.1 g/dL / ALK PHOS: 107 U/L / ALT: 9 U/L / AST: 15 U/L / GGT: x           PT/INR - ( 27 Jan 2023 07:03 )   PT: 25.2 sec;   INR: 2.17 ratio         PTT - ( 27 Jan 2023 07:03 )  PTT:35.6 sec        Patient name: BELLE MENENDEZ  YOB: 1960   Age: 62 (F)   MR#: [id number]  Study Date: 1/25/2023  [facility]  Study quality: Technically good  Referring Physician: Bibiana Merchant MD  Blood Pressure: 111/54 mmHg  Height: 157 cm  Weight: 70 kg  BSA: 1.7 m2  ------------------------------------------------------------------------  PROCEDURE: Transthoracic echocardiogram with 2-D, M-Mode  and complete spectral and color flow Doppler.  INDICATION: Abnormal electrocardiogram (ECG) (EKG) (R94.31)  ------------------------------------------------------------------------  Dimensions:    Normal Values:  LA:     3.9    2.0 - 4.0 cm  Ao:     2.9    2.0 - 3.8 cm  SEPTUM: 1.0    0.6 - 1.2 cm  PWT:    0.8    0.6 - 1.1 cm  LVIDd:  4.7    3.0 - 5.6 cm  LVIDs:  2.6    1.8 - 4.0 cm  Derived variables:  LVMI: 83 g/m2  RWT: 0.34  Fractional short: 45 %  EF (Carreno Rule): 61 %Doppler Peak Velocity (m/sec):  AoV=1.4  ------------------------------------------------------------------------  Observations:  Mitral Valve: Normal mitral valve. Minimal mitral  regurgitation.  Aortic Valve/Aorta: Thickened trileaflet aortic valve. Peak  transaortic valve gradient equals 8 mm Hg. No aortic valve  regurgitation seen. Peak left ventricular outflow tract  gradient equals 5 mm Hg, mean gradient is equal to 2 mm Hg,  LVOT velocity time integral equals 26 cm.  Aortic Root: 2.9 cm.  Ascending Aorta: 3.3 cm.  LVOT diameter: 1.9 cm.  Left Atrium: Normal left atrium.  LA volume index = 25  cc/m2.  Left Ventricle: Normal left ventricular systolic function.  No segmental wall motion abnormalities. Normal left  ventricular internal dimensions and wall thickness. Normal  diastolic function.  Right Heart: Normal right atrium. Normal right ventricular  size and function. Normal tricuspid valve. Mild tricuspid  regurgitation. Pulmonic valve not well visualized, probably  normal. No pulmonic regurgitation.  Pericardium/Pleura: Normal pericardium with no pericardial  effusion.  Hemodynamic: Estimated right atrial pressure is 8 mm Hg.  Estimated right ventricular systolic pressure equals 31 mm  Hg, assuming right atrial pressure equals 8 mm Hg,  consistent with normal pulmonary pressures.  ------------------------------------------------------------------------  Conclusions:  1. Normal left ventricular internal dimensions and wall  thickness.  2. Normal left ventricular systolic function. No segmental  wall motion abnormalities.  3. Normal right ventricular size and function.  4. Estimated pulmonary artery systolic pressure equals 31  mm Hg, assuming right atrial pressure equals 8  mm Hg,  consistent with normal pulmonary pressures.  ------------------------------------------------------------------------  Confirmed on  1/25/2023 - 17:33:29 by PAULA Kimble  ------------------------------------------------------------------------    < end of copied text >

## 2023-01-29 LAB
ANION GAP SERPL CALC-SCNC: 13 MMOL/L — SIGNIFICANT CHANGE UP (ref 5–17)
APTT BLD: 43.8 SEC — HIGH (ref 27.5–35.5)
BUN SERPL-MCNC: 26 MG/DL — HIGH (ref 7–23)
CALCIUM SERPL-MCNC: 9.7 MG/DL — SIGNIFICANT CHANGE UP (ref 8.4–10.5)
CHLORIDE SERPL-SCNC: 110 MMOL/L — HIGH (ref 96–108)
CO2 SERPL-SCNC: 22 MMOL/L — SIGNIFICANT CHANGE UP (ref 22–31)
CREAT SERPL-MCNC: 1.7 MG/DL — HIGH (ref 0.5–1.3)
EGFR: 34 ML/MIN/1.73M2 — LOW
GLUCOSE BLDC GLUCOMTR-MCNC: 91 MG/DL — SIGNIFICANT CHANGE UP (ref 70–99)
GLUCOSE SERPL-MCNC: 91 MG/DL — SIGNIFICANT CHANGE UP (ref 70–99)
INR BLD: 4.32 RATIO — HIGH (ref 0.88–1.16)
POTASSIUM SERPL-MCNC: 3.3 MMOL/L — LOW (ref 3.5–5.3)
POTASSIUM SERPL-SCNC: 3.3 MMOL/L — LOW (ref 3.5–5.3)
PROTHROM AB SERPL-ACNC: 50.4 SEC — HIGH (ref 10.5–13.4)
SODIUM SERPL-SCNC: 145 MMOL/L — SIGNIFICANT CHANGE UP (ref 135–145)

## 2023-01-29 PROCEDURE — 70450 CT HEAD/BRAIN W/O DYE: CPT | Mod: 26

## 2023-01-29 PROCEDURE — 99232 SBSQ HOSP IP/OBS MODERATE 35: CPT

## 2023-01-29 RX ORDER — LEVETIRACETAM 250 MG/1
1500 TABLET, FILM COATED ORAL
Refills: 0 | Status: DISCONTINUED | OUTPATIENT
Start: 2023-01-29 | End: 2023-02-08

## 2023-01-29 RX ORDER — SODIUM CHLORIDE 9 MG/ML
1000 INJECTION INTRAMUSCULAR; INTRAVENOUS; SUBCUTANEOUS
Refills: 0 | Status: DISCONTINUED | OUTPATIENT
Start: 2023-01-29 | End: 2023-01-30

## 2023-01-29 RX ORDER — POTASSIUM CHLORIDE 20 MEQ
40 PACKET (EA) ORAL EVERY 4 HOURS
Refills: 0 | Status: COMPLETED | OUTPATIENT
Start: 2023-01-29 | End: 2023-01-29

## 2023-01-29 RX ADMIN — SENNA PLUS 2 TABLET(S): 8.6 TABLET ORAL at 23:17

## 2023-01-29 RX ADMIN — SODIUM CHLORIDE 75 MILLILITER(S): 9 INJECTION INTRAMUSCULAR; INTRAVENOUS; SUBCUTANEOUS at 11:07

## 2023-01-29 RX ADMIN — Medication 40 MILLIEQUIVALENT(S): at 17:03

## 2023-01-29 RX ADMIN — AMLODIPINE BESYLATE 10 MILLIGRAM(S): 2.5 TABLET ORAL at 06:20

## 2023-01-29 RX ADMIN — LACOSAMIDE 100 MILLIGRAM(S): 50 TABLET ORAL at 17:03

## 2023-01-29 RX ADMIN — LEVETIRACETAM 1500 MILLIGRAM(S): 250 TABLET, FILM COATED ORAL at 23:17

## 2023-01-29 RX ADMIN — Medication 4 MILLIGRAM(S): at 23:16

## 2023-01-29 RX ADMIN — Medication 100 MILLIGRAM(S): at 17:04

## 2023-01-29 RX ADMIN — OXCARBAZEPINE 300 MILLIGRAM(S): 300 TABLET, FILM COATED ORAL at 17:04

## 2023-01-29 RX ADMIN — ATORVASTATIN CALCIUM 80 MILLIGRAM(S): 80 TABLET, FILM COATED ORAL at 23:16

## 2023-01-29 RX ADMIN — CHLORHEXIDINE GLUCONATE 1 APPLICATION(S): 213 SOLUTION TOPICAL at 06:20

## 2023-01-29 RX ADMIN — LACOSAMIDE 100 MILLIGRAM(S): 50 TABLET ORAL at 06:23

## 2023-01-29 RX ADMIN — OXCARBAZEPINE 300 MILLIGRAM(S): 300 TABLET, FILM COATED ORAL at 06:20

## 2023-01-29 RX ADMIN — Medication 100 MILLIGRAM(S): at 06:20

## 2023-01-29 RX ADMIN — LOSARTAN POTASSIUM 100 MILLIGRAM(S): 100 TABLET, FILM COATED ORAL at 06:21

## 2023-01-29 RX ADMIN — LEVETIRACETAM 400 MILLIGRAM(S): 250 TABLET, FILM COATED ORAL at 10:12

## 2023-01-29 RX ADMIN — Medication 40 MILLIEQUIVALENT(S): at 23:18

## 2023-01-29 RX ADMIN — Medication 81 MILLIGRAM(S): at 11:07

## 2023-01-29 NOTE — PROGRESS NOTE ADULT - ASSESSMENT
61yo black F with CAD s/p PCI 2003, htn, brain aneurysm s/p hemicraniectomy and clipping (2013), seizures post hemicraniectomy, chronic DVTs on Coumadin p/w bradycardia .   CTH and CTA H/N 5/2022: frontal lobes encephalomalacia, old L occipital infarct, CTA H/N neg, aneusym clipi in [place]   TTE as above 1/25  + COVID    Impression:   1) aneurysm s/p clipping and hemicrani  2) epilepsy  3) occipital stroke, ESUS     - AC for DVT   - c/w home AEDs --> on keppra 1500mg BID, vimpat 100mg BID, Oxcarbazepine 300mg BID, and zonisamide 100mg BID   - for secondary stroke prevention c/w ASA and statin therapy with LDL goal < 70    - cardiac workup in progress for gema   - Hemoglobin A1c and lipid panel  - rEEG if there is concern for seizure acitivity   - EP , would benefit from ILR for gema cardiac and to r/o occult AFib as cause of her occipital stroke   - telemetry  - PT/OT/SS/SLP, OOBC  - check FS, glucose control <180  - GI/DVT ppx  - sees Dr doran outaptient   Tristan Braun MD  Vascular Neurology  Office: [phone]

## 2023-01-29 NOTE — PROGRESS NOTE ADULT - SUBJECTIVE AND OBJECTIVE BOX
DATE OF SERVICE: 01-29-23 @ 14:17    Patient is a 62y old  Female who presents with a chief complaint of bradycardia (29 Jan 2023 08:06)      INTERVAL HISTORY: no complaints     REVIEW OF SYSTEMS:  CONSTITUTIONAL: No weakness  EYES/ENT: No visual changes;  No throat pain   NECK: No pain or stiffness  RESPIRATORY: No cough, wheezing; No shortness of breath  CARDIOVASCULAR: No chest pain or palpitations  GASTROINTESTINAL: No abdominal  pain. No nausea, vomiting, or hematemesis  GENITOURINARY: No dysuria, frequency or hematuria  NEUROLOGICAL: No stroke like symptoms  SKIN: No rashes    	  MEDICATIONS:  amLODIPine   Tablet 10 milliGRAM(s) Oral daily  doxazosin 4 milliGRAM(s) Oral at bedtime  hydrochlorothiazide 25 milliGRAM(s) Oral daily  losartan 100 milliGRAM(s) Oral daily        PHYSICAL EXAM:  T(C): 36.8 (01-29-23 @ 11:33), Max: 37 (01-29-23 @ 04:40)  HR: 67 (01-29-23 @ 11:33) (64 - 69)  BP: 118/69 (01-29-23 @ 11:33) (118/68 - 122/73)  RR: 18 (01-29-23 @ 11:33) (18 - 18)  SpO2: 98% (01-29-23 @ 11:33) (96% - 100%)  Wt(kg): --  I&O's Summary    28 Jan 2023 07:01  -  29 Jan 2023 07:00  --------------------------------------------------------  IN: 100 mL / OUT: 0 mL / NET: 100 mL          Appearance: In no distress	  HEENT:    PERRL, EOMI	  Cardiovascular:  S1 S2, No JVD  Respiratory: Lungs clear to auscultation	  Gastrointestinal:  Soft, Non-tender, + BS	  Vascularature:  No edema of LE  Psychiatric: Appropriate affect   Neuro: no acute focal deficits                               11.7   7.06  )-----------( 204      ( 28 Jan 2023 06:55 )             35.2     01-29    145  |  110<H>  |  26<H>  ----------------------------<  91  3.3<L>   |  22  |  1.70<H>    Ca    9.7      29 Jan 2023 06:31  Phos  3.4     01-28  Mg     2.1     01-28    TPro  8.1  /  Alb  4.2  /  TBili  0.2  /  DBili  <0.1  /  AST  15  /  ALT  9<L>  /  AlkPhos  107  01-27        Labs personally reviewed      ASSESSMENT/PLAN: 	  Ms. Denis is a 63 yo female with PMH of CAD s/p PCI in 2003, HTN, brain aneursyms s/p hemicraniotomy and clipping in 2013 c/b epilepsy chronic DVT now on coumadin who presents with symptomatic bradycardia.    Problem/Plan -1  Problem: Bradycardia  - ECG reveals SB; tele shows HR mostly in 30-40s but as long as 29  - EP following  - TTE unremarkable  - TSH wnl  - Cont to monitor on tele. Recommend pacing pads and atropine at bedside  - HR still persistently bradycardic. Will DC clonidine and trend.   - 1/28 HR slightly improved today 50-60 bpm    Problem/Plan -2  Problem: Hypertension  - Patient hypotensive with EMS but on multiple antihypertensive meds  - c/w all home meds with the exception of labetalol and clonidine     Problem/Plan -3  Problem: Coronary Artery Disease  - s/p remote PCI  - Denies CP or SOB  - Echo unremarkable  - c/w ASA 81mg PO daily, rosuvastatin 40mg PO daily    Problem/Plan -4  Problem: hx of DVT  - c/w coumadin and dose to INR 2-3    Problem/Plan -5  Problem: Hx of Brain Aneursyms with intervention and Epilepsy  - Now with increased forgetfulness and lethargy  - Neuro recs appreciated. Pt defers ILR placement at this time.             RENÉE Magana DO [facility]  Cardiovascular Medicine  [address], [address]  Office: [phone]  Available via call/text on [employer] Teams  DATE OF SERVICE: 01-29-23 @ 14:17    Patient is a 62y old  Female who presents with a chief complaint of bradycardia (29 Jan 2023 08:06)      INTERVAL HISTORY: no complaints     REVIEW OF SYSTEMS:  CONSTITUTIONAL: No weakness  EYES/ENT: No visual changes;  No throat pain   NECK: No pain or stiffness  RESPIRATORY: No cough, wheezing; No shortness of breath  CARDIOVASCULAR: No chest pain or palpitations  GASTROINTESTINAL: No abdominal  pain. No nausea, vomiting, or hematemesis  GENITOURINARY: No dysuria, frequency or hematuria  NEUROLOGICAL: No stroke like symptoms  SKIN: No rashes    	  MEDICATIONS:  amLODIPine   Tablet 10 milliGRAM(s) Oral daily  doxazosin 4 milliGRAM(s) Oral at bedtime  hydrochlorothiazide 25 milliGRAM(s) Oral daily  losartan 100 milliGRAM(s) Oral daily        PHYSICAL EXAM:  T(C): 36.8 (01-29-23 @ 11:33), Max: 37 (01-29-23 @ 04:40)  HR: 67 (01-29-23 @ 11:33) (64 - 69)  BP: 118/69 (01-29-23 @ 11:33) (118/68 - 122/73)  RR: 18 (01-29-23 @ 11:33) (18 - 18)  SpO2: 98% (01-29-23 @ 11:33) (96% - 100%)  Wt(kg): --  I&O's Summary    28 Jan 2023 07:01  -  29 Jan 2023 07:00  --------------------------------------------------------  IN: 100 mL / OUT: 0 mL / NET: 100 mL          Appearance: In no distress	  HEENT:    PERRL, EOMI	  Cardiovascular:  S1 S2, No JVD  Respiratory: Lungs clear to auscultation	  Gastrointestinal:  Soft, Non-tender, + BS	  Vascularature:  No edema of LE  Psychiatric: Appropriate affect   Neuro: no acute focal deficits                               11.7   7.06  )-----------( 204      ( 28 Jan 2023 06:55 )             35.2     01-29    145  |  110<H>  |  26<H>  ----------------------------<  91  3.3<L>   |  22  |  1.70<H>    Ca    9.7      29 Jan 2023 06:31  Phos  3.4     01-28  Mg     2.1     01-28    TPro  8.1  /  Alb  4.2  /  TBili  0.2  /  DBili  <0.1  /  AST  15  /  ALT  9<L>  /  AlkPhos  107  01-27        Labs personally reviewed      ASSESSMENT/PLAN: 	  Ms. Denis is a 61 yo female with PMH of CAD s/p PCI in 2003, HTN, brain aneursyms s/p hemicraniotomy and clipping in 2013 c/b epilepsy chronic DVT now on coumadin who presents with symptomatic bradycardia.    Problem/Plan -1  Problem: Bradycardia  - ECG reveals SB; tele shows HR mostly in 30-40s but as long as 29  - EP following  - TTE unremarkable  - TSH wnl  - HR  improved  60s bpm  - remain off Clonidine and Labetalol    Problem/Plan -2  Problem: Hypertension  - Patient hypotensive with EMS but on multiple antihypertensive meds  - c/w all home meds with the exception of labetalol and clonidine     Problem/Plan -3  Problem: Coronary Artery Disease  - s/p remote PCI  - Denies CP or SOB  - Echo unremarkable  - c/w ASA 81mg PO daily, rosuvastatin 40mg PO daily    Problem/Plan -4  Problem: hx of DVT  - c/w coumadin and dose to INR 2-3    Problem/Plan -5  Problem: Hx of Brain Aneursyms with intervention and Epilepsy  - Now with increased forgetfulness and lethargy  - Neuro recs appreciated. Pt defers ILR placement at this time.       Discharge planning for Monday if HR remains stable      RENÉE Magana DO [facility]  Cardiovascular Medicine  [address], [address]  Office: [phone]  Available via call/text on [employer] Teams

## 2023-01-29 NOTE — PROGRESS NOTE ADULT - SUBJECTIVE AND OBJECTIVE BOX
Neurology Progress Note    S: Patient seen and examined. now on airborne precautions for covid     Medication:  acetaminophen     Tablet .. 650 milliGRAM(s) Oral every 6 hours PRN  aluminum hydroxide/magnesium hydroxide/simethicone Suspension 30 milliLiter(s) Oral every 4 hours PRN  amLODIPine   Tablet 10 milliGRAM(s) Oral daily  aspirin enteric coated 81 milliGRAM(s) Oral daily  atorvastatin 80 milliGRAM(s) Oral at bedtime  chlorhexidine 2% Cloths 1 Application(s) Topical <User Schedule>  doxazosin 4 milliGRAM(s) Oral at bedtime  hydrochlorothiazide 25 milliGRAM(s) Oral daily  influenza   Vaccine 0.5 milliLiter(s) IntraMuscular once  lacosamide 100 milliGRAM(s) Oral two times a day  levETIRAcetam  IVPB 1500 milliGRAM(s) IV Intermittent every 12 hours  losartan 100 milliGRAM(s) Oral daily  melatonin 3 milliGRAM(s) Oral at bedtime PRN  ondansetron Injectable 4 milliGRAM(s) IV Push every 8 hours PRN  OXcarbazepine 300 milliGRAM(s) Oral two times a day  senna 2 Tablet(s) Oral at bedtime  zonisamide 100 milliGRAM(s) Oral two times a day      Vitals:  Vital Signs Last 24 Hrs  T(C): 37 (29 Jan 2023 04:40), Max: 37 (29 Jan 2023 04:40)  T(F): 98.6 (29 Jan 2023 04:40), Max: 98.6 (29 Jan 2023 04:40)  HR: 66 (29 Jan 2023 04:40) (58 - 69)  BP: 121/75 (29 Jan 2023 04:40) (96/58 - 122/73)  BP(mean): --  RR: 18 (29 Jan 2023 04:40) (17 - 18)  SpO2: 100% (29 Jan 2023 04:40) (96% - 100%)    Parameters below as of 29 Jan 2023 04:40  Patient On (Oxygen Delivery Method): room air        General Exam:   General Appearance: Appropriately dressed and in no acute distress       Head: Normocephalic, atraumatic and no dysmorphic features  Ear, Nose, and Throat: Moist mucous membranes  CVS: S1S2+  Resp: No SOB, no wheeze or rhonchi  GI: soft NT/ND  Extremities: No edema or cyanosis  Skin: No bruises or rashes     Neurological Exam:  Mental Status: Awake, alert and oriented x 3.  Able to follow simple and complex verbal commands. Able to name and repeat. fluent speech. No obvious aphasia or dysarthria noted.   Cranial Nerves: PERRL, EOMI, VFFC, sensation V1-V3 intact,  no obvious facial asymmetry, equal elevation of palate, scm/trap 5/5, tongue is midline on protrusion. no obvious papilledema on fundoscopic exam. hearing is grossly intact.   Motor: Normal bulk, tone and strength throughout. Fine finger movements were intact and symmetric. no tremors or drift noted.    Sensation: Intact to light touch and pinprick throughout. no right/left confusion. no extinction to tactile on DSS.    Reflexes: 1+ throughout at biceps, brachioradialis, triceps, patellars and ankles bilaterally and equal. No clonus. R toe and L toe were both downgoing.  Coordination: No dysmetria on FNF   Gait: deferred         I personally reviewed the below data/images/labs:      CBC Full  -  ( 28 Jan 2023 06:55 )  WBC Count : 7.06 K/uL  RBC Count : 4.06 M/uL  Hemoglobin : 11.7 g/dL  Hematocrit : 35.2 %  Platelet Count - Automated : 204 K/uL  Mean Cell Volume : 86.7 fl  Mean Cell Hemoglobin : 28.8 pg  Mean Cell Hemoglobin Concentration : 33.2 gm/dL  Auto Neutrophil # : x  Auto Lymphocyte # : x  Auto Monocyte # : x  Auto Eosinophil # : x  Auto Basophil # : x  Auto Neutrophil % : x  Auto Lymphocyte % : x  Auto Monocyte % : x  Auto Eosinophil % : x  Auto Basophil % : x    01-29    145  |  110<H>  |  26<H>  ----------------------------<  91  3.3<L>   |  22  |  1.70<H>    Ca    9.7      29 Jan 2023 06:31  Phos  3.4     01-28  Mg     2.1     01-28    TPro  8.1  /  Alb  4.2  /  TBili  0.2  /  DBili  <0.1  /  AST  15  /  ALT  9<L>  /  AlkPhos  107  01-27    LIVER FUNCTIONS - ( 27 Jan 2023 15:14 )  Alb: 4.2 g/dL / Pro: 8.1 g/dL / ALK PHOS: 107 U/L / ALT: 9 U/L / AST: 15 U/L / GGT: x           PT/INR - ( 29 Jan 2023 06:31 )   PT: 50.4 sec;   INR: 4.32 ratio         PTT - ( 29 Jan 2023 06:31 )  PTT:43.8 sec      Patient name: BELLE MENENDEZ  YOB: 1960   Age: 62 (F)   MR#: [id number]  Study Date: 1/25/2023  [facility] [place]  Study quality: Technically good  Referring Physician: Bibiana Merchant MD  Blood Pressure: 111/54 mmHg  Height: 157 cm  Weight: 70 kg  BSA: 1.7 m2  ------------------------------------------------------------------------  PROCEDURE: Transthoracic echocardiogram with 2-D, M-Mode  and complete spectral and color flow Doppler.  INDICATION: Abnormal electrocardiogram (ECG) (EKG) (R94.31)  ------------------------------------------------------------------------  Dimensions:    Normal Values:  LA:     3.9    2.0 - 4.0 cm  Ao:     2.9    2.0 - 3.8 cm  SEPTUM: 1.0    0.6 - 1.2 cm  PWT:    0.8    0.6 - 1.1 cm  LVIDd:  4.7    3.0 - 5.6 cm  LVIDs:  2.6    1.8 - 4.0 cm  Derived variables:  LVMI: 83 g/m2  RWT: 0.34  Fractional short: 45 %  EF (Carreno Rule): 61 %Doppler Peak Velocity (m/sec):  AoV=1.4  ------------------------------------------------------------------------  Observations:  Mitral Valve: Normal mitral valve. Minimal mitral  regurgitation.  Aortic Valve/Aorta: Thickened trileaflet aortic valve. Peak  transaortic valve gradient equals 8 mm Hg. No aortic valve  regurgitation seen. Peak left ventricular outflow tract  gradient equals 5 mm Hg, mean gradient is equal to 2 mm Hg,  LVOT velocity time integral equals 26 cm.  Aortic Root: 2.9 cm.  Ascending Aorta: 3.3 cm.  LVOT diameter: 1.9 cm.  Left Atrium: Normal left atrium.  LA volume index = 25  cc/m2.  Left Ventricle: Normal left ventricular systolic function.  No segmental wall motion abnormalities. Normal left  ventricular internal dimensions and wall thickness. Normal  diastolic function.  Right Heart: Normal right atrium. Normal right ventricular  size and function. Normal tricuspid valve. Mild tricuspid  regurgitation. Pulmonic valve not well visualized, probably  normal. No pulmonic regurgitation.  Pericardium/Pleura: Normal pericardium with no pericardial  effusion.  Hemodynamic: Estimated right atrial pressure is 8 mm Hg.  Estimated right ventricular systolic pressure equals 31 mm  Hg, assuming right atrial pressure equals 8 mm Hg,  consistent with normal pulmonary pressures.  ------------------------------------------------------------------------  Conclusions:  1. Normal left ventricular internal dimensions and wall  thickness.  2. Normal left ventricular systolic function. No segmental  wall motion abnormalities.  3. Normal right ventricular size and function.  4. Estimated pulmonary artery systolic pressure equals 31  mm Hg, assuming right atrial pressure equals 8  mm Hg,  consistent with normal pulmonary pressures.  ------------------------------------------------------------------------  Confirmed on  1/25/2023 - 17:33:29 by PAULA Kimble  ------------------------------------------------------------------------    < end of copied text >

## 2023-01-29 NOTE — PROGRESS NOTE ADULT - NSPROGADDITIONALINFOA_GEN_ALL_CORE
- Counseling on diet, exercise, and medication adherence was done  - Counseling on smoking cessation and alcohol consumption offered when appropriate.  - Pain assessed and judicious use of narcotics when appropriate was discussed.    - Stroke education given when appropriate.  - Importance of fall prevention discussed.   - Differential diagnosis and plan of care discussed with patient and/or family and primary team  - Thank you for allowing me to participate in the care of this patient. Call with questions.

## 2023-01-29 NOTE — PROGRESS NOTE ADULT - SUBJECTIVE AND OBJECTIVE BOX
[facility] Division of Hospital Medicine  Maty Lisa MD  Pager (M-F, 1U-8U): [phone]  Other Times:  [phone]    Patient is a 62y old  Female who presents with a chief complaint of bradycardia (25 Jan 2023 11:57)      SUBJECTIVE / OVERNIGHT EVENTS: Repeat covid negative. Reports that she is now tolerating some food but still with episodes of emesis. Denies abd pain, diarrhea. Afebrile and on RA. Keppra levels elevated.    ADDITIONAL REVIEW OF SYSTEMS: otherwise negative    MEDICATIONS  (STANDING):  amLODIPine   Tablet 10 milliGRAM(s) Oral daily  aspirin enteric coated 81 milliGRAM(s) Oral daily  atorvastatin 80 milliGRAM(s) Oral at bedtime  chlorhexidine 2% Cloths 1 Application(s) Topical <User Schedule>  doxazosin 4 milliGRAM(s) Oral at bedtime  hydrochlorothiazide 25 milliGRAM(s) Oral daily  influenza   Vaccine 0.5 milliLiter(s) IntraMuscular once  lacosamide 100 milliGRAM(s) Oral two times a day  levETIRAcetam  IVPB 1500 milliGRAM(s) IV Intermittent every 12 hours  losartan 100 milliGRAM(s) Oral daily  OXcarbazepine 300 milliGRAM(s) Oral two times a day  senna 2 Tablet(s) Oral at bedtime  warfarin 7.5 milliGRAM(s) Oral once  zonisamide 100 milliGRAM(s) Oral two times a day    MEDICATIONS  (PRN):  acetaminophen     Tablet .. 650 milliGRAM(s) Oral every 6 hours PRN Temp greater or equal to 38C (100.4F), Mild Pain (1 - 3)  aluminum hydroxide/magnesium hydroxide/simethicone Suspension 30 milliLiter(s) Oral every 4 hours PRN Dyspepsia  melatonin 3 milliGRAM(s) Oral at bedtime PRN Insomnia  ondansetron Injectable 4 milliGRAM(s) IV Push every 8 hours PRN Nausea and/or Vomiting    PHYSICAL EXAM:  T(C): 36.8 (01-29-23 @ 11:33), Max: 37 (01-29-23 @ 04:40)  T(F): 98.3 (01-29-23 @ 11:33), Max: 98.6 (01-29-23 @ 04:40)  HR: 67 (01-29-23 @ 11:33) (64 - 69)  BP: 118/69 (01-29-23 @ 11:33) (118/68 - 122/73)  RR: 18 (01-29-23 @ 11:33) (18 - 18)  SpO2: 98% (01-29-23 @ 11:33) (96% - 100%)  Wt(kg): --    CONSTITUTIONAL: NAD, well-developed, well-groomed  EYES: PERRLA; conjunctiva and sclera clear  ENMT: Moist oral mucosa, no pharyngeal injection or exudates; normal dentition  NECK: Supple, no palpable masses; no thyromegaly  RESPIRATORY: Normal respiratory effort; lungs are clear to auscultation bilaterally  CARDIOVASCULAR: Bradycardic, normal S1 and S2, no murmur/rub/gallop; No lower extremity edema  ABDOMEN: Nontender to palpation, normoactive bowel sounds, no rebound/guarding; No hepatosplenomegaly  MUSCULOSKELETAL:  No clubbing or cyanosis of digits; no joint swelling or tenderness to palpation  PSYCH: A+O to person, place, and time; affect appropriate  NEUROLOGY: CN 2-12 are intact and symmetric; no gross sensory deficits, slowed speech   SKIN: No rashes; no palpable lesions    LABS: reviewed                                     11.7   7.06  )-----------( 204      ( 28 Jan 2023 06:55 )             35.2       01-29    145  |  110<H>  |  26<H>  ----------------------------<  91  3.3<L>   |  22  |  1.70<H>    Ca    9.7      29 Jan 2023 06:31  Phos  3.4     01-28  Mg     2.1     01-28                    PT/INR - ( 29 Jan 2023 06:31 )   PT: 50.4 sec;   INR: 4.32 ratio         PTT - ( 29 Jan 2023 06:31 )  PTT:43.8 sec          CAPILLARY BLOOD GLUCOSE                                PT/INR - ( 28 Jan 2023 06:55 )   PT: 35.0 sec;   INR: 3.01 ratio         PTT - ( 27 Jan 2023 07:03 )  PTT:35.6 sec          CAPILLARY BLOOD GLUCOSE                                    PT/INR - ( 27 Jan 2023 07:03 )   PT: 25.2 sec;   INR: 2.17 ratio         PTT - ( 27 Jan 2023 07:03 )  PTT:35.6 sec          CAPILLARY BLOOD GLUCOSE                  < from: Transthoracic Echocardiogram (01.25.23 @ 14:56) >  Conclusions:  1. Normal left ventricular internal dimensions and wall  thickness.  2. Normal left ventricular systolic function. No segmental  wall motion abnormalities.  3. Normal right ventricular size and function.  4. Estimated pulmonary artery systolic pressure equals 31  mm Hg, assuming right atrial pressure equals 8  mm Hg,  consistent with normal pulmonary pressures.    < end of copied text >

## 2023-01-30 ENCOUNTER — TRANSCRIPTION ENCOUNTER (OUTPATIENT)
Age: 63
End: 2023-01-30

## 2023-01-30 LAB
ANION GAP SERPL CALC-SCNC: 9 MMOL/L — SIGNIFICANT CHANGE UP (ref 5–17)
BUN SERPL-MCNC: 24 MG/DL — HIGH (ref 7–23)
CALCIUM SERPL-MCNC: 9.4 MG/DL — SIGNIFICANT CHANGE UP (ref 8.4–10.5)
CHLORIDE SERPL-SCNC: 114 MMOL/L — HIGH (ref 96–108)
CO2 SERPL-SCNC: 21 MMOL/L — LOW (ref 22–31)
CREAT SERPL-MCNC: 1.5 MG/DL — HIGH (ref 0.5–1.3)
EGFR: 39 ML/MIN/1.73M2 — LOW
GLUCOSE SERPL-MCNC: 91 MG/DL — SIGNIFICANT CHANGE UP (ref 70–99)
INR BLD: 3.66 RATIO — HIGH (ref 0.88–1.16)
LEVETIRACETAM SERPL-MCNC: 60.7 UG/ML — HIGH (ref 10–40)
POTASSIUM SERPL-MCNC: 4.1 MMOL/L — SIGNIFICANT CHANGE UP (ref 3.5–5.3)
POTASSIUM SERPL-SCNC: 4.1 MMOL/L — SIGNIFICANT CHANGE UP (ref 3.5–5.3)
PROTHROM AB SERPL-ACNC: 42.6 SEC — HIGH (ref 10.5–13.4)
SODIUM SERPL-SCNC: 144 MMOL/L — SIGNIFICANT CHANGE UP (ref 135–145)
ZONISAMIDE SERPL-MCNC: 18.3 UG/ML — SIGNIFICANT CHANGE UP (ref 10–40)

## 2023-01-30 PROCEDURE — 99232 SBSQ HOSP IP/OBS MODERATE 35: CPT

## 2023-01-30 PROCEDURE — 95816 EEG AWAKE AND DROWSY: CPT | Mod: 26

## 2023-01-30 RX ORDER — ONDANSETRON 8 MG/1
4 TABLET, FILM COATED ORAL THREE TIMES A DAY
Refills: 0 | Status: DISCONTINUED | OUTPATIENT
Start: 2023-01-30 | End: 2023-01-31

## 2023-01-30 RX ORDER — SODIUM CHLORIDE 9 MG/ML
1000 INJECTION, SOLUTION INTRAVENOUS
Refills: 0 | Status: DISCONTINUED | OUTPATIENT
Start: 2023-01-30 | End: 2023-02-03

## 2023-01-30 RX ORDER — PANTOPRAZOLE SODIUM 20 MG/1
40 TABLET, DELAYED RELEASE ORAL
Refills: 0 | Status: DISCONTINUED | OUTPATIENT
Start: 2023-01-30 | End: 2023-02-08

## 2023-01-30 RX ADMIN — SODIUM CHLORIDE 75 MILLILITER(S): 9 INJECTION, SOLUTION INTRAVENOUS at 09:45

## 2023-01-30 RX ADMIN — CHLORHEXIDINE GLUCONATE 1 APPLICATION(S): 213 SOLUTION TOPICAL at 06:29

## 2023-01-30 RX ADMIN — AMLODIPINE BESYLATE 10 MILLIGRAM(S): 2.5 TABLET ORAL at 06:29

## 2023-01-30 RX ADMIN — Medication 100 MILLIGRAM(S): at 06:28

## 2023-01-30 RX ADMIN — LEVETIRACETAM 1500 MILLIGRAM(S): 250 TABLET, FILM COATED ORAL at 17:45

## 2023-01-30 RX ADMIN — LACOSAMIDE 100 MILLIGRAM(S): 50 TABLET ORAL at 17:45

## 2023-01-30 RX ADMIN — LACOSAMIDE 100 MILLIGRAM(S): 50 TABLET ORAL at 06:29

## 2023-01-30 RX ADMIN — Medication 4 MILLIGRAM(S): at 21:09

## 2023-01-30 RX ADMIN — LOSARTAN POTASSIUM 100 MILLIGRAM(S): 100 TABLET, FILM COATED ORAL at 06:28

## 2023-01-30 RX ADMIN — OXCARBAZEPINE 300 MILLIGRAM(S): 300 TABLET, FILM COATED ORAL at 17:45

## 2023-01-30 RX ADMIN — ONDANSETRON 4 MILLIGRAM(S): 8 TABLET, FILM COATED ORAL at 17:46

## 2023-01-30 RX ADMIN — Medication 100 MILLIGRAM(S): at 17:45

## 2023-01-30 RX ADMIN — ATORVASTATIN CALCIUM 80 MILLIGRAM(S): 80 TABLET, FILM COATED ORAL at 21:09

## 2023-01-30 RX ADMIN — Medication 81 MILLIGRAM(S): at 11:57

## 2023-01-30 RX ADMIN — OXCARBAZEPINE 300 MILLIGRAM(S): 300 TABLET, FILM COATED ORAL at 06:28

## 2023-01-30 RX ADMIN — LEVETIRACETAM 1500 MILLIGRAM(S): 250 TABLET, FILM COATED ORAL at 06:28

## 2023-01-30 RX ADMIN — ONDANSETRON 4 MILLIGRAM(S): 8 TABLET, FILM COATED ORAL at 09:49

## 2023-01-30 NOTE — PROGRESS NOTE ADULT - ASSESSMENT
62y f pmh brain aneurysm s/p hemicraniectomy and clipping (2013), seizures post hemicraniectomy (on Keppra), chronic DVTs on coumadin and hypertension admitted for eval of bradycardia, with course c/b intractable nausea/vomiting of unclear etiology.

## 2023-01-30 NOTE — PROGRESS NOTE ADULT - SUBJECTIVE AND OBJECTIVE BOX
Neurology Progress Note    S: Patient seen and examined. CTH done overnight     Medication:  MEDICATIONS  (STANDING):  amLODIPine   Tablet 10 milliGRAM(s) Oral daily  aspirin enteric coated 81 milliGRAM(s) Oral daily  atorvastatin 80 milliGRAM(s) Oral at bedtime  chlorhexidine 2% Cloths 1 Application(s) Topical <User Schedule>  doxazosin 4 milliGRAM(s) Oral at bedtime  hydrochlorothiazide 25 milliGRAM(s) Oral daily  influenza   Vaccine 0.5 milliLiter(s) IntraMuscular once  lacosamide 100 milliGRAM(s) Oral two times a day  lactated ringers. 1000 milliLiter(s) (75 mL/Hr) IV Continuous <Continuous>  levETIRAcetam 1500 milliGRAM(s) Oral two times a day  losartan 100 milliGRAM(s) Oral daily  OXcarbazepine 300 milliGRAM(s) Oral two times a day  senna 2 Tablet(s) Oral at bedtime  zonisamide 100 milliGRAM(s) Oral two times a day    MEDICATIONS  (PRN):  acetaminophen     Tablet .. 650 milliGRAM(s) Oral every 6 hours PRN Temp greater or equal to 38C (100.4F), Mild Pain (1 - 3)  aluminum hydroxide/magnesium hydroxide/simethicone Suspension 30 milliLiter(s) Oral every 4 hours PRN Dyspepsia  melatonin 3 milliGRAM(s) Oral at bedtime PRN Insomnia  ondansetron Injectable 4 milliGRAM(s) IV Push every 8 hours PRN Nausea and/or Vomiting        Vitals:  Vital Signs Last 24 Hrs  T(C): 36.8 (01-30-23 @ 04:34), Max: 37.2 (01-29-23 @ 21:16)  T(F): 98.2 (01-30-23 @ 04:34), Max: 98.9 (01-29-23 @ 21:16)  HR: 61 (01-30-23 @ 04:34) (61 - 71)  BP: 145/78 (01-30-23 @ 04:34) (115/70 - 150/79)  BP(mean): --  RR: 18 (01-30-23 @ 04:34) (18 - 18)  SpO2: 95% (01-30-23 @ 04:34) (95% - 98%)            General Exam:   General Appearance: Appropriately dressed and in no acute distress       Head: Normocephalic, atraumatic and no dysmorphic features  Ear, Nose, and Throat: Moist mucous membranes  CVS: S1S2+  Resp: No SOB, no wheeze or rhonchi  GI: soft NT/ND  Extremities: No edema or cyanosis  Skin: No bruises or rashes     Neurological Exam:  Mental Status: Awake, alert and oriented x 3.  Able to follow simple and complex verbal commands. Able to name and repeat. fluent speech. No obvious aphasia or dysarthria noted.   Cranial Nerves: PERRL, EOMI, VFFC, sensation V1-V3 intact,  no obvious facial asymmetry, equal elevation of palate, scm/trap 5/5, tongue is midline on protrusion. no obvious papilledema on fundoscopic exam. hearing is grossly intact.   Motor: Normal bulk, tone and strength throughout. Fine finger movements were intact and symmetric. no tremors or drift noted.    Sensation: Intact to light touch and pinprick throughout. no right/left confusion. no extinction to tactile on DSS.    Reflexes: 1+ throughout at biceps, brachioradialis, triceps, patellars and ankles bilaterally and equal. No clonus. R toe and L toe were both downgoing.  Coordination: No dysmetria on FNF   Gait: deferred         I personally reviewed the below data/images/labs:  no new labs     CBC Full  -  ( 28 Jan 2023 06:55 )  WBC Count : 7.06 K/uL  RBC Count : 4.06 M/uL  Hemoglobin : 11.7 g/dL  Hematocrit : 35.2 %  Platelet Count - Automated : 204 K/uL  Mean Cell Volume : 86.7 fl  Mean Cell Hemoglobin : 28.8 pg  Mean Cell Hemoglobin Concentration : 33.2 gm/dL  Auto Neutrophil # : x  Auto Lymphocyte # : x  Auto Monocyte # : x  Auto Eosinophil # : x  Auto Basophil # : x  Auto Neutrophil % : x  Auto Lymphocyte % : x  Auto Monocyte % : x  Auto Eosinophil % : x  Auto Basophil % : x    01-29    145  |  110<H>  |  26<H>  ----------------------------<  91  3.3<L>   |  22  |  1.70<H>    Ca    9.7      29 Jan 2023 06:31  Phos  3.4     01-28  Mg     2.1     01-28    TPro  8.1  /  Alb  4.2  /  TBili  0.2  /  DBili  <0.1  /  AST  15  /  ALT  9<L>  /  AlkPhos  107  01-27    LIVER FUNCTIONS - ( 27 Jan 2023 15:14 )  Alb: 4.2 g/dL / Pro: 8.1 g/dL / ALK PHOS: 107 U/L / ALT: 9 U/L / AST: 15 U/L / GGT: x           PT/INR - ( 29 Jan 2023 06:31 )   PT: 50.4 sec;   INR: 4.32 ratio         PTT - ( 29 Jan 2023 06:31 )  PTT:43.8 sec      Patient name: BELLE MENENDEZ  YOB: 1960   Age: 62 (F)   MR#: [id number]  Study Date: 1/25/2023  [facility]  Study quality: Technically good  Referring Physician: Bibiana Merchant MD  Blood Pressure: 111/54 mmHg  Height: 157 cm  Weight: 70 kg  BSA: 1.7 m2  ------------------------------------------------------------------------  PROCEDURE: Transthoracic echocardiogram with 2-D, M-Mode  and complete spectral and color flow Doppler.  INDICATION: Abnormal electrocardiogram (ECG) (EKG) (R94.31)  ------------------------------------------------------------------------  Dimensions:    Normal Values:  LA:     3.9    2.0 - 4.0 cm  Ao:     2.9    2.0 - 3.8 cm  SEPTUM: 1.0    0.6 - 1.2 cm  PWT:    0.8    0.6 - 1.1 cm  LVIDd:  4.7    3.0 - 5.6 cm  LVIDs:  2.6    1.8 - 4.0 cm  Derived variables:  LVMI: 83 g/m2  RWT: 0.34  Fractional short: 45 %  EF (Carreno Rule): 61 %Doppler Peak Velocity (m/sec):  AoV=1.4  ------------------------------------------------------------------------  Observations:  Mitral Valve: Normal mitral valve. Minimal mitral  regurgitation.  Aortic Valve/Aorta: Thickened trileaflet aortic valve. Peak  transaortic valve gradient equals 8 mm Hg. No aortic valve  regurgitation seen. Peak left ventricular outflow tract  gradient equals 5 mm Hg, mean gradient is equal to 2 mm Hg,  LVOT velocity time integral equals 26 cm.  Aortic Root: 2.9 cm.  Ascending Aorta: 3.3 cm.  LVOT diameter: 1.9 cm.  Left Atrium: Normal left atrium.  LA volume index = 25  cc/m2.  Left Ventricle: Normal left ventricular systolic function.  No segmental wall motion abnormalities. Normal left  ventricular internal dimensions and wall thickness. Normal  diastolic function.  Right Heart: Normal right atrium. Normal right ventricular  size and function. Normal tricuspid valve. Mild tricuspid  regurgitation. Pulmonic valve not well visualized, probably  normal. No pulmonic regurgitation.  Pericardium/Pleura: Normal pericardium with no pericardial  effusion.  Hemodynamic: Estimated right atrial pressure is 8 mm Hg.  Estimated right ventricular systolic pressure equals 31 mm  Hg, assuming right atrial pressure equals 8 mm Hg,  consistent with normal pulmonary pressures.  ------------------------------------------------------------------------  Conclusions:  1. Normal left ventricular internal dimensions and wall  thickness.  2. Normal left ventricular systolic function. No segmental  wall motion abnormalities.  3. Normal right ventricular size and function.  4. Estimated pulmonary artery systolic pressure equals 31  mm Hg, assuming right atrial pressure equals 8  mm Hg,  consistent with normal pulmonary pressures.  ------------------------------------------------------------------------  Confirmed on  1/25/2023 - 17:33:29 by PAULA Kimble  ------------------------------------------------------------------------    < end of copied text >

## 2023-01-30 NOTE — EEG REPORT - NS EEG TEXT BOX
REPORT OF ROUTINE EEG WITH VIDEO  [facility]: [address], [address], [place] [address], Phone: [phone] [facility]: [address] [place] [address], [place] [address], Phone: [phone] Office: [address] [place] [address], [place] [address], Phone: [phone]  Patient Name: Jeimy Denis   Age: 62 year : 1960 Location: [address]  EEG #: [id number] Study Date: 2023   Start Time: 4:22:35 PM    Study Duration: 20.4  Technical Information:					 On Instrument: [id number] Placement and Labeling of Electrodes: The EEG was performed utilizing 20 channels referential EEG connections (coronal over temporal over parasagittal montage) using all standard 10-20 electrode placements with EKG.  Recording was at a sampling rate of 256 samples per second per channel.  Time synchronized digital video recording was done simultaneously with EEG recording.  A low light infrared camera was used for low light recording.  Hema and seizure detection algorithms were utilized. CSA Technical Component: Quantitative EEG analysis using a separate Compressed Spectral Array (CSA) software package was conducted in real-time and run at bedside after set up by the technician, digitally displaying the power of electrographic frequencies included in the 1-30Hz band using a graded color map.  This data was reviewed and interpreted independently, and is reported in a separate section below.  History: Routine study performed at the bedside COR: Awake and Alert, slightly lethargic No HV due to covid protocol Photic performed 63 Y/O Female P/W: AMS H/O: Aneurysm, Stroke, HTN, Seizure, DVT, CAD, HLD, Epilepsy  Medication Tylenol Trilepal (Oxcarbazepine) Keppra (Levetiracetam) Zonegran (Zonisamide) Vimpat (Lacosamide)  Study Interpretation:  FINDINGS:  The background was continuous, spontaneously variable and reactive.  During wakefulness, the posterior dominant rhythm consisted of fragments of 7-8 Hz activity, with an amplitude to 30 uV, that attenuated to eye opening.  Low amplitude central beta was noted in wakefulness.  Background Slowing: Generalized slowing: there is mix of theta diffusely. Focal slowing: there is continuous polymorphic delta frontally bilaterally.  Sleep Background: -Drowsiness was characterized by fragmentation, attenuation, and slowing of the background activity.   -Stage II sleep transients were not recorded.  Non-epileptiform activity: There is evidence of breach activity over the frontal regions bilaterally.  Epileptiform Activity:  There are probable spikes over the right frontal region.  Events: No clinical events were recorded. No seizures were recorded.  Activation Procedures:  -Hyperventilation was not performed.   -Photic stimulation was performed and did not elicit any abnormalities.    Artifacts: Intermittent myogenic and movement artifacts were noted.  ECG: The heart rate on single channel ECG was predominantly between 80-90 BPM.  EEG Classification / Summary:  Abnormal EEG in the awake, drowsy states. Probable spikes, focal, right frontal region Continuous polymorphic delta slowing, focal, bilateral frontal regions Background slowing, generalized, mild Breach artifact, bilateral frontal regions  Clinical Impression:  Potential epileptogenic focus in the right frontal region. The presence of breach activity in the bilateral frontal regions makes definitive identification of epileptogenic activity difficult. Structural abnormality in the bilateral frontal regions. Mild nonspecific diffuse or multifocal cerebral dysfunction not specific for etiology.  ________________________________________    jT De Los Santos MD, PhD Director, Epilepsy Division, [facility] ------------------------------------ EEG Reading Room: [phone] On Call Service After Hours: [phone]

## 2023-01-30 NOTE — PROGRESS NOTE ADULT - SUBJECTIVE AND OBJECTIVE BOX
DATE OF SERVICE: 01-30-23 @ 13:11    Patient is a 62y old  Female who presents with a chief complaint of bradycardia (30 Jan 2023 11:13)      INTERVAL HISTORY: Feels ok.     REVIEW OF SYSTEMS:  CONSTITUTIONAL: No weakness  EYES/ENT: No visual changes;  No throat pain   NECK: No pain or stiffness  RESPIRATORY: No cough, wheezing; No shortness of breath  CARDIOVASCULAR: No chest pain or palpitations  GASTROINTESTINAL: No abdominal  pain. No nausea, vomiting, or hematemesis  GENITOURINARY: No dysuria, frequency or hematuria  NEUROLOGICAL: No stroke like symptoms  SKIN: No rashes    TELEMETRY Personally reviewed: SB/SR 50-60  	  MEDICATIONS:  amLODIPine   Tablet 10 milliGRAM(s) Oral daily  doxazosin 4 milliGRAM(s) Oral at bedtime  hydrochlorothiazide 25 milliGRAM(s) Oral daily  losartan 100 milliGRAM(s) Oral daily        PHYSICAL EXAM:  T(C): 36.7 (01-30-23 @ 11:25), Max: 37.2 (01-29-23 @ 21:16)  HR: 83 (01-30-23 @ 11:25) (61 - 83)  BP: 115/71 (01-30-23 @ 11:25) (115/70 - 150/79)  RR: 18 (01-30-23 @ 11:25) (18 - 18)  SpO2: 96% (01-30-23 @ 11:25) (95% - 97%)  Wt(kg): --  I&O's Summary    29 Jan 2023 07:01  -  30 Jan 2023 07:00  --------------------------------------------------------  IN: 440 mL / OUT: 300 mL / NET: 140 mL          Appearance: In no distress	  HEENT:    PERRL, EOMI	  Cardiovascular:  S1 S2, No JVD  Respiratory: Lungs clear to auscultation	  Gastrointestinal:  Soft, Non-tender, + BS	, + emesis  Vascularature:  No edema of LE  Psychiatric: Appropriate affect   Neuro: no acute focal deficits           01-30    144  |  114<H>  |  24<H>  ----------------------------<  91  4.1   |  21<L>  |  1.50<H>    Ca    9.4      30 Jan 2023 06:23          Labs personally reviewed      ASSESSMENT/PLAN: 	    Ms. Denis is a 63 yo female with PMH of CAD s/p PCI in 2003, HTN, brain aneursyms s/p hemicraniotomy and clipping in 2013 c/b epilepsy chronic DVT now on coumadin who presents with symptomatic bradycardia.    Problem/Plan -1  Problem: Bradycardia  - ECG reveals SB; tele shows HR mostly in 30-40s but as long as 29  - EP following  - TTE unremarkable  - TSH wnl  - HR  improved  60s bpm  - remain off Clonidine and Labetalol    Problem/Plan -2  Problem: Hypertension  - Patient hypotensive with EMS but on multiple antihypertensive meds  - c/w all home meds with the exception of labetalol and clonidine     Problem/Plan -3  Problem: Coronary Artery Disease  - s/p remote PCI  - Denies CP or SOB  - Echo unremarkable  - c/w ASA 81mg PO daily, rosuvastatin 40mg PO daily    Problem/Plan -4  Problem: hx of DVT  - c/w coumadin and dose to INR 2-3    Problem/Plan -5  Problem: Hx of Brain Aneursyms with intervention and Epilepsy  - Now with increased forgetfulness and lethargy  - Neuro recs appreciated. Pt defers ILR placement at this time.   - CTH shows no evidence of hydrocephalus, Bifrontal encephalomalacia, Old left PCA infarct, Small right parietal parasagittal infarct  - Keppra dose adjusted based on serum level  - As per daughter, reports increased forgetfulness and repetitivenss.  - Will plan for EEG    Problem/Plan -6  Problem: Emesis  - No WBC elevation, no fever  - Not a/w meals  - Denies Diarrhea, epigastic pain  - Lipase wnl  - Abdominal XR wnl  - Appreciate GI consult with Dr. Lary Goldstein, AG-NP   Delfino Everett DO [facility]  Cardiovascular Medicine  [address], [address]  Available through call or text on [employer]  Office: [phone]   DATE OF SERVICE: 01-30-23 @ 13:11    Patient is a 62y old  Female who presents with a chief complaint of bradycardia (30 Jan 2023 11:13)      INTERVAL HISTORY: Feels ok.     REVIEW OF SYSTEMS:  CONSTITUTIONAL: No weakness  EYES/ENT: No visual changes;  No throat pain   NECK: No pain or stiffness  RESPIRATORY: No cough, wheezing; No shortness of breath  CARDIOVASCULAR: No chest pain or palpitations  GASTROINTESTINAL: No abdominal  pain. No nausea, vomiting, or hematemesis  GENITOURINARY: No dysuria, frequency or hematuria  NEUROLOGICAL: No stroke like symptoms  SKIN: No rashes    TELEMETRY Personally reviewed: SB/SR 50-60  	  MEDICATIONS:  amLODIPine   Tablet 10 milliGRAM(s) Oral daily  doxazosin 4 milliGRAM(s) Oral at bedtime  hydrochlorothiazide 25 milliGRAM(s) Oral daily  losartan 100 milliGRAM(s) Oral daily        PHYSICAL EXAM:  T(C): 36.7 (01-30-23 @ 11:25), Max: 37.2 (01-29-23 @ 21:16)  HR: 83 (01-30-23 @ 11:25) (61 - 83)  BP: 115/71 (01-30-23 @ 11:25) (115/70 - 150/79)  RR: 18 (01-30-23 @ 11:25) (18 - 18)  SpO2: 96% (01-30-23 @ 11:25) (95% - 97%)  Wt(kg): --  I&O's Summary    29 Jan 2023 07:01  -  30 Jan 2023 07:00  --------------------------------------------------------  IN: 440 mL / OUT: 300 mL / NET: 140 mL          Appearance: In no distress	  HEENT:    PERRL, EOMI	  Cardiovascular:  S1 S2, No JVD  Respiratory: Lungs clear to auscultation	  Gastrointestinal:  Soft, Non-tender, + BS	, + emesis  Vascularature:  No edema of LE  Psychiatric: Appropriate affect   Neuro: no acute focal deficits           01-30    144  |  114<H>  |  24<H>  ----------------------------<  91  4.1   |  21<L>  |  1.50<H>    Ca    9.4      30 Jan 2023 06:23          Labs personally reviewed      ASSESSMENT/PLAN: 	    Ms. Denis is a 61 yo female with PMH of CAD s/p PCI in 2003, HTN, brain aneursyms s/p hemicraniotomy and clipping in 2013 c/b epilepsy chronic DVT now on coumadin who presents with symptomatic bradycardia.    Problem/Plan -1  Problem: Bradycardia  - ECG reveals SB; tele shows HR mostly in 30-40s but as long as 29  - EP following  - TTE unremarkable  - TSH wnl  - HR  improved  60s bpm  - remain off Clonidine and Labetalol    Problem/Plan -2  Problem: Hypertension  - Patient hypotensive with EMS but on multiple antihypertensive meds  - c/w all home meds with the exception of labetalol and clonidine     Problem/Plan -3  Problem: Coronary Artery Disease  - s/p remote PCI  - Denies CP or SOB  - Echo unremarkable  - c/w ASA 81mg PO daily, rosuvastatin 40mg PO daily    Problem/Plan -4  Problem: hx of DVT  - c/w coumadin and dose to INR 2-3    Problem/Plan -5  Problem: Hx of Brain Aneursyms with intervention and Epilepsy  - Now with increased forgetfulness and lethargy  - Neuro recs appreciated. Pt defers ILR placement at this time.   - CTH shows no evidence of hydrocephalus, Bifrontal encephalomalacia, Old left PCA infarct, Small right parietal parasagittal infarct  - Keppra dose adjusted based on serum level  - As per daughter, reports increased forgetfulness and repetitivenss.  - Will plan for EEG    Problem/Plan -6  Problem: Emesis  - No WBC elevation, no fever  - Not a/w meals  - Denies Diarrhea, epigastic pain  - Lipase wnl  - Abdominal XR wnl  - Appreciate GI consult with Dr. Barth    Left message for Dtr Allie updated about Plan of Care.       Kaykay Goldstein, RIDDHI-NP   Delfino Everett,  [facility]  Cardiovascular Medicine  [address], [address]  Available through call or text on [employer]  Office: [phone]

## 2023-01-30 NOTE — CONSULT NOTE ADULT - ASSESSMENT
1. Nausea / vomiting. Unclear etiology, ddx includes gastroenteritis, dysmotility, medication-related. No evidence of bowel obstruction. LFTs / lipase wnl so doubt hepatobiliary or pancreatic pathology. CTH neg for acute pathology. No correlation with bradycardia or hypertension.   -     2. Chronic DVTs  - on coumadin, supratherapeutic INR    3. Hx of aneurysm s/p hemicraniectomy and clipping  - on ASA and statin     4. HTN  - per primary team     5. Seizure disorder  - per neurology     6. CAD s/p stent       I had a prolonged conversation with the patient regarding the hospital course, differential diagnosis, results of diagnostic tests this far, and therapeutic modalities available. Plan of care discussed with the patient after the evaluation. Patient expresses a clear understanding of the plan of care. Sixty five minutes spent on the total encounter, of which more than fifty percent of the encounter was spent on counseling and/or coordinating care by the attending physician.    Advanced care planning forms were discussed. Code status including forceful chest compressions, defibrillation and intubation were discussed. The risks benefits and alternatives to pertinent gastrointestinal procedures and interventions were discussed in detail and all questions were answered. Duration: 15 Minutes.    Radha Barth M.D.   Gastroenterology and Hepatology  [address] [place], [place]  Office: [phone]  Cell: [phone]     1. Nausea / vomiting, diarrhea. Unclear etiology, ddx includes gastroenteritis, dysmotility, medication-related. No evidence of bowel obstruction. LFTs / lipase wnl so doubt hepatobiliary or pancreatic pathology. CTH neg for acute pathology. No correlation with bradycardia or hypertension.   - if recurrent diarrhea, check GI PCR  - Zofran changed to standing pre-meals  - if no improvement by tomorrow, change zofran to reglan 5 mg TID     2. Chronic DVTs  - on coumadin, supratherapeutic INR    3. Hx of aneurysm s/p hemicraniectomy and clipping  - on ASA and statin     4. HTN  - per primary team     5. Seizure disorder  - per neurology     6. CAD s/p stent       I had a prolonged conversation with the patient regarding the hospital course, differential diagnosis, results of diagnostic tests this far, and therapeutic modalities available. Plan of care discussed with the patient after the evaluation. Patient expresses a clear understanding of the plan of care. Sixty five minutes spent on the total encounter, of which more than fifty percent of the encounter was spent on counseling and/or coordinating care by the attending physician.    Advanced care planning forms were discussed. Code status including forceful chest compressions, defibrillation and intubation were discussed. The risks benefits and alternatives to pertinent gastrointestinal procedures and interventions were discussed in detail and all questions were answered. Duration: 15 Minutes.    Radha Barth M.D.   Gastroenterology and Hepatology  [address] [place], [place]  Office: [phone]  Cell: [phone]     1. Nausea / vomiting, diarrhea. Unclear etiology, ddx includes gastroenteritis, dysmotility/gastroparesis, medication-related. No evidence of bowel obstruction. LFTs / lipase wnl so doubt hepatobiliary or pancreatic pathology. CTH neg for acute pathology. No correlation with bradycardia or hypertension.   - if recurrent diarrhea, check GI PCR  - Zofran changed to standing pre-meals  - if no improvement by tomorrow, change zofran to reglan 5 mg TID   - daily PPI     2. Chronic DVTs  - on coumadin, supratherapeutic INR    3. Hx of aneurysm s/p hemicraniectomy and clipping  - on ASA and statin     4. HTN  - per primary team     5. Seizure disorder  - per neurology     6. CAD s/p stent       I had a prolonged conversation with the patient regarding the hospital course, differential diagnosis, results of diagnostic tests this far, and therapeutic modalities available. Plan of care discussed with the patient after the evaluation. Patient expresses a clear understanding of the plan of care. Sixty five minutes spent on the total encounter, of which more than fifty percent of the encounter was spent on counseling and/or coordinating care by the attending physician.    Advanced care planning forms were discussed. Code status including forceful chest compressions, defibrillation and intubation were discussed. The risks benefits and alternatives to pertinent gastrointestinal procedures and interventions were discussed in detail and all questions were answered. Duration: 15 Minutes.    Radha Barth M.D.   Gastroenterology and Hepatology  [address] [place], [place]  Office: [phone]  Cell: [phone]

## 2023-01-30 NOTE — CONSULT NOTE ADULT - SUBJECTIVE AND OBJECTIVE BOX
Chief Complaint:  Patient is a 62y old  Female who presents with a chief complaint of bradycardia (30 Jan 2023 08:27)      Date of service: 01-30-23 @ 11:13    HPI:    The patient is a 62 year old woman with a PMH of CAD s/p PCI 2003, HTN, brain aneurysm s/p hemicraniectomy and clipping (2013), seizures post hemicraniectomy (on Keppra), chronic DVTs on Coumadin, admitted for bradycardia. GI consulted for new onset nausea/emesis.     The patient denies dysphagia, nausea and vomiting, abdominal pain, diarrhea, unintentional weight loss, change in bowel habits or NSAID use.      Allergies:  [This allergen will not trigger allergy alert] IV DYE, IODINE CONTRAST (Unknown)  [This allergen will not trigger allergy alert] Sulfa (Sulfonamide Antibiotics) (Hives)  Clindamycin Phosphate (Rash)  doxycycline (Rash)  latex (Unknown)  penicillin (Hives)  sulfa drugs (Other)      Home Medications:    Hospital Medications:  acetaminophen     Tablet .. 650 milliGRAM(s) Oral every 6 hours PRN  aluminum hydroxide/magnesium hydroxide/simethicone Suspension 30 milliLiter(s) Oral every 4 hours PRN  amLODIPine   Tablet 10 milliGRAM(s) Oral daily  aspirin enteric coated 81 milliGRAM(s) Oral daily  atorvastatin 80 milliGRAM(s) Oral at bedtime  chlorhexidine 2% Cloths 1 Application(s) Topical <User Schedule>  doxazosin 4 milliGRAM(s) Oral at bedtime  hydrochlorothiazide 25 milliGRAM(s) Oral daily  influenza   Vaccine 0.5 milliLiter(s) IntraMuscular once  lacosamide 100 milliGRAM(s) Oral two times a day  lactated ringers. 1000 milliLiter(s) IV Continuous <Continuous>  levETIRAcetam 1500 milliGRAM(s) Oral two times a day  losartan 100 milliGRAM(s) Oral daily  melatonin 3 milliGRAM(s) Oral at bedtime PRN  ondansetron Injectable 4 milliGRAM(s) IV Push every 8 hours PRN  OXcarbazepine 300 milliGRAM(s) Oral two times a day  senna 2 Tablet(s) Oral at bedtime  zonisamide 100 milliGRAM(s) Oral two times a day      PMHX/PSHX:  HTN (hypertension)    Epilepsy    Coronary artery disease    Brain aneurysm    Hyperlipidemia    Coronary artery disease    Deep vein thrombosis (DVT)    Seizure    Hypertension    Stroke    Aneurysm    Cholecystostomy care    Aneurysm    S/P  shunt    History of cranioplasty    H/O craniotomy    Presence of IVC filter    S/P primary angioplasty with coronary stent    H/O cerebral aneurysm repair        Family history:  No pertinent family history in first degree relatives        Social History:   Denies ethanol use.  Denies illicit drug use.    ROS:     General:  No wt loss, fevers, chills, night sweats, fatigue,   Eyes:  Good vision, no reported pain  ENT:  No sore throat, pain, runny nose, dysphagia  CV:  No pain, palpitations, hypo/hypertension  Resp:  No dyspnea, cough, tachypnea, wheezing  GI:  See HPI  :  No pain, bleeding, incontinence, nocturia  Muscle:  No pain, weakness  Neuro:  No weakness, tingling, memory problems  Psych:  No fatigue, insomnia, mood problems, depression  Endocrine:  No polyuria, polydipsia, cold/heat intolerance  Heme:  No petechiae, ecchymosis, easy bruisability  Integumentary:  No rash, edema      PHYSICAL EXAM:     GENERAL:  Appears stated age, well-groomed, well-nourished, no distress  HEENT:  NC/AT,  conjunctivae anicteric, clear and pink,   NECK: supple, trachea midline  CHEST:  Full & symmetric excursion, no increased effort, breath sounds clear  HEART:  Regular rhythm, no JVD  ABDOMEN:  Soft, non-tender, non-distended, normoactive bowel sounds,  no masses , no hepatosplenomegaly  EXTREMITIES:  no cyanosis,clubbing or edema  SKIN:  No rash, erythema, or, ecchymoses, no jaundice  NEURO:  Alert, non-focal, no asterixis  PSYCH: Appropriate affect, oriented to place and time  RECTAL: Deferred      Vital Signs:  Vital Signs Last 24 Hrs  T(C): 36.8 (30 Jan 2023 04:34), Max: 37.2 (29 Jan 2023 21:16)  T(F): 98.2 (30 Jan 2023 04:34), Max: 98.9 (29 Jan 2023 21:16)  HR: 61 (30 Jan 2023 04:34) (61 - 71)  BP: 145/78 (30 Jan 2023 04:34) (115/70 - 150/79)  BP(mean): --  RR: 18 (30 Jan 2023 04:34) (18 - 18)  SpO2: 95% (30 Jan 2023 04:34) (95% - 98%)    Parameters below as of 30 Jan 2023 04:34  Patient On (Oxygen Delivery Method): room air      Daily     Daily     LABS: Labs personally reviewed by me:    01-30    144  |  114<H>  |  24<H>  ----------------------------<  91  4.1   |  21<L>  |  1.50<H>    Ca    9.4      30 Jan 2023 06:23        PT/INR - ( 30 Jan 2023 06:23 )   PT: 42.6 sec;   INR: 3.66 ratio         PTT - ( 29 Jan 2023 06:31 )  PTT:43.8 sec        Imaging personally reviewed by me:           Chief Complaint:  Patient is a 62y old  Female who presents with a chief complaint of bradycardia (30 Jan 2023 08:27)      Date of service: 01-30-23 @ 11:13    HPI:    The patient is a 62 year old woman with a PMH of CAD s/p PCI 2003, HTN, brain aneurysm s/p hemicraniectomy and clipping (2013), seizures post hemicraniectomy (on Keppra), chronic DVTs on Coumadin, admitted for bradycardia. GI consulted for new onset nausea/emesis. For the past 2-3 days she has been experiencing nausea after meals, sometimes with emesis 30 min after of undigested material. Also having diarrhea, 1 episode / day. She has chronic reflux, unchanged from baseline. No abdominal pain or bleeding symptoms. The patient denies dysphagia, unintentional weight loss, or NSAID use. Last EGD/Colon a few yrs ago, reportedly normal.       Allergies:  [This allergen will not trigger allergy alert] IV DYE, IODINE CONTRAST (Unknown)  [This allergen will not trigger allergy alert] Sulfa (Sulfonamide Antibiotics) (Hives)  Clindamycin Phosphate (Rash)  doxycycline (Rash)  latex (Unknown)  penicillin (Hives)  sulfa drugs (Other)      Home Medications:    Hospital Medications:  acetaminophen     Tablet .. 650 milliGRAM(s) Oral every 6 hours PRN  aluminum hydroxide/magnesium hydroxide/simethicone Suspension 30 milliLiter(s) Oral every 4 hours PRN  amLODIPine   Tablet 10 milliGRAM(s) Oral daily  aspirin enteric coated 81 milliGRAM(s) Oral daily  atorvastatin 80 milliGRAM(s) Oral at bedtime  chlorhexidine 2% Cloths 1 Application(s) Topical <User Schedule>  doxazosin 4 milliGRAM(s) Oral at bedtime  hydrochlorothiazide 25 milliGRAM(s) Oral daily  influenza   Vaccine 0.5 milliLiter(s) IntraMuscular once  lacosamide 100 milliGRAM(s) Oral two times a day  lactated ringers. 1000 milliLiter(s) IV Continuous <Continuous>  levETIRAcetam 1500 milliGRAM(s) Oral two times a day  losartan 100 milliGRAM(s) Oral daily  melatonin 3 milliGRAM(s) Oral at bedtime PRN  ondansetron Injectable 4 milliGRAM(s) IV Push every 8 hours PRN  OXcarbazepine 300 milliGRAM(s) Oral two times a day  senna 2 Tablet(s) Oral at bedtime  zonisamide 100 milliGRAM(s) Oral two times a day      PMHX/PSHX:  HTN (hypertension)    Epilepsy    Coronary artery disease    Brain aneurysm    Hyperlipidemia    Coronary artery disease    Deep vein thrombosis (DVT)    Seizure    Hypertension    Stroke    Aneurysm    Cholecystostomy care    Aneurysm    S/P  shunt    History of cranioplasty    H/O craniotomy    Presence of IVC filter    S/P primary angioplasty with coronary stent    H/O cerebral aneurysm repair        Family history:  No pertinent family history in first degree relatives        Social History:   Denies ethanol use.  Denies illicit drug use.    ROS:     General:  No wt loss, fevers, chills, night sweats, fatigue,   Eyes:  Good vision, no reported pain  ENT:  No sore throat, pain, runny nose, dysphagia  CV:  No pain, palpitations, hypo/hypertension  Resp:  No dyspnea, cough, tachypnea, wheezing  GI:  See HPI  :  No pain, bleeding, incontinence, nocturia  Muscle:  No pain, weakness  Neuro:  No weakness, tingling, memory problems  Psych:  No fatigue, insomnia, mood problems, depression  Endocrine:  No polyuria, polydipsia, cold/heat intolerance  Heme:  No petechiae, ecchymosis, easy bruisability  Integumentary:  No rash, edema      PHYSICAL EXAM:     GENERAL:  Appears stated age, well-groomed, well-nourished, no distress  HEENT:  NC/AT,  conjunctivae anicteric, clear and pink,   NECK: supple, trachea midline  CHEST:  Full & symmetric excursion, no increased effort, breath sounds clear  HEART:  Regular rhythm, no JVD  ABDOMEN:  Soft, non-tender, non-distended, normoactive bowel sounds,  no masses , no hepatosplenomegaly  EXTREMITIES:  no cyanosis,clubbing or edema  SKIN:  No rash, erythema, or, ecchymoses, no jaundice  NEURO:  Alert, non-focal, no asterixis  PSYCH: Appropriate affect, oriented to place and time  RECTAL: Deferred      Vital Signs:  Vital Signs Last 24 Hrs  T(C): 36.8 (30 Jan 2023 04:34), Max: 37.2 (29 Jan 2023 21:16)  T(F): 98.2 (30 Jan 2023 04:34), Max: 98.9 (29 Jan 2023 21:16)  HR: 61 (30 Jan 2023 04:34) (61 - 71)  BP: 145/78 (30 Jan 2023 04:34) (115/70 - 150/79)  BP(mean): --  RR: 18 (30 Jan 2023 04:34) (18 - 18)  SpO2: 95% (30 Jan 2023 04:34) (95% - 98%)    Parameters below as of 30 Jan 2023 04:34  Patient On (Oxygen Delivery Method): room air      Daily     Daily     LABS: Labs personally reviewed by me:    01-30    144  |  114<H>  |  24<H>  ----------------------------<  91  4.1   |  21<L>  |  1.50<H>    Ca    9.4      30 Jan 2023 06:23        PT/INR - ( 30 Jan 2023 06:23 )   PT: 42.6 sec;   INR: 3.66 ratio         PTT - ( 29 Jan 2023 06:31 )  PTT:43.8 sec        Imaging personally reviewed by me:

## 2023-01-30 NOTE — DISCHARGE NOTE NURSING/CASE MANAGEMENT/SOCIAL WORK - PATIENT PORTAL LINK FT
You can access the FollowMyHealth Patient Portal offered by [facility] by registering at the following website: [email or web address]. By joining [facility]’s FollowMyHealth portal, you will also be able to view your health information using other applications (apps) compatible with our system.

## 2023-01-30 NOTE — PROGRESS NOTE ADULT - SUBJECTIVE AND OBJECTIVE BOX
[facility] Division of Hospital Medicine  Mita Hernandez MD  Available via MS Teams    SUBJECTIVE / OVERNIGHT EVENTS:  overnight had episode where speech appeared more slurred/slowed per RN, taken down for CTH  per RN, shortly after returning from [facility], pt appeared back to baseline speech pattern     this AM, noted to be vomiting with small streaks of blood in emesis  denies any abd pain, no diarrhea, no CP or SOB    ADDITIONAL REVIEW OF SYSTEMS:  14 point ROS negative except as noted above     MEDICATIONS  (STANDING):  amLODIPine   Tablet 10 milliGRAM(s) Oral daily  aspirin enteric coated 81 milliGRAM(s) Oral daily  atorvastatin 80 milliGRAM(s) Oral at bedtime  chlorhexidine 2% Cloths 1 Application(s) Topical <User Schedule>  doxazosin 4 milliGRAM(s) Oral at bedtime  hydrochlorothiazide 25 milliGRAM(s) Oral daily  influenza   Vaccine 0.5 milliLiter(s) IntraMuscular once  lacosamide 100 milliGRAM(s) Oral two times a day  lactated ringers. 1000 milliLiter(s) (75 mL/Hr) IV Continuous <Continuous>  levETIRAcetam 1500 milliGRAM(s) Oral two times a day  losartan 100 milliGRAM(s) Oral daily  ondansetron Injectable 4 milliGRAM(s) IV Push three times a day  OXcarbazepine 300 milliGRAM(s) Oral two times a day  pantoprazole    Tablet 40 milliGRAM(s) Oral before breakfast  senna 2 Tablet(s) Oral at bedtime  zonisamide 100 milliGRAM(s) Oral two times a day    MEDICATIONS  (PRN):  acetaminophen     Tablet .. 650 milliGRAM(s) Oral every 6 hours PRN Temp greater or equal to 38C (100.4F), Mild Pain (1 - 3)  aluminum hydroxide/magnesium hydroxide/simethicone Suspension 30 milliLiter(s) Oral every 4 hours PRN Dyspepsia  melatonin 3 milliGRAM(s) Oral at bedtime PRN Insomnia      I&O's Summary    29 Jan 2023 07:01  -  30 Jan 2023 07:00  --------------------------------------------------------  IN: 440 mL / OUT: 300 mL / NET: 140 mL    30 Jan 2023 07:01  -  30 Jan 2023 15:05  --------------------------------------------------------  IN: 480 mL / OUT: 600 mL / NET: -120 mL        PHYSICAL EXAM:  Vital Signs Last 24 Hrs  T(C): 36.7 (30 Jan 2023 11:25), Max: 37.2 (29 Jan 2023 21:16)  T(F): 98 (30 Jan 2023 11:25), Max: 98.9 (29 Jan 2023 21:16)  HR: 83 (30 Jan 2023 11:25) (61 - 83)  BP: 115/71 (30 Jan 2023 11:25) (115/70 - 150/79)  BP(mean): --  RR: 18 (30 Jan 2023 11:25) (18 - 18)  SpO2: 96% (30 Jan 2023 11:25) (95% - 97%)    Parameters below as of 30 Jan 2023 11:25  Patient On (Oxygen Delivery Method): room air      PHYSICAL EXAM:  GENERAL: NAD, well-developed  HEAD:  Atraumatic, normocephalic  EYES: EOMI, conjunctiva and sclera clear  NECK: Supple, no JVD  CHEST/LUNG: Clear to auscultation bilaterally; no wheezing or rales  HEART: Regular rate and rhythm; no murmurs, no LE edema   ABDOMEN: Soft, nontender, nondistended; bowel sounds present  SKIN: No rashes or lesions      LABS:    01-30    144  |  114<H>  |  24<H>  ----------------------------<  91  4.1   |  21<L>  |  1.50<H>    Ca    9.4      30 Jan 2023 06:23      PT/INR - ( 30 Jan 2023 06:23 )   PT: 42.6 sec;   INR: 3.66 ratio         PTT - ( 29 Jan 2023 06:31 )  PTT:43.8 sec          COVID-19 PCR: NotDetetory (28 Jan 2023 10:53)  SARS-CoV-2: Detected (27 Jan 2023 17:14)      RADIOLOGY & ADDITIONAL TESTS:  New Imaging Personally Reviewed Today:  New Electrocardiogram Personally Reviewed Today:  Other Results Reviewed Today:   Prior or Outpatient Records Reviewed Today with Summary:    COORDINATION OF CARE:  Consultant Communication and Details of Discussion (where applicable):

## 2023-01-30 NOTE — DISCHARGE NOTE NURSING/CASE MANAGEMENT/SOCIAL WORK - NSDCPEPTCOWAFU_GEN_ALL_CORE
Go for blood tests as directed. Because your dose is based on the PT/INR blood test, it is very important that you get your blood tested on the scheduled date and time and to keep your health care provider appointments.   Please follow up with your doctor within 3 days of discharge to schedule your next blood test. Pituitary tumor per dx

## 2023-01-30 NOTE — PROGRESS NOTE ADULT - ASSESSMENT
63yo black F with CAD s/p PCI 2003, htn, brain aneurysm s/p hemicraniectomy and clipping (2013), seizures post hemicraniectomy, chronic DVTs on Coumadin p/w bradycardia .   CTH and CTA H/N 5/2022: frontal lobes encephalomalacia, old L occipital infarct, CTA H/N neg, aneusym clipi in [place]   TTE as above 1/25  + COVID    Impression:   1) aneurysm s/p clipping and hemicrani  2) epilepsy  3) occipital stroke, ESUS     - CTH done overnight, f/u official report   - AC for DVT   - c/w home AEDs --> on keppra 1500mg BID, vimpat 100mg BID, Oxcarbazepine 300mg BID, and zonisamide 100mg BID   - for secondary stroke prevention c/w ASA and statin therapy with LDL goal < 70    - cardiac workup in progress for gema   - Hemoglobin A1c and lipid panel  - rEEG if there is concern for seizure acitivity   - EP , would benefit from ILR for gema cardiac and to r/o occult AFib as cause of her occipital stroke   - telemetry  - PT/OT/SS/SLP, OOBC  - check FS, glucose control <180  - GI/DVT ppx  - sees Dr doran outaptient   Tristan Braun MD  Vascular Neurology  Office: [phone]

## 2023-01-31 LAB
ANION GAP SERPL CALC-SCNC: 9 MMOL/L — SIGNIFICANT CHANGE UP (ref 5–17)
BUN SERPL-MCNC: 21 MG/DL — SIGNIFICANT CHANGE UP (ref 7–23)
CALCIUM SERPL-MCNC: 9.4 MG/DL — SIGNIFICANT CHANGE UP (ref 8.4–10.5)
CHLORIDE SERPL-SCNC: 112 MMOL/L — HIGH (ref 96–108)
CO2 SERPL-SCNC: 23 MMOL/L — SIGNIFICANT CHANGE UP (ref 22–31)
CREAT SERPL-MCNC: 1.28 MG/DL — SIGNIFICANT CHANGE UP (ref 0.5–1.3)
EGFR: 47 ML/MIN/1.73M2 — LOW
GLUCOSE SERPL-MCNC: 87 MG/DL — SIGNIFICANT CHANGE UP (ref 70–99)
INR BLD: 3.27 RATIO — HIGH (ref 0.88–1.16)
OXCARBAZEPINE SERPL-MCNC: 24 UG/ML — SIGNIFICANT CHANGE UP (ref 10–35)
POTASSIUM SERPL-MCNC: 3.5 MMOL/L — SIGNIFICANT CHANGE UP (ref 3.5–5.3)
POTASSIUM SERPL-SCNC: 3.5 MMOL/L — SIGNIFICANT CHANGE UP (ref 3.5–5.3)
PROTHROM AB SERPL-ACNC: 38.1 SEC — HIGH (ref 10.5–13.4)
SODIUM SERPL-SCNC: 144 MMOL/L — SIGNIFICANT CHANGE UP (ref 135–145)

## 2023-01-31 PROCEDURE — 99232 SBSQ HOSP IP/OBS MODERATE 35: CPT

## 2023-01-31 RX ORDER — ONDANSETRON 8 MG/1
4 TABLET, FILM COATED ORAL THREE TIMES A DAY
Refills: 0 | Status: DISCONTINUED | OUTPATIENT
Start: 2023-01-31 | End: 2023-02-08

## 2023-01-31 RX ORDER — WARFARIN SODIUM 2.5 MG/1
7.5 TABLET ORAL ONCE
Refills: 0 | Status: COMPLETED | OUTPATIENT
Start: 2023-01-31 | End: 2023-01-31

## 2023-01-31 RX ADMIN — LEVETIRACETAM 1500 MILLIGRAM(S): 250 TABLET, FILM COATED ORAL at 05:06

## 2023-01-31 RX ADMIN — Medication 4 MILLIGRAM(S): at 21:23

## 2023-01-31 RX ADMIN — OXCARBAZEPINE 300 MILLIGRAM(S): 300 TABLET, FILM COATED ORAL at 05:06

## 2023-01-31 RX ADMIN — PANTOPRAZOLE SODIUM 40 MILLIGRAM(S): 20 TABLET, DELAYED RELEASE ORAL at 05:06

## 2023-01-31 RX ADMIN — Medication 100 MILLIGRAM(S): at 17:15

## 2023-01-31 RX ADMIN — Medication 81 MILLIGRAM(S): at 11:15

## 2023-01-31 RX ADMIN — LEVETIRACETAM 1500 MILLIGRAM(S): 250 TABLET, FILM COATED ORAL at 17:14

## 2023-01-31 RX ADMIN — CHLORHEXIDINE GLUCONATE 1 APPLICATION(S): 213 SOLUTION TOPICAL at 05:07

## 2023-01-31 RX ADMIN — LACOSAMIDE 100 MILLIGRAM(S): 50 TABLET ORAL at 17:14

## 2023-01-31 RX ADMIN — WARFARIN SODIUM 7.5 MILLIGRAM(S): 2.5 TABLET ORAL at 21:24

## 2023-01-31 RX ADMIN — AMLODIPINE BESYLATE 10 MILLIGRAM(S): 2.5 TABLET ORAL at 05:06

## 2023-01-31 RX ADMIN — LOSARTAN POTASSIUM 100 MILLIGRAM(S): 100 TABLET, FILM COATED ORAL at 05:06

## 2023-01-31 RX ADMIN — Medication 100 MILLIGRAM(S): at 05:06

## 2023-01-31 RX ADMIN — ONDANSETRON 4 MILLIGRAM(S): 8 TABLET, FILM COATED ORAL at 05:07

## 2023-01-31 RX ADMIN — OXCARBAZEPINE 300 MILLIGRAM(S): 300 TABLET, FILM COATED ORAL at 17:15

## 2023-01-31 RX ADMIN — ONDANSETRON 4 MILLIGRAM(S): 8 TABLET, FILM COATED ORAL at 21:24

## 2023-01-31 RX ADMIN — ATORVASTATIN CALCIUM 80 MILLIGRAM(S): 80 TABLET, FILM COATED ORAL at 21:23

## 2023-01-31 RX ADMIN — ONDANSETRON 4 MILLIGRAM(S): 8 TABLET, FILM COATED ORAL at 11:14

## 2023-01-31 RX ADMIN — LACOSAMIDE 100 MILLIGRAM(S): 50 TABLET ORAL at 05:06

## 2023-01-31 NOTE — PROGRESS NOTE ADULT - SUBJECTIVE AND OBJECTIVE BOX
[facility] Division of Hospital Medicine  Laurita Louis DO  8a-5pm: [phone]  after 5pm call [phone]    Patient is a 62y old  Female who presents with a chief complaint of bradycardia (31 Jan 2023 15:06)        SUBJECTIVE / OVERNIGHT EVENTS: no acute complaints       MEDICATIONS  (STANDING):  amLODIPine   Tablet 10 milliGRAM(s) Oral daily  aspirin enteric coated 81 milliGRAM(s) Oral daily  atorvastatin 80 milliGRAM(s) Oral at bedtime  chlorhexidine 2% Cloths 1 Application(s) Topical <User Schedule>  doxazosin 4 milliGRAM(s) Oral at bedtime  hydrochlorothiazide 25 milliGRAM(s) Oral daily  influenza   Vaccine 0.5 milliLiter(s) IntraMuscular once  lacosamide 100 milliGRAM(s) Oral two times a day  lactated ringers. 1000 milliLiter(s) (75 mL/Hr) IV Continuous <Continuous>  levETIRAcetam 1500 milliGRAM(s) Oral two times a day  losartan 100 milliGRAM(s) Oral daily  ondansetron   Disintegrating Tablet 4 milliGRAM(s) Oral three times a day  OXcarbazepine 300 milliGRAM(s) Oral two times a day  pantoprazole    Tablet 40 milliGRAM(s) Oral before breakfast  senna 2 Tablet(s) Oral at bedtime  zonisamide 100 milliGRAM(s) Oral two times a day    MEDICATIONS  (PRN):  acetaminophen     Tablet .. 650 milliGRAM(s) Oral every 6 hours PRN Temp greater or equal to 38C (100.4F), Mild Pain (1 - 3)  aluminum hydroxide/magnesium hydroxide/simethicone Suspension 30 milliLiter(s) Oral every 4 hours PRN Dyspepsia  melatonin 3 milliGRAM(s) Oral at bedtime PRN Insomnia      Vital Signs Last 24 Hrs  T(C): 37 (31 Jan 2023 11:12), Max: 37.3 (30 Jan 2023 20:04)  T(F): 98.6 (31 Jan 2023 11:12), Max: 99.2 (30 Jan 2023 20:04)  HR: 62 (31 Jan 2023 16:47) (62 - 81)  BP: 153/88 (31 Jan 2023 16:47) (118/77 - 153/88)  BP(mean): --  RR: 18 (31 Jan 2023 16:47) (18 - 18)  SpO2: 98% (31 Jan 2023 16:47) (97% - 98%)    Parameters below as of 31 Jan 2023 16:47  Patient On (Oxygen Delivery Method): room air      CAPILLARY BLOOD GLUCOSE        I&O's Summary    30 Jan 2023 07:01  -  31 Jan 2023 07:00  --------------------------------------------------------  IN: 2440 mL / OUT: 1700 mL / NET: 740 mL    31 Jan 2023 07:01  -  31 Jan 2023 18:35  --------------------------------------------------------  IN: 700 mL / OUT: 0 mL / NET: 700 mL        PHYSICAL EXAM:  GENERAL: NAD, breathing normal  HEAD:  Atraumatic, Normocephalic  EYES: conjunctiva and sclera clear  NECK: supple, No JVD  CHEST/LUNG: CTA b/l  HEART: S1 S2 RRR  ABDOMEN: +BS Soft, NT/ND, no rebound or guarding, negative nieves's sign  EXTREMITIES:  2+ DP Pulses, No c/c. no LE edema  NEUROLOGY: AAOx3, no facial droop, 5/5 ms b/l UE and LE   SKIN: LLE demarcated skin discoloration - no erythema or warmth    LABS:    01-31    144  |  112<H>  |  21  ----------------------------<  87  3.5   |  23  |  1.28    Ca    9.4      31 Jan 2023 06:59      PT/INR - ( 31 Jan 2023 06:59 )   PT: 38.1 sec;   INR: 3.27 ratio                   RADIOLOGY & ADDITIONAL TESTS:    Imaging Personally Reviewed:  Consultant(s) Notes Reviewed:    Care Discussed with Consultants/Other Providers: d/w Dr. Braun regarding EEG - continue current AEDs

## 2023-01-31 NOTE — PROGRESS NOTE ADULT - ASSESSMENT
1. Nausea / vomiting, diarrhea.-- improving   - Unclear etiology, ddx includes gastroenteritis, dysmotility/gastroparesis, medication-related. No evidence of bowel obstruction. LFTs / lipase wnl so doubt hepatobiliary or pancreatic pathology. CTH neg for acute pathology. No correlation with bradycardia or hypertension.   - diarrhea resolved-- if resumes can check GI PCR  - Zofran changed to standing pre-meals-- seems to be helping   - daily PPI     2. Chronic DVTs  - on coumadin, supratherapeutic INR    3. Hx of aneurysm s/p hemicraniectomy and clipping  - on ASA and statin     4. HTN  - per primary team     5. Seizure disorder  - per neurology     6. CAD s/p stent       Attending supervision statement: I have personally seen and examined the patient. I fully participated in the care of this patient. I have made amendments to the documentation where necessary, and agree with the history, physical exam, and plan as outlined by the ACP.    [facility]  Gastroenterology and Hepatology  Office: [phone]

## 2023-01-31 NOTE — PROGRESS NOTE ADULT - SUBJECTIVE AND OBJECTIVE BOX
Neurology Progress Note    S: Patient seen and examined. CTH done overnight     Medication:  MEDICATIONS  (STANDING):  amLODIPine   Tablet 10 milliGRAM(s) Oral daily  aspirin enteric coated 81 milliGRAM(s) Oral daily  atorvastatin 80 milliGRAM(s) Oral at bedtime  chlorhexidine 2% Cloths 1 Application(s) Topical <User Schedule>  doxazosin 4 milliGRAM(s) Oral at bedtime  hydrochlorothiazide 25 milliGRAM(s) Oral daily  influenza   Vaccine 0.5 milliLiter(s) IntraMuscular once  lacosamide 100 milliGRAM(s) Oral two times a day  lactated ringers. 1000 milliLiter(s) (75 mL/Hr) IV Continuous <Continuous>  levETIRAcetam 1500 milliGRAM(s) Oral two times a day  losartan 100 milliGRAM(s) Oral daily  ondansetron Injectable 4 milliGRAM(s) IV Push three times a day  OXcarbazepine 300 milliGRAM(s) Oral two times a day  pantoprazole    Tablet 40 milliGRAM(s) Oral before breakfast  senna 2 Tablet(s) Oral at bedtime  zonisamide 100 milliGRAM(s) Oral two times a day    MEDICATIONS  (PRN):  acetaminophen     Tablet .. 650 milliGRAM(s) Oral every 6 hours PRN Temp greater or equal to 38C (100.4F), Mild Pain (1 - 3)  aluminum hydroxide/magnesium hydroxide/simethicone Suspension 30 milliLiter(s) Oral every 4 hours PRN Dyspepsia  melatonin 3 milliGRAM(s) Oral at bedtime PRN Insomnia      Vitals:    Vital Signs Last 24 Hrs  T(C): 36.6 (01-31-23 @ 04:35), Max: 37.3 (01-30-23 @ 20:04)  T(F): 97.9 (01-31-23 @ 04:35), Max: 99.2 (01-30-23 @ 20:04)  HR: 62 (01-31-23 @ 04:35) (62 - 83)  BP: 124/79 (01-31-23 @ 04:35) (115/71 - 153/81)  BP(mean): --  RR: 18 (01-31-23 @ 04:35) (18 - 18)  SpO2: 97% (01-31-23 @ 04:35) (96% - 97%)         General Exam:   General Appearance: Appropriately dressed and in no acute distress       Head: Normocephalic, atraumatic and no dysmorphic features  Ear, Nose, and Throat: Moist mucous membranes  CVS: S1S2+  Resp: No SOB, no wheeze or rhonchi  GI: soft NT/ND  Extremities: No edema or cyanosis  Skin: No bruises or rashes     Neurological Exam:  Mental Status: Awake, alert and oriented x 3.  Able to follow simple and complex verbal commands. Able to name and repeat. fluent speech. No obvious aphasia or dysarthria noted.   Cranial Nerves: PERRL, EOMI, VFFC, sensation V1-V3 intact,  no obvious facial asymmetry, equal elevation of palate, scm/trap 5/5, tongue is midline on protrusion. no obvious papilledema on fundoscopic exam. hearing is grossly intact.   Motor: Normal bulk, tone and strength throughout. Fine finger movements were intact and symmetric. no tremors or drift noted.    Sensation: Intact to light touch and pinprick throughout. no right/left confusion. no extinction to tactile on DSS.    Reflexes: 1+ throughout at biceps, brachioradialis, triceps, patellars and ankles bilaterally and equal. No clonus. R toe and L toe were both downgoing.  Coordination: No dysmetria on FNF   Gait: deferred         I personally reviewed the below data/images/labs:    no new labs     CBC Full  -  ( 28 Jan 2023 06:55 )  WBC Count : 7.06 K/uL  RBC Count : 4.06 M/uL  Hemoglobin : 11.7 g/dL  Hematocrit : 35.2 %  Platelet Count - Automated : 204 K/uL  Mean Cell Volume : 86.7 fl  Mean Cell Hemoglobin : 28.8 pg  Mean Cell Hemoglobin Concentration : 33.2 gm/dL  Auto Neutrophil # : x  Auto Lymphocyte # : x  Auto Monocyte # : x  Auto Eosinophil # : x  Auto Basophil # : x  Auto Neutrophil % : x  Auto Lymphocyte % : x  Auto Monocyte % : x  Auto Eosinophil % : x  Auto Basophil % : x    01-29    145  |  110<H>  |  26<H>  ----------------------------<  91  3.3<L>   |  22  |  1.70<H>    Ca    9.7      29 Jan 2023 06:31  Phos  3.4     01-28  Mg     2.1     01-28    TPro  8.1  /  Alb  4.2  /  TBili  0.2  /  DBili  <0.1  /  AST  15  /  ALT  9<L>  /  AlkPhos  107  01-27    LIVER FUNCTIONS - ( 27 Jan 2023 15:14 )  Alb: 4.2 g/dL / Pro: 8.1 g/dL / ALK PHOS: 107 U/L / ALT: 9 U/L / AST: 15 U/L / GGT: x           PT/INR - ( 29 Jan 2023 06:31 )   PT: 50.4 sec;   INR: 4.32 ratio         PTT - ( 29 Jan 2023 06:31 )  PTT:43.8 sec      Patient name: BELLE MENENDEZ  YOB: 1960   Age: 62 (F)   MR#: [id number]  Study Date: 1/25/2023  [facility]  Study quality: Technically good  Referring Physician: Bibiana Merchant MD  Blood Pressure: 111/54 mmHg  Height: 157 cm  Weight: 70 kg  BSA: 1.7 m2  ------------------------------------------------------------------------  PROCEDURE: Transthoracic echocardiogram with 2-D, M-Mode  and complete spectral and color flow Doppler.  INDICATION: Abnormal electrocardiogram (ECG) (EKG) (R94.31)  ------------------------------------------------------------------------  Dimensions:    Normal Values:  LA:     3.9    2.0 - 4.0 cm  Ao:     2.9    2.0 - 3.8 cm  SEPTUM: 1.0    0.6 - 1.2 cm  PWT:    0.8    0.6 - 1.1 cm  LVIDd:  4.7    3.0 - 5.6 cm  LVIDs:  2.6    1.8 - 4.0 cm  Derived variables:  LVMI: 83 g/m2  RWT: 0.34  Fractional short: 45 %  EF (Carreno Rule): 61 %Doppler Peak Velocity (m/sec):  AoV=1.4  ------------------------------------------------------------------------  Observations:  Mitral Valve: Normal mitral valve. Minimal mitral  regurgitation.  Aortic Valve/Aorta: Thickened trileaflet aortic valve. Peak  transaortic valve gradient equals 8 mm Hg. No aortic valve  regurgitation seen. Peak left ventricular outflow tract  gradient equals 5 mm Hg, mean gradient is equal to 2 mm Hg,  LVOT velocity time integral equals 26 cm.  Aortic Root: 2.9 cm.  Ascending Aorta: 3.3 cm.  LVOT diameter: 1.9 cm.  Left Atrium: Normal left atrium.  LA volume index = 25  cc/m2.  Left Ventricle: Normal left ventricular systolic function.  No segmental wall motion abnormalities. Normal left  ventricular internal dimensions and wall thickness. Normal  diastolic function.  Right Heart: Normal right atrium. Normal right ventricular  size and function. Normal tricuspid valve. Mild tricuspid  regurgitation. Pulmonic valve not well visualized, probably  normal. No pulmonic regurgitation.  Pericardium/Pleura: Normal pericardium with no pericardial  effusion.  Hemodynamic: Estimated right atrial pressure is 8 mm Hg.  Estimated right ventricular systolic pressure equals 31 mm  Hg, assuming right atrial pressure equals 8 mm Hg,  consistent with normal pulmonary pressures.  ------------------------------------------------------------------------  Conclusions:  1. Normal left ventricular internal dimensions and wall  thickness.  2. Normal left ventricular systolic function. No segmental  wall motion abnormalities.  3. Normal right ventricular size and function.  4. Estimated pulmonary artery systolic pressure equals 31  mm Hg, assuming right atrial pressure equals 8  mm Hg,  consistent with normal pulmonary pressures.  ------------------------------------------------------------------------  Confirmed on  1/25/2023 - 17:33:29 by PAULA Kimble  ------------------------------------------------------------------------    < end of copied text >   Neurology Progress Note    S: Patient seen and examined. CTH done overnight ; EEG neg for seizures     Medication:  MEDICATIONS  (STANDING):  amLODIPine   Tablet 10 milliGRAM(s) Oral daily  aspirin enteric coated 81 milliGRAM(s) Oral daily  atorvastatin 80 milliGRAM(s) Oral at bedtime  chlorhexidine 2% Cloths 1 Application(s) Topical <User Schedule>  doxazosin 4 milliGRAM(s) Oral at bedtime  hydrochlorothiazide 25 milliGRAM(s) Oral daily  influenza   Vaccine 0.5 milliLiter(s) IntraMuscular once  lacosamide 100 milliGRAM(s) Oral two times a day  lactated ringers. 1000 milliLiter(s) (75 mL/Hr) IV Continuous <Continuous>  levETIRAcetam 1500 milliGRAM(s) Oral two times a day  losartan 100 milliGRAM(s) Oral daily  ondansetron Injectable 4 milliGRAM(s) IV Push three times a day  OXcarbazepine 300 milliGRAM(s) Oral two times a day  pantoprazole    Tablet 40 milliGRAM(s) Oral before breakfast  senna 2 Tablet(s) Oral at bedtime  zonisamide 100 milliGRAM(s) Oral two times a day    MEDICATIONS  (PRN):  acetaminophen     Tablet .. 650 milliGRAM(s) Oral every 6 hours PRN Temp greater or equal to 38C (100.4F), Mild Pain (1 - 3)  aluminum hydroxide/magnesium hydroxide/simethicone Suspension 30 milliLiter(s) Oral every 4 hours PRN Dyspepsia  melatonin 3 milliGRAM(s) Oral at bedtime PRN Insomnia      Vitals:    Vital Signs Last 24 Hrs  T(C): 36.6 (01-31-23 @ 04:35), Max: 37.3 (01-30-23 @ 20:04)  T(F): 97.9 (01-31-23 @ 04:35), Max: 99.2 (01-30-23 @ 20:04)  HR: 62 (01-31-23 @ 04:35) (62 - 83)  BP: 124/79 (01-31-23 @ 04:35) (115/71 - 153/81)  BP(mean): --  RR: 18 (01-31-23 @ 04:35) (18 - 18)  SpO2: 97% (01-31-23 @ 04:35) (96% - 97%)         General Exam:   General Appearance: Appropriately dressed and in no acute distress       Head: Normocephalic, atraumatic and no dysmorphic features  Ear, Nose, and Throat: Moist mucous membranes  CVS: S1S2+  Resp: No SOB, no wheeze or rhonchi  GI: soft NT/ND  Extremities: No edema or cyanosis  Skin: No bruises or rashes     Neurological Exam:  Mental Status: Awake, alert and oriented x 3.  Able to follow simple and complex verbal commands. Able to name and repeat. fluent speech. No obvious aphasia or dysarthria noted.   Cranial Nerves: PERRL, EOMI, VFFC, sensation V1-V3 intact,  no obvious facial asymmetry, equal elevation of palate, scm/trap 5/5, tongue is midline on protrusion. no obvious papilledema on fundoscopic exam. hearing is grossly intact.   Motor: Normal bulk, tone and strength throughout. Fine finger movements were intact and symmetric. no tremors or drift noted.    Sensation: Intact to light touch and pinprick throughout. no right/left confusion. no extinction to tactile on DSS.    Reflexes: 1+ throughout at biceps, brachioradialis, triceps, patellars and ankles bilaterally and equal. No clonus. R toe and L toe were both downgoing.  Coordination: No dysmetria on FNF   Gait: deferred         I personally reviewed the below data/images/labs:    no new labs     CBC Full  -  ( 28 Jan 2023 06:55 )  WBC Count : 7.06 K/uL  RBC Count : 4.06 M/uL  Hemoglobin : 11.7 g/dL  Hematocrit : 35.2 %  Platelet Count - Automated : 204 K/uL  Mean Cell Volume : 86.7 fl  Mean Cell Hemoglobin : 28.8 pg  Mean Cell Hemoglobin Concentration : 33.2 gm/dL  Auto Neutrophil # : x  Auto Lymphocyte # : x  Auto Monocyte # : x  Auto Eosinophil # : x  Auto Basophil # : x  Auto Neutrophil % : x  Auto Lymphocyte % : x  Auto Monocyte % : x  Auto Eosinophil % : x  Auto Basophil % : x    01-29    145  |  110<H>  |  26<H>  ----------------------------<  91  3.3<L>   |  22  |  1.70<H>    Ca    9.7      29 Jan 2023 06:31  Phos  3.4     01-28  Mg     2.1     01-28    TPro  8.1  /  Alb  4.2  /  TBili  0.2  /  DBili  <0.1  /  AST  15  /  ALT  9<L>  /  AlkPhos  107  01-27    LIVER FUNCTIONS - ( 27 Jan 2023 15:14 )  Alb: 4.2 g/dL / Pro: 8.1 g/dL / ALK PHOS: 107 U/L / ALT: 9 U/L / AST: 15 U/L / GGT: x           PT/INR - ( 29 Jan 2023 06:31 )   PT: 50.4 sec;   INR: 4.32 ratio         PTT - ( 29 Jan 2023 06:31 )  PTT:43.8 sec      Patient name: BELLE MENENDEZ  YOB: 1960   Age: 62 (F)   MR#: [id number]  Study Date: 1/25/2023  [facility]  Study quality: Technically good  Referring Physician: Bibiana Merchant MD  Blood Pressure: 111/54 mmHg  Height: 157 cm  Weight: 70 kg  BSA: 1.7 m2  ------------------------------------------------------------------------  PROCEDURE: Transthoracic echocardiogram with 2-D, M-Mode  and complete spectral and color flow Doppler.  INDICATION: Abnormal electrocardiogram (ECG) (EKG) (R94.31)  ------------------------------------------------------------------------  Dimensions:    Normal Values:  LA:     3.9    2.0 - 4.0 cm  Ao:     2.9    2.0 - 3.8 cm  SEPTUM: 1.0    0.6 - 1.2 cm  PWT:    0.8    0.6 - 1.1 cm  LVIDd:  4.7    3.0 - 5.6 cm  LVIDs:  2.6    1.8 - 4.0 cm  Derived variables:  LVMI: 83 g/m2  RWT: 0.34  Fractional short: 45 %  EF (Carreno Rule): 61 %Doppler Peak Velocity (m/sec):  AoV=1.4  ------------------------------------------------------------------------  Observations:  Mitral Valve: Normal mitral valve. Minimal mitral  regurgitation.  Aortic Valve/Aorta: Thickened trileaflet aortic valve. Peak  transaortic valve gradient equals 8 mm Hg. No aortic valve  regurgitation seen. Peak left ventricular outflow tract  gradient equals 5 mm Hg, mean gradient is equal to 2 mm Hg,  LVOT velocity time integral equals 26 cm.  Aortic Root: 2.9 cm.  Ascending Aorta: 3.3 cm.  LVOT diameter: 1.9 cm.  Left Atrium: Normal left atrium.  LA volume index = 25  cc/m2.  Left Ventricle: Normal left ventricular systolic function.  No segmental wall motion abnormalities. Normal left  ventricular internal dimensions and wall thickness. Normal  diastolic function.  Right Heart: Normal right atrium. Normal right ventricular  size and function. Normal tricuspid valve. Mild tricuspid  regurgitation. Pulmonic valve not well visualized, probably  normal. No pulmonic regurgitation.  Pericardium/Pleura: Normal pericardium with no pericardial  effusion.  Hemodynamic: Estimated right atrial pressure is 8 mm Hg.  Estimated right ventricular systolic pressure equals 31 mm  Hg, assuming right atrial pressure equals 8 mm Hg,  consistent with normal pulmonary pressures.  ------------------------------------------------------------------------  Conclusions:  1. Normal left ventricular internal dimensions and wall  thickness.  2. Normal left ventricular systolic function. No segmental  wall motion abnormalities.  3. Normal right ventricular size and function.  4. Estimated pulmonary artery systolic pressure equals 31  mm Hg, assuming right atrial pressure equals 8  mm Hg,  consistent with normal pulmonary pressures.  ------------------------------------------------------------------------  Confirmed on  1/25/2023 - 17:33:29 by PAULA Kimble  ------------------------------------------------------------------------    < end of copied text >

## 2023-01-31 NOTE — PROGRESS NOTE ADULT - SUBJECTIVE AND OBJECTIVE BOX
DATE OF SERVICE: 01-31-23 @ 15:06    Patient is a 62y old  Female who presents with a chief complaint of bradycardia (31 Jan 2023 13:02)      INTERVAL HISTORY: Feels ok .     REVIEW OF SYSTEMS:  CONSTITUTIONAL: No weakness  EYES/ENT: No visual changes;  No throat pain   NECK: No pain or stiffness  RESPIRATORY: No cough, wheezing; No shortness of breath  CARDIOVASCULAR: No chest pain or palpitations  GASTROINTESTINAL: No abdominal  pain. No nausea, vomiting, or hematemesis  GENITOURINARY: No dysuria, frequency or hematuria  NEUROLOGICAL: No stroke like symptoms  SKIN: No rashes    TELEMETRY Personally reviewed: SB/SR 55-70  	  MEDICATIONS:  amLODIPine   Tablet 10 milliGRAM(s) Oral daily  doxazosin 4 milliGRAM(s) Oral at bedtime  hydrochlorothiazide 25 milliGRAM(s) Oral daily  losartan 100 milliGRAM(s) Oral daily        PHYSICAL EXAM:  T(C): 37 (01-31-23 @ 11:12), Max: 37.3 (01-30-23 @ 20:04)  HR: 80 (01-31-23 @ 12:01) (62 - 81)  BP: 146/76 (01-31-23 @ 12:01) (118/77 - 153/81)  RR: 18 (01-31-23 @ 12:01) (18 - 18)  SpO2: 98% (01-31-23 @ 12:01) (97% - 98%)  Wt(kg): --  I&O's Summary    30 Jan 2023 07:01  -  31 Jan 2023 07:00  --------------------------------------------------------  IN: 2440 mL / OUT: 1700 mL / NET: 740 mL    31 Jan 2023 07:01  -  31 Jan 2023 15:06  --------------------------------------------------------  IN: 480 mL / OUT: 0 mL / NET: 480 mL          Appearance: In no distress	  HEENT:    PERRL, EOMI	  Cardiovascular:  S1 S2, No JVD  Respiratory: Lungs clear to auscultation	  Gastrointestinal:  Soft, Non-tender, + BS	  Vascularature:  No edema of LE  Psychiatric: Appropriate affect   Neuro: no acute focal deficits           01-31    144  |  112<H>  |  21  ----------------------------<  87  3.5   |  23  |  1.28    Ca    9.4      31 Jan 2023 06:59          Labs personally reviewed      ASSESSMENT/PLAN: 	    Ms. Denis is a 61 yo female with PMH of CAD s/p PCI in 2003, HTN, brain aneursyms s/p hemicraniotomy and clipping in 2013 c/b epilepsy chronic DVT now on coumadin who presents with symptomatic bradycardia.    Problem/Plan -1  Problem: Bradycardia  - ECG reveals SB; tele shows HR mostly in 30-40s but as long as 29  - TTE unremarkable  - TSH wnl  - HR  improved  60s bpm  - remain off Clonidine and Labetalol    Problem/Plan -2  Problem: Hypertension  - Patient hypotensive with EMS but on multiple antihypertensive meds  - c/w all home meds with the exception of labetalol and clonidine     Problem/Plan -3  Problem: Coronary Artery Disease  - s/p remote PCI  - Denies CP or SOB  - Echo unremarkable  - c/w ASA 81mg PO daily, rosuvastatin 40mg PO daily    Problem/Plan -4  Problem: hx of DVT  - c/w coumadin and dose to INR 2-3  - INR currently supratherapeutic    Problem/Plan -5  Problem: Hx of Brain Aneursyms with intervention and Epilepsy  - Now with increased forgetfulness and lethargy  - Neuro recs appreciated.   - CTH shows no evidence of hydrocephalus, Bifrontal encephalomalacia, Old left PCA infarct, Small right parietal parasagittal infarct  - Keppra dose adjusted based on serum level  - As per daughter, reports increased forgetfulness and repetitiveness  - Appreciate Neuro recs based on completed EEG  - Patient and daughter now ammenable to poss ILR. EP reconsulted.     Problem/Plan -6  Problem: Emesis  - No WBC elevation, no fever  - Not a/w meals  - Denies Diarrhea, epigastic pain  - Lipase wnl  - Abdominal XR wnl  - Appreciate GI consult   - No episode of Emesis today. Patient reports feeling better.     Daughter Allie updated.         ENMA Valdes,  [facility]  Cardiovascular Medicine  [address], [address]  Available through call or text on [employer]  Office: [phone]   DATE OF SERVICE: 01-31-23 @ 15:06    Patient is a 62y old  Female who presents with a chief complaint of bradycardia (31 Jan 2023 13:02)      INTERVAL HISTORY: Feels ok .     REVIEW OF SYSTEMS:  CONSTITUTIONAL: No weakness  EYES/ENT: No visual changes;  No throat pain   NECK: No pain or stiffness  RESPIRATORY: No cough, wheezing; No shortness of breath  CARDIOVASCULAR: No chest pain or palpitations  GASTROINTESTINAL: No abdominal  pain. No nausea, vomiting, or hematemesis  GENITOURINARY: No dysuria, frequency or hematuria  NEUROLOGICAL: No stroke like symptoms  SKIN: No rashes    TELEMETRY Personally reviewed: SB/SR 55-70  	  MEDICATIONS:  amLODIPine   Tablet 10 milliGRAM(s) Oral daily  doxazosin 4 milliGRAM(s) Oral at bedtime  hydrochlorothiazide 25 milliGRAM(s) Oral daily  losartan 100 milliGRAM(s) Oral daily        PHYSICAL EXAM:  T(C): 37 (01-31-23 @ 11:12), Max: 37.3 (01-30-23 @ 20:04)  HR: 80 (01-31-23 @ 12:01) (62 - 81)  BP: 146/76 (01-31-23 @ 12:01) (118/77 - 153/81)  RR: 18 (01-31-23 @ 12:01) (18 - 18)  SpO2: 98% (01-31-23 @ 12:01) (97% - 98%)  Wt(kg): --  I&O's Summary    30 Jan 2023 07:01  -  31 Jan 2023 07:00  --------------------------------------------------------  IN: 2440 mL / OUT: 1700 mL / NET: 740 mL    31 Jan 2023 07:01  -  31 Jan 2023 15:06  --------------------------------------------------------  IN: 480 mL / OUT: 0 mL / NET: 480 mL          Appearance: In no distress	  HEENT:    PERRL, EOMI	  Cardiovascular:  S1 S2, No JVD  Respiratory: Lungs clear to auscultation	  Gastrointestinal:  Soft, Non-tender, + BS	  Vascularature:  No edema of LE  Psychiatric: Appropriate affect   Neuro: no acute focal deficits           01-31    144  |  112<H>  |  21  ----------------------------<  87  3.5   |  23  |  1.28    Ca    9.4      31 Jan 2023 06:59          Labs personally reviewed      ASSESSMENT/PLAN: 	    Ms. Denis is a 61 yo female with PMH of CAD s/p PCI in 2003, HTN, brain aneursyms s/p hemicraniotomy and clipping in 2013 c/b epilepsy chronic DVT now on coumadin who presents with symptomatic bradycardia.    Problem/Plan -1  Problem: Bradycardia  - ECG reveals SB; tele shows HR mostly in 30-40s but as long as 29  - TTE unremarkable  - TSH wnl  - HR  improved  60s bpm  - remain off Clonidine and Labetalol    Problem/Plan -2  Problem: Hypertension  - Patient hypotensive with EMS but on multiple antihypertensive meds  - c/w all home meds with the exception of labetalol and clonidine     Problem/Plan -3  Problem: Coronary Artery Disease  - s/p remote PCI  - Denies CP or SOB  - Echo unremarkable  - c/w ASA 81mg PO daily, rosuvastatin 40mg PO daily    Problem/Plan -4  Problem: hx of DVT  - c/w coumadin and dose to INR 2-3  - INR currently supratherapeutic    Problem/Plan -5  Problem: Hx of Brain Aneursyms with intervention and Epilepsy  - Now with increased forgetfulness and lethargy  - Neuro recs appreciated.   - CTH shows no evidence of hydrocephalus, Bifrontal encephalomalacia, Old left PCA infarct, Small right parietal parasagittal infarct  - Keppra dose adjusted based on serum level  - As per daughter, reports increased forgetfulness and repetitiveness  - Appreciate Neuro recs based on completed EEG  - No need for ILR for patient already on AC with coumadin.    Problem/Plan -6  Problem: Emesis  - No WBC elevation, no fever  - Not a/w meals  - Denies Diarrhea, epigastic pain  - Lipase wnl  - Abdominal XR wnl  - Appreciate GI consult   - No episode of Emesis today. Patient reports feeling better.     Daughter Allie paige.         ENMA Valdes DO [facility]  Cardiovascular Medicine  [address] [place] [address], [address]  Available through call or text on [employer]  Office: [phone]

## 2023-01-31 NOTE — PROGRESS NOTE ADULT - SUBJECTIVE AND OBJECTIVE BOX
Chief Complaint:  Patient is a 62y old  Female who presents with a chief complaint of bradycardia (2023 07:35)      Date of service 23 @ 13:02      Interval Events:   Patient seen and examined.   No abdominal pain, nausea, vomiting or diarrhea today.     Hospital Medications:  acetaminophen     Tablet .. 650 milliGRAM(s) Oral every 6 hours PRN  aluminum hydroxide/magnesium hydroxide/simethicone Suspension 30 milliLiter(s) Oral every 4 hours PRN  amLODIPine   Tablet 10 milliGRAM(s) Oral daily  aspirin enteric coated 81 milliGRAM(s) Oral daily  atorvastatin 80 milliGRAM(s) Oral at bedtime  chlorhexidine 2% Cloths 1 Application(s) Topical <User Schedule>  doxazosin 4 milliGRAM(s) Oral at bedtime  hydrochlorothiazide 25 milliGRAM(s) Oral daily  influenza   Vaccine 0.5 milliLiter(s) IntraMuscular once  lacosamide 100 milliGRAM(s) Oral two times a day  lactated ringers. 1000 milliLiter(s) IV Continuous <Continuous>  levETIRAcetam 1500 milliGRAM(s) Oral two times a day  losartan 100 milliGRAM(s) Oral daily  melatonin 3 milliGRAM(s) Oral at bedtime PRN  ondansetron Injectable 4 milliGRAM(s) IV Push three times a day  OXcarbazepine 300 milliGRAM(s) Oral two times a day  pantoprazole    Tablet 40 milliGRAM(s) Oral before breakfast  senna 2 Tablet(s) Oral at bedtime  zonisamide 100 milliGRAM(s) Oral two times a day        Review of Systems:  General:  No wt loss, fevers, chills, night sweats, fatigue,   Eyes:  Good vision, no reported pain  ENT:  No sore throat, pain, runny nose, dysphagia  CV:  No pain, palpitations, hypo/hypertension  Resp:  No dyspnea, cough, tachypnea, wheezing  GI:  See HPI  :  No pain, bleeding, incontinence, nocturia  Muscle:  No pain, weakness  Neuro:  No weakness, tingling, memory problems  Psych:  No fatigue, insomnia, mood problems, depression  Endocrine:  No polyuria, polydipsia, cold/heat intolerance  Heme:  No petechiae, ecchymosis, easy bruisability  Integumentary:  No rash, edema    PHYSICAL EXAM:   Vital Signs:  Vital Signs Last 24 Hrs  T(C): 37 (2023 11:12), Max: 37.3 (2023 20:04)  T(F): 98.6 (2023 11:12), Max: 99.2 (2023 20:04)  HR: 80 (2023 12:01) (62 - 81)  BP: 146/76 (2023 12:01) (118/77 - 153/81)  BP(mean): --  RR: 18 (2023 12:01) (18 - 18)  SpO2: 98% (2023 12:01) (97% - 98%)    Parameters below as of 2023 12:01  Patient On (Oxygen Delivery Method): room air      Daily     Daily Weight in k.6 (2023 07:35)      PHYSICAL EXAM:     GENERAL:  Appears stated age, well-groomed, well-nourished, no distress  HEENT:  NC/AT,  conjunctivae anicteric, clear and pink,   NECK: supple, trachea midline  CHEST:  Full & symmetric excursion, no increased effort, breath sounds clear  HEART:  Regular rhythm, no JVD  ABDOMEN:  Soft, non-tender, non-distended, normoactive bowel sounds,  no masses , no hepatosplenomegaly  EXTREMITIES:  no cyanosis,clubbing or edema  SKIN:  No rash, erythema, or, ecchymoses, no jaundice  NEURO:  Alert, non-focal, no asterixis  PSYCH: Appropriate affect, oriented to place and time  RECTAL: Deferred      LABS Personally reviewed by me:          144  |  112<H>  |  21  ----------------------------<  87  3.5   |  23  |  1.28    Ca    9.4      2023 06:59        PT/INR - ( 2023 06:59 )   PT: 38.1 sec;   INR: 3.27 ratio                   Imaging personally reviewed by me:

## 2023-01-31 NOTE — PROGRESS NOTE ADULT - ASSESSMENT
61yo black F with CAD s/p PCI 2003, htn, brain aneurysm s/p hemicraniectomy and clipping (2013), seizures post hemicraniectomy, chronic DVTs on Coumadin p/w bradycardia .   CTH and CTA H/N 5/2022: frontal lobes encephalomalacia, old L occipital infarct, CTA H/N neg, aneusym clipi in [facility]   TTE as above 1/25  + COVID    Impression:   1) aneurysm s/p clipping and hemicrani  2) epilepsy  3) occipital stroke, ESUS     - CTH done overnight, f/u official report   - AC for DVT   - c/w home AEDs --> on keppra 1500mg BID, vimpat 100mg BID, Oxcarbazepine 300mg BID, and zonisamide 100mg BID   - for secondary stroke prevention c/w ASA and statin therapy with LDL goal < 70    - cardiac workup in progress for gema   - Hemoglobin A1c and lipid panel  - rEEG if there is concern for seizure acitivity   - EP , would benefit from ILR for gema cardiac and to r/o occult AFib as cause of her occipital stroke   - telemetry  - PT/OT/SS/SLP, OOBC  - check FS, glucose control <180  - GI/DVT ppx  - sees Dr doran outaptient   Tristan Braun MD  Vascular Neurology  Office: [phone]    63yo black F with CAD s/p PCI 2003, htn, brain aneurysm s/p hemicraniectomy and clipping (2013), seizures post hemicraniectomy, chronic DVTs on Coumadin p/w bradycardia .   CTH and CTA H/N 5/2022: frontal lobes encephalomalacia, old L occipital infarct, CTA H/N neg, aneusym clipi in [facility]   TTE as above 1/25  + COVID  EEG with seizure focus no seiuzres     Impression:   1) aneurysm s/p clipping and hemicrani  2) epilepsy  3) occipital stroke, ESUS     - CTH done overnight, f/u official report   - AC for DVT   - c/w home AEDs --> on keppra 1500mg BID, vimpat 100mg BID, Oxcarbazepine 300mg BID, and zonisamide 100mg BID   - for secondary stroke prevention c/w ASA and statin therapy with LDL goal < 70    - cardiac workup in progress for gema   - Hemoglobin A1c and lipid panel  - EP , would benefit from ILR for gema cardiac and to r/o occult AFib as cause of her occipital stroke   - telemetry  - PT/OT/SS/SLP, OOBC  - check FS, glucose control <180  - GI/DVT ppx  - sees Dr doran outaptient   Tristan Braun MD  Vascular Neurology  Office: [phone]

## 2023-02-01 ENCOUNTER — TRANSCRIPTION ENCOUNTER (OUTPATIENT)
Age: 63
End: 2023-02-01

## 2023-02-01 LAB
INR BLD: 2.55 RATIO — HIGH (ref 0.88–1.16)
PROTHROM AB SERPL-ACNC: 29.9 SEC — HIGH (ref 10.5–13.4)

## 2023-02-01 PROCEDURE — 93970 EXTREMITY STUDY: CPT | Mod: 26

## 2023-02-01 PROCEDURE — 99232 SBSQ HOSP IP/OBS MODERATE 35: CPT

## 2023-02-01 RX ORDER — SENNA PLUS 8.6 MG/1
2 TABLET ORAL
Qty: 0 | Refills: 0 | DISCHARGE
Start: 2023-02-01

## 2023-02-01 RX ORDER — ONDANSETRON 8 MG/1
1 TABLET, FILM COATED ORAL
Qty: 90 | Refills: 0
Start: 2023-02-01 | End: 2023-03-02

## 2023-02-01 RX ORDER — OXCARBAZEPINE 300 MG/1
1 TABLET, FILM COATED ORAL
Qty: 0 | Refills: 0 | DISCHARGE

## 2023-02-01 RX ORDER — WARFARIN SODIUM 2.5 MG/1
1.5 TABLET ORAL
Qty: 0 | Refills: 0 | DISCHARGE

## 2023-02-01 RX ORDER — ACETAMINOPHEN 500 MG
2 TABLET ORAL
Qty: 0 | Refills: 0 | DISCHARGE
Start: 2023-02-01

## 2023-02-01 RX ORDER — OXCARBAZEPINE 300 MG/1
1 TABLET, FILM COATED ORAL
Qty: 60 | Refills: 0
Start: 2023-02-01 | End: 2023-03-02

## 2023-02-01 RX ORDER — APIXABAN 2.5 MG/1
5 TABLET, FILM COATED ORAL
Qty: 30 | Refills: 0
Start: 2023-02-01 | End: 2023-03-02

## 2023-02-01 RX ORDER — WARFARIN SODIUM 2.5 MG/1
1 TABLET ORAL
Qty: 0 | Refills: 0 | DISCHARGE

## 2023-02-01 RX ORDER — LANOLIN ALCOHOL/MO/W.PET/CERES
1 CREAM (GRAM) TOPICAL
Qty: 0 | Refills: 0 | DISCHARGE
Start: 2023-02-01

## 2023-02-01 RX ORDER — WARFARIN SODIUM 2.5 MG/1
7.5 TABLET ORAL ONCE
Refills: 0 | Status: COMPLETED | OUTPATIENT
Start: 2023-02-01 | End: 2023-02-01

## 2023-02-01 RX ORDER — LABETALOL HCL 100 MG
1 TABLET ORAL
Qty: 0 | Refills: 0 | DISCHARGE

## 2023-02-01 RX ORDER — PANTOPRAZOLE SODIUM 20 MG/1
1 TABLET, DELAYED RELEASE ORAL
Qty: 30 | Refills: 0
Start: 2023-02-01 | End: 2023-03-02

## 2023-02-01 RX ADMIN — LACOSAMIDE 100 MILLIGRAM(S): 50 TABLET ORAL at 05:12

## 2023-02-01 RX ADMIN — WARFARIN SODIUM 7.5 MILLIGRAM(S): 2.5 TABLET ORAL at 21:42

## 2023-02-01 RX ADMIN — ONDANSETRON 4 MILLIGRAM(S): 8 TABLET, FILM COATED ORAL at 14:55

## 2023-02-01 RX ADMIN — OXCARBAZEPINE 300 MILLIGRAM(S): 300 TABLET, FILM COATED ORAL at 17:05

## 2023-02-01 RX ADMIN — Medication 4 MILLIGRAM(S): at 21:43

## 2023-02-01 RX ADMIN — ONDANSETRON 4 MILLIGRAM(S): 8 TABLET, FILM COATED ORAL at 21:43

## 2023-02-01 RX ADMIN — Medication 81 MILLIGRAM(S): at 12:05

## 2023-02-01 RX ADMIN — ONDANSETRON 4 MILLIGRAM(S): 8 TABLET, FILM COATED ORAL at 05:13

## 2023-02-01 RX ADMIN — AMLODIPINE BESYLATE 10 MILLIGRAM(S): 2.5 TABLET ORAL at 05:12

## 2023-02-01 RX ADMIN — LACOSAMIDE 100 MILLIGRAM(S): 50 TABLET ORAL at 17:06

## 2023-02-01 RX ADMIN — Medication 100 MILLIGRAM(S): at 17:05

## 2023-02-01 RX ADMIN — CHLORHEXIDINE GLUCONATE 1 APPLICATION(S): 213 SOLUTION TOPICAL at 05:13

## 2023-02-01 RX ADMIN — LEVETIRACETAM 1500 MILLIGRAM(S): 250 TABLET, FILM COATED ORAL at 17:04

## 2023-02-01 RX ADMIN — LOSARTAN POTASSIUM 100 MILLIGRAM(S): 100 TABLET, FILM COATED ORAL at 05:13

## 2023-02-01 RX ADMIN — LEVETIRACETAM 1500 MILLIGRAM(S): 250 TABLET, FILM COATED ORAL at 05:12

## 2023-02-01 RX ADMIN — ATORVASTATIN CALCIUM 80 MILLIGRAM(S): 80 TABLET, FILM COATED ORAL at 21:43

## 2023-02-01 RX ADMIN — OXCARBAZEPINE 300 MILLIGRAM(S): 300 TABLET, FILM COATED ORAL at 05:13

## 2023-02-01 RX ADMIN — Medication 100 MILLIGRAM(S): at 05:13

## 2023-02-01 RX ADMIN — PANTOPRAZOLE SODIUM 40 MILLIGRAM(S): 20 TABLET, DELAYED RELEASE ORAL at 05:12

## 2023-02-01 NOTE — DISCHARGE NOTE PROVIDER - CARE PROVIDER_API CALL
Tim Ojeda)  Cardiology; Internal Medicine  [address] [facility] [address], [address]  [place] [address]  Phone: [phone]  Fax: [phone]  Scheduled Appointment: 02/03/2023   Tim Ojeda)  Cardiology; Internal Medicine  [address] [facility] [address] [facility], [address]  [place] [address]  Phone: [phone]  Fax: [phone]  Follow Up Time: 1-3 days   Tim Ojeda)  Cardiology; Internal Medicine  [address] [facility]  [place] [address]  Phone: [phone]  Fax: [phone]  Established Patient  Follow Up Time: 1-3 days    SOLOMON MIX  Neurological Surgery  [address] [place] [address]  Phone: [phone]  Fax: ()-  Follow Up Time:     Kirk Barth)  Gastroenterology; Internal Medicine  [address] [place] [address]  Phone: [phone]  Fax: [phone]  Follow Up Time:     Wolfgang Phoenix)  Neurology  [address] [place] [address]  Phone: [phone]  Fax: [phone]  Established Patient  Follow Up Time:

## 2023-02-01 NOTE — PROGRESS NOTE ADULT - SUBJECTIVE AND OBJECTIVE BOX
Neurology Progress Note    S: Patient seen and examined. CTH done overnight ; EEG neg for seizures     Medication:  MEDICATIONS  (STANDING):  amLODIPine   Tablet 10 milliGRAM(s) Oral daily  aspirin enteric coated 81 milliGRAM(s) Oral daily  atorvastatin 80 milliGRAM(s) Oral at bedtime  chlorhexidine 2% Cloths 1 Application(s) Topical <User Schedule>  doxazosin 4 milliGRAM(s) Oral at bedtime  hydrochlorothiazide 25 milliGRAM(s) Oral daily  influenza   Vaccine 0.5 milliLiter(s) IntraMuscular once  lacosamide 100 milliGRAM(s) Oral two times a day  lactated ringers. 1000 milliLiter(s) (75 mL/Hr) IV Continuous <Continuous>  levETIRAcetam 1500 milliGRAM(s) Oral two times a day  losartan 100 milliGRAM(s) Oral daily  ondansetron   Disintegrating Tablet 4 milliGRAM(s) Oral three times a day  OXcarbazepine 300 milliGRAM(s) Oral two times a day  pantoprazole    Tablet 40 milliGRAM(s) Oral before breakfast  senna 2 Tablet(s) Oral at bedtime  warfarin 7.5 milliGRAM(s) Oral once  zonisamide 100 milliGRAM(s) Oral two times a day    MEDICATIONS  (PRN):  acetaminophen     Tablet .. 650 milliGRAM(s) Oral every 6 hours PRN Temp greater or equal to 38C (100.4F), Mild Pain (1 - 3)  aluminum hydroxide/magnesium hydroxide/simethicone Suspension 30 milliLiter(s) Oral every 4 hours PRN Dyspepsia  melatonin 3 milliGRAM(s) Oral at bedtime PRN Insomnia    Vitals:    Vital Signs Last 24 Hrs  T(C): 36.7 (02-01-23 @ 04:21), Max: 36.7 (01-31-23 @ 21:05)  T(F): 98 (02-01-23 @ 04:21), Max: 98.1 (01-31-23 @ 21:05)  HR: 64 (02-01-23 @ 04:21) (62 - 80)  BP: 136/74 (02-01-23 @ 04:21) (130/62 - 153/88)  BP(mean): --  RR: 18 (02-01-23 @ 04:21) (18 - 18)  SpO2: 98% (02-01-23 @ 04:21) (98% - 100%)           General Exam:   General Appearance: Appropriately dressed and in no acute distress       Head: Normocephalic, atraumatic and no dysmorphic features  Ear, Nose, and Throat: Moist mucous membranes  CVS: S1S2+  Resp: No SOB, no wheeze or rhonchi  GI: soft NT/ND  Extremities: No edema or cyanosis  Skin: No bruises or rashes     Neurological Exam:  Mental Status: Awake, alert and oriented x 3.  Able to follow simple and complex verbal commands. Able to name and repeat. fluent speech. No obvious aphasia or dysarthria noted.   Cranial Nerves: PERRL, EOMI, VFFC, sensation V1-V3 intact,  no obvious facial asymmetry, equal elevation of palate, scm/trap 5/5, tongue is midline on protrusion. no obvious papilledema on fundoscopic exam. hearing is grossly intact.   Motor: Normal bulk, tone and strength throughout. Fine finger movements were intact and symmetric. no tremors or drift noted.    Sensation: Intact to light touch and pinprick throughout. no right/left confusion. no extinction to tactile on DSS.    Reflexes: 1+ throughout at biceps, brachioradialis, triceps, patellars and ankles bilaterally and equal. No clonus. R toe and L toe were both downgoing.  Coordination: No dysmetria on FNF   Gait: deferred         I personally reviewed the below data/images/labs:      INR 2.55           Patient name: BELLE MENENDEZ  YOB: 1960   Age: 62 (F)   MR#: [id number]  Study Date: 1/25/2023  [facility]  Study quality: Technically good  Referring Physician: Bibiana Merchant MD  Blood Pressure: 111/54 mmHg  Height: 157 cm  Weight: 70 kg  BSA: 1.7 m2  ------------------------------------------------------------------------  PROCEDURE: Transthoracic echocardiogram with 2-D, M-Mode  and complete spectral and color flow Doppler.  INDICATION: Abnormal electrocardiogram (ECG) (EKG) (R94.31)  ------------------------------------------------------------------------  Dimensions:    Normal Values:  LA:     3.9    2.0 - 4.0 cm  Ao:     2.9    2.0 - 3.8 cm  SEPTUM: 1.0    0.6 - 1.2 cm  PWT:    0.8    0.6 - 1.1 cm  LVIDd:  4.7    3.0 - 5.6 cm  LVIDs:  2.6    1.8 - 4.0 cm  Derived variables:  LVMI: 83 g/m2  RWT: 0.34  Fractional short: 45 %  EF (Carreno Rule): 61 %Doppler Peak Velocity (m/sec):  AoV=1.4  ------------------------------------------------------------------------  Observations:  Mitral Valve: Normal mitral valve. Minimal mitral  regurgitation.  Aortic Valve/Aorta: Thickened trileaflet aortic valve. Peak  transaortic valve gradient equals 8 mm Hg. No aortic valve  regurgitation seen. Peak left ventricular outflow tract  gradient equals 5 mm Hg, mean gradient is equal to 2 mm Hg,  LVOT velocity time integral equals 26 cm.  Aortic Root: 2.9 cm.  Ascending Aorta: 3.3 cm.  LVOT diameter: 1.9 cm.  Left Atrium: Normal left atrium.  LA volume index = 25  cc/m2.  Left Ventricle: Normal left ventricular systolic function.  No segmental wall motion abnormalities. Normal left  ventricular internal dimensions and wall thickness. Normal  diastolic function.  Right Heart: Normal right atrium. Normal right ventricular  size and function. Normal tricuspid valve. Mild tricuspid  regurgitation. Pulmonic valve not well visualized, probably  normal. No pulmonic regurgitation.  Pericardium/Pleura: Normal pericardium with no pericardial  effusion.  Hemodynamic: Estimated right atrial pressure is 8 mm Hg.  Estimated right ventricular systolic pressure equals 31 mm  Hg, assuming right atrial pressure equals 8 mm Hg,  consistent with normal pulmonary pressures.  ------------------------------------------------------------------------  Conclusions:  1. Normal left ventricular internal dimensions and wall  thickness.  2. Normal left ventricular systolic function. No segmental  wall motion abnormalities.  3. Normal right ventricular size and function.  4. Estimated pulmonary artery systolic pressure equals 31  mm Hg, assuming right atrial pressure equals 8  mm Hg,  consistent with normal pulmonary pressures.  ------------------------------------------------------------------------  Confirmed on  1/25/2023 - 17:33:29 by PAULA Kimble  ------------------------------------------------------------------------    < end of copied text >    < from: CT Head No Cont (01.29.23 @ 22:44) >    ACC: [id number] EXAM:  CT BRAIN   ORDERED BY: JESSICA HORTON     PROCEDURE DATE:  01/29/2023          INTERPRETATION:  INDICATIONS:  AMS    h/i brain aneurysm    TECHNIQUE:  Serial axial images were obtained from the skull base to the   vertex without intravenous contrast. Sagittal and Coronal reformats were   performed    COMPARISON EXAMINATION: 5/7/2022    FINDINGS:  VENTRICLES AND SULCI:  Right frontal shunt catheter with its tip at the   level of the septum pellucidum. 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 Evidence of right parietal   parasagittal distribution infarct seen on the prior as well. Bifrontal   encephalomalacia with evidence of aneurysm clips in the midline at the   level of the interhemispheric fissure in the distribution of the anterior   cerebral artery. Right basal ganglia lacunar infarct seen on the prior as   well  EXTRA-AXIAL:  No mass or collection is seen.  VISUALIZED SINUSES:  Clear.  VISUALIZED MASTOIDS:  Clear.  CALVARIUM:  Bifrontal craniectomy with cranioplasty  MISCELLANEOUS:  None.    IMPRESSION:  Ventricles are better visualized compared with the prior   though no evidence of hydrocephalus. Bifrontal cranioplasty. 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 Small right parietal parasagittal   infarct    --- End of Report ---            DANYA AGUDELO MD; Attending Radiologist  This document has been electronically signed. Jan 30 2023 10:51AM    < end of copied text >

## 2023-02-01 NOTE — DISCHARGE NOTE PROVIDER - NSDCFUADDAPPT_GEN_ALL_CORE_FT
New Patient to Establish    Reason to establish: New patient to establish    CC: New patient, med refills, generalized pruritis    HPI: Mr. Parikh is an  male with a PMH of COPD, GERD, HTN, thyroid nodules, pruritis and heavy alcohol use that presents to the clinic today as a new patient to establish care, requesting medication refills and complaining of generalized pruritis. Patient was recently hospitalized on 12/13-12/14 at [facility] with complaints of chest pain. A stress test came back negative for acute reversible ischemia, an EKG showed no signs of acute ischemia and serial trops were negative. While inpatient an US neck showed numerous b/l thyroid nodules, an US RUQ showed cholelithiasis, gallbladder wall thickening, CBD dilation and fatty liver vs fibrosis. A CT of the chest showed renal scarring and patient's Hep C came back positive.    COPD: Patient has been smoking on/off for the past 50 years and is down to 3 cigarettes per day. He is on inhalers tiotropium, and beclomethasone scheduled and uses his rescue inhaler 2-3 times per day. He has a chronic cough and did undergo PFT's at [facility] around 7-8 years prior.    Pruritis: Began 5-6 weeks ago and has been constant since that time. The itching is generalized.    HTN: has been on amlodipine for 2-3 years although he has been non-compliant with this medication and is currently out of all of his prescriptions.    Health maintenance: Patient has a colonoscopy 6-7 months ago, but is not sure where it was done or what the results were. He is uncertain of his immunization status. He had a rectal exam 4 months ago with no issues.    Patient Active Problem List    Diagnosis Date Noted   • Chronic hepatitis C without hepatic coma (CMS-HCC) 02/15/2017   • Tobacco use 02/15/2017   • Alcohol use (HCC) 02/15/2017   • Calculus of gallbladder without cholecystitis 02/15/2017   • Multiple thyroid nodules 12/14/2016   • Generalized pruritus  12/13/2016   • COPD (chronic obstructive pulmonary disease) (CMS-[facility]) 12/13/2016   • GERD (gastroesophageal reflux disease) 12/13/2016   • Hypertension 12/13/2016   • Substance abuse 12/13/2016       History reviewed. No pertinent past medical history.    Current Outpatient Prescriptions   Medication Sig Dispense Refill   • aspirin (ASA) 81 MG Chew Tab chewable tablet Take 1 Tab by mouth every day. 30 Tab 11   • loratadine (CLARITIN) 10 MG Tab Take 1 Tab by mouth every day. 30 Tab 3   • albuterol 108 (90 BASE) MCG/ACT Aero Soln inhalation aerosol Inhale 2 Puffs by mouth every 6 hours as needed for Shortness of Breath. 8.5 g 3   • beclomethasone (QVAR) 40 MCG/ACT inhaler Inhale 1 Puff by mouth 2 Times a Day. 2 Inhaler 5   • tiotropium (SPIRIVA) 18 MCG Cap Inhale 1 Cap by mouth every day. 30 Cap 5   • omeprazole (PRILOSEC) 20 MG delayed-release capsule Take 1 Cap by mouth every day. 30 Cap 5   • pravastatin (PRAVACHOL) 20 MG Tab Take 1 Tab by mouth every day. 30 Tab 5     No current facility-administered medications for this visit.       Allergies as of 02/15/2017   • (No Known Allergies)       Social History     Social History   • Marital Status: Single     Spouse Name: N/A   • Number of Children: N/A   • Years of Education: N/A     Occupational History   • Not on file.     Social History Main Topics   • Smoking status: Current Every Day Smoker -- 0.50 packs/day     Types: Cigarettes   • Smokeless tobacco: Never Used   • Alcohol Use: Yes   • Drug Use: Yes     Special: Inhaled      Comment: cocaine   • Sexual Activity: Not on file     Other Topics Concern   • Not on file     Social History Narrative       Family History   Problem Relation Age of Onset   • Heart Disease Mother    • Cancer Mother        History reviewed. No pertinent past surgical history.    ROS: As per HPI. Additional pertinent symptoms as noted below.    Constitutional: Denies weight change, fatigue, weakness, fever, chills, night sweats  Endorses  "generalized pruritis  Eyes: Denies blurry vision, tearing, itching, redness, scleral icterus, vision loss  ENT: Denies hearing loss, tinnitus, vertigo, discharge, earache, rhinorrhea, congestion, itching, epistaxis, odynophagia, dysphagia, hoarseness, swollen neck   Cardiovascular: Denies hypertension, murmur, palpitations, orthopnea, paroxysmal nocturnal dyspnea, edema  Respiratory: Denies wheeze, sputum, hemoptysis, asthma, hx tb  Endorses SOB with exertion, cough  GI: Denies change in appetite, nausea, vomiting, indigestion, dysphagia, diarrhea, abdominal pain, jaundice, hepatitis  Endorses constipation  : Denies frequency, hesitancy, urgency, polyuria, dysuria, hematuria, nocturia, incontinence, stones  Musculo-skeletal: Denies joint pain, weakness, myalgia, stiffness, decrease in range of motion, instability, redness, swelling, arthritis, gout  Skin: Denies rash, sores, jaundice, itching, ecchymosis  Endorses left knee lump  Neurological: Denies loss of sensation, numbness, tingling, tremors, weakness, paralysis, loss of consciousness, seizure  Endorses episodic HA  Psychological: Denies mood changes, anxiety, depression, memory difficulty      /74 mmHg  Pulse 111  Temp(Src) 36.3 °C (97.3 °F)  Ht 1.734 m (5' 8.27\")  Wt 66.225 kg (146 lb)  BMI 22.03 kg/m2  SpO2 97%    Physical Exam  General:  male, Alert and oriented, No apparent distress.  Eyes: Pupils equal and reactive. No scleral icterus. EOMI, conjunctiva white non-injected  Throat: Clear no erythema or exudates noted.  Neck: Supple. No lymphadenopathy noted. Multiple b/l thyroid nodules.  Lungs: Clear to auscultation and percussion bilaterally. Decreased BS b/l.  Cardiovascular: Regular rate and rhythm. No murmurs, rubs or gallops.  Abdomen:  Benign. No rebound or guarding noted.  Extremities: No clubbing, cyanosis, edema. Scratch marks from itching, non-painful left knee lump.  Skin: Clear. No rash or suspicious skin lesions " noted.        Assessment and Plan    1. Chronic obstructive pulmonary disease  - Continue tiotropium and beclomethasone inhalers scheduled  - Continue albuterol inhaler  - Get updated PFT's  - Give prevnar at next visit followed by pneumovax in 1 year   - Counseled smoking cessation    2. Chronic hepatitis C   - Hx IV drug use, released from [facility] 3 years ago  - Pt was unaware that he had Hep C  - Hep C RNA 1,900,000 on 12/13  -  on admit to hospital on 12/13. Dropped to 65 at discharge.  - Draw Hep C genotype  - HIV negative  - ID referral  - Counseled Alcohol cessation    3. Essential hypertension  - BP at today's visit 114/74  - Not currently taking any medications  - Previously on amlodipine, but pt non-compliant  - Hold for now. Trend BP.    4. Generalized pruritus  - Likely 2/2 to pt's chronic liver cirrhosis from heavy alcohol use and hep C  - Prescribed loratidine  - Tx liver disease, alcohol cessation    5. Multiple thyroid nodules  - TSH 0.28, T4 0.91 on 12/14  - US thyroid showed numerous b/l thyroid nodules, easily palpated on physical exam  - Endocrinology referral previously placed. Instructed patient to call the office and set up an appt.  - Will likely need a FNA of thyroid    6. Calculus of gallbladder with biliary obstruction but without cholecystitis  - Asymptomatic besides pruritis  - US liver 12/13 showed cholelithiasis, gallbladder wall thickening, CBD dilation, and fatty liver vs fibrosis  - Surgery referral to assess for elective outpatient cholecystectomy    7. Tobacco use  - Has smoked on/off for the past 50 years  - Down to 3 cigarettes per day  - Counseled cessation  - F/u at next visit  - Harm reduction plan discussed and set up    8. Alcohol use  - Drinks half pint per day  - , ALT 77 on 12/13  - Pt was discharged with folate, B12, multivitamin and did not take them because he cant afford them  - Albumin 2.8, likely malnourished    9. Gastroesophageal reflux disease  -  Continue omeprazole  - Stable    10. Health care maintenance  - Tdap, influenza given today  - Colonoscopy 6-7 months ago. Will have to track down these records  - Give prevnar at next visit   - May need other immunizations like Hep B. Get immunization record at next visit.  - Check B12, folate      PLAN: Vitamin B12, folate, Tdap, flu vaccine, ID referral for Hep C, surgery referral for cholelithiasis, PFT's, Hep C genotype. F/u in 5 weeks. Consider prevnar at next visit      Risk Assessment (discuss potential complications a function of chronic problems): High risk, non-compliant    Complexity (discuss number of co-morbidities): 10    Signed by: Cortes Dubose M.D.   APPTS ARE READY TO BE MADE: [X] YES    Best Family or Patient Contact (if needed):    Additional Information about above appointments (if needed):    1:   2:   3:     Other comments or requests:    You must follow up with Dr. Ojeda (your cardiologist) on 2/3/2023, as already discussed with you.  At this appointment, you will need your INR checked, and your coumadin dose adjusted accordingly.            APPTS ARE READY TO BE MADE: [X] YES    Best Family or Patient Contact (if needed):    Additional Information about above appointments (if needed):    1: Primary medical doctor  2: Cardiologist  3:     Other comments or requests:    You must follow up with Dr. Ojeda (your cardiologist), as already discussed with you.  At this appointment, you will need your INR checked, and your coumadin dose adjusted accordingly.    APPTS ARE READY TO BE MADE: [X] YES    Best Family or Patient Contact (if needed):    Additional Information about above appointments (if needed):    1: Primary medical doctor  2: Cardiologist  3:     Other comments or requests:    You must follow up with Dr. Ojeda (your cardiologist), as already discussed with you.  At this appointment, you will need your INR checked, and your coumadin dose adjusted accordingly.    APPTS ARE READY TO BE MADE: [X] YES    Best Family or Patient Contact (if needed):    Additional Information about above appointments (if needed):    1: Primary medical doctor  2: Cardiologist  3: Neurosurgery  4. neurology  5. Gastroenterology    Other comments or requests:    You must follow up with Dr. Ojeda (your cardiologist), as already discussed with you.  At this appointment, you will need your INR checked, and your coumadin dose adjusted accordingly.    APPTS ARE READY TO BE MADE: [X] YES    Best Family or Patient Contact (if needed):    Additional Information about above appointments (if needed):    1: Primary medical doctor  2: Cardiologist  3: Neurosurgery  4. neurology  5. Gastroenterology    Other comments or requests:     Patient was provided with Dr. Ojeda, Dr. Jenkins, Dr. Barth, and Dr. Ambrosio and was advised to call to schedule follow up within specified time frame. At this time patient declined scheduling assistance.

## 2023-02-01 NOTE — DISCHARGE NOTE PROVIDER - HOSPITAL COURSE
62F h/o brain aneurysm s/p hemicraniectomy and clipping (2013), seizures post hemicraniectomy (on Keppra), chronic DVTs on coumadin and hypertension to the ED after having a routine visit with her cardiologist and found to be bradycardic to the 30s and hypotensive (SBP 80s) per EMS - pt remained asymptomatic throughout. Patient was seen by EP. Her labetalol and clonidine were held with improvement in HR running 50-70. Biotel patch placed prior to discharge, as patient refused ILR. Hospital course was complicated by nausea and vomiting - found to have elevated keppra level but felt by Neurology that this should not cause patient's nausea. Abdominal xray without obstruction. CT head without acute changes. The patient had an EEG that was negative for active seizure. Neurology recommended no change in antiepileptics. GI was also consulted - felt possible gastroenteritis and recommended nausea medication around the clock prior to meals. Nausea and loose stool resolved. Patient tolerating diet (with zofran ODT before meals). INR was supratherapeutic when dosed coumadin 7.5mg x 4 nights. Patient informed to take coumadin 7.5mg 2/1 night, then 5mg 2/2 night, and check INR with Dr. Ojeda on 2/3 for further dosing.     Discharge/Dispo/Med rec discussed with attending Dr. Louis. Patient medically cleared for discharge with outpatient follow up with PCP/cardiology. 62F h/o brain aneurysm s/p hemicraniectomy and clipping (2013), seizures post hemicraniectomy (on Keppra), chronic DVTs on coumadin and hypertension to the ED after having a routine visit with her cardiologist and found to be bradycardic to the 30s and hypotensive (SBP 80s) per EMS - pt remained asymptomatic throughout. Patient was seen by EP. Her labetalol and clonidine were held with improvement in HR running 50-70. Patient refused ILR and Biotel patch prior to discharge. Hospital course was complicated by nausea and vomiting - found to have elevated keppra level but felt by Neurology that this should not cause patient's nausea. Abdominal xray without obstruction. CT head without acute changes. The patient had an EEG that was negative for active seizure. Neurology recommended no change in antiepileptics. GI was also consulted - felt possible gastroenteritis and recommended nausea medication around the clock prior to meals. Nausea and loose stool resolved. Patient tolerating diet (with zofran ODT before meals). INR was supratherapeutic when dosed coumadin 7.5mg x 4 nights. Patient informed to take coumadin 7.5mg 2/1 night, then 5mg 2/2 night, and check INR with Dr. Ojeda on 2/3 for further dosing.     Discharge/Dispo/Med rec discussed with attending Dr. Louis. Patient medically cleared for discharge with outpatient follow up with PCP/cardiology. 62F h/o brain aneurysm s/p hemicraniectomy and clipping (2013), seizures post hemicraniectomy (on Keppra), chronic DVTs on coumadin and hypertension to the ED after having a routine visit with her cardiologist and found to be bradycardic to the 30s and hypotensive (SBP 80s) per EMS - pt remained asymptomatic throughout. Patient was seen by EP. Her labetalol and clonidine were held with improvement in HR running 50-70. Patient refused ILR and Biotel patch prior to discharge. Hospital course was complicated by nausea and vomiting - found to have elevated keppra level but felt by Neurology that this should not cause patient's nausea. Abdominal xray without obstruction. CT head without acute changes. The patient had an EEG that was negative for active seizure. Neurology recommended no change in antiepileptics. GI was also consulted - felt possible gastroenteritis and recommended nausea medication around the clock prior to meals. Nausea and loose stool resolved. Patient tolerating diet (with zofran ODT before meals). INR was supratherapeutic when dosed coumadin 7.5mg x 4 nights. Patient informed to take coumadin 5mg on  the night of 2/2/2023, and check INR with Dr. Ojeda on 2/3/2023 for further dosing.     Discharge/Dispo/Med rec discussed with attending Dr. Louis. Patient medically cleared for discharge with outpatient follow up with PCP/cardiology. 62F pmh brain aneurysm s/p hemicraniectomy and clipping (2013), seizures post hemicraniectomy (on Keppra), chronic DVTs on coumadin and hypertension admitted for eval of bradycardia, with course c/b intractable nausea/vomiting of unclear etiology.         Problem/Plan - 1:  ·  Problem: Vomiting.   ·  Plan: resolved  s/p egd-->normal findings  -keppra level elevated; per neuro, elevated level does not cause nausea or bradycardia, switch to oral  EEG reviewed with Dr. Braun - no active seizure. continue current AED doses.     Problem/Plan - 2:  ·  Problem: Bradycardia.   ·  Plan: Sinus bradycardia, likely medication induced - BB vs clonidine; now resolved.  Labetalol and clonidine now discontinued per cardiology  TTE reviewed; TSH wnl  Cont to monitor on tele  no biotel on discharge - no further bradycardia and pt will follow up with Dr. Ojeda as outpatient.     Problem/Plan - 3:  ·  Problem: ROBINA (acute kidney injury).   ·  Plan: - resolved s/p IVF.     Problem/Plan - 4:  ·  Problem: Hypertension.   ·  Plan: BP at goal  Cont losartan, amlodipine, HCTZ  Renal US without evidence of DULCE  Cont to monitor vitals.     Problem/Plan - 5:  ·  Problem: Seizure.   ·  Plan: Cont home Levetiracetam, Lacosamide and Zonisamide.  levetiracetam level elevated (see above)   no change in dose needed per neuro.     Problem/Plan - 6:  ·  Problem: History of DVT (deep vein thrombosis).   ·  Plan: - INR down trending. will increase coumadin back to her home dose of 5 mg tonight  - check INR daily.     Problem/Plan - 7:  ·  Problem: CAD (coronary artery disease).   ·  Plan: Stable.       Additional Information:  Additional Information: MR brain ---> bilateral cerebral and cerebellar  convexities measuring 2 mm suggesting dural thickening versus subdural collections.--> Neurosurgery recs appreciated --> will get CTH to f/u potential collection for stability; No neurosurgerical intervention, pt should follow up with Dr. Parish Ambrosio. office #: [phone]    Dispo: if CT head findings are benign will plan for discharge home this evening, pt is aware and in agreement of the plan      Discharge planning discussed with attending Dr. Reyes. Patient is medically cleared and stable for discharge. Medication Reconciliation reviewed with attending. Follow up outpatient with your PCP.   62F pmh brain aneurysm s/p hemicraniectomy and clipping (2013), seizures post hemicraniectomy (on Keppra), chronic DVTs on coumadin and hypertension admitted for eval of bradycardia, with course c/b intractable nausea/vomiting of unclear etiology.         Problem/Plan - 1:  ·  Problem: Vomiting.   ·  Plan: resolved  s/p egd-->normal findings  -keppra level elevated; per neuro, elevated level does not cause nausea or bradycardia, switch to oral  EEG reviewed with Dr. Braun - no active seizure. continue current AED doses.     Problem/Plan - 2:  ·  Problem: Bradycardia.   ·  Plan: Sinus bradycardia, likely medication induced - BB vs clonidine; now resolved.  Labetalol and clonidine now discontinued per cardiology  TTE reviewed; TSH wnl  Cont to monitor on tele  no biotel on discharge - no further bradycardia and pt will follow up with Dr. Ojeda as outpatient.     Problem/Plan - 3:  ·  Problem: ROBINA (acute kidney injury).   ·  Plan: - resolved s/p IVF.     Problem/Plan - 4:  ·  Problem: Hypertension.   ·  Plan: BP at goal  Cont losartan, amlodipine, HCTZ  Renal US without evidence of DULCE  Cont to monitor vitals.     Problem/Plan - 5:  ·  Problem: Seizure.   ·  Plan: Cont home Levetiracetam, Lacosamide and Zonisamide.  levetiracetam level elevated (see above)   no change in dose needed per neuro.      Discharge planning discussed with attending Dr. Reyes. Patient is medically cleared and stable for discharge. Medication Reconciliation reviewed with attending. Follow up outpatient with your PCP.

## 2023-02-01 NOTE — DISCHARGE NOTE PROVIDER - NSDCFUSCHEDAPPT_GEN_ALL_CORE_FT
Ravinder Powell Physician [facility]  CARDIOLOGY [address]  Scheduled Appointment: 04/25/2023

## 2023-02-01 NOTE — PROGRESS NOTE ADULT - ASSESSMENT
1. Nausea / vomiting, diarrhea.-- improved   - Unclear etiology, ddx includes gastroenteritis, dysmotility/gastroparesis, medication-related. No evidence of bowel obstruction. LFTs / lipase wnl so doubt hepatobiliary or pancreatic pathology. CTH neg for acute pathology. No correlation with bradycardia or hypertension.   - diarrhea resolved-- if resumes can check GI PCR  - Zofran changed to standing pre-meals-- seems to be helping   - daily PPI     2. Chronic DVTs  - on coumadin, supratherapeutic INR    3. Hx of aneurysm s/p hemicraniectomy and clipping  - on ASA and statin     4. HTN  - per primary team     5. Seizure disorder  - per neurology     6. CAD s/p stent       Attending supervision statement: I have personally seen and examined the patient. I fully participated in the care of this patient. I have made amendments to the documentation where necessary, and agree with the history, physical exam, and plan as outlined by the ACP.    [facility]– [place]  Gastroenterology and Hepatology  Office: [phone]

## 2023-02-01 NOTE — DISCHARGE NOTE PROVIDER - PROVIDER TOKENS
PROVIDER:[TOKEN:[[id number]],SCHEDULEDAPPT:[02/03/2023]] PROVIDER:[TOKEN:[[id number]],FOLLOWUP:[1-3 days]] PROVIDER:[TOKEN:[[id number]],FOLLOWUP:[1-3 days],ESTABLISHEDPATIENT:[T]],PROVIDER:[TOKEN:[[id number]]],PROVIDER:[TOKEN:[[id number]]],PROVIDER:[TOKEN:[[id number]],ESTABLISHEDPATIENT:[T]]

## 2023-02-01 NOTE — CHART NOTE - NSCHARTNOTEFT_GEN_A_CORE
MEDICINE PA NOTE    Patient medically cleared. EP recommending Biotel patch on day of discharge but patient and  refusing until discussed with cardiologist Dr Ojeda.     CLAUDIA Staley  [id number] MEDICINE PA NOTE    Patient medically cleared. EP recommending Biotel patch on day of discharge but patient and  refusing until discussed with cardiologist Dr Ojeda. Also refusing discharge until tomorrow.     CLAUDIA Staley  [id number]

## 2023-02-01 NOTE — PROGRESS NOTE ADULT - ASSESSMENT
63yo black F with CAD s/p PCI 2003, htn, brain aneurysm s/p hemicraniectomy and clipping (2013), seizures post hemicraniectomy, chronic DVTs on Coumadin p/w bradycardia .   CTH and CTA H/N 5/2022: frontal lobes encephalomalacia, old L occipital infarct, CTA H/N neg, aneusym clipi in MANUEL   TTE as above 1/25  + COVID  CTH old L PCA infarct, evidence of cranioplasty. no acute findings   EEG with seizure focus no seiuzres ; R frontal spikes. potential for seizure from R frontal     Impression:   1) aneurysm s/p clipping and hemicrani  2) epilepsy  3) occipital stroke, ESUS     - CTH done overnight, f/u official report   - AC for DVT   - c/w home AEDs --> on keppra 1500mg BID, vimpat 100mg BID, Oxcarbazepine 300mg BID, and zonisamide 100mg BID   - for secondary stroke prevention c/w ASA and statin therapy with LDL goal < 70    - cardiac workup in progress for gema   - EP , would benefit from ILR for gema cardiac and to r/o occult AFib as cause of her occipital stroke --> refused. consider event monitor   - telemetry  - PT/OT/SS/SLP, OOBC  - check FS, glucose control <180  - GI/DVT ppx  - sees Dr doran outaptient   Tristan Braun MD  Vascular Neurology  Office: [phone]

## 2023-02-01 NOTE — DISCHARGE NOTE PROVIDER - NSDCMRMEDTOKEN_GEN_ALL_CORE_FT
amLODIPine 10 mg oral tablet: 2 tab(s) orally once a day  aspirin 81 mg oral delayed release tablet: 1 tab(s) orally once a day  cloNIDine 0.2 mg oral tablet: 1 tab(s) orally 3 times a day  doxazosin 4 mg oral tablet: 1 tab(s) orally once a day (at bedtime)  hydroCHLOROthiazide 25 mg oral tablet: 1 tab(s) orally once a day  labetalol 200 mg oral tablet: 1 tab(s) orally 2 times a day  lacosamide 100 mg oral tablet: 1 tab(s) orally 2 times a day  levETIRAcetam 750 mg oral tablet: 2 tab(s) orally 2 times a day  olmesartan 40 mg oral tablet: 1 tab(s) orally once a day  OXcarbazepine 300 mg oral tablet: 1 tab(s) orally once a day  rosuvastatin 40 mg oral tablet: 1 tab(s) orally once a day  warfarin 5 mg oral tablet: 1 tab(s) orally 2 times a week (Sat &amp; Sun)  warfarin 5 mg oral tablet: 1.5 tab(s) orally 5 times a week (Mon - Fri)  zonisamide 50 mg oral capsule: 2 cap(s) orally 2 times a day   acetaminophen 325 mg oral tablet: 2 tab(s) orally every 6 hours, As needed, Temp greater or equal to 38C (100.4F), Mild Pain (1 - 3)  amLODIPine 10 mg oral tablet: 2 tab(s) orally once a day  aspirin 81 mg oral delayed release tablet: 1 tab(s) orally once a day  doxazosin 4 mg oral tablet: 1 tab(s) orally once a day (at bedtime)  hydroCHLOROthiazide 25 mg oral tablet: 1 tab(s) orally once a day  lacosamide 100 mg oral tablet: 1 tab(s) orally 2 times a day  levETIRAcetam 750 mg oral tablet: 2 tab(s) orally 2 times a day  melatonin 3 mg oral tablet: 1 tab(s) orally once a day (at bedtime), As needed, Insomnia  olmesartan 40 mg oral tablet: 1 tab(s) orally once a day  ondansetron 4 mg oral tablet, disintegratin tab(s) orally 3 times a day  OXcarbazepine 300 mg oral tablet: 1 tab(s) orally 2 times a day  pantoprazole 40 mg oral delayed release tablet: 1 tab(s) orally once a day (before a meal)  rosuvastatin 40 mg oral tablet: 1 tab(s) orally once a day  senna leaf extract oral tablet: 2 tab(s) orally once a day (at bedtime)  warfarin 5 mg oral tablet: 1 tab(s) orally once a day   Take on   zonisamide 50 mg oral capsule: 2 cap(s) orally 2 times a day   acetaminophen 325 mg oral tablet: 2 tab(s) orally every 6 hours, As needed, Temp greater or equal to 38C (100.4F), Mild Pain (1 - 3)  amLODIPine 10 mg oral tablet: 2 tab(s) orally once a day  aspirin 81 mg oral delayed release tablet: 1 tab(s) orally once a day  doxazosin 4 mg oral tablet: 1 tab(s) orally once a day (at bedtime)  hydroCHLOROthiazide 25 mg oral tablet: 1 tab(s) orally once a day  lacosamide 100 mg oral tablet: 1 tab(s) orally 2 times a day  levETIRAcetam 750 mg oral tablet: 2 tab(s) orally 2 times a day  melatonin 3 mg oral tablet: 1 tab(s) orally once a day (at bedtime), As needed, Insomnia  olmesartan 40 mg oral tablet: 1 tab(s) orally once a day  ondansetron 4 mg oral tablet, disintegratin tab(s) orally 3 times a day  OXcarbazepine 300 mg oral tablet: 1 tab(s) orally 2 times a day  pantoprazole 40 mg oral delayed release tablet: 1 tab(s) orally once a day (before a meal)  rosuvastatin 40 mg oral tablet: 1 tab(s) orally once a day  senna leaf extract oral tablet: 2 tab(s) orally once a day (at bedtime)  warfarin 5 mg oral tablet: 1 tab(s) orally once (at bedtime)  Take tonight, 2023  Repeat INR 2/3/2023 at Dr. Smith office - he will order further dosing   zonisamide 50 mg oral capsule: 2 cap(s) orally 2 times a day   acetaminophen 325 mg oral tablet: 2 tab(s) orally every 6 hours, As needed, Temp greater or equal to 38C (100.4F), Mild Pain (1 - 3)  amLODIPine 10 mg oral tablet: 2 tab(s) orally once a day  aspirin 81 mg oral delayed release tablet: 1 tab(s) orally once a day  doxazosin 4 mg oral tablet: 1 tab(s) orally once a day (at bedtime)  hydroCHLOROthiazide 25 mg oral tablet: 1 tab(s) orally once a day  lacosamide 100 mg oral tablet: 1 tab(s) orally 2 times a day  levETIRAcetam 750 mg oral tablet: 2 tab(s) orally 2 times a day  melatonin 3 mg oral tablet: 1 tab(s) orally once a day (at bedtime), As needed, Insomnia  olmesartan 40 mg oral tablet: 1 tab(s) orally once a day  ondansetron 4 mg oral tablet, disintegratin tab(s) orally 3 times a day  OXcarbazepine 300 mg oral tablet: 1 tab(s) orally 2 times a day  pantoprazole 40 mg oral delayed release tablet: 1 tab(s) orally once a day (before a meal)  rosuvastatin 40 mg oral tablet: 1 tab(s) orally once a day  senna leaf extract oral tablet: 2 tab(s) orally once a day (at bedtime)  zonisamide 50 mg oral capsule: 2 cap(s) orally 2 times a day   acetaminophen 325 mg oral tablet: 2 tab(s) orally every 6 hours, As needed, Temp greater or equal to 38C (100.4F), Mild Pain (1 - 3)  amLODIPine 10 mg oral tablet: 2 tab(s) orally once a day  aspirin 81 mg oral delayed release tablet: 1 tab(s) orally once a day  doxazosin 4 mg oral tablet: 1 tab(s) orally once a day (at bedtime)  hydroCHLOROthiazide 25 mg oral tablet: 1 tab(s) orally once a day  lacosamide 100 mg oral tablet: 1 tab(s) orally 2 times a day  levETIRAcetam 750 mg oral tablet: 2 tab(s) orally 2 times a day  melatonin 3 mg oral tablet: 1 tab(s) orally once a day (at bedtime), As needed, Insomnia  olmesartan 40 mg oral tablet: 1 tab(s) orally once a day  ondansetron 4 mg oral tablet, disintegratin tab(s) orally 3 times a day  OXcarbazepine 300 mg oral tablet: 1 tab(s) orally 2 times a day  pantoprazole 40 mg oral delayed release tablet: 1 tab(s) orally once a day (before a meal)  rosuvastatin 40 mg oral tablet: 1 tab(s) orally once a day  senna leaf extract oral tablet: 2 tab(s) orally once a day (at bedtime)  warfarin 5 mg oral tablet: 1 tab(s) orally once a day (in the evening)  zonisamide 50 mg oral capsule: 2 cap(s) orally 2 times a day   acetaminophen 325 mg oral tablet: 2 tab(s) orally every 6 hours, As needed, Temp greater or equal to 38C (100.4F), Mild Pain (1 - 3)  amLODIPine 10 mg oral tablet: 2 tab(s) orally once a day  aspirin 81 mg oral delayed release tablet: 1 tab(s) orally once a day  doxazosin 4 mg oral tablet: 1 tab(s) orally once a day (at bedtime)  hydroCHLOROthiazide 25 mg oral tablet: 1 tab(s) orally once a day  K-Tab 10 mEq oral tablet, extended release: 1 tab(s) orally once a day   lacosamide 100 mg oral tablet: 1 tab(s) orally 2 times a day  levETIRAcetam 750 mg oral tablet: 2 tab(s) orally 2 times a day  melatonin 3 mg oral tablet: 1 tab(s) orally once a day (at bedtime), As needed, Insomnia  olmesartan 40 mg oral tablet: 1 tab(s) orally once a day  ondansetron 4 mg oral tablet, disintegratin tab(s) orally 3 times a day  OXcarbazepine 300 mg oral tablet: 1 tab(s) orally 2 times a day  pantoprazole 40 mg oral delayed release tablet: 1 tab(s) orally once a day (before a meal)  rosuvastatin 40 mg oral tablet: 1 tab(s) orally once a day  senna leaf extract oral tablet: 2 tab(s) orally once a day (at bedtime)  warfarin 5 mg oral tablet: 1 tab(s) orally once a day (in the evening)  zonisamide 50 mg oral capsule: 2 cap(s) orally 2 times a day

## 2023-02-01 NOTE — DISCHARGE NOTE PROVIDER - NSDCCPCAREPLAN_GEN_ALL_CORE_FT
PRINCIPAL DISCHARGE DIAGNOSIS  Diagnosis: Sinus bradycardia  Assessment and Plan of Treatment: Bradycardia is a slow heart rate. Notify your physician if you:   Feel dizzy or lightheadedFeel short of breath and find it harder to exercise.   Feel tired.   Have chest pain or a feeling that your heart is pounding or fluttering (palpitations)   Feel confused or have trouble concentrating. Faint, if a slow heart rate causes a drop in blood pressure        SECONDARY DISCHARGE DIAGNOSES  Diagnosis: Seizure  Assessment and Plan of Treatment: Continue your anti-seizure medications as prescribed.  Follow up with your doctor for blood work monitoring of medication levels  Notify your doctor if you experience increased/worsening seizure activity, body aches, weakness, lack of coordination, feeling irritable, drowsiness, dizziness      Diagnosis: CAD (coronary artery disease)  Assessment and Plan of Treatment: Coronary artery disease is a condition where the arteries the supply the heart muscle get clogged with fatty deposits & puts you at risk for a heart attack.  Call your doctor if you have any new pain, pressure, or discomfort in the center of your chest, pain, tingling or discomfort in arms, back, neck, jaw, or stomach, shortness of breath, nausea, vomiting, burping or heartburn, sweating, cold and clammy skin, racing or abnormal heartbeat for more than 10 minutes or if they keep coming & going.  Call 911 and do not try to get to hospital by car. eat lots of fruits & vegetables & low fat dairy products, not a lot of meat & fatty foods, walk or some form of physical activity most days of the week, lose weight if you are overweight.  Take your cardiac medication as prescribed to lower cholesterol, to lower blood pressure, and control your blood sugar.      Diagnosis: History of DVT (deep vein thrombosis)  Assessment and Plan of Treatment: Continue taking coumadin as prescribed. Take this medication Daily as prescribed by your Health Care Provider.  Tell your Dentist, Surgeon, and other Doctors that you are on this drug.  This medication requires PT/INR Blood work monitoring,  Your appointment for a PT/INR check is on 2/3 with Dr. Ojeda  Blood thinners can result in abnormal bleeding and brushing.  Be mindful to avoid accidental trauma.  Participate in activities as prescribed.  If you develop new leg pain, swelling, and/or redness contact your healthcare provider.  Call your doctor if you experience lightheadedness, loss of consciousness, shortness of breath (especially with exercise), feel your heart racing or beating unusually, or experience frequent or abnormal bleeding.      Diagnosis: Vomiting  Assessment and Plan of Treatment: Improved  please continue medications as prescribed  Follow-up with your primary care physician within 1 week. Call for appointment.  Please bring all discharge paperwork and list of medications to all follow up appointments  Please call for follow up appointments one day after discharge       PRINCIPAL DISCHARGE DIAGNOSIS  Diagnosis: Sinus bradycardia  Assessment and Plan of Treatment: Bradycardia is a slow heart rate. Notify your physician if you:   Feel dizzy or lightheadedFeel short of breath and find it harder to exercise. Feel tired. Have chest pain or a feeling that your heart is pounding or fluttering (palpitations).Feel confused or have trouble concentrating. Faint, if a slow heart rate causes a drop in blood pressure.  -Biotel monitor was placed on day of discharge  -Please follow up with your cardiologist Dr Ojeda after discharge        SECONDARY DISCHARGE DIAGNOSES  Diagnosis: History of DVT (deep vein thrombosis)  Assessment and Plan of Treatment: Take Coumadin 7.5mg tonight and 5mg tomorrow, and follow up with Dr Ojeda on Friday (May 23rd at 9:00 AM) to check INR level. Your appointment has already been scheduled.  Tell your Dentist, Surgeon, and other Doctors that you are on this drug.  This medication requires PT/INR Blood work monitoring,  Blood thinners can result in abnormal bleeding and brushing.  Be mindful to avoid accidental trauma.  Participate in activities as prescribed.  If you develop new leg pain, swelling, and/or redness contact your healthcare provider.  Call your doctor if you experience lightheadedness, loss of consciousness, shortness of breath (especially with exercise), feel your heart racing or beating unusually, or experience frequent or abnormal bleeding.      Diagnosis: Seizure  Assessment and Plan of Treatment: Continue your anti-seizure medications as prescribed.  Follow up with your doctor for blood work monitoring of medication levels.  Notify your doctor if you experience increased/worsening seizure activity, body aches, weakness, lack of coordination, feeling irritable, drowsiness, dizziness.      Diagnosis: CAD (coronary artery disease)  Assessment and Plan of Treatment: Coronary artery disease is a condition where the arteries the supply the heart muscle get clogged with fatty deposits & puts you at risk for a heart attack.  Call your doctor if you have any new pain, pressure, or discomfort in the center of your chest, pain, tingling or discomfort in arms, back, neck, jaw, or stomach, shortness of breath, nausea, vomiting, burping or heartburn, sweating, cold and clammy skin, racing or abnormal heartbeat for more than 10 minutes or if they keep coming & going.  Call 911 and do not try to get to hospital by car. eat lots of fruits & vegetables & low fat dairy products, not a lot of meat & fatty foods, walk or some form of physical activity most days of the week, lose weight if you are overweight.  Take your cardiac medication as prescribed to lower cholesterol, to lower blood pressure, and control your blood sugar.      Diagnosis: Vomiting  Assessment and Plan of Treatment: Improved  please continue medications as prescribed  Follow-up with your primary care physician within 1 week. Call for appointment.  Please bring all discharge paperwork and list of medications to all follow up appointments.  Please call for follow up appointments one day after discharge.       PRINCIPAL DISCHARGE DIAGNOSIS  Diagnosis: Sinus bradycardia  Assessment and Plan of Treatment: Bradycardia is a slow heart rate. Notify your physician if you:   Feel dizzy or lightheadedFeel short of breath and find it harder to exercise. Feel tired. Have chest pain or a feeling that your heart is pounding or fluttering (palpitations).Feel confused or have trouble concentrating. Faint, if a slow heart rate causes a drop in blood pressure.  -Biotel monitor placement was refused the day of discharge   -Please follow up with your cardiologist Dr Ojeda after discharge        SECONDARY DISCHARGE DIAGNOSES  Diagnosis: History of DVT (deep vein thrombosis)  Assessment and Plan of Treatment: Take Coumadin 7.5mg tonight and 5mg tomorrow, and follow up with Dr Ojeda on Friday (May 23rd at 9:00 AM) to check INR level. Your appointment has already been scheduled.  Tell your Dentist, Surgeon, and other Doctors that you are on this drug.  This medication requires PT/INR Blood work monitoring,  Blood thinners can result in abnormal bleeding and brushing.  Be mindful to avoid accidental trauma.  Participate in activities as prescribed.  If you develop new leg pain, swelling, and/or redness contact your healthcare provider.  Call your doctor if you experience lightheadedness, loss of consciousness, shortness of breath (especially with exercise), feel your heart racing or beating unusually, or experience frequent or abnormal bleeding.      Diagnosis: Seizure  Assessment and Plan of Treatment: Continue your anti-seizure medications as prescribed.  Follow up with your doctor for blood work monitoring of medication levels.  Notify your doctor if you experience increased/worsening seizure activity, body aches, weakness, lack of coordination, feeling irritable, drowsiness, dizziness.      Diagnosis: CAD (coronary artery disease)  Assessment and Plan of Treatment: Coronary artery disease is a condition where the arteries the supply the heart muscle get clogged with fatty deposits & puts you at risk for a heart attack.  Call your doctor if you have any new pain, pressure, or discomfort in the center of your chest, pain, tingling or discomfort in arms, back, neck, jaw, or stomach, shortness of breath, nausea, vomiting, burping or heartburn, sweating, cold and clammy skin, racing or abnormal heartbeat for more than 10 minutes or if they keep coming & going.  Call 911 and do not try to get to hospital by car. eat lots of fruits & vegetables & low fat dairy products, not a lot of meat & fatty foods, walk or some form of physical activity most days of the week, lose weight if you are overweight.  Take your cardiac medication as prescribed to lower cholesterol, to lower blood pressure, and control your blood sugar.      Diagnosis: Vomiting  Assessment and Plan of Treatment: Improved  please continue medications as prescribed  Follow-up with your primary care physician within 1 week. Call for appointment.  Please bring all discharge paperwork and list of medications to all follow up appointments.  Please call for follow up appointments one day after discharge.       PRINCIPAL DISCHARGE DIAGNOSIS  Diagnosis: Sinus bradycardia  Assessment and Plan of Treatment: Bradycardia is a slow heart rate. Notify your physician if you:   Feel dizzy or lightheadedFeel short of breath and find it harder to exercise. Feel tired. Have chest pain or a feeling that your heart is pounding or fluttering (palpitations).Feel confused or have trouble concentrating. Faint, if a slow heart rate causes a drop in blood pressure.  -Biotel monitor placement was refused the day of discharge   -Please follow up with your cardiologist Dr Ojeda after discharge        SECONDARY DISCHARGE DIAGNOSES  Diagnosis: History of DVT (deep vein thrombosis)  Assessment and Plan of Treatment: Take Coumadin 5mg tonight 2/2, and follow up with Dr Ojeda on Friday (2/3/2023) to check INR level. Your appointment has already been scheduled.  Tell your Dentist, Surgeon, and other Doctors that you are on this drug.  This medication requires PT/INR Blood work monitoring,  Blood thinners can result in abnormal bleeding and brushing.  Be mindful to avoid accidental trauma.  Participate in activities as prescribed.  If you develop new leg pain, swelling, and/or redness contact your healthcare provider.  Call your doctor if you experience lightheadedness, loss of consciousness, shortness of breath (especially with exercise), feel your heart racing or beating unusually, or experience frequent or abnormal bleeding.      Diagnosis: Seizure  Assessment and Plan of Treatment: Continue your anti-seizure medications as prescribed.  Follow up with your doctor for blood work monitoring of medication levels.  Notify your doctor if you experience increased/worsening seizure activity, body aches, weakness, lack of coordination, feeling irritable, drowsiness, dizziness.      Diagnosis: CAD (coronary artery disease)  Assessment and Plan of Treatment: Coronary artery disease is a condition where the arteries the supply the heart muscle get clogged with fatty deposits & puts you at risk for a heart attack.  Call your doctor if you have any new pain, pressure, or discomfort in the center of your chest, pain, tingling or discomfort in arms, back, neck, jaw, or stomach, shortness of breath, nausea, vomiting, burping or heartburn, sweating, cold and clammy skin, racing or abnormal heartbeat for more than 10 minutes or if they keep coming & going.  Call 911 and do not try to get to hospital by car. eat lots of fruits & vegetables & low fat dairy products, not a lot of meat & fatty foods, walk or some form of physical activity most days of the week, lose weight if you are overweight.  Take your cardiac medication as prescribed to lower cholesterol, to lower blood pressure, and control your blood sugar.      Diagnosis: Vomiting  Assessment and Plan of Treatment: Improved  please continue medications as prescribed  Follow-up with your primary care physician within 1 week. Call for appointment.  Please bring all discharge paperwork and list of medications to all follow up appointments.  Please call for follow up appointments one day after discharge.       PRINCIPAL DISCHARGE DIAGNOSIS  Diagnosis: Sinus bradycardia  Assessment and Plan of Treatment: Bradycardia is a slow heart rate. Notify your physician if you:   Feel dizzy or lightheadedFeel short of breath and find it harder to exercise. Feel tired. Have chest pain or a feeling that your heart is pounding or fluttering (palpitations).Feel confused or have trouble concentrating. Faint, if a slow heart rate causes a drop in blood pressure.  -Biotel monitor placement was refused the day of discharge   -Please follow up with your cardiologist Dr Ojeda on 2/3/2023 in his office        SECONDARY DISCHARGE DIAGNOSES  Diagnosis: History of DVT (deep vein thrombosis)  Assessment and Plan of Treatment: Take Coumadin 5mg tonight (2/2/2023), and follow up with Dr Ojeda on Friday (2/3/2023) to check INR level - Dr. Ojeda will then need to tell you want dose of Coumadin to take going forward. Your appointment has already been scheduled.  Tell your Dentist, Surgeon, and other Doctors that you are on this drug.  This medication requires PT/INR Blood work monitoring,  Blood thinners can result in abnormal bleeding and brushing.  Be mindful to avoid accidental trauma.  Participate in activities as prescribed.  If you develop new leg pain, swelling, and/or redness contact your healthcare provider.  Call your doctor if you experience lightheadedness, loss of consciousness, shortness of breath (especially with exercise), feel your heart racing or beating unusually, or experience frequent or abnormal bleeding.      Diagnosis: Seizure  Assessment and Plan of Treatment: Continue your anti-seizure medications as prescribed.  Follow up with your doctor for blood work monitoring of medication levels.  Notify your doctor if you experience increased/worsening seizure activity, body aches, weakness, lack of coordination, feeling irritable, drowsiness, dizziness.      Diagnosis: CAD (coronary artery disease)  Assessment and Plan of Treatment: Coronary artery disease is a condition where the arteries the supply the heart muscle get clogged with fatty deposits & puts you at risk for a heart attack.  Call your doctor if you have any new pain, pressure, or discomfort in the center of your chest, pain, tingling or discomfort in arms, back, neck, jaw, or stomach, shortness of breath, nausea, vomiting, burping or heartburn, sweating, cold and clammy skin, racing or abnormal heartbeat for more than 10 minutes or if they keep coming & going.  Call 911 and do not try to get to hospital by car. eat lots of fruits & vegetables & low fat dairy products, not a lot of meat & fatty foods, walk or some form of physical activity most days of the week, lose weight if you are overweight.  Take your cardiac medication as prescribed to lower cholesterol, to lower blood pressure, and control your blood sugar.      Diagnosis: Vomiting  Assessment and Plan of Treatment: Improved  please continue medications as prescribed  Follow-up with your primary care physician within 1 week. Call for appointment.  Please bring all discharge paperwork and list of medications to all follow up appointments.  Please call for follow up appointments one day after discharge.       PRINCIPAL DISCHARGE DIAGNOSIS  Diagnosis: Sinus bradycardia  Assessment and Plan of Treatment: Bradycardia is a slow heart rate. Notify your physician if you:   Feel dizzy or lightheadedFeel short of breath and find it harder to exercise. Feel tired. Have chest pain or a feeling that your heart is pounding or fluttering (palpitations).Feel confused or have trouble concentrating. Faint, if a slow heart rate causes a drop in blood pressure.  -Biotel monitor placement was refused the day of discharge   -Please follow up with your cardiologist Dr Ojeda on 2/3/2023 in his office      SECONDARY DISCHARGE DIAGNOSES  Diagnosis: History of DVT (deep vein thrombosis)  Assessment and Plan of Treatment: Take Coumadin 5mg tonight and follow up with Dr Ojeda to check INR level - Dr. Ojeda will then need to tell you what dose of Coumadin to take going forward. Your appointment has already been scheduled.  Tell your Dentist, Surgeon, and other Doctors that you are on this drug.  This medication requires PT/INR Blood work monitoring,  Blood thinners can result in abnormal bleeding and brushing.  Be mindful to avoid accidental trauma.  Participate in activities as prescribed.  If you develop new leg pain, swelling, and/or redness contact your healthcare provider.  Call your doctor if you experience lightheadedness, loss of consciousness, shortness of breath (especially with exercise), feel your heart racing or beating unusually, or experience frequent or abnormal bleeding.    Diagnosis: Seizure  Assessment and Plan of Treatment: Continue your anti-seizure medications as prescribed.  Follow up with your doctor for blood work monitoring of medication levels.  Notify your doctor if you experience increased/worsening seizure activity, body aches, weakness, lack of coordination, feeling irritable, drowsiness, dizziness.      Diagnosis: CAD (coronary artery disease)  Assessment and Plan of Treatment: Coronary artery disease is a condition where the arteries the supply the heart muscle get clogged with fatty deposits & puts you at risk for a heart attack.  Call your doctor if you have any new pain, pressure, or discomfort in the center of your chest, pain, tingling or discomfort in arms, back, neck, jaw, or stomach, shortness of breath, nausea, vomiting, burping or heartburn, sweating, cold and clammy skin, racing or abnormal heartbeat for more than 10 minutes or if they keep coming & going.  Call 911 and do not try to get to hospital by car. eat lots of fruits & vegetables & low fat dairy products, not a lot of meat & fatty foods, walk or some form of physical activity most days of the week, lose weight if you are overweight.  Take your cardiac medication as prescribed to lower cholesterol, to lower blood pressure, and control your blood sugar.      Diagnosis: Vomiting  Assessment and Plan of Treatment: Improved  please continue medications as prescribed  Follow-up with your primary care physician within 1 week. Call for appointment.  Please bring all discharge paperwork and list of medications to all follow up appointments.  Please call for follow up appointments one day after discharge.       PRINCIPAL DISCHARGE DIAGNOSIS  Diagnosis: Sinus bradycardia  Assessment and Plan of Treatment: Bradycardia is a slow heart rate. Notify your physician if you:   Feel dizzy or lightheadedFeel short of breath and find it harder to exercise. Feel tired. Have chest pain or a feeling that your heart is pounding or fluttering (palpitations).Feel confused or have trouble concentrating. Faint, if a slow heart rate causes a drop in blood pressure.  -Biotel monitor placement no longer needed on discharge  -Please follow up with your cardiologist and Primary Care Physician Dr Ojeda in his office within 1 week of      SECONDARY DISCHARGE DIAGNOSES  Diagnosis: History of DVT (deep vein thrombosis)  Assessment and Plan of Treatment: Take Coumadin 5mg tonight and follow up with Dr Ojeda to check INR level - Dr. Ojeda will then need to tell you what dose of Coumadin to take going forward.  Tell your Dentist, Surgeon, and other Doctors that you are on this drug.  This medication requires PT/INR Blood work monitoring,  Blood thinners can result in abnormal bleeding and brushing.  Be mindful to avoid accidental trauma.  Participate in activities as prescribed.  If you develop new leg pain, swelling, and/or redness contact your healthcare provider.  Call your doctor if you experience lightheadedness, loss of consciousness, shortness of breath (especially with exercise), feel your heart racing or beating unusually, or experience frequent or abnormal bleeding.    Diagnosis: Seizure  Assessment and Plan of Treatment: Continue your anti-seizure medications as prescribed.  Follow up with your doctor for blood work monitoring of medication levels.  Notify your doctor if you experience increased/worsening seizure activity, body aches, weakness, lack of coordination, feeling irritable, drowsiness, dizziness.      Diagnosis: CAD (coronary artery disease)  Assessment and Plan of Treatment: Coronary artery disease is a condition where the arteries the supply the heart muscle get clogged with fatty deposits & puts you at risk for a heart attack.  Call your doctor if you have any new pain, pressure, or discomfort in the center of your chest, pain, tingling or discomfort in arms, back, neck, jaw, or stomach, shortness of breath, nausea, vomiting, burping or heartburn, sweating, cold and clammy skin, racing or abnormal heartbeat for more than 10 minutes or if they keep coming & going.  Call 911 and do not try to get to hospital by car. eat lots of fruits & vegetables & low fat dairy products, not a lot of meat & fatty foods, walk or some form of physical activity most days of the week, lose weight if you are overweight.  Take your cardiac medication as prescribed to lower cholesterol, to lower blood pressure, and control your blood sugar.      Diagnosis: Vomiting  Assessment and Plan of Treatment: Improved  please continue medications as prescribed  Follow-up with your primary care physician within 1 week. Call for appointment.  Please bring all discharge paperwork and list of medications to all follow up appointments.  Please call for follow up appointments one day after discharge.       PRINCIPAL DISCHARGE DIAGNOSIS  Diagnosis: Sinus bradycardia  Assessment and Plan of Treatment: Bradycardia is a slow heart rate. Notify your physician if you:   Feel dizzy or lightheadedFeel short of breath and find it harder to exercise. Feel tired. Have chest pain or a feeling that your heart is pounding or fluttering (palpitations).Feel confused or have trouble concentrating. Faint, if a slow heart rate causes a drop in blood pressure.  -Biotel monitor placement no longer needed on discharge  -Please follow up with your cardiologist and Primary Care Physician Dr Ojeda in his office within 1 week of      SECONDARY DISCHARGE DIAGNOSES  Diagnosis: History of DVT (deep vein thrombosis)  Assessment and Plan of Treatment: Take Coumadin 5mg tonight and follow up with Dr Ojeda to check INR level - Dr. Ojeda will then need to tell you what dose of Coumadin to take going forward.  Tell your Dentist, Surgeon, and other Doctors that you are on this drug.  This medication requires PT/INR Blood work monitoring,  Blood thinners can result in abnormal bleeding and brushing.  Be mindful to avoid accidental trauma.  Participate in activities as prescribed.  If you develop new leg pain, swelling, and/or redness contact your healthcare provider.  Call your doctor if you experience lightheadedness, loss of consciousness, shortness of breath (especially with exercise), feel your heart racing or beating unusually, or experience frequent or abnormal bleeding.    Diagnosis: Seizure  Assessment and Plan of Treatment: Continue your anti-seizure medications as prescribed.  Follow up with your doctor for blood work monitoring of medication levels.  Notify your doctor if you experience increased/worsening seizure activity, body aches, weakness, lack of coordination, feeling irritable, drowsiness, dizziness.      Diagnosis: CAD (coronary artery disease)  Assessment and Plan of Treatment: Coronary artery disease is a condition where the arteries the supply the heart muscle get clogged with fatty deposits & puts you at risk for a heart attack.  Call your doctor if you have any new pain, pressure, or discomfort in the center of your chest, pain, tingling or discomfort in arms, back, neck, jaw, or stomach, shortness of breath, nausea, vomiting, burping or heartburn, sweating, cold and clammy skin, racing or abnormal heartbeat for more than 10 minutes or if they keep coming & going.  Call 911 and do not try to get to hospital by car. eat lots of fruits & vegetables & low fat dairy products, not a lot of meat & fatty foods, walk or some form of physical activity most days of the week, lose weight if you are overweight.  Take your cardiac medication as prescribed to lower cholesterol, to lower blood pressure, and control your blood sugar.      Diagnosis: Vomiting  Assessment and Plan of Treatment: Improved  please continue medications as prescribed  Follow-up with your primary care physician within 1 week. Call for appointment.  Please bring all discharge paperwork and list of medications to all follow up appointments.  Please call for follow up appointments one day after discharge.      Diagnosis: ROBINA (acute kidney injury)  Assessment and Plan of Treatment: Please follow-up with your primary care physician within one week to repeat a basic metabolic panel to monitor your creatinine.

## 2023-02-01 NOTE — DISCHARGE NOTE PROVIDER - CARE PROVIDERS DIRECT ADDRESSES
[email or web address],DirectAddress_Unknown

## 2023-02-01 NOTE — PROGRESS NOTE ADULT - SUBJECTIVE AND OBJECTIVE BOX
DATE OF SERVICE: 02-01-23 @ 15:46    Patient is a 62y old  Female who presents with a chief complaint of bradycardia (01 Feb 2023 14:50)      INTERVAL HISTORY: Feels ok.     REVIEW OF SYSTEMS:  CONSTITUTIONAL: No weakness  EYES/ENT: No visual changes;  No throat pain   NECK: No pain or stiffness  RESPIRATORY: No cough, wheezing; No shortness of breath  CARDIOVASCULAR: No chest pain or palpitations  GASTROINTESTINAL: No abdominal  pain. No nausea, vomiting, or hematemesis  GENITOURINARY: No dysuria, frequency or hematuria  NEUROLOGICAL: No stroke like symptoms  SKIN: No rashes    TELEMETRY Personally reviewed: SB/SR 50-80  	  MEDICATIONS:  amLODIPine   Tablet 10 milliGRAM(s) Oral daily  doxazosin 4 milliGRAM(s) Oral at bedtime  hydrochlorothiazide 25 milliGRAM(s) Oral daily  losartan 100 milliGRAM(s) Oral daily        PHYSICAL EXAM:  T(C): 36.7 (02-01-23 @ 11:27), Max: 36.7 (01-31-23 @ 21:05)  HR: 66 (02-01-23 @ 11:27) (62 - 66)  BP: 127/77 (02-01-23 @ 11:27) (127/77 - 153/88)  RR: 18 (02-01-23 @ 11:27) (18 - 18)  SpO2: 95% (02-01-23 @ 11:27) (95% - 100%)  Wt(kg): --  I&O's Summary    31 Jan 2023 07:01  -  01 Feb 2023 07:00  --------------------------------------------------------  IN: 700 mL / OUT: 450 mL / NET: 250 mL    01 Feb 2023 07:01  -  01 Feb 2023 15:46  --------------------------------------------------------  IN: 480 mL / OUT: 800 mL / NET: -320 mL          Appearance: In no distress	  HEENT:    PERRL, EOMI	  Cardiovascular:  S1 S2, No JVD  Respiratory: Lungs clear to auscultation	  Gastrointestinal:  Soft, Non-tender, + BS	  Vascularature:  No edema of LE  Psychiatric: Appropriate affect   Neuro: no acute focal deficits           01-31    144  |  112<H>  |  21  ----------------------------<  87  3.5   |  23  |  1.28    Ca    9.4      31 Jan 2023 06:59          Labs personally reviewed      ASSESSMENT/PLAN: 	    Ms. Denis is a 63 yo female with PMH of CAD s/p PCI in 2003, HTN, brain aneursyms s/p hemicraniotomy and clipping in 2013 c/b epilepsy chronic DVT now on coumadin who presents with symptomatic bradycardia.    Problem/Plan -1  Problem: Bradycardia  - ECG reveals SB; tele shows HR mostly in 30-40s but as long as 29  - TTE unremarkable  - TSH wnl  - HR  improved  60s bpm  - remain off Clonidine and Labetalol    Problem/Plan -2  Problem: Hypertension  - Patient hypotensive with EMS but on multiple antihypertensive meds  - c/w all home meds with the exception of labetalol and clonidine     Problem/Plan -3  Problem: Coronary Artery Disease  - s/p remote PCI  - Denies CP or SOB  - Echo unremarkable  - c/w ASA 81mg PO daily, rosuvastatin 40mg PO daily    Problem/Plan -4  Problem: hx of DVT  - c/w coumadin and dose to INR 2-3    Problem/Plan -5  Problem: Hx of Brain Aneursyms with intervention and Epilepsy  - Now with increased forgetfulness and lethargy  - Neuro recs appreciated.   - CTH shows no evidence of hydrocephalus, Bifrontal encephalomalacia, Old left PCA infarct, Small right parietal parasagittal infarct  - Keppra dose adjusted based on serum level  - As per daughter, reports increased forgetfulness and repetitiveness  - Appreciate Neuro recs based on completed EEG  - No need for ILR for patient already on AC with coumadin.    Problem/Plan -6  Problem: Emesis  - No WBC elevation, no fever  - Not a/w meals  - Denies Diarrhea, epigastic pain  - Lipase wnl  - Abdominal XR wnl  - Appreciate GI consult   - No episode of Emesis today. Patient reports feeling better.         ENMA Valdes, DO [facility]  Cardiovascular Medicine  [address], [address]  Available through call or text on [employer] TEAMs  Office: [phone]   DATE OF SERVICE: 02-01-23 @ 15:46    Patient is a 62y old  Female who presents with a chief complaint of bradycardia (01 Feb 2023 14:50)      INTERVAL HISTORY: Feels ok.     REVIEW OF SYSTEMS:  CONSTITUTIONAL: No weakness  EYES/ENT: No visual changes;  No throat pain   NECK: No pain or stiffness  RESPIRATORY: No cough, wheezing; No shortness of breath  CARDIOVASCULAR: No chest pain or palpitations  GASTROINTESTINAL: No abdominal  pain. No nausea, vomiting, or hematemesis  GENITOURINARY: No dysuria, frequency or hematuria  NEUROLOGICAL: No stroke like symptoms  SKIN: No rashes    TELEMETRY Personally reviewed: SB/SR 50-80  	  MEDICATIONS:  amLODIPine   Tablet 10 milliGRAM(s) Oral daily  doxazosin 4 milliGRAM(s) Oral at bedtime  hydrochlorothiazide 25 milliGRAM(s) Oral daily  losartan 100 milliGRAM(s) Oral daily        PHYSICAL EXAM:  T(C): 36.7 (02-01-23 @ 11:27), Max: 36.7 (01-31-23 @ 21:05)  HR: 66 (02-01-23 @ 11:27) (62 - 66)  BP: 127/77 (02-01-23 @ 11:27) (127/77 - 153/88)  RR: 18 (02-01-23 @ 11:27) (18 - 18)  SpO2: 95% (02-01-23 @ 11:27) (95% - 100%)  Wt(kg): --  I&O's Summary    31 Jan 2023 07:01  -  01 Feb 2023 07:00  --------------------------------------------------------  IN: 700 mL / OUT: 450 mL / NET: 250 mL    01 Feb 2023 07:01  -  01 Feb 2023 15:46  --------------------------------------------------------  IN: 480 mL / OUT: 800 mL / NET: -320 mL          Appearance: In no distress	  HEENT:    PERRL, EOMI	  Cardiovascular:  S1 S2, No JVD  Respiratory: Lungs clear to auscultation	  Gastrointestinal:  Soft, Non-tender, + BS	  Vascularature:  No edema of LE  Psychiatric: Appropriate affect   Neuro: no acute focal deficits           01-31    144  |  112<H>  |  21  ----------------------------<  87  3.5   |  23  |  1.28    Ca    9.4      31 Jan 2023 06:59          Labs personally reviewed      ASSESSMENT/PLAN: 	    Ms. Denis is a 61 yo female with PMH of CAD s/p PCI in 2003, HTN, brain aneursyms s/p hemicraniotomy and clipping in 2013 c/b epilepsy chronic DVT now on coumadin who presents with symptomatic bradycardia.    Problem/Plan -1  Problem: Bradycardia  - ECG reveals SB; tele shows HR mostly in 30-40s but as long as 29  - TTE unremarkable  - TSH wnl  - HR  improved  60 - 70s bpm  - remain off Clonidine and Labetalol    Problem/Plan -2  Problem: Hypertension  - Patient hypotensive with EMS but on multiple antihypertensive meds  - c/w all home meds with the exception of labetalol and clonidine     Problem/Plan -3  Problem: Coronary Artery Disease  - s/p remote PCI  - Denies CP or SOB  - Echo unremarkable  - c/w ASA 81mg PO daily, rosuvastatin 40mg PO daily    Problem/Plan -4  Problem: hx of DVT  - c/w coumadin and dose to INR 2-3  - Discussed with vascular Dr Pwoell, c/w coumadin    Problem/Plan -5  Problem: Hx of Brain Aneursyms with intervention and Epilepsy  - Now with increased forgetfulness and lethargy  - Neuro recs appreciated.   - CTH shows no evidence of hydrocephalus, Bifrontal encephalomalacia, Old left PCA infarct, Small right parietal parasagittal infarct  - Keppra dose adjusted based on serum level  - As per daughter, reports increased forgetfulness and repetitiveness  - Appreciate Neuro recs based on completed EEG  - No need for ILR for patient already on AC with coumadin and HR have improved off Clonidine and Labetalol    Problem/Plan -6  Problem: Emesis  - No WBC elevation, no fever  - Not a/w meals  - Denies Diarrhea, epigastic pain  - Lipase wnl  - Abdominal XR wnl  - Appreciate GI consult   - No episode of Emesis today. Patient reports feeling better.         ENMA Valdes DO [facility]  Cardiovascular Medicine  [address] [place] [address], [address]  Available through call or text on [employer] TEAMs  Office: [phone]

## 2023-02-01 NOTE — PROGRESS NOTE ADULT - SUBJECTIVE AND OBJECTIVE BOX
Chief Complaint:  Patient is a 62y old  Female who presents with a chief complaint of bradycardia (31 Jan 2023 18:35)      Date of service 02-01-23 @ 09:54      Interval Events:   Patient seen and examined.   Resting comfortably.   No nausea, vomiting, diarrhea.     Hospital Medications:  acetaminophen     Tablet .. 650 milliGRAM(s) Oral every 6 hours PRN  aluminum hydroxide/magnesium hydroxide/simethicone Suspension 30 milliLiter(s) Oral every 4 hours PRN  amLODIPine   Tablet 10 milliGRAM(s) Oral daily  aspirin enteric coated 81 milliGRAM(s) Oral daily  atorvastatin 80 milliGRAM(s) Oral at bedtime  chlorhexidine 2% Cloths 1 Application(s) Topical <User Schedule>  doxazosin 4 milliGRAM(s) Oral at bedtime  hydrochlorothiazide 25 milliGRAM(s) Oral daily  influenza   Vaccine 0.5 milliLiter(s) IntraMuscular once  lacosamide 100 milliGRAM(s) Oral two times a day  lactated ringers. 1000 milliLiter(s) IV Continuous <Continuous>  levETIRAcetam 1500 milliGRAM(s) Oral two times a day  losartan 100 milliGRAM(s) Oral daily  melatonin 3 milliGRAM(s) Oral at bedtime PRN  ondansetron   Disintegrating Tablet 4 milliGRAM(s) Oral three times a day  OXcarbazepine 300 milliGRAM(s) Oral two times a day  pantoprazole    Tablet 40 milliGRAM(s) Oral before breakfast  senna 2 Tablet(s) Oral at bedtime  warfarin 7.5 milliGRAM(s) Oral once  zonisamide 100 milliGRAM(s) Oral two times a day        Review of Systems:  General:  No wt loss, fevers, chills, night sweats, fatigue,   Eyes:  Good vision, no reported pain  ENT:  No sore throat, pain, runny nose, dysphagia  CV:  No pain, palpitations, hypo/hypertension  Resp:  No dyspnea, cough, tachypnea, wheezing  GI:  See HPI  :  No pain, bleeding, incontinence, nocturia  Muscle:  No pain, weakness  Neuro:  No weakness, tingling, memory problems  Psych:  No fatigue, insomnia, mood problems, depression  Endocrine:  No polyuria, polydipsia, cold/heat intolerance  Heme:  No petechiae, ecchymosis, easy bruisability  Integumentary:  No rash, edema    PHYSICAL EXAM:   Vital Signs:  Vital Signs Last 24 Hrs  T(C): 36.7 (01 Feb 2023 04:21), Max: 37 (31 Jan 2023 11:12)  T(F): 98 (01 Feb 2023 04:21), Max: 98.6 (31 Jan 2023 11:12)  HR: 64 (01 Feb 2023 04:21) (62 - 80)  BP: 136/74 (01 Feb 2023 04:21) (118/77 - 153/88)  BP(mean): --  RR: 18 (01 Feb 2023 04:21) (18 - 18)  SpO2: 98% (01 Feb 2023 04:21) (97% - 100%)    Parameters below as of 01 Feb 2023 04:21  Patient On (Oxygen Delivery Method): room air      Daily     Daily       PHYSICAL EXAM:     GENERAL:  Appears stated age, well-groomed, well-nourished, no distress  HEENT:  NC/AT,  conjunctivae anicteric, clear and pink,   NECK: supple, trachea midline  CHEST:  Full & symmetric excursion, no increased effort, breath sounds clear  HEART:  Regular rhythm, no JVD  ABDOMEN:  Soft, non-tender, non-distended, normoactive bowel sounds,  no masses , no hepatosplenomegaly  EXTREMITIES:  no cyanosis,clubbing or edema  SKIN:  No rash, erythema, or, ecchymoses, no jaundice  NEURO:  Alert, non-focal, no asterixis  PSYCH: Appropriate affect, oriented to place and time  RECTAL: Deferred      LABS Personally reviewed by me:      01-31    144  |  112<H>  |  21  ----------------------------<  87  3.5   |  23  |  1.28    Ca    9.4      31 Jan 2023 06:59        PT/INR - ( 01 Feb 2023 06:57 )   PT: 29.9 sec;   INR: 2.55 ratio                   Imaging personally reviewed by me:

## 2023-02-01 NOTE — CHART NOTE - NSCHARTNOTEFT_GEN_A_CORE
patient seen and examined. no acute complaints. no further vomiting or loose stools. she is tolerating her diet without nausea.   vitals and labs reviewed  PE - Lungs cta b/l   no focal deficits on exam   no abd tenderness +bowel sounds    62F h/o brain aneurysm s/p hemicraniectomy and clipping (2013), seizures post hemicraniectomy (on Keppra), chronic DVTs on coumadin and hypertension admitted for eval of bradycardia, with course c/b intractable nausea/vomiting of unclear etiology.         Problem/Plan - 1:  ·  Problem: Vomiting.   ·  Plan: Unclear cause of nausea/vomiting   -keppra level elevated; per neuro, elevated level does not cause nausea or bradycardia, switch to oral  EEG reviewed with Dr. Braun - no active seizure. continue current AED doses.   -AXR reviewed; no evidence of obstruction  -repeat CTH showing no acute changes from prior imaging   -GI consulted - zofran (changed to ODT) switched to pre-meals. no further diarrhea so GI pcr not sent- will continue to monitor. now tolerating diet better  -Monitor electrolytes  -c/w antiemetics.     Problem/Plan - 2:  ·  Problem: Bradycardia.   ·  Plan: Sinus bradycardia, likely medication induced - BB vs clonidine; improved  Labetalol and clonidine now discontinued per cardiology  TTE reviewed; TSH wnl  F/u further EP recs  Cont to monitor on tele  Pt is open to biotel or zio patch on dc.     Problem/Plan - 3:  ·  Problem: Hypertension.   ·  Plan: BP at goal  Cont losartan, amlodipine, HCTZ  Renal US without evidence of DULCE  Cont to monitor vitals.     Problem/Plan - 4:  ·  Problem: Seizure.   ·  Plan: Cont home Levetiracetam, Lacosamide and Zonisamide.  levetiracetam level elevated (see above)   no change in dose needed per neuro.     Problem/Plan - 5:  ·  Problem: History of DVT (deep vein thrombosis).   ·  Plan: Cont home coumadin; held 1/28, 1/29, and 1/30 due to supratherapeutic INR  likely will be in range tomorrow - will dose coumadin 7.5mg tonight (home dose)   monitor INR.     Problem/Plan - 6:  ·  Problem: CAD (coronary artery disease).   ·  Plan: Stable.       Additional Information:  Additional Information: d/w ESTER Mckeon patient seen and examined. no acute complaints. no further vomiting or loose stools. she is tolerating her diet without nausea.   vitals and labs reviewed  PE - Lungs cta b/l   no focal deficits on exam   no abd tenderness +bowel sounds    62F h/o brain aneurysm s/p hemicraniectomy and clipping (2013), seizures post hemicraniectomy (on Keppra), chronic DVTs on coumadin and hypertension admitted for eval of bradycardia. The patient was seen by EP. Her labetalol and clonidine held with improvement in HR running 50-70. Biotel patch to be placed prior to discharge. pt refused ILR. The patient developed nausea and vomiting found to have elevated keppra level but felt by Neurology that this should not cause patient's nausea. Abdominal xray without obstruction. CT head without acute changes. The patient had an EEG that was negative for active seizure. Neurology recommended no change in antiepileptics. Gi consulted felt possible gastroenteritis and recommended nausea medication around the clock prior to meals. nausea and loose stool resolved. patient tolerating diet (with zofran ODT before meals).  with course c/b intractable nausea/vomiting of unclear etiology. INR was supratherapeutic when dosed coumadin 7.5mg x 4 nights. the patient recommended to take coumadin 7.5mg 2/1 night, then 5mg 2/2 night, and check INR with Dr. Ojeda on 2/3 for further dosing.     discharge time - 36 minutes   Dr. M. Luke  Medicine Hospitalist  [phone] [facility] Division of Hospital Medicine  Laurita Louis DO  8a-5pm: [phone]  after 5pm call [phone]    Patient is a 62y old  Female who presents with a chief complaint of bradycardia (01 Feb 2023 15:46)        SUBJECTIVE / OVERNIGHT EVENTS: no further nausea or diarrhea. tolerating diet    MEDICATIONS  (STANDING):  amLODIPine   Tablet 10 milliGRAM(s) Oral daily  aspirin enteric coated 81 milliGRAM(s) Oral daily  atorvastatin 80 milliGRAM(s) Oral at bedtime  chlorhexidine 2% Cloths 1 Application(s) Topical <User Schedule>  doxazosin 4 milliGRAM(s) Oral at bedtime  hydrochlorothiazide 25 milliGRAM(s) Oral daily  influenza   Vaccine 0.5 milliLiter(s) IntraMuscular once  lacosamide 100 milliGRAM(s) Oral two times a day  lactated ringers. 1000 milliLiter(s) (75 mL/Hr) IV Continuous <Continuous>  levETIRAcetam 1500 milliGRAM(s) Oral two times a day  losartan 100 milliGRAM(s) Oral daily  ondansetron   Disintegrating Tablet 4 milliGRAM(s) Oral three times a day  OXcarbazepine 300 milliGRAM(s) Oral two times a day  pantoprazole    Tablet 40 milliGRAM(s) Oral before breakfast  senna 2 Tablet(s) Oral at bedtime  zonisamide 100 milliGRAM(s) Oral two times a day    MEDICATIONS  (PRN):  acetaminophen     Tablet .. 650 milliGRAM(s) Oral every 6 hours PRN Temp greater or equal to 38C (100.4F), Mild Pain (1 - 3)  aluminum hydroxide/magnesium hydroxide/simethicone Suspension 30 milliLiter(s) Oral every 4 hours PRN Dyspepsia  melatonin 3 milliGRAM(s) Oral at bedtime PRN Insomnia      Vital Signs Last 24 Hrs  T(C): 37.3 (01 Feb 2023 20:24), Max: 37.3 (01 Feb 2023 20:24)  T(F): 99.2 (01 Feb 2023 20:24), Max: 99.2 (01 Feb 2023 20:24)  HR: 84 (01 Feb 2023 20:24) (64 - 84)  BP: 157/86 (01 Feb 2023 20:24) (127/77 - 157/86)  BP(mean): --  RR: 18 (01 Feb 2023 20:24) (18 - 18)  SpO2: 97% (01 Feb 2023 20:24) (95% - 98%)    Parameters below as of 01 Feb 2023 20:24  Patient On (Oxygen Delivery Method): room air      CAPILLARY BLOOD GLUCOSE        I&O's Summary    31 Jan 2023 07:01  -  01 Feb 2023 07:00  --------------------------------------------------------  IN: 700 mL / OUT: 450 mL / NET: 250 mL    01 Feb 2023 07:01  -  01 Feb 2023 22:11  --------------------------------------------------------  IN: 480 mL / OUT: 800 mL / NET: -320 mL        PHYSICAL EXAM:  GENERAL: NAD, breathing normal  HEAD:  Atraumatic, Normocephalic  EYES: conjunctiva and sclera clear  NECK: supple, No JVD  CHEST/LUNG: CTA b/l  HEART: S1 S2 RRR  ABDOMEN: +BS Soft, NT/ND, no rebound or guarding, negative nieves's sign  EXTREMITIES:  2+ DP Pulses, No c/c. no LE edema  NEUROLOGY: AAOx3, no facial droop, 5/5 ms b/l UE and LE   SKIN: LLE demarcated skin discoloration - no erythema or warmth    LABS:    01-31    144  |  112<H>  |  21  ----------------------------<  87  3.5   |  23  |  1.28    Ca    9.4      31 Jan 2023 06:59      PT/INR - ( 01 Feb 2023 06:57 )   PT: 29.9 sec;   INR: 2.55 ratio                   RADIOLOGY & ADDITIONAL TESTS:    Imaging Personally Reviewed:  Consultant(s) Notes Reviewed:    Care Discussed with Consultants/Other Providers:      62F h/o brain aneurysm s/p hemicraniectomy and clipping (2013), seizures post hemicraniectomy (on Keppra), chronic DVTs on coumadin and hypertension admitted for eval of bradycardia. The patient was seen by EP. Her labetalol and clonidine held with improvement in HR running 50-70. Biotel patch to be placed prior to discharge. pt refused ILR. The patient developed nausea and vomiting found to have elevated keppra level but felt by Neurology that this should not cause patient's nausea. Abdominal xray without obstruction. CT head without acute changes. The patient had an EEG that was negative for active seizure. Neurology recommended no change in antiepileptics. Gi consulted felt possible gastroenteritis and recommended nausea medication around the clock prior to meals. nausea and loose stool resolved. patient tolerating diet (with zofran ODT before meals).  with course c/b intractable nausea/vomiting of unclear etiology. INR was supratherapeutic when dosed coumadin 7.5mg x 4 nights. the patient recommended to take coumadin 7.5mg 2/1 night, then 5mg 2/2 night, and check INR with Dr. Ojeda on 2/3 for further dosing. Biotel to be placed prior to discharge per EP recommendations.     discharge time - 36 minutes   Dr. M. Luke  Medicine Hospitalist  [phone]

## 2023-02-02 ENCOUNTER — NON-APPOINTMENT (OUTPATIENT)
Age: 63
End: 2023-02-02

## 2023-02-02 LAB
ANION GAP SERPL CALC-SCNC: 15 MMOL/L — SIGNIFICANT CHANGE UP (ref 5–17)
BUN SERPL-MCNC: 27 MG/DL — HIGH (ref 7–23)
CALCIUM SERPL-MCNC: 10 MG/DL — SIGNIFICANT CHANGE UP (ref 8.4–10.5)
CHLORIDE SERPL-SCNC: 103 MMOL/L — SIGNIFICANT CHANGE UP (ref 96–108)
CO2 SERPL-SCNC: 23 MMOL/L — SIGNIFICANT CHANGE UP (ref 22–31)
CREAT SERPL-MCNC: 1.66 MG/DL — HIGH (ref 0.5–1.3)
EGFR: 35 ML/MIN/1.73M2 — LOW
GLUCOSE SERPL-MCNC: 112 MG/DL — HIGH (ref 70–99)
INR BLD: 3.83 RATIO — HIGH (ref 0.88–1.16)
MAGNESIUM SERPL-MCNC: 2.2 MG/DL — SIGNIFICANT CHANGE UP (ref 1.6–2.6)
PHOSPHATE SERPL-MCNC: 3.5 MG/DL — SIGNIFICANT CHANGE UP (ref 2.5–4.5)
POTASSIUM SERPL-MCNC: 3.6 MMOL/L — SIGNIFICANT CHANGE UP (ref 3.5–5.3)
POTASSIUM SERPL-SCNC: 3.6 MMOL/L — SIGNIFICANT CHANGE UP (ref 3.5–5.3)
PROTHROM AB SERPL-ACNC: 44.6 SEC — HIGH (ref 10.5–13.4)
SODIUM SERPL-SCNC: 141 MMOL/L — SIGNIFICANT CHANGE UP (ref 135–145)

## 2023-02-02 PROCEDURE — 99239 HOSP IP/OBS DSCHRG MGMT >30: CPT

## 2023-02-02 RX ORDER — WARFARIN SODIUM 2.5 MG/1
5 TABLET ORAL ONCE
Refills: 0 | Status: COMPLETED | OUTPATIENT
Start: 2023-02-02 | End: 2023-02-03

## 2023-02-02 RX ORDER — WARFARIN SODIUM 2.5 MG/1
1 TABLET ORAL
Qty: 1 | Refills: 0
Start: 2023-02-02 | End: 2023-02-02

## 2023-02-02 RX ADMIN — LOSARTAN POTASSIUM 100 MILLIGRAM(S): 100 TABLET, FILM COATED ORAL at 06:11

## 2023-02-02 RX ADMIN — LACOSAMIDE 100 MILLIGRAM(S): 50 TABLET ORAL at 17:22

## 2023-02-02 RX ADMIN — ONDANSETRON 4 MILLIGRAM(S): 8 TABLET, FILM COATED ORAL at 06:11

## 2023-02-02 RX ADMIN — Medication 81 MILLIGRAM(S): at 11:43

## 2023-02-02 RX ADMIN — LEVETIRACETAM 1500 MILLIGRAM(S): 250 TABLET, FILM COATED ORAL at 17:22

## 2023-02-02 RX ADMIN — Medication 100 MILLIGRAM(S): at 17:29

## 2023-02-02 RX ADMIN — ATORVASTATIN CALCIUM 80 MILLIGRAM(S): 80 TABLET, FILM COATED ORAL at 22:47

## 2023-02-02 RX ADMIN — PANTOPRAZOLE SODIUM 40 MILLIGRAM(S): 20 TABLET, DELAYED RELEASE ORAL at 06:10

## 2023-02-02 RX ADMIN — OXCARBAZEPINE 300 MILLIGRAM(S): 300 TABLET, FILM COATED ORAL at 06:11

## 2023-02-02 RX ADMIN — OXCARBAZEPINE 300 MILLIGRAM(S): 300 TABLET, FILM COATED ORAL at 17:23

## 2023-02-02 RX ADMIN — Medication 4 MILLIGRAM(S): at 22:47

## 2023-02-02 RX ADMIN — AMLODIPINE BESYLATE 10 MILLIGRAM(S): 2.5 TABLET ORAL at 06:11

## 2023-02-02 RX ADMIN — ONDANSETRON 4 MILLIGRAM(S): 8 TABLET, FILM COATED ORAL at 22:47

## 2023-02-02 RX ADMIN — LEVETIRACETAM 1500 MILLIGRAM(S): 250 TABLET, FILM COATED ORAL at 06:12

## 2023-02-02 RX ADMIN — ONDANSETRON 4 MILLIGRAM(S): 8 TABLET, FILM COATED ORAL at 11:43

## 2023-02-02 RX ADMIN — CHLORHEXIDINE GLUCONATE 1 APPLICATION(S): 213 SOLUTION TOPICAL at 06:12

## 2023-02-02 RX ADMIN — LACOSAMIDE 100 MILLIGRAM(S): 50 TABLET ORAL at 06:14

## 2023-02-02 RX ADMIN — Medication 100 MILLIGRAM(S): at 06:11

## 2023-02-02 NOTE — PROGRESS NOTE ADULT - SUBJECTIVE AND OBJECTIVE BOX
Neurology Progress Note    S: Patient seen and examined. CTH done overnight ; EEG neg for seizures     Medication:  MEDICATIONS  (STANDING):  amLODIPine   Tablet 10 milliGRAM(s) Oral daily  aspirin enteric coated 81 milliGRAM(s) Oral daily  atorvastatin 80 milliGRAM(s) Oral at bedtime  chlorhexidine 2% Cloths 1 Application(s) Topical <User Schedule>  doxazosin 4 milliGRAM(s) Oral at bedtime  hydrochlorothiazide 25 milliGRAM(s) Oral daily  influenza   Vaccine 0.5 milliLiter(s) IntraMuscular once  lacosamide 100 milliGRAM(s) Oral two times a day  lactated ringers. 1000 milliLiter(s) (75 mL/Hr) IV Continuous <Continuous>  levETIRAcetam 1500 milliGRAM(s) Oral two times a day  losartan 100 milliGRAM(s) Oral daily  ondansetron   Disintegrating Tablet 4 milliGRAM(s) Oral three times a day  OXcarbazepine 300 milliGRAM(s) Oral two times a day  pantoprazole    Tablet 40 milliGRAM(s) Oral before breakfast  senna 2 Tablet(s) Oral at bedtime  warfarin 7.5 milliGRAM(s) Oral once  zonisamide 100 milliGRAM(s) Oral two times a day    MEDICATIONS  (PRN):  acetaminophen     Tablet .. 650 milliGRAM(s) Oral every 6 hours PRN Temp greater or equal to 38C (100.4F), Mild Pain (1 - 3)  aluminum hydroxide/magnesium hydroxide/simethicone Suspension 30 milliLiter(s) Oral every 4 hours PRN Dyspepsia  melatonin 3 milliGRAM(s) Oral at bedtime PRN Insomnia    Vitals:      Vital Signs Last 24 Hrs  T(C): 36.4 (02 Feb 2023 04:32), Max: 37.3 (01 Feb 2023 20:24)  T(F): 97.5 (02 Feb 2023 04:32), Max: 99.2 (01 Feb 2023 20:24)  HR: 69 (02 Feb 2023 04:32) (66 - 84)  BP: 128/71 (02 Feb 2023 04:32) (127/77 - 157/86)  BP(mean): --  RR: 18 (02 Feb 2023 04:32) (18 - 18)  SpO2: 98% (02 Feb 2023 04:32) (95% - 98%)    Parameters below as of 02 Feb 2023 04:32  Patient On (Oxygen Delivery Method): room air        General Exam:   General Appearance: Appropriately dressed and in no acute distress       Head: Normocephalic, atraumatic and no dysmorphic features  Ear, Nose, and Throat: Moist mucous membranes  CVS: S1S2+  Resp: No SOB, no wheeze or rhonchi  GI: soft NT/ND  Extremities: No edema or cyanosis  Skin: No bruises or rashes     Neurological Exam:  Mental Status: Awake, alert and oriented x 3.  Able to follow simple and complex verbal commands. Able to name and repeat. fluent speech. No obvious aphasia or dysarthria noted.   Cranial Nerves: PERRL, EOMI, VFFC, sensation V1-V3 intact,  no obvious facial asymmetry, equal elevation of palate, scm/trap 5/5, tongue is midline on protrusion. no obvious papilledema on fundoscopic exam. hearing is grossly intact.   Motor: Normal bulk, tone and strength throughout. Fine finger movements were intact and symmetric. no tremors or drift noted.    Sensation: Intact to light touch and pinprick throughout. no right/left confusion. no extinction to tactile on DSS.    Reflexes: 1+ throughout at biceps, brachioradialis, triceps, patellars and ankles bilaterally and equal. No clonus. R toe and L toe were both downgoing.  Coordination: No dysmetria on FNF   Gait: deferred         I personally reviewed the below data/images/labs:        PT/INR - ( 01 Feb 2023 06:57 )   PT: 29.9 sec;   INR: 2.55 ratio                    Patient name: BELLE MENENDEZ  YOB: 1960   Age: 62 (F)   MR#: [id number]  Study Date: 1/25/2023  [facility] Ravinder [facility]galde [facility]CS  Study quality: Technically good  Referring Physician: Bibiana Merchant MD  Blood Pressure: 111/54 mmHg  Height: 157 cm  Weight: 70 kg  BSA: 1.7 m2  ------------------------------------------------------------------------  PROCEDURE: Transthoracic echocardiogram with 2-D, M-Mode  and complete spectral and color flow Doppler.  INDICATION: Abnormal electrocardiogram (ECG) (EKG) (R94.31)  ------------------------------------------------------------------------  Dimensions:    Normal Values:  LA:     3.9    2.0 - 4.0 cm  Ao:     2.9    2.0 - 3.8 cm  SEPTUM: 1.0    0.6 - 1.2 cm  PWT:    0.8    0.6 - 1.1 cm  LVIDd:  4.7    3.0 - 5.6 cm  LVIDs:  2.6    1.8 - 4.0 cm  Derived variables:  LVMI: 83 g/m2  RWT: 0.34  Fractional short: 45 %  EF (Carreno Rule): 61 %Doppler Peak Velocity (m/sec):  AoV=1.4  ------------------------------------------------------------------------  Observations:  Mitral Valve: Normal mitral valve. Minimal mitral  regurgitation.  Aortic Valve/Aorta: Thickened trileaflet aortic valve. Peak  transaortic valve gradient equals 8 mm Hg. No aortic valve  regurgitation seen. Peak left ventricular outflow tract  gradient equals 5 mm Hg, mean gradient is equal to 2 mm Hg,  LVOT velocity time integral equals 26 cm.  Aortic Root: 2.9 cm.  Ascending Aorta: 3.3 cm.  LVOT diameter: 1.9 cm.  Left Atrium: Normal left atrium.  LA volume index = 25  cc/m2.  Left Ventricle: Normal left ventricular systolic function.  No segmental wall motion abnormalities. Normal left  ventricular internal dimensions and wall thickness. Normal  diastolic function.  Right Heart: Normal right atrium. Normal right ventricular  size and function. Normal tricuspid valve. Mild tricuspid  regurgitation. Pulmonic valve not well visualized, probably  normal. No pulmonic regurgitation.  Pericardium/Pleura: Normal pericardium with no pericardial  effusion.  Hemodynamic: Estimated right atrial pressure is 8 mm Hg.  Estimated right ventricular systolic pressure equals 31 mm  Hg, assuming right atrial pressure equals 8 mm Hg,  consistent with normal pulmonary pressures.  ------------------------------------------------------------------------  Conclusions:  1. Normal left ventricular internal dimensions and wall  thickness.  2. Normal left ventricular systolic function. No segmental  wall motion abnormalities.  3. Normal right ventricular size and function.  4. Estimated pulmonary artery systolic pressure equals 31  mm Hg, assuming right atrial pressure equals 8  mm Hg,  consistent with normal pulmonary pressures.  ------------------------------------------------------------------------  Confirmed on  1/25/2023 - 17:33:29 by PAULA Kimble  ------------------------------------------------------------------------    < end of copied text >    < from: CT Head No Cont (01.29.23 @ 22:44) >    ACC: [id number] EXAM:  CT BRAIN   ORDERED BY: JESSICA HORTON     PROCEDURE DATE:  01/29/2023          INTERPRETATION:  INDICATIONS:  AMS    h/i brain aneurysm    TECHNIQUE:  Serial axial images were obtained from the skull base to the   vertex without intravenous contrast. Sagittal and Coronal reformats were   performed    COMPARISON EXAMINATION: 5/7/2022    FINDINGS:  VENTRICLES AND SULCI:  Right frontal shunt catheter with its tip at the   level of the septum pellucidum. 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 Evidence of right parietal   parasagittal distribution infarct seen on the prior as well. Bifrontal   encephalomalacia with evidence of aneurysm clips in the midline at the   level of the interhemispheric fissure in the distribution of the anterior   cerebral artery. Right basal ganglia lacunar infarct seen on the prior as   well  EXTRA-AXIAL:  No mass or collection is seen.  VISUALIZED SINUSES:  Clear.  VISUALIZED MASTOIDS:  Clear.  CALVARIUM:  Bifrontal craniectomy with cranioplasty  MISCELLANEOUS:  None.    IMPRESSION:  Ventricles are better visualized compared with the prior   though no evidence of hydrocephalus. Bifrontal cranioplasty. 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 Small right parietal parasagittal   infarct    --- End of Report ---            DANYA AGUDELO MD; Attending Radiologist  This document has been electronically signed. Jan 30 2023 10:51AM    < end of copied text >

## 2023-02-02 NOTE — PROGRESS NOTE ADULT - ASSESSMENT
1. Nausea / vomiting, diarrhea.-- improved   - Unclear etiology, ddx includes gastroenteritis, dysmotility/gastroparesis, medication-related. No evidence of bowel obstruction. LFTs / lipase wnl so doubt hepatobiliary or pancreatic pathology. CTH neg for acute pathology. No correlation with bradycardia or hypertension.   - diarrhea resolved-- if resumes can check GI PCR  - Zofran changed to standing pre-meals-- seems to be helping   - daily PPI     2. Chronic DVTs  - on coumadin, supratherapeutic INR    3. Hx of aneurysm s/p hemicraniectomy and clipping  - on ASA and statin     4. HTN  - per primary team     5. Seizure disorder  - per neurology     6. CAD s/p stent     DC planning       Attending supervision statement: I have personally seen and examined the patient. I fully participated in the care of this patient. I have made amendments to the documentation where necessary, and agree with the history, physical exam, and plan as outlined by the ACP.    [facility]  Gastroenterology and Hepatology  Office: [phone]

## 2023-02-02 NOTE — PROGRESS NOTE ADULT - SUBJECTIVE AND OBJECTIVE BOX
DATE OF SERVICE: 02-02-23 @ 12:39    Patient is a 62y old  Female who presents with a chief complaint of bradycardia (02 Feb 2023 08:21)      INTERVAL HISTORY: Feels ok.     REVIEW OF SYSTEMS:  CONSTITUTIONAL: No weakness  EYES/ENT: No visual changes;  No throat pain   NECK: No pain or stiffness  RESPIRATORY: No cough, wheezing; No shortness of breath  CARDIOVASCULAR: No chest pain or palpitations  GASTROINTESTINAL: No abdominal  pain. No nausea, vomiting, or hematemesis  GENITOURINARY: No dysuria, frequency or hematuria  NEUROLOGICAL: No stroke like symptoms  SKIN: No rashes    TELEMETRY Personally reviewed: SR 60-70  	  MEDICATIONS:  amLODIPine   Tablet 10 milliGRAM(s) Oral daily  doxazosin 4 milliGRAM(s) Oral at bedtime  hydrochlorothiazide 25 milliGRAM(s) Oral daily  losartan 100 milliGRAM(s) Oral daily        PHYSICAL EXAM:  T(C): 37.2 (02-02-23 @ 11:08), Max: 37.3 (02-01-23 @ 20:24)  HR: 66 (02-02-23 @ 11:08) (66 - 84)  BP: 109/70 (02-02-23 @ 11:08) (109/70 - 157/86)  RR: 18 (02-02-23 @ 11:08) (18 - 18)  SpO2: 99% (02-02-23 @ 11:08) (97% - 99%)  Wt(kg): --  I&O's Summary    01 Feb 2023 07:01  -  02 Feb 2023 07:00  --------------------------------------------------------  IN: 480 mL / OUT: 800 mL / NET: -320 mL    02 Feb 2023 07:01  -  02 Feb 2023 12:39  --------------------------------------------------------  IN: 480 mL / OUT: 0 mL / NET: 480 mL          Appearance: In no distress	  HEENT:    PERRL, EOMI	  Cardiovascular:  S1 S2, No JVD  Respiratory: Lungs clear to auscultation	  Gastrointestinal:  Soft, Non-tender, + BS	  Vascularature:  No edema of LE  Psychiatric: Appropriate affect   Neuro: no acute focal deficits         Labs personally reviewed      ASSESSMENT/PLAN: 	    Ms. Denis is a 63 yo female with PMH of CAD s/p PCI in 2003, HTN, brain aneursyms s/p hemicraniotomy and clipping in 2013 c/b epilepsy chronic DVT now on coumadin who presents with symptomatic bradycardia.    Problem/Plan -1  Problem: Bradycardia  - ECG reveals SB; tele shows HR mostly in 30-40s but as long as 29  - TTE unremarkable  - TSH wnl  - HR  improved  60 - 70s bpm  - remain off Clonidine and Labetalol    Problem/Plan -2  Problem: Hypertension  - Patient hypotensive with EMS but on multiple antihypertensive meds  - c/w all home meds with the exception of labetalol and clonidine     Problem/Plan -3  Problem: Coronary Artery Disease  - s/p remote PCI  - Denies CP or SOB  - Echo unremarkable  - c/w ASA 81mg PO daily, rosuvastatin 40mg PO daily    Problem/Plan -4  Problem: hx of DVT  - c/w coumadin and dose to INR 2-3  - Discussed with vascular Dr Powell, c/w coumadin    Problem/Plan -5  Problem: Hx of Brain Aneursyms with intervention and Epilepsy  - Now with increased forgetfulness and lethargy  - Neuro recs appreciated.   - CTH shows no evidence of hydrocephalus, Bifrontal encephalomalacia, Old left PCA infarct, Small right parietal parasagittal infarct  - Keppra dose adjusted based on serum level  - As per daughter, reports increased forgetfulness and repetitiveness  - Appreciate Neuro recs based on completed EEG  - No need for ILR for patient already on AC with coumadin and HR have improved off Clonidine and Labetalol    Problem/Plan -6  Problem: Emesis  - No WBC elevation, no fever  - Not a/w meals  - Denies Diarrhea, epigastic pain  - Lipase wnl  - Abdominal XR wnl  - Appreciate GI consult   - Resolved      Kaykay Goldstein, RIDDHI-NP   Delfino Everett DO [facility]  Cardiovascular Medicine  [address], [address]  Available through call or text on [employer]  Office: [phone]

## 2023-02-02 NOTE — PROGRESS NOTE ADULT - ASSESSMENT
61yo black F with CAD s/p PCI 2003, htn, brain aneurysm s/p hemicraniectomy and clipping (2013), seizures post hemicraniectomy, chronic DVTs on Coumadin p/w bradycardia .   CTH and CTA H/N 5/2022: frontal lobes encephalomalacia, old L occipital infarct, CTA H/N neg, aneusym clipi in [place]   TTE as above 1/25  + COVID  CTH old L PCA infarct, evidence of cranioplasty. no acute findings   EEG with seizure focus no seiuzres ; R frontal spikes. potential for seizure from R frontal     Impression:   1) aneurysm s/p clipping and hemicrani  2) epilepsy  3) occipital stroke, ESUS     - CTH done overnight, f/u official report   - AC for DVT   - c/w home AEDs --> on keppra 1500mg BID, vimpat 100mg BID, Oxcarbazepine 300mg BID, and zonisamide 100mg BID   - for secondary stroke prevention c/w ASA and statin therapy with LDL goal < 70    - cardiac workup in progress for gema   - EP , would benefit from ILR for gema cardiac and to r/o occult AFib as cause of her occipital stroke --> refused. consider event monitor   - telemetry  - PT/OT/SS/SLP, OOBC  - check FS, glucose control <180  - GI/DVT ppx  - sees Dr doran outaptient    dcp   Tristan Braun MD  Vascular Neurology  Office: [phone]

## 2023-02-02 NOTE — CHART NOTE - NSCHARTNOTEFT_GEN_A_CORE
(1) attempt(s) were made to reach patient, which have been unsuccessful. Unable to leave voicemail on (02/02).

## 2023-02-02 NOTE — CHART NOTE - NSCHARTNOTEFT_GEN_A_CORE
Request from Dr. Louis to facilitate patient discharge.  Medication reconciliation reviewed, revised, and resolved with Dr. Louis, who has medically cleared patient for discharge with follow up as advised.  Please refer to discharge note for detailed hospital course.

## 2023-02-02 NOTE — PROGRESS NOTE ADULT - SUBJECTIVE AND OBJECTIVE BOX
Chief Complaint:  Patient is a 62y old  Female who presents with a chief complaint of bradycardia (02 Feb 2023 12:39)      Date of service 02-02-23 @ 14:05      Interval Events:   Patient seen and examined.   Seen eating, tolerating meal well.  No GI complaints.     Hospital Medications:  acetaminophen     Tablet .. 650 milliGRAM(s) Oral every 6 hours PRN  aluminum hydroxide/magnesium hydroxide/simethicone Suspension 30 milliLiter(s) Oral every 4 hours PRN  amLODIPine   Tablet 10 milliGRAM(s) Oral daily  aspirin enteric coated 81 milliGRAM(s) Oral daily  atorvastatin 80 milliGRAM(s) Oral at bedtime  chlorhexidine 2% Cloths 1 Application(s) Topical <User Schedule>  doxazosin 4 milliGRAM(s) Oral at bedtime  hydrochlorothiazide 25 milliGRAM(s) Oral daily  influenza   Vaccine 0.5 milliLiter(s) IntraMuscular once  lacosamide 100 milliGRAM(s) Oral two times a day  lactated ringers. 1000 milliLiter(s) IV Continuous <Continuous>  levETIRAcetam 1500 milliGRAM(s) Oral two times a day  losartan 100 milliGRAM(s) Oral daily  melatonin 3 milliGRAM(s) Oral at bedtime PRN  ondansetron   Disintegrating Tablet 4 milliGRAM(s) Oral three times a day  OXcarbazepine 300 milliGRAM(s) Oral two times a day  pantoprazole    Tablet 40 milliGRAM(s) Oral before breakfast  senna 2 Tablet(s) Oral at bedtime  zonisamide 100 milliGRAM(s) Oral two times a day        Review of Systems:  General:  No wt loss, fevers, chills, night sweats, fatigue,   Eyes:  Good vision, no reported pain  ENT:  No sore throat, pain, runny nose, dysphagia  CV:  No pain, palpitations, hypo/hypertension  Resp:  No dyspnea, cough, tachypnea, wheezing  GI:  See HPI  :  No pain, bleeding, incontinence, nocturia  Muscle:  No pain, weakness  Neuro:  No weakness, tingling, memory problems  Psych:  No fatigue, insomnia, mood problems, depression  Endocrine:  No polyuria, polydipsia, cold/heat intolerance  Heme:  No petechiae, ecchymosis, easy bruisability  Integumentary:  No rash, edema    PHYSICAL EXAM:   Vital Signs:  Vital Signs Last 24 Hrs  T(C): 37.2 (02 Feb 2023 11:08), Max: 37.3 (01 Feb 2023 20:24)  T(F): 98.9 (02 Feb 2023 11:08), Max: 99.2 (01 Feb 2023 20:24)  HR: 66 (02 Feb 2023 11:08) (66 - 84)  BP: 109/70 (02 Feb 2023 11:08) (109/70 - 157/86)  BP(mean): --  RR: 18 (02 Feb 2023 11:08) (18 - 18)  SpO2: 99% (02 Feb 2023 11:08) (97% - 99%)    Parameters below as of 02 Feb 2023 11:08  Patient On (Oxygen Delivery Method): room air      Daily     Daily       PHYSICAL EXAM:     GENERAL:  Appears stated age, well-groomed, well-nourished, no distress  HEENT:  NC/AT,  conjunctivae anicteric, clear and pink,   NECK: supple, trachea midline  CHEST:  Full & symmetric excursion, no increased effort, breath sounds clear  HEART:  Regular rhythm, no JVD  ABDOMEN:  Soft, non-tender, non-distended, normoactive bowel sounds,  no masses , no hepatosplenomegaly  EXTREMITIES:  no cyanosis,clubbing or edema  SKIN:  No rash, erythema, or, ecchymoses, no jaundice  NEURO:  Alert, non-focal, no asterixis  PSYCH: Appropriate affect, oriented to place and time  RECTAL: Deferred      LABS Personally reviewed by me:              PT/INR - ( 01 Feb 2023 06:57 )   PT: 29.9 sec;   INR: 2.55 ratio                   Imaging personally reviewed by me:

## 2023-02-02 NOTE — CHART NOTE - NSCHARTNOTEFT_GEN_A_CORE
patient seen and examined. denies nausea, tolerating diet. all questions answered. attempted to call  with emergency contact number but no answer.   discussed with Dr. Ojeda at patient's bedside - no episodes of bradycardia on telemetry >24 hours. okay to discharge and follow up with him tomorrow for INR check and will set up with monitor as outpatient   vitals and labs reviewed   PE - lungs CTA b/l, abd-+bs soft, NT, ND    62F h/o brain aneurysm s/p hemicraniectomy and clipping (2013), seizures post hemicraniectomy (on Keppra), chronic DVTs on coumadin and hypertension admitted for eval of bradycardia. The patient was seen by EP. Her labetalol and clonidine held with improvement in HR running 50-70. Biotel patch to be placed prior to discharge. pt refused ILR. The patient developed nausea and vomiting found to have elevated keppra level but felt by Neurology that this should not cause patient's nausea. Abdominal xray without obstruction. CT head without acute changes. The patient had an EEG that was negative for active seizure. Neurology recommended no change in antiepileptics. Gi consulted felt possible gastroenteritis and recommended nausea medication around the clock prior to meals. nausea and loose stool resolved. patient tolerating diet (with zofran ODT before meals).  with course c/b intractable nausea/vomiting of unclear etiology. INR was supratherapeutic when dosed coumadin 7.5mg x 4 nights. the patient recommended to take coumadin 7.5mg 2/1 night, then 5mg 2/2 night, and check INR with Dr. Ojeda on 2/3 for further dosing. pt refused biotel. d/w Dr. Ojeda who states she will follow up with him tomorrow and he will set her up with monitor outpatient.     discharge time - 37 minutes  Dr. M. Luke  [facility] Hospitalist  [phone] patient seen and examined. denies nausea, tolerating diet. all questions answered. attempted to call  with emergency contact number but no answer.   discussed with Dr. Ojeda at patient's bedside - no episodes of bradycardia on telemetry >24 hours. okay to discharge and follow up with him tomorrow for INR check and will set up with monitor as outpatient   vitals and labs reviewed   PE - lungs CTA b/l, abd-+bs soft, NT, ND    62F h/o brain aneurysm s/p hemicraniectomy and clipping (2013), seizures post hemicraniectomy (on Keppra), chronic DVTs on coumadin and hypertension admitted for eval of bradycardia. The patient was seen by EP. Her labetalol and clonidine held with improvement in HR running 50-70. Biotel patch to be placed prior to discharge. pt refused ILR. The patient developed nausea and vomiting found to have elevated keppra level but felt by Neurology that this should not cause patient's nausea. Abdominal xray without obstruction. CT head without acute changes. The patient had an EEG that was negative for active seizure. Neurology recommended no change in antiepileptics. Gi consulted felt possible gastroenteritis and recommended nausea medication around the clock prior to meals. nausea and loose stool resolved. patient tolerating diet (with zofran ODT before meals).  with course c/b intractable nausea/vomiting of unclear etiology. INR was supratherapeutic when dosed coumadin 7.5mg x 4 nights. the patient recommended to take coumadin 5mg 2/2 night, and check INR with Dr. Ojeda on 2/3 for further dosing. pt refused biotel. d/w Dr. Ojeda who states she will follow up with him tomorrow and he will set her up with monitor outpatient.     discharge time - 37 minutes  Dr. M. Luke  Medicine Hospitalist  [phone]

## 2023-02-03 ENCOUNTER — NON-APPOINTMENT (OUTPATIENT)
Age: 63
End: 2023-02-03

## 2023-02-03 LAB
ANION GAP SERPL CALC-SCNC: 11 MMOL/L — SIGNIFICANT CHANGE UP (ref 5–17)
BUN SERPL-MCNC: 28 MG/DL — HIGH (ref 7–23)
CALCIUM SERPL-MCNC: 9.7 MG/DL — SIGNIFICANT CHANGE UP (ref 8.4–10.5)
CHLORIDE SERPL-SCNC: 106 MMOL/L — SIGNIFICANT CHANGE UP (ref 96–108)
CO2 SERPL-SCNC: 24 MMOL/L — SIGNIFICANT CHANGE UP (ref 22–31)
CREAT SERPL-MCNC: 1.73 MG/DL — HIGH (ref 0.5–1.3)
EGFR: 33 ML/MIN/1.73M2 — LOW
GLUCOSE SERPL-MCNC: 92 MG/DL — SIGNIFICANT CHANGE UP (ref 70–99)
POTASSIUM SERPL-MCNC: 3.2 MMOL/L — LOW (ref 3.5–5.3)
POTASSIUM SERPL-SCNC: 3.2 MMOL/L — LOW (ref 3.5–5.3)
SODIUM SERPL-SCNC: 141 MMOL/L — SIGNIFICANT CHANGE UP (ref 135–145)

## 2023-02-03 PROCEDURE — 99232 SBSQ HOSP IP/OBS MODERATE 35: CPT

## 2023-02-03 RX ORDER — POTASSIUM CHLORIDE 20 MEQ
40 PACKET (EA) ORAL ONCE
Refills: 0 | Status: COMPLETED | OUTPATIENT
Start: 2023-02-03 | End: 2023-02-03

## 2023-02-03 RX ORDER — SODIUM CHLORIDE 9 MG/ML
1000 INJECTION, SOLUTION INTRAVENOUS
Refills: 0 | Status: DISCONTINUED | OUTPATIENT
Start: 2023-02-03 | End: 2023-02-04

## 2023-02-03 RX ADMIN — ONDANSETRON 4 MILLIGRAM(S): 8 TABLET, FILM COATED ORAL at 05:28

## 2023-02-03 RX ADMIN — AMLODIPINE BESYLATE 10 MILLIGRAM(S): 2.5 TABLET ORAL at 05:28

## 2023-02-03 RX ADMIN — LEVETIRACETAM 1500 MILLIGRAM(S): 250 TABLET, FILM COATED ORAL at 18:45

## 2023-02-03 RX ADMIN — Medication 81 MILLIGRAM(S): at 12:30

## 2023-02-03 RX ADMIN — LEVETIRACETAM 1500 MILLIGRAM(S): 250 TABLET, FILM COATED ORAL at 05:27

## 2023-02-03 RX ADMIN — CHLORHEXIDINE GLUCONATE 1 APPLICATION(S): 213 SOLUTION TOPICAL at 05:28

## 2023-02-03 RX ADMIN — LACOSAMIDE 100 MILLIGRAM(S): 50 TABLET ORAL at 05:32

## 2023-02-03 RX ADMIN — Medication 40 MILLIEQUIVALENT(S): at 09:31

## 2023-02-03 RX ADMIN — ONDANSETRON 4 MILLIGRAM(S): 8 TABLET, FILM COATED ORAL at 21:44

## 2023-02-03 RX ADMIN — OXCARBAZEPINE 300 MILLIGRAM(S): 300 TABLET, FILM COATED ORAL at 18:45

## 2023-02-03 RX ADMIN — ATORVASTATIN CALCIUM 80 MILLIGRAM(S): 80 TABLET, FILM COATED ORAL at 21:44

## 2023-02-03 RX ADMIN — LACOSAMIDE 100 MILLIGRAM(S): 50 TABLET ORAL at 18:45

## 2023-02-03 RX ADMIN — Medication 4 MILLIGRAM(S): at 21:44

## 2023-02-03 RX ADMIN — SODIUM CHLORIDE 75 MILLILITER(S): 9 INJECTION, SOLUTION INTRAVENOUS at 09:31

## 2023-02-03 RX ADMIN — LOSARTAN POTASSIUM 100 MILLIGRAM(S): 100 TABLET, FILM COATED ORAL at 05:28

## 2023-02-03 RX ADMIN — OXCARBAZEPINE 300 MILLIGRAM(S): 300 TABLET, FILM COATED ORAL at 05:27

## 2023-02-03 RX ADMIN — Medication 100 MILLIGRAM(S): at 18:45

## 2023-02-03 RX ADMIN — PANTOPRAZOLE SODIUM 40 MILLIGRAM(S): 20 TABLET, DELAYED RELEASE ORAL at 05:27

## 2023-02-03 RX ADMIN — Medication 100 MILLIGRAM(S): at 05:27

## 2023-02-03 RX ADMIN — WARFARIN SODIUM 5 MILLIGRAM(S): 2.5 TABLET ORAL at 00:13

## 2023-02-03 NOTE — PROGRESS NOTE ADULT - SUBJECTIVE AND OBJECTIVE BOX
DATE OF SERVICE: 02-03-23 @ 22:05    Patient is a 62y old  Female who presents with a chief complaint of bradycardia (03 Feb 2023 14:54)      INTERVAL HISTORY: feels ok    MEDICATIONS:  amLODIPine   Tablet 10 milliGRAM(s) Oral daily  doxazosin 4 milliGRAM(s) Oral at bedtime  hydrochlorothiazide 25 milliGRAM(s) Oral daily  losartan 100 milliGRAM(s) Oral daily        PHYSICAL EXAM:  T(C): 36.3 (02-03-23 @ 20:05), Max: 36.7 (02-03-23 @ 11:15)  HR: 66 (02-03-23 @ 20:05) (55 - 68)  BP: 146/82 (02-03-23 @ 20:05) (114/77 - 146/82)  RR: 18 (02-03-23 @ 20:05) (18 - 18)  SpO2: 99% (02-03-23 @ 20:05) (98% - 99%)  Wt(kg): --  I&O's Summary    02 Feb 2023 07:01  -  03 Feb 2023 07:00  --------------------------------------------------------  IN: 480 mL / OUT: 500 mL / NET: -20 mL    03 Feb 2023 07:01  -  03 Feb 2023 22:05  --------------------------------------------------------  IN: 720 mL / OUT: 0 mL / NET: 720 mL          Appearance: In no distress	  HEENT:    PERRL, EOMI	  Cardiovascular:  S1 S2, No JVD  Respiratory: Lungs clear to auscultation	  Gastrointestinal:  Soft, Non-tender, + BS	  Vascularature:  No edema of LE  Psychiatric: Appropriate affect   Neuro: no acute focal deficits           02-03    141  |  106  |  28<H>  ----------------------------<  92  3.2<L>   |  24  |  1.73<H>    Ca    9.7      03 Feb 2023 07:30  Phos  3.5     02-02  Mg     2.2     02-02          Labs personally reviewed      ASSESSMENT/PLAN: 	    Ms. Denis is a 61 yo female with PMH of CAD s/p PCI in 2003, HTN, brain aneursyms s/p hemicraniotomy and clipping in 2013 c/b epilepsy chronic DVT now on coumadin who presents with symptomatic bradycardia.    Problem/Plan -1  Problem: Bradycardia  - ECG reveals SB; tele shows HR mostly in 30-40s   - TTE unremarkable  - TSH wnl  - HR  improved  60 - 70s bpm  - remain off Clonidine and Labetalol    Problem/Plan -2  Problem: Hypertension  - Patient hypotensive with EMS but on multiple antihypertensive meds  - c/w all home meds with the exception of labetalol and clonidine     Problem/Plan -3  Problem: Coronary Artery Disease  - s/p remote PCI  - Denies CP or SOB  - Echo unremarkable  - c/w ASA 81mg PO daily, rosuvastatin 40mg PO daily    Problem/Plan -4  Problem: hx of DVT  - c/w coumadin and dose to INR 2-3  - Discussed with vascular Dr Powell, c/w coumadin    Problem/Plan -5  Problem: Hx of Brain Aneursyms with intervention and Epilepsy  - Now with increased forgetfulness and lethargy  - Neuro recs appreciated.   - CTH shows no evidence of hydrocephalus, Bifrontal encephalomalacia, Old left PCA infarct, Small right parietal parasagittal infarct  - Keppra dose adjusted based on serum level  - As per daughter, reports increased forgetfulness and repetitiveness  - Appreciate Neuro recs based on completed EEG  - No need for ILR for patient already on AC with coumadin and HR have improved off Clonidine and Labetalol    Problem/Plan -6  Problem: Emesis  - No WBC elevation, no fever  - Not a/w meals  - Denies Diarrhea, epigastic pain  - Lipase wnl  - Abdominal XR wnl  - Appreciate GI consult   - MBS pending    d/w daughter        Delfino ChanmelodieroniDO [facility]  Cardiovascular Medicine  [address], [address]  Office: [phone]  Available via Text/call on Microsoft Teams

## 2023-02-03 NOTE — PROGRESS NOTE ADULT - SUBJECTIVE AND OBJECTIVE BOX
Chief Complaint:  Patient is a 62y old  Female who presents with a chief complaint of bradycardia (02 Feb 2023 14:05)      Date of service 02-03-23 @ 14:06      Interval Events:   Patient seen and examined.   Was discharged yesterday, had episode of vomiting  while waiting for elevator.   Seen today eating soup. Denies abdominal pain,  nausea and vomiting today.     Hospital Medications:  acetaminophen     Tablet .. 650 milliGRAM(s) Oral every 6 hours PRN  aluminum hydroxide/magnesium hydroxide/simethicone Suspension 30 milliLiter(s) Oral every 4 hours PRN  amLODIPine   Tablet 10 milliGRAM(s) Oral daily  aspirin enteric coated 81 milliGRAM(s) Oral daily  atorvastatin 80 milliGRAM(s) Oral at bedtime  chlorhexidine 2% Cloths 1 Application(s) Topical <User Schedule>  doxazosin 4 milliGRAM(s) Oral at bedtime  hydrochlorothiazide 25 milliGRAM(s) Oral daily  influenza   Vaccine 0.5 milliLiter(s) IntraMuscular once  lacosamide 100 milliGRAM(s) Oral two times a day  lactated ringers. 1000 milliLiter(s) IV Continuous <Continuous>  levETIRAcetam 1500 milliGRAM(s) Oral two times a day  losartan 100 milliGRAM(s) Oral daily  melatonin 3 milliGRAM(s) Oral at bedtime PRN  ondansetron   Disintegrating Tablet 4 milliGRAM(s) Oral three times a day  OXcarbazepine 300 milliGRAM(s) Oral two times a day  pantoprazole    Tablet 40 milliGRAM(s) Oral before breakfast  senna 2 Tablet(s) Oral at bedtime  zonisamide 100 milliGRAM(s) Oral two times a day        Review of Systems:  General:  No wt loss, fevers, chills, night sweats, fatigue,   Eyes:  Good vision, no reported pain  ENT:  No sore throat, pain, runny nose, dysphagia  CV:  No pain, palpitations, hypo/hypertension  Resp:  No dyspnea, cough, tachypnea, wheezing  GI:  See HPI  :  No pain, bleeding, incontinence, nocturia  Muscle:  No pain, weakness  Neuro:  No weakness, tingling, memory problems  Psych:  No fatigue, insomnia, mood problems, depression  Endocrine:  No polyuria, polydipsia, cold/heat intolerance  Heme:  No petechiae, ecchymosis, easy bruisability  Integumentary:  No rash, edema    PHYSICAL EXAM:   Vital Signs:  Vital Signs Last 24 Hrs  T(C): 36.7 (03 Feb 2023 11:15), Max: 36.9 (02 Feb 2023 16:45)  T(F): 98 (03 Feb 2023 11:15), Max: 98.5 (02 Feb 2023 16:45)  HR: 55 (03 Feb 2023 11:15) (55 - 87)  BP: 114/77 (03 Feb 2023 11:15) (114/77 - 140/95)  BP(mean): --  RR: 18 (03 Feb 2023 11:15) (18 - 18)  SpO2: 98% (03 Feb 2023 11:15) (97% - 100%)    Parameters below as of 03 Feb 2023 11:15  Patient On (Oxygen Delivery Method): room air      Daily     Daily       PHYSICAL EXAM:     GENERAL:  Appears stated age, well-groomed, well-nourished, no distress  HEENT:  NC/AT,  conjunctivae anicteric, clear and pink,   NECK: supple, trachea midline  CHEST:  Full & symmetric excursion, no increased effort, breath sounds clear  HEART:  Regular rhythm, no JVD  ABDOMEN:  Soft, non-tender, non-distended, normoactive bowel sounds,  no masses , no hepatosplenomegaly  EXTREMITIES:  no cyanosis,clubbing or edema  SKIN:  No rash, erythema, or, ecchymoses, no jaundice  NEURO:  Alert, non-focal, no asterixis  PSYCH: Appropriate affect, oriented to place and time  RECTAL: Deferred      LABS Personally reviewed by me:      02-03    141  |  106  |  28<H>  ----------------------------<  92  3.2<L>   |  24  |  1.73<H>    Ca    9.7      03 Feb 2023 07:30  Phos  3.5     02-02  Mg     2.2     02-02        PT/INR - ( 02 Feb 2023 21:21 )   PT: 44.6 sec;   INR: 3.83 ratio                   Imaging personally reviewed by me:

## 2023-02-03 NOTE — PROGRESS NOTE ADULT - ASSESSMENT
1. Nausea / vomiting, diarrhea  - Unclear etiology, ddx includes gastroenteritis, dysmotility/gastroparesis, medication-related. No evidence of bowel obstruction. LFTs / lipase wnl so doubt hepatobiliary or pancreatic pathology. CTH neg for acute pathology. No correlation with bradycardia or hypertension.   - diarrhea resolved   - episode of vomiting yesterday-- pending gastic emptying study   - zofran pre-meals   - daily PPI     2. Chronic DVTs  - on coumadin, supratherapeutic INR    3. Hx of aneurysm s/p hemicraniectomy and clipping  - on ASA and statin     4. HTN  - per primary team     5. Seizure disorder  - per neurology     6. CAD s/p stent           Attending supervision statement: I have personally seen and examined the patient. I fully participated in the care of this patient. I have made amendments to the documentation where necessary, and agree with the history, physical exam, and plan as outlined by the ACP.    [facility]  Gastroenterology and Hepatology  Office: [phone]

## 2023-02-03 NOTE — PROGRESS NOTE ADULT - SUBJECTIVE AND OBJECTIVE BOX
[facility] Division of Hospital Medicine  Laurita Louis DO  8a-5pm: [phone]  after 5pm call [phone]    Patient is a 62y old  Female who presents with a chief complaint of bradycardia (03 Feb 2023 14:06)        SUBJECTIVE / OVERNIGHT EVENTS: no acute complaints. denies nausea and tolerated breakfast today. pt had vomiting after discharge yesturday - she states she was feeling fine and her  drove her in the wheelchair and when approaching the elevator she started vomiting. she did mention that her  was driving the wheelchair fast. no further vomiting after that.       MEDICATIONS  (STANDING):  amLODIPine   Tablet 10 milliGRAM(s) Oral daily  aspirin enteric coated 81 milliGRAM(s) Oral daily  atorvastatin 80 milliGRAM(s) Oral at bedtime  chlorhexidine 2% Cloths 1 Application(s) Topical <User Schedule>  doxazosin 4 milliGRAM(s) Oral at bedtime  hydrochlorothiazide 25 milliGRAM(s) Oral daily  influenza   Vaccine 0.5 milliLiter(s) IntraMuscular once  lacosamide 100 milliGRAM(s) Oral two times a day  lactated ringers. 1000 milliLiter(s) (75 mL/Hr) IV Continuous <Continuous>  levETIRAcetam 1500 milliGRAM(s) Oral two times a day  losartan 100 milliGRAM(s) Oral daily  ondansetron   Disintegrating Tablet 4 milliGRAM(s) Oral three times a day  OXcarbazepine 300 milliGRAM(s) Oral two times a day  pantoprazole    Tablet 40 milliGRAM(s) Oral before breakfast  senna 2 Tablet(s) Oral at bedtime  zonisamide 100 milliGRAM(s) Oral two times a day    MEDICATIONS  (PRN):  acetaminophen     Tablet .. 650 milliGRAM(s) Oral every 6 hours PRN Temp greater or equal to 38C (100.4F), Mild Pain (1 - 3)  aluminum hydroxide/magnesium hydroxide/simethicone Suspension 30 milliLiter(s) Oral every 4 hours PRN Dyspepsia  melatonin 3 milliGRAM(s) Oral at bedtime PRN Insomnia      Vital Signs Last 24 Hrs  T(C): 36.7 (03 Feb 2023 11:15), Max: 36.9 (02 Feb 2023 16:45)  T(F): 98 (03 Feb 2023 11:15), Max: 98.5 (02 Feb 2023 16:45)  HR: 55 (03 Feb 2023 11:15) (55 - 87)  BP: 114/77 (03 Feb 2023 11:15) (114/77 - 140/95)  BP(mean): --  RR: 18 (03 Feb 2023 11:15) (18 - 18)  SpO2: 98% (03 Feb 2023 11:15) (97% - 100%)    Parameters below as of 03 Feb 2023 11:15  Patient On (Oxygen Delivery Method): room air      CAPILLARY BLOOD GLUCOSE        I&O's Summary    02 Feb 2023 07:01  -  03 Feb 2023 07:00  --------------------------------------------------------  IN: 480 mL / OUT: 500 mL / NET: -20 mL    03 Feb 2023 07:01  -  03 Feb 2023 14:55  --------------------------------------------------------  IN: 480 mL / OUT: 0 mL / NET: 480 mL        PHYSICAL EXAM:  GENERAL: NAD, breathing normal  HEAD:  Atraumatic, Normocephalic  EYES: conjunctiva and sclera clear  NECK: supple, No JVD  CHEST/LUNG: CTA b/l  HEART: S1 S2 RRR  ABDOMEN: +BS Soft, NT/ND, no rebound or guarding, negative nieves's sign  EXTREMITIES:  2+ DP Pulses, No c/c. no LE edema  NEUROLOGY: AAOx3, no facial droop, 5/5 ms b/l UE and LE   SKIN: LLE demarcated skin discoloration - no erythema or warmth    LABS:    02-03    141  |  106  |  28<H>  ----------------------------<  92  3.2<L>   |  24  |  1.73<H>    Ca    9.7      03 Feb 2023 07:30  Phos  3.5     02-02  Mg     2.2     02-02      PT/INR - ( 02 Feb 2023 21:21 )   PT: 44.6 sec;   INR: 3.83 ratio                   RADIOLOGY & ADDITIONAL TESTS:    Imaging Personally Reviewed:  Consultant(s) Notes Reviewed:    Care Discussed with Consultants/Other Providers:

## 2023-02-04 DIAGNOSIS — N17.9 ACUTE KIDNEY FAILURE, UNSPECIFIED: ICD-10-CM

## 2023-02-04 LAB
ANION GAP SERPL CALC-SCNC: 10 MMOL/L — SIGNIFICANT CHANGE UP (ref 5–17)
BUN SERPL-MCNC: 24 MG/DL — HIGH (ref 7–23)
CALCIUM SERPL-MCNC: 9.4 MG/DL — SIGNIFICANT CHANGE UP (ref 8.4–10.5)
CHLORIDE SERPL-SCNC: 108 MMOL/L — SIGNIFICANT CHANGE UP (ref 96–108)
CO2 SERPL-SCNC: 25 MMOL/L — SIGNIFICANT CHANGE UP (ref 22–31)
CREAT SERPL-MCNC: 1.47 MG/DL — HIGH (ref 0.5–1.3)
EGFR: 40 ML/MIN/1.73M2 — LOW
GLUCOSE SERPL-MCNC: 81 MG/DL — SIGNIFICANT CHANGE UP (ref 70–99)
HCT VFR BLD CALC: 31.8 % — LOW (ref 34.5–45)
HGB BLD-MCNC: 10.4 G/DL — LOW (ref 11.5–15.5)
INR BLD: 4.39 RATIO — HIGH (ref 0.88–1.16)
MCHC RBC-ENTMCNC: 28.8 PG — SIGNIFICANT CHANGE UP (ref 27–34)
MCHC RBC-ENTMCNC: 32.7 GM/DL — SIGNIFICANT CHANGE UP (ref 32–36)
MCV RBC AUTO: 88.1 FL — SIGNIFICANT CHANGE UP (ref 80–100)
NRBC # BLD: 0 /100 WBCS — SIGNIFICANT CHANGE UP (ref 0–0)
PLATELET # BLD AUTO: 200 K/UL — SIGNIFICANT CHANGE UP (ref 150–400)
POTASSIUM SERPL-MCNC: 3.5 MMOL/L — SIGNIFICANT CHANGE UP (ref 3.5–5.3)
POTASSIUM SERPL-SCNC: 3.5 MMOL/L — SIGNIFICANT CHANGE UP (ref 3.5–5.3)
PROTHROM AB SERPL-ACNC: 51.2 SEC — HIGH (ref 10.5–13.4)
RBC # BLD: 3.61 M/UL — LOW (ref 3.8–5.2)
RBC # FLD: 15.1 % — HIGH (ref 10.3–14.5)
SODIUM SERPL-SCNC: 143 MMOL/L — SIGNIFICANT CHANGE UP (ref 135–145)
WBC # BLD: 4.18 K/UL — SIGNIFICANT CHANGE UP (ref 3.8–10.5)
WBC # FLD AUTO: 4.18 K/UL — SIGNIFICANT CHANGE UP (ref 3.8–10.5)

## 2023-02-04 PROCEDURE — 99233 SBSQ HOSP IP/OBS HIGH 50: CPT

## 2023-02-04 RX ORDER — POTASSIUM CHLORIDE 20 MEQ
40 PACKET (EA) ORAL ONCE
Refills: 0 | Status: COMPLETED | OUTPATIENT
Start: 2023-02-04 | End: 2023-02-04

## 2023-02-04 RX ORDER — SODIUM CHLORIDE 9 MG/ML
1000 INJECTION, SOLUTION INTRAVENOUS
Refills: 0 | Status: DISCONTINUED | OUTPATIENT
Start: 2023-02-04 | End: 2023-02-08

## 2023-02-04 RX ADMIN — OXCARBAZEPINE 300 MILLIGRAM(S): 300 TABLET, FILM COATED ORAL at 05:40

## 2023-02-04 RX ADMIN — CHLORHEXIDINE GLUCONATE 1 APPLICATION(S): 213 SOLUTION TOPICAL at 05:42

## 2023-02-04 RX ADMIN — Medication 40 MILLIEQUIVALENT(S): at 13:50

## 2023-02-04 RX ADMIN — ONDANSETRON 4 MILLIGRAM(S): 8 TABLET, FILM COATED ORAL at 05:41

## 2023-02-04 RX ADMIN — LACOSAMIDE 100 MILLIGRAM(S): 50 TABLET ORAL at 05:40

## 2023-02-04 RX ADMIN — LEVETIRACETAM 1500 MILLIGRAM(S): 250 TABLET, FILM COATED ORAL at 17:31

## 2023-02-04 RX ADMIN — AMLODIPINE BESYLATE 10 MILLIGRAM(S): 2.5 TABLET ORAL at 05:41

## 2023-02-04 RX ADMIN — LEVETIRACETAM 1500 MILLIGRAM(S): 250 TABLET, FILM COATED ORAL at 05:41

## 2023-02-04 RX ADMIN — SODIUM CHLORIDE 75 MILLILITER(S): 9 INJECTION, SOLUTION INTRAVENOUS at 13:50

## 2023-02-04 RX ADMIN — Medication 81 MILLIGRAM(S): at 13:50

## 2023-02-04 RX ADMIN — Medication 4 MILLIGRAM(S): at 21:24

## 2023-02-04 RX ADMIN — OXCARBAZEPINE 300 MILLIGRAM(S): 300 TABLET, FILM COATED ORAL at 17:32

## 2023-02-04 RX ADMIN — Medication 100 MILLIGRAM(S): at 17:32

## 2023-02-04 RX ADMIN — LACOSAMIDE 100 MILLIGRAM(S): 50 TABLET ORAL at 17:31

## 2023-02-04 RX ADMIN — Medication 100 MILLIGRAM(S): at 05:41

## 2023-02-04 RX ADMIN — PANTOPRAZOLE SODIUM 40 MILLIGRAM(S): 20 TABLET, DELAYED RELEASE ORAL at 05:41

## 2023-02-04 RX ADMIN — LOSARTAN POTASSIUM 100 MILLIGRAM(S): 100 TABLET, FILM COATED ORAL at 05:41

## 2023-02-04 RX ADMIN — ATORVASTATIN CALCIUM 80 MILLIGRAM(S): 80 TABLET, FILM COATED ORAL at 21:24

## 2023-02-04 NOTE — PROGRESS NOTE ADULT - SUBJECTIVE AND OBJECTIVE BOX
DATE OF SERVICE: 02-04-23 @ 14:28    Patient is a 62y old  Female who presents with a chief complaint of bradycardia (04 Feb 2023 11:46)      INTERVAL HISTORY: Feels ok.  at bedside.     REVIEW OF SYSTEMS:  CONSTITUTIONAL: No weakness  EYES/ENT: No visual changes;  No throat pain   NECK: No pain or stiffness  RESPIRATORY: No cough, wheezing; No shortness of breath  CARDIOVASCULAR: No chest pain or palpitations  GASTROINTESTINAL: No abdominal  pain. No nausea, vomiting, or hematemesis  GENITOURINARY: No dysuria, frequency or hematuria  NEUROLOGICAL: No stroke like symptoms  SKIN: No rashes    TELEMETRY Personally reviewed: SR 60-90  	  MEDICATIONS:  amLODIPine   Tablet 10 milliGRAM(s) Oral daily  doxazosin 4 milliGRAM(s) Oral at bedtime  hydrochlorothiazide 25 milliGRAM(s) Oral daily  losartan 100 milliGRAM(s) Oral daily        PHYSICAL EXAM:  T(C): 36.5 (02-04-23 @ 11:28), Max: 36.7 (02-04-23 @ 04:24)  HR: 50 (02-04-23 @ 11:28) (50 - 71)  BP: 144/84 (02-04-23 @ 11:28) (129/69 - 146/82)  RR: 18 (02-04-23 @ 11:28) (18 - 18)  SpO2: 94% (02-04-23 @ 11:28) (94% - 99%)  Wt(kg): --  I&O's Summary    03 Feb 2023 07:01  -  04 Feb 2023 07:00  --------------------------------------------------------  IN: 720 mL / OUT: 0 mL / NET: 720 mL    04 Feb 2023 07:01  -  04 Feb 2023 14:28  --------------------------------------------------------  IN: 460 mL / OUT: 0 mL / NET: 460 mL          Appearance: In no distress	  HEENT:    PERRL, EOMI	  Cardiovascular:  S1 S2, No JVD  Respiratory: Lungs clear to auscultation	  Gastrointestinal:  Soft, Non-tender, + BS	  Vascularature:  No edema of LE  Psychiatric: Appropriate affect   Neuro: no acute focal deficits                               10.4   4.18  )-----------( 200      ( 04 Feb 2023 07:10 )             31.8     02-04    143  |  108  |  24<H>  ----------------------------<  81  3.5   |  25  |  1.47<H>    Ca    9.4      04 Feb 2023 07:12  Phos  3.5     02-02  Mg     2.2     02-02          Labs personally reviewed      ASSESSMENT/PLAN: 	    Ms. Denis is a 63 yo female with PMH of CAD s/p PCI in 2003, HTN, brain aneursyms s/p hemicraniotomy and clipping in 2013 c/b epilepsy chronic DVT now on coumadin who presents with symptomatic bradycardia.    Problem/Plan -1  Problem: Bradycardia  - ECG reveals SB; tele shows HR mostly in 30-40s   - TTE unremarkable  - TSH wnl  - HR  improved  60 - 70s bpm  - remain off Clonidine and Labetalol    Problem/Plan -2  Problem: Hypertension  - Patient hypotensive with EMS but on multiple antihypertensive meds  - c/w all home meds with the exception of labetalol and clonidine     Problem/Plan -3  Problem: Coronary Artery Disease  - s/p remote PCI  - Denies CP or SOB  - Echo unremarkable  - c/w ASA 81mg PO daily, rosuvastatin 40mg PO daily    Problem/Plan -4  Problem: hx of DVT  - c/w coumadin and dose to INR 2-3  - Discussed with vascular Dr Powell, c/w coumadin    Problem/Plan -5  Problem: Hx of Brain Aneursyms with intervention and Epilepsy  - Now with increased forgetfulness and lethargy  - Neuro recs appreciated.   - CTH shows no evidence of hydrocephalus, Bifrontal encephalomalacia, Old left PCA infarct, Small right parietal parasagittal infarct  - Keppra dose adjusted based on serum level  - As per daughter, reports increased forgetfulness and repetitiveness  - No need for ILR for patient already on AC with coumadin and HR have improved off Clonidine and Labetalol  - MRI Brain and 24 hour EEG pending    Problem/Plan -6  Problem: Emesis  - No WBC elevation, no fever  - Not a/w meals  - Denies Diarrhea, epigastic pain  - Lipase wnl  - Abdominal XR wnl  - Appreciate GI consult   - NM gastric emptying study as well as CT A/P pending          RIDDHI Valdes-NP   Delfino Everett DO [facility]  Cardiovascular Medicine  [address], [address]  Available through call or text on [employer] TEAM[employer]  Office: [phone]   DATE OF SERVICE: 02-04-23 @ 14:28    Patient is a 62y old  Female who presents with a chief complaint of bradycardia (04 Feb 2023 11:46)      INTERVAL HISTORY: Feels ok.  at bedside.     REVIEW OF SYSTEMS:  CONSTITUTIONAL: No weakness  EYES/ENT: No visual changes;  No throat pain   NECK: No pain or stiffness  RESPIRATORY: No cough, wheezing; No shortness of breath  CARDIOVASCULAR: No chest pain or palpitations  GASTROINTESTINAL: No abdominal  pain. No nausea, vomiting, or hematemesis  GENITOURINARY: No dysuria, frequency or hematuria  NEUROLOGICAL: No stroke like symptoms  SKIN: No rashes    TELEMETRY Personally reviewed: SR 60-90  	  MEDICATIONS:  amLODIPine   Tablet 10 milliGRAM(s) Oral daily  doxazosin 4 milliGRAM(s) Oral at bedtime  hydrochlorothiazide 25 milliGRAM(s) Oral daily  losartan 100 milliGRAM(s) Oral daily        PHYSICAL EXAM:  T(C): 36.5 (02-04-23 @ 11:28), Max: 36.7 (02-04-23 @ 04:24)  HR: 50 (02-04-23 @ 11:28) (50 - 71)  BP: 144/84 (02-04-23 @ 11:28) (129/69 - 146/82)  RR: 18 (02-04-23 @ 11:28) (18 - 18)  SpO2: 94% (02-04-23 @ 11:28) (94% - 99%)  Wt(kg): --  I&O's Summary    03 Feb 2023 07:01  -  04 Feb 2023 07:00  --------------------------------------------------------  IN: 720 mL / OUT: 0 mL / NET: 720 mL    04 Feb 2023 07:01  -  04 Feb 2023 14:28  --------------------------------------------------------  IN: 460 mL / OUT: 0 mL / NET: 460 mL          Appearance: In no distress	  HEENT:    PERRL, EOMI	  Cardiovascular:  S1 S2, No JVD  Respiratory: Lungs clear to auscultation	  Gastrointestinal:  Soft, Non-tender, + BS	  Vascularature:  No edema of LE  Psychiatric: Appropriate affect   Neuro: no acute focal deficits                               10.4   4.18  )-----------( 200      ( 04 Feb 2023 07:10 )             31.8     02-04    143  |  108  |  24<H>  ----------------------------<  81  3.5   |  25  |  1.47<H>    Ca    9.4      04 Feb 2023 07:12  Phos  3.5     02-02  Mg     2.2     02-02          Labs personally reviewed      ASSESSMENT/PLAN: 	    Ms. Denis is a 63 yo female with PMH of CAD s/p PCI in 2003, HTN, brain aneursyms s/p hemicraniotomy and clipping in 2013 c/b epilepsy chronic DVT now on coumadin who presents with symptomatic bradycardia.    Problem/Plan -1  Problem: Bradycardia  - ECG reveals SB; tele shows HR mostly in 30-40s   - TTE unremarkable  - TSH wnl  - HR  improved  60 - 70s bpm  - remain off Clonidine and Labetalol    Problem/Plan -2  Problem: Hypertension  - Patient hypotensive with EMS but on multiple antihypertensive meds  - c/w all home meds with the exception of labetalol and clonidine     Problem/Plan -3  Problem: Coronary Artery Disease  - s/p remote PCI  - Denies CP or SOB  - Echo unremarkable  - c/w ASA 81mg PO daily, rosuvastatin 40mg PO daily    Problem/Plan -4  Problem: hx of DVT  - c/w coumadin and dose to INR 2-3  - Discussed with vascular Dr Powell, c/w coumadin    Problem/Plan -5  Problem: Hx of Brain Aneursyms with intervention and Epilepsy  - Now with increased forgetfulness and lethargy  - Neuro recs appreciated.   - CTH shows no evidence of hydrocephalus, Bifrontal encephalomalacia, Old left PCA infarct, Small right parietal parasagittal infarct  - Keppra dose adjusted based on serum level  - As per daughter, reports increased forgetfulness and repetitiveness  - No need for ILR for patient already on AC with coumadin and HR have improved off Clonidine and Labetalol  - MRI Brain and 24 hour EEG pending    Problem/Plan -6  Problem: Emesis  - No WBC elevation, no fever  - Not a/w meals  - Denies Diarrhea, epigastic pain  - Lipase wnl  - Abdominal XR wnl  - Appreciate GI consult   - NM gastric emptying study as well as CT A/P pending          RIDDHI Valdes-NP   Delfino Everett DO [facility]  Cardiovascular Medicine  [address], [address]  Available through call or text on [employer] TEAMs  Office: [phone]   Enbrel Counseling:  I discussed with the patient the risks of etanercept including but not limited to myelosuppression, immunosuppression, autoimmune hepatitis, demyelinating diseases, lymphoma, and infections.  The patient understands that monitoring is required including a PPD at baseline and must alert us or the primary physician if symptoms of infection or other concerning signs are noted.

## 2023-02-04 NOTE — PROGRESS NOTE ADULT - ASSESSMENT
1. Nausea / vomiting now improved. Gastric emptying study not yet done. Now that she is feeling well unclear if it is needed to keep her over the weekend. Will d/w patient's daughter.    2. Chronic DVTs  - on coumadin, supratherapeutic INR    3. Hx of aneurysm s/p hemicraniectomy and clipping  - on ASA and statin     4. HTN  - per primary team     5. Seizure disorder  - per neurology     6. CAD s/p stent

## 2023-02-04 NOTE — PROGRESS NOTE ADULT - SUBJECTIVE AND OBJECTIVE BOX
Chief Complaint:  Patient is a 62y old  Female who presents with a chief complaint of bradycardia (03 Feb 2023 22:04)      Date of service 02-04-23 @ 07:38      Interval Events:   no further vomiting    Hospital Medications:  acetaminophen     Tablet .. 650 milliGRAM(s) Oral every 6 hours PRN  aluminum hydroxide/magnesium hydroxide/simethicone Suspension 30 milliLiter(s) Oral every 4 hours PRN  amLODIPine   Tablet 10 milliGRAM(s) Oral daily  aspirin enteric coated 81 milliGRAM(s) Oral daily  atorvastatin 80 milliGRAM(s) Oral at bedtime  chlorhexidine 2% Cloths 1 Application(s) Topical <User Schedule>  doxazosin 4 milliGRAM(s) Oral at bedtime  hydrochlorothiazide 25 milliGRAM(s) Oral daily  influenza   Vaccine 0.5 milliLiter(s) IntraMuscular once  lacosamide 100 milliGRAM(s) Oral two times a day  lactated ringers. 1000 milliLiter(s) IV Continuous <Continuous>  levETIRAcetam 1500 milliGRAM(s) Oral two times a day  losartan 100 milliGRAM(s) Oral daily  melatonin 3 milliGRAM(s) Oral at bedtime PRN  ondansetron   Disintegrating Tablet 4 milliGRAM(s) Oral three times a day  OXcarbazepine 300 milliGRAM(s) Oral two times a day  pantoprazole    Tablet 40 milliGRAM(s) Oral before breakfast  senna 2 Tablet(s) Oral at bedtime  zonisamide 100 milliGRAM(s) Oral two times a day        Review of Systems:  General:  No wt loss, fevers, chills, night sweats, fatigue,   Eyes:  Good vision, no reported pain  ENT:  No sore throat, pain, runny nose, dysphagia  CV:  No pain, palpitations, hypo/hypertension  Resp:  No dyspnea, cough, tachypnea, wheezing  GI:  See HPI  :  No pain, bleeding, incontinence, nocturia  Muscle:  No pain, weakness  Neuro:  No weakness, tingling, memory problems  Psych:  No fatigue, insomnia, mood problems, depression  Endocrine:  No polyuria, polydipsia, cold/heat intolerance  Heme:  No petechiae, ecchymosis, easy bruisability  Integumentary:  No rash, edema    PHYSICAL EXAM:   Vital Signs:  Vital Signs Last 24 Hrs  T(C): 36.7 (04 Feb 2023 04:24), Max: 36.7 (03 Feb 2023 11:15)  T(F): 98 (04 Feb 2023 04:24), Max: 98 (03 Feb 2023 11:15)  HR: 71 (04 Feb 2023 04:24) (55 - 71)  BP: 145/80 (04 Feb 2023 04:24) (114/77 - 146/82)  BP(mean): --  RR: 18 (04 Feb 2023 04:24) (18 - 18)  SpO2: 96% (04 Feb 2023 04:24) (96% - 99%)    Parameters below as of 04 Feb 2023 04:24  Patient On (Oxygen Delivery Method): room air      Daily     Daily       PHYSICAL EXAM:     GENERAL:  Appears stated age, well-groomed, well-nourished, no distress  HEENT:  NC/AT,  conjunctivae anicteric, clear and pink,   NECK: supple, trachea midline  CHEST:  Full & symmetric excursion, no increased effort, breath sounds clear  HEART:  Regular rhythm, no JVD  ABDOMEN:  Soft, non-tender, non-distended, normoactive bowel sounds,  no masses , no hepatosplenomegaly  EXTREMITIES:  no cyanosis,clubbing or edema  SKIN:  No rash, erythema, or, ecchymoses, no jaundice  NEURO:  Alert, non-focal, no asterixis  PSYCH: Appropriate affect, oriented to place and time  RECTAL: Deferred      LABS Personally reviewed by me:      02-03    141  |  106  |  28<H>  ----------------------------<  92  3.2<L>   |  24  |  1.73<H>    Ca    9.7      03 Feb 2023 07:30  Phos  3.5     02-02  Mg     2.2     02-02        PT/INR - ( 02 Feb 2023 21:21 )   PT: 44.6 sec;   INR: 3.83 ratio                   Imaging personally reviewed by me:

## 2023-02-04 NOTE — PROGRESS NOTE ADULT - SUBJECTIVE AND OBJECTIVE BOX
[facility] Division of Hospital Medicine  Larry Breaux MD  Available via MS Teams    SUBJECTIVE / OVERNIGHT EVENTS: No acute events overnight. Patient currently denies any nausea or vomiting. No signs of bleeding noted. Denies any chest pain or SOB. No other concerns or complaints at this time.     Review of Systems:   CONSTITUTIONAL: No fever   EYES: No eye pain, visual disturbances, or discharge  ENMT: No difficulty hearing   RESPIRATORY: No SOB. No cough   CARDIOVASCULAR: No chest pain   GASTROINTESTINAL: No abdominal or epigastric pain. No nausea, vomiting, or hematemesis; No diarrhea   GENITOURINARY: No dysuria   NEUROLOGICAL: No headaches   SKIN: No itching   MUSCULOSKELETAL: No joint pain or swelling; No muscle or back pain  PSYCHIATRIC: No depression or anxiety  HEME/LYMPH: No easy bruising or bleeding gums      MEDICATIONS  (STANDING):  amLODIPine   Tablet 10 milliGRAM(s) Oral daily  aspirin enteric coated 81 milliGRAM(s) Oral daily  atorvastatin 80 milliGRAM(s) Oral at bedtime  chlorhexidine 2% Cloths 1 Application(s) Topical <User Schedule>  doxazosin 4 milliGRAM(s) Oral at bedtime  hydrochlorothiazide 25 milliGRAM(s) Oral daily  influenza   Vaccine 0.5 milliLiter(s) IntraMuscular once  lacosamide 100 milliGRAM(s) Oral two times a day  lactated ringers. 1000 milliLiter(s) (75 mL/Hr) IV Continuous <Continuous>  levETIRAcetam 1500 milliGRAM(s) Oral two times a day  losartan 100 milliGRAM(s) Oral daily  ondansetron   Disintegrating Tablet 4 milliGRAM(s) Oral three times a day  OXcarbazepine 300 milliGRAM(s) Oral two times a day  pantoprazole    Tablet 40 milliGRAM(s) Oral before breakfast  senna 2 Tablet(s) Oral at bedtime  zonisamide 100 milliGRAM(s) Oral two times a day    MEDICATIONS  (PRN):  acetaminophen     Tablet .. 650 milliGRAM(s) Oral every 6 hours PRN Temp greater or equal to 38C (100.4F), Mild Pain (1 - 3)  aluminum hydroxide/magnesium hydroxide/simethicone Suspension 30 milliLiter(s) Oral every 4 hours PRN Dyspepsia  melatonin 3 milliGRAM(s) Oral at bedtime PRN Insomnia      I&O's Summary    03 Feb 2023 07:01  -  04 Feb 2023 07:00  --------------------------------------------------------  IN: 720 mL / OUT: 0 mL / NET: 720 mL    04 Feb 2023 07:01  -  04 Feb 2023 15:42  --------------------------------------------------------  IN: 460 mL / OUT: 0 mL / NET: 460 mL      PHYSICAL EXAM:  Vital Signs Last 24 Hrs  T(C): 36.5 (04 Feb 2023 11:28), Max: 36.7 (04 Feb 2023 04:24)  T(F): 97.7 (04 Feb 2023 11:28), Max: 98 (04 Feb 2023 04:24)  HR: 50 (04 Feb 2023 11:28) (50 - 71)  BP: 144/84 (04 Feb 2023 11:28) (129/69 - 146/82)  RR: 18 (04 Feb 2023 11:28) (18 - 18)  SpO2: 94% (04 Feb 2023 11:28) (94% - 99%)    Parameters below as of 04 Feb 2023 11:28  Patient On (Oxygen Delivery Method): room air      CONSTITUTIONAL: NAD, well-developed   EYES: PERRLA; conjunctiva and sclera clear  ENMT: Moist oral mucosa, no pharyngeal injection or exudates   NECK: Supple   RESPIRATORY: Normal respiratory effort; lungs are clear to auscultation bilaterally  CARDIOVASCULAR: Regular rate and rhythm, normal S1 and S2, no murmur   ABDOMEN: Nontender to palpation, normoactive bowel sounds   MUSCULOSKELETAL: no clubbing or cyanosis of digits; no joint swelling or tenderness to palpation  PSYCH: A+O to person, place, and time; affect appropriate  NEUROLOGY: no gross sensory deficits   SKIN: No rashes     LABS:                        10.4   4.18  )-----------( 200      ( 04 Feb 2023 07:10 )             31.8     02-04    143  |  108  |  24<H>  ----------------------------<  81  3.5   |  25  |  1.47<H>    Ca    9.4      04 Feb 2023 07:12  Phos  3.5     02-02  Mg     2.2     02-02      PT/INR - ( 04 Feb 2023 07:10 )   PT: 51.2 sec;   INR: 4.39 ratio         COVID-19 PCR: NotDetec (28 Jan 2023 10:53)  SARS-CoV-2: Detected (27 Jan 2023 17:14)      RADIOLOGY & ADDITIONAL TESTS:  New Imaging Personally Reviewed Today:  New Electrocardiogram Personally Reviewed Today:  Other Results Reviewed Today:   Prior or Outpatient Records Reviewed Today with Summary:    COORDINATION OF CARE:  Consultant Communication and Details of Discussion (where applicable):

## 2023-02-05 LAB
ANION GAP SERPL CALC-SCNC: 10 MMOL/L — SIGNIFICANT CHANGE UP (ref 5–17)
BUN SERPL-MCNC: 20 MG/DL — SIGNIFICANT CHANGE UP (ref 7–23)
CALCIUM SERPL-MCNC: 9.1 MG/DL — SIGNIFICANT CHANGE UP (ref 8.4–10.5)
CHLORIDE SERPL-SCNC: 109 MMOL/L — HIGH (ref 96–108)
CO2 SERPL-SCNC: 23 MMOL/L — SIGNIFICANT CHANGE UP (ref 22–31)
CREAT SERPL-MCNC: 1.11 MG/DL — SIGNIFICANT CHANGE UP (ref 0.5–1.3)
EGFR: 56 ML/MIN/1.73M2 — LOW
FERRITIN SERPL-MCNC: 156 NG/ML — HIGH (ref 15–150)
GLUCOSE SERPL-MCNC: 83 MG/DL — SIGNIFICANT CHANGE UP (ref 70–99)
HCT VFR BLD CALC: 30 % — LOW (ref 34.5–45)
HGB BLD-MCNC: 9.8 G/DL — LOW (ref 11.5–15.5)
INR BLD: 2.88 RATIO — HIGH (ref 0.88–1.16)
IRON SATN MFR SERPL: 65 UG/DL — SIGNIFICANT CHANGE UP (ref 30–160)
MAGNESIUM SERPL-MCNC: 1.9 MG/DL — SIGNIFICANT CHANGE UP (ref 1.6–2.6)
MCHC RBC-ENTMCNC: 28.8 PG — SIGNIFICANT CHANGE UP (ref 27–34)
MCHC RBC-ENTMCNC: 32.7 GM/DL — SIGNIFICANT CHANGE UP (ref 32–36)
MCV RBC AUTO: 88.2 FL — SIGNIFICANT CHANGE UP (ref 80–100)
NRBC # BLD: 0 /100 WBCS — SIGNIFICANT CHANGE UP (ref 0–0)
PHOSPHATE SERPL-MCNC: 2.4 MG/DL — LOW (ref 2.5–4.5)
PLATELET # BLD AUTO: 203 K/UL — SIGNIFICANT CHANGE UP (ref 150–400)
POTASSIUM SERPL-MCNC: 3.6 MMOL/L — SIGNIFICANT CHANGE UP (ref 3.5–5.3)
POTASSIUM SERPL-SCNC: 3.6 MMOL/L — SIGNIFICANT CHANGE UP (ref 3.5–5.3)
PROTHROM AB SERPL-ACNC: 33.8 SEC — HIGH (ref 10.5–13.4)
RAPID RVP RESULT: SIGNIFICANT CHANGE UP
RBC # BLD: 3.4 M/UL — LOW (ref 3.8–5.2)
RBC # FLD: 14.9 % — HIGH (ref 10.3–14.5)
SARS-COV-2 RNA SPEC QL NAA+PROBE: SIGNIFICANT CHANGE UP
SODIUM SERPL-SCNC: 142 MMOL/L — SIGNIFICANT CHANGE UP (ref 135–145)
WBC # BLD: 3.93 K/UL — SIGNIFICANT CHANGE UP (ref 3.8–10.5)
WBC # FLD AUTO: 3.93 K/UL — SIGNIFICANT CHANGE UP (ref 3.8–10.5)

## 2023-02-05 PROCEDURE — 99233 SBSQ HOSP IP/OBS HIGH 50: CPT

## 2023-02-05 PROCEDURE — 74176 CT ABD & PELVIS W/O CONTRAST: CPT | Mod: 26

## 2023-02-05 PROCEDURE — 70551 MRI BRAIN STEM W/O DYE: CPT | Mod: 26

## 2023-02-05 RX ORDER — POTASSIUM PHOSPHATE, MONOBASIC POTASSIUM PHOSPHATE, DIBASIC 236; 224 MG/ML; MG/ML
15 INJECTION, SOLUTION INTRAVENOUS ONCE
Refills: 0 | Status: COMPLETED | OUTPATIENT
Start: 2023-02-05 | End: 2023-02-05

## 2023-02-05 RX ORDER — WARFARIN SODIUM 2.5 MG/1
3 TABLET ORAL ONCE
Refills: 0 | Status: DISCONTINUED | OUTPATIENT
Start: 2023-02-05 | End: 2023-02-05

## 2023-02-05 RX ADMIN — LOSARTAN POTASSIUM 100 MILLIGRAM(S): 100 TABLET, FILM COATED ORAL at 06:15

## 2023-02-05 RX ADMIN — LEVETIRACETAM 1500 MILLIGRAM(S): 250 TABLET, FILM COATED ORAL at 06:15

## 2023-02-05 RX ADMIN — OXCARBAZEPINE 300 MILLIGRAM(S): 300 TABLET, FILM COATED ORAL at 18:14

## 2023-02-05 RX ADMIN — AMLODIPINE BESYLATE 10 MILLIGRAM(S): 2.5 TABLET ORAL at 06:14

## 2023-02-05 RX ADMIN — OXCARBAZEPINE 300 MILLIGRAM(S): 300 TABLET, FILM COATED ORAL at 06:15

## 2023-02-05 RX ADMIN — SODIUM CHLORIDE 75 MILLILITER(S): 9 INJECTION, SOLUTION INTRAVENOUS at 03:56

## 2023-02-05 RX ADMIN — Medication 4 MILLIGRAM(S): at 22:16

## 2023-02-05 RX ADMIN — LACOSAMIDE 100 MILLIGRAM(S): 50 TABLET ORAL at 18:13

## 2023-02-05 RX ADMIN — POTASSIUM PHOSPHATE, MONOBASIC POTASSIUM PHOSPHATE, DIBASIC 62.5 MILLIMOLE(S): 236; 224 INJECTION, SOLUTION INTRAVENOUS at 15:57

## 2023-02-05 RX ADMIN — LEVETIRACETAM 1500 MILLIGRAM(S): 250 TABLET, FILM COATED ORAL at 18:13

## 2023-02-05 RX ADMIN — Medication 81 MILLIGRAM(S): at 18:13

## 2023-02-05 RX ADMIN — Medication 100 MILLIGRAM(S): at 18:13

## 2023-02-05 RX ADMIN — LACOSAMIDE 100 MILLIGRAM(S): 50 TABLET ORAL at 06:18

## 2023-02-05 RX ADMIN — ONDANSETRON 4 MILLIGRAM(S): 8 TABLET, FILM COATED ORAL at 06:15

## 2023-02-05 RX ADMIN — Medication 100 MILLIGRAM(S): at 06:15

## 2023-02-05 RX ADMIN — CHLORHEXIDINE GLUCONATE 1 APPLICATION(S): 213 SOLUTION TOPICAL at 06:16

## 2023-02-05 RX ADMIN — PANTOPRAZOLE SODIUM 40 MILLIGRAM(S): 20 TABLET, DELAYED RELEASE ORAL at 06:15

## 2023-02-05 RX ADMIN — ATORVASTATIN CALCIUM 80 MILLIGRAM(S): 80 TABLET, FILM COATED ORAL at 22:16

## 2023-02-05 NOTE — PROGRESS NOTE ADULT - SUBJECTIVE AND OBJECTIVE BOX
DATE OF SERVICE: 02-05-23 @ 14:19    Patient is a 62y old  Female who presents with a chief complaint of bradycardia (05 Feb 2023 13:44)      INTERVAL HISTORY: Feels ok.     REVIEW OF SYSTEMS:  CONSTITUTIONAL: No weakness  EYES/ENT: No visual changes;  No throat pain   NECK: No pain or stiffness  RESPIRATORY: No cough, wheezing; No shortness of breath  CARDIOVASCULAR: No chest pain or palpitations  GASTROINTESTINAL: No abdominal  pain. No nausea, vomiting, or hematemesis  GENITOURINARY: No dysuria, frequency or hematuria  NEUROLOGICAL: No stroke like symptoms  SKIN: No rashes    TELEMETRY Personally reviewed: SB/SR 50-70  	  MEDICATIONS:  amLODIPine   Tablet 10 milliGRAM(s) Oral daily  doxazosin 4 milliGRAM(s) Oral at bedtime  hydrochlorothiazide 25 milliGRAM(s) Oral daily  losartan 100 milliGRAM(s) Oral daily        PHYSICAL EXAM:  T(C): 36.6 (02-05-23 @ 11:09), Max: 36.7 (02-04-23 @ 20:34)  HR: 68 (02-05-23 @ 11:09) (53 - 68)  BP: 122/68 (02-05-23 @ 11:09) (122/68 - 147/80)  RR: 18 (02-05-23 @ 11:09) (18 - 18)  SpO2: 98% (02-05-23 @ 11:09) (98% - 98%)  Wt(kg): --  I&O's Summary    04 Feb 2023 07:01  -  05 Feb 2023 07:00  --------------------------------------------------------  IN: 2030 mL / OUT: 0 mL / NET: 2030 mL    05 Feb 2023 07:01  -  05 Feb 2023 14:19  --------------------------------------------------------  IN: 480 mL / OUT: 0 mL / NET: 480 mL          Appearance: In no distress	  HEENT:    PERRL, EOMI	  Cardiovascular:  S1 S2, No JVD  Respiratory: Lungs clear to auscultation	  Gastrointestinal:  Soft, Non-tender, + BS	  Vascularature:  No edema of LE  Psychiatric: Appropriate affect   Neuro: no acute focal deficits                               9.8    3.93  )-----------( 203      ( 05 Feb 2023 07:56 )             30.0     02-05    142  |  109<H>  |  20  ----------------------------<  83  3.6   |  23  |  1.11    Ca    9.1      05 Feb 2023 07:57  Phos  2.4     02-05  Mg     1.9     02-05          Labs personally reviewed        ASSESSMENT/PLAN: 	    Ms. Denis is a 61 yo female with PMH of CAD s/p PCI in 2003, HTN, brain aneursyms s/p hemicraniotomy and clipping in 2013 c/b epilepsy chronic DVT now on coumadin who presents with symptomatic bradycardia.    Problem/Plan -1  Problem: Bradycardia  - ECG reveals SB; tele shows HR mostly in 30-40s   - TTE unremarkable  - TSH wnl  - HR  improved  60 - 70s bpm  - remain off Clonidine and Labetalol  - No need for monitor as OP as HR has been stable while on cont tele    Problem/Plan -2  Problem: Hypertension  - Patient hypotensive with EMS but on multiple antihypertensive meds  - c/w all home meds with the exception of labetalol and clonidine     Problem/Plan -3  Problem: Coronary Artery Disease  - s/p remote PCI  - Denies CP or SOB  - Echo unremarkable  - c/w ASA 81mg PO daily, rosuvastatin 40mg PO daily    Problem/Plan -4  Problem: hx of DVT  - c/w coumadin and dose to INR 2-3  - Discussed with vascular Dr Powell, c/w coumadin    Problem/Plan -5  Problem: Hx of Brain Aneursyms with intervention and Epilepsy  - Now with increased forgetfulness and lethargy  - Neuro recs appreciated.   - CTH shows no evidence of hydrocephalus, Bifrontal encephalomalacia, Old left PCA infarct, Small right parietal parasagittal infarct  - Keppra dose adjusted based on serum level  - As per daughter, reports increased forgetfulness and repetitiveness  - No need for ILR for patient already on AC with coumadin and HR have improved off Clonidine and Labetalol  - MRI Brain and 24 hour EEG pending    Problem/Plan -6  Problem: Emesis  - No WBC elevation, no fever  - Not a/w meals  - Denies Diarrhea, epigastic pain  - Lipase wnl  - Abdominal XR wnl  - Appreciate GI consult   - NM gastric emptying study as well as CT A/P pending        Kaykay Goldstein, RIDDHI-NP   Delfino Everett DO [facility]  Cardiovascular Medicine  [address] [place] [address], [address]  Available through call or text on [employer] TE[employer]  Office: [phone]   DATE OF SERVICE: 02-05-23 @ 14:19    Patient is a 62y old  Female who presents with a chief complaint of bradycardia (05 Feb 2023 13:44)      INTERVAL HISTORY: Feels ok.     REVIEW OF SYSTEMS:  CONSTITUTIONAL: No weakness  EYES/ENT: No visual changes;  No throat pain   NECK: No pain or stiffness  RESPIRATORY: No cough, wheezing; No shortness of breath  CARDIOVASCULAR: No chest pain or palpitations  GASTROINTESTINAL: No abdominal  pain. No nausea, vomiting, or hematemesis  GENITOURINARY: No dysuria, frequency or hematuria  NEUROLOGICAL: No stroke like symptoms  SKIN: No rashes    TELEMETRY Personally reviewed: SB/SR 50-70  	  MEDICATIONS:  amLODIPine   Tablet 10 milliGRAM(s) Oral daily  doxazosin 4 milliGRAM(s) Oral at bedtime  hydrochlorothiazide 25 milliGRAM(s) Oral daily  losartan 100 milliGRAM(s) Oral daily        PHYSICAL EXAM:  T(C): 36.6 (02-05-23 @ 11:09), Max: 36.7 (02-04-23 @ 20:34)  HR: 68 (02-05-23 @ 11:09) (53 - 68)  BP: 122/68 (02-05-23 @ 11:09) (122/68 - 147/80)  RR: 18 (02-05-23 @ 11:09) (18 - 18)  SpO2: 98% (02-05-23 @ 11:09) (98% - 98%)  Wt(kg): --  I&O's Summary    04 Feb 2023 07:01  -  05 Feb 2023 07:00  --------------------------------------------------------  IN: 2030 mL / OUT: 0 mL / NET: 2030 mL    05 Feb 2023 07:01  -  05 Feb 2023 14:19  --------------------------------------------------------  IN: 480 mL / OUT: 0 mL / NET: 480 mL          Appearance: In no distress	  HEENT:    PERRL, EOMI	  Cardiovascular:  S1 S2, No JVD  Respiratory: Lungs clear to auscultation	  Gastrointestinal:  Soft, Non-tender, + BS	  Vascularature:  No edema of LE  Psychiatric: Appropriate affect   Neuro: no acute focal deficits                               9.8    3.93  )-----------( 203      ( 05 Feb 2023 07:56 )             30.0     02-05    142  |  109<H>  |  20  ----------------------------<  83  3.6   |  23  |  1.11    Ca    9.1      05 Feb 2023 07:57  Phos  2.4     02-05  Mg     1.9     02-05          Labs personally reviewed        ASSESSMENT/PLAN: 	    Ms. Denis is a 61 yo female with PMH of CAD s/p PCI in 2003, HTN, brain aneursyms s/p hemicraniotomy and clipping in 2013 c/b epilepsy chronic DVT now on coumadin who presents with symptomatic bradycardia.    Problem/Plan -1  Problem: Bradycardia  - ECG reveals SB; tele shows HR mostly in 30-40s   - TTE unremarkable  - TSH wnl  - HR  improved  60 - 70s bpm  - remain off Clonidine and Labetalol  - No need for monitor as OP as HR has been stable while on cont tele    Problem/Plan -2  Problem: Hypertension  - Patient hypotensive with EMS but on multiple antihypertensive meds  - c/w all home meds with the exception of labetalol and clonidine     Problem/Plan -3  Problem: Coronary Artery Disease  - s/p remote PCI  - Denies CP or SOB  - Echo unremarkable  - c/w ASA 81mg PO daily, rosuvastatin 40mg PO daily    Problem/Plan -4  Problem: hx of DVT  - c/w coumadin and dose to INR 2-3  - Discussed with vascular Dr Powell, c/w coumadin    Problem/Plan -5  Problem: Hx of Brain Aneursyms with intervention and Epilepsy  - Now with increased forgetfulness and lethargy  - Neuro recs appreciated.   - CTH shows no evidence of hydrocephalus, Bifrontal encephalomalacia, Old left PCA infarct, Small right parietal parasagittal infarct  - Keppra dose adjusted based on serum level  - As per daughter, reports increased forgetfulness and repetitiveness  - No need for ILR for patient already on AC with coumadin and HR have improved off Clonidine and Labetalol  - MRI Brain and 24 hour EEG pending    Problem/Plan -6  Problem: Emesis  - No WBC elevation, no fever  - Not a/w meals  - Denies Diarrhea, epigastic pain  - Lipase wnl  - Abdominal XR wnl  - Appreciate GI consult   - NM gastric emptying study as well as CT A/P pending        Kaykay Goldstein, RIDDHI-NP   Delfino Everett DO [facility]  Cardiovascular Medicine  [address] [place] [address], [address]  Available through call or text on [employer] TEAM[employer]  Office: [phone]

## 2023-02-05 NOTE — PROGRESS NOTE ADULT - SUBJECTIVE AND OBJECTIVE BOX
[facility] Division of Hospital Medicine  Larry Breaux MD  Available via MS Teams    SUBJECTIVE / OVERNIGHT EVENTS: No acute events overnight. Patient denies any episodes of nausea or vomiting. Denies any chest pain or SOB. Denies any other concerns or complaints at this time. INR downtrended to 2.88 this morning.     Review of Systems:   CONSTITUTIONAL: No fever   EYES: No eye pain, visual disturbances, or discharge  ENMT: No difficulty hearing   RESPIRATORY: No SOB. No cough   CARDIOVASCULAR: No chest pain   GASTROINTESTINAL: No abdominal or epigastric pain. No nausea, vomiting, or hematemesis; No diarrhea   GENITOURINARY: No dysuria   NEUROLOGICAL: No headache   SKIN: No itching   MUSCULOSKELETAL: No joint pain or swelling; No muscle or back pain  PSYCHIATRIC: No depression or anxiety   HEME/LYMPH: No easy bruising or bleeding gums      MEDICATIONS  (STANDING):  amLODIPine   Tablet 10 milliGRAM(s) Oral daily  aspirin enteric coated 81 milliGRAM(s) Oral daily  atorvastatin 80 milliGRAM(s) Oral at bedtime  chlorhexidine 2% Cloths 1 Application(s) Topical <User Schedule>  doxazosin 4 milliGRAM(s) Oral at bedtime  hydrochlorothiazide 25 milliGRAM(s) Oral daily  influenza   Vaccine 0.5 milliLiter(s) IntraMuscular once  lacosamide 100 milliGRAM(s) Oral two times a day  lactated ringers. 1000 milliLiter(s) (75 mL/Hr) IV Continuous <Continuous>  levETIRAcetam 1500 milliGRAM(s) Oral two times a day  losartan 100 milliGRAM(s) Oral daily  ondansetron   Disintegrating Tablet 4 milliGRAM(s) Oral three times a day  OXcarbazepine 300 milliGRAM(s) Oral two times a day  pantoprazole    Tablet 40 milliGRAM(s) Oral before breakfast  senna 2 Tablet(s) Oral at bedtime  warfarin 3 milliGRAM(s) Oral once  zonisamide 100 milliGRAM(s) Oral two times a day    MEDICATIONS  (PRN):  acetaminophen     Tablet .. 650 milliGRAM(s) Oral every 6 hours PRN Temp greater or equal to 38C (100.4F), Mild Pain (1 - 3)  aluminum hydroxide/magnesium hydroxide/simethicone Suspension 30 milliLiter(s) Oral every 4 hours PRN Dyspepsia  melatonin 3 milliGRAM(s) Oral at bedtime PRN Insomnia      I&O's Summary    04 Feb 2023 07:01  -  05 Feb 2023 07:00  --------------------------------------------------------  IN: 2030 mL / OUT: 0 mL / NET: 2030 mL    05 Feb 2023 07:01  -  05 Feb 2023 13:01  --------------------------------------------------------  IN: 240 mL / OUT: 0 mL / NET: 240 mL      PHYSICAL EXAM:  Vital Signs Last 24 Hrs  T(C): 36.6 (05 Feb 2023 11:09), Max: 36.7 (04 Feb 2023 20:34)  T(F): 97.9 (05 Feb 2023 11:09), Max: 98.1 (04 Feb 2023 20:34)  HR: 68 (05 Feb 2023 11:09) (53 - 68)  BP: 122/68 (05 Feb 2023 11:09) (122/68 - 147/80)  RR: 18 (05 Feb 2023 11:09) (18 - 18)  SpO2: 98% (05 Feb 2023 11:09) (98% - 98%)    Parameters below as of 05 Feb 2023 11:09  Patient On (Oxygen Delivery Method): room air      CONSTITUTIONAL: NAD, well-developed   EYES: PERRLA; conjunctiva and sclera clear  ENMT: Moist oral mucosa, no pharyngeal injection or exudates   NECK: Supple   RESPIRATORY: Normal respiratory effort; lungs are clear to auscultation bilaterally  CARDIOVASCULAR: Regular rate and rhythm, normal S1 and S2, no murmur   ABDOMEN: Nontender to palpation, normoactive bowel sounds   MUSCULOSKELETAL: no clubbing or cyanosis of digits; no joint swelling or tenderness to palpation  PSYCH: A+O to person, place, and time; affect appropriate  NEUROLOGY: no gross sensory deficits   SKIN: No rashes     LABS:                        9.8    3.93  )-----------( 203      ( 05 Feb 2023 07:56 )             30.0     02-05    142  |  109<H>  |  20  ----------------------------<  83  3.6   |  23  |  1.11    Ca    9.1      05 Feb 2023 07:57  Phos  2.4     02-05  Mg     1.9     02-05      PT/INR - ( 05 Feb 2023 07:56 )   PT: 33.8 sec;   INR: 2.88 ratio         COVID-19 PCR: NotDetec (28 Jan 2023 10:53)  SARS-CoV-2: Detected (27 Jan 2023 17:14)      RADIOLOGY & ADDITIONAL TESTS:  New Imaging Personally Reviewed Today:  New Electrocardiogram Personally Reviewed Today:  Other Results Reviewed Today:   Prior or Outpatient Records Reviewed Today with Summary:    COORDINATION OF CARE:  Consultant Communication and Details of Discussion (where applicable):

## 2023-02-05 NOTE — PROGRESS NOTE ADULT - ASSESSMENT
63yo black F with CAD s/p PCI 2003, htn, brain aneurysm s/p hemicraniectomy and clipping (2013), seizures post hemicraniectomy, chronic DVTs on Coumadin p/w bradycardia .   CTH and CTA H/N 5/2022: frontal lobes encephalomalacia, old L occipital infarct, CTA H/N neg, aneusym clipi in [facility]   TTE as above 1/25  + COVID  CTH old L PCA infarct, evidence of cranioplasty. no acute findings   EEG with seizure focus no seiuzres ; R frontal spikes. potential for seizure from R frontal   CTH 1/29 stable. bifrontal crani and encephalomalacia. old L PCA and R parietal infarct   2/5: recalled for n/v, patient states the only episode she has was when she was being discharged. now she is AAOx3, no n/v, feels at her baseline . last saw neuro dr doran 3-4 months ago     Impression:   1) aneurysm s/p clipping and hemicrani  2) epilepsy  3) occipital stroke L and R parietal, ESUS      - repeat EEG and MRI brain , patient upset for repeat studies   - AC for DVT , on coumadin   - c/w home AEDs --> on keppra 1500mg BID, vimpat 100mg BID, Oxcarbazepine 300mg BID, and zonisamide 100mg BID   - for secondary stroke prevention c/w ASA and statin therapy with LDL goal < 70    - cardio following  - GI following   - EP , would benefit from ILR for gema cardiac and to r/o occult AFib as cause of her occipital stroke, parietal  --> refused. consider event monitor   - telemetry  - PT/OT/SS/SLP, OOBC  - check FS, glucose control <180  - GI/DVT ppx  - sees Dr doran outaptient ( she states she saw him about 3 months ago and he has been also adjusting meds in past)    dcp when stable   spoke with ACP, GI, cardio, and PCP   Tristan Braun MD  Vascular Neurology  Office: [phone]

## 2023-02-05 NOTE — PROGRESS NOTE ADULT - SUBJECTIVE AND OBJECTIVE BOX
Chief Complaint:  Patient is a 62y old  Female who presents with a chief complaint of bradycardia (05 Feb 2023 11:31)      Date of service 02-05-23 @ 13:44      Interval Events:   no nausea. eating well.     Hospital Medications:  acetaminophen     Tablet .. 650 milliGRAM(s) Oral every 6 hours PRN  aluminum hydroxide/magnesium hydroxide/simethicone Suspension 30 milliLiter(s) Oral every 4 hours PRN  amLODIPine   Tablet 10 milliGRAM(s) Oral daily  aspirin enteric coated 81 milliGRAM(s) Oral daily  atorvastatin 80 milliGRAM(s) Oral at bedtime  chlorhexidine 2% Cloths 1 Application(s) Topical <User Schedule>  doxazosin 4 milliGRAM(s) Oral at bedtime  hydrochlorothiazide 25 milliGRAM(s) Oral daily  influenza   Vaccine 0.5 milliLiter(s) IntraMuscular once  lacosamide 100 milliGRAM(s) Oral two times a day  lactated ringers. 1000 milliLiter(s) IV Continuous <Continuous>  levETIRAcetam 1500 milliGRAM(s) Oral two times a day  losartan 100 milliGRAM(s) Oral daily  melatonin 3 milliGRAM(s) Oral at bedtime PRN  ondansetron   Disintegrating Tablet 4 milliGRAM(s) Oral three times a day  OXcarbazepine 300 milliGRAM(s) Oral two times a day  pantoprazole    Tablet 40 milliGRAM(s) Oral before breakfast  potassium phosphate IVPB 15 milliMole(s) IV Intermittent once  senna 2 Tablet(s) Oral at bedtime  warfarin 3 milliGRAM(s) Oral once  zonisamide 100 milliGRAM(s) Oral two times a day        Review of Systems:  General:  No wt loss, fevers, chills, night sweats, fatigue,   Eyes:  Good vision, no reported pain  ENT:  No sore throat, pain, runny nose, dysphagia  CV:  No pain, palpitations, hypo/hypertension  Resp:  No dyspnea, cough, tachypnea, wheezing  GI:  See HPI  :  No pain, bleeding, incontinence, nocturia  Muscle:  No pain, weakness  Neuro:  No weakness, tingling, memory problems  Psych:  No fatigue, insomnia, mood problems, depression  Endocrine:  No polyuria, polydipsia, cold/heat intolerance  Heme:  No petechiae, ecchymosis, easy bruisability  Integumentary:  No rash, edema    PHYSICAL EXAM:   Vital Signs:  Vital Signs Last 24 Hrs  T(C): 36.6 (05 Feb 2023 11:09), Max: 36.7 (04 Feb 2023 20:34)  T(F): 97.9 (05 Feb 2023 11:09), Max: 98.1 (04 Feb 2023 20:34)  HR: 68 (05 Feb 2023 11:09) (53 - 68)  BP: 122/68 (05 Feb 2023 11:09) (122/68 - 147/80)  BP(mean): --  RR: 18 (05 Feb 2023 11:09) (18 - 18)  SpO2: 98% (05 Feb 2023 11:09) (98% - 98%)    Parameters below as of 05 Feb 2023 11:09  Patient On (Oxygen Delivery Method): room air      Daily     Daily       PHYSICAL EXAM:     GENERAL:  Appears stated age, well-groomed, well-nourished, no distress  HEENT:  NC/AT,  conjunctivae anicteric, clear and pink,   NECK: supple, trachea midline  CHEST:  Full & symmetric excursion, no increased effort, breath sounds clear  HEART:  Regular rhythm, no JVD  ABDOMEN:  Soft, non-tender, non-distended, normoactive bowel sounds,  no masses , no hepatosplenomegaly  EXTREMITIES:  no cyanosis,clubbing or edema  SKIN:  No rash, erythema, or, ecchymoses, no jaundice  NEURO:  Alert, non-focal, no asterixis  PSYCH: Appropriate affect, oriented to place and time  RECTAL: Deferred      LABS Personally reviewed by me:                        9.8    3.93  )-----------( 203      ( 05 Feb 2023 07:56 )             30.0     Mean Cell Volume: 88.2 fl (02-05-23 @ 07:56)    02-05    142  |  109<H>  |  20  ----------------------------<  83  3.6   |  23  |  1.11    Ca    9.1      05 Feb 2023 07:57  Phos  2.4     02-05  Mg     1.9     02-05        PT/INR - ( 05 Feb 2023 07:56 )   PT: 33.8 sec;   INR: 2.88 ratio                                     9.8    3.93  )-----------( 203      ( 05 Feb 2023 07:56 )             30.0                         10.4   4.18  )-----------( 200      ( 04 Feb 2023 07:10 )             31.8       Imaging personally reviewed by me:

## 2023-02-05 NOTE — PROGRESS NOTE ADULT - ASSESSMENT
1. Nausea / vomiting now improved. Etiology unclear. Pending CT of the abdomen. As well as MRI brain to assess for possible central etiology. Today patient seems more lucid and overall improved. Possibility of safely removing some antiepileptics has been raised as well in case they are considering to central nausea.    2. Chronic DVTs  - on coumadin, supratherapeutic INR    3. Hx of aneurysm s/p hemicraniectomy and clipping  - on ASA and statin     4. Anemia  -iron studies ordered. slight downtrend today in hgb. Likely lab artifact. Can check occult blood stool.     5. Seizure disorder  - per neurology     6. CAD s/p stent

## 2023-02-05 NOTE — CHART NOTE - NSCHARTNOTESELECT_GEN_ALL_CORE
d/c appt/Event Note
Discharge Note
Event Note
Event Note/Event Note
Medicine attending follow up/Event Note
Medicine follow up/Event Note

## 2023-02-05 NOTE — PROGRESS NOTE ADULT - SUBJECTIVE AND OBJECTIVE BOX
Neurology Progress Note    S: Patient seen and examined. states she is at her baseline. AAOx3 no n/v     Medication:  MEDICATIONS  (STANDING):  amLODIPine   Tablet 10 milliGRAM(s) Oral daily  aspirin enteric coated 81 milliGRAM(s) Oral daily  atorvastatin 80 milliGRAM(s) Oral at bedtime  chlorhexidine 2% Cloths 1 Application(s) Topical <User Schedule>  doxazosin 4 milliGRAM(s) Oral at bedtime  hydrochlorothiazide 25 milliGRAM(s) Oral daily  influenza   Vaccine 0.5 milliLiter(s) IntraMuscular once  lacosamide 100 milliGRAM(s) Oral two times a day  lactated ringers. 1000 milliLiter(s) (75 mL/Hr) IV Continuous <Continuous>  levETIRAcetam 1500 milliGRAM(s) Oral two times a day  losartan 100 milliGRAM(s) Oral daily  ondansetron   Disintegrating Tablet 4 milliGRAM(s) Oral three times a day  OXcarbazepine 300 milliGRAM(s) Oral two times a day  pantoprazole    Tablet 40 milliGRAM(s) Oral before breakfast  senna 2 Tablet(s) Oral at bedtime  zonisamide 100 milliGRAM(s) Oral two times a day    MEDICATIONS  (PRN):  acetaminophen     Tablet .. 650 milliGRAM(s) Oral every 6 hours PRN Temp greater or equal to 38C (100.4F), Mild Pain (1 - 3)  aluminum hydroxide/magnesium hydroxide/simethicone Suspension 30 milliLiter(s) Oral every 4 hours PRN Dyspepsia  melatonin 3 milliGRAM(s) Oral at bedtime PRN Insomnia    Vitals:    Vital Signs Last 24 Hrs  T(C): 36.7 (02-05-23 @ 04:40), Max: 36.7 (02-04-23 @ 20:34)  T(F): 98.1 (02-05-23 @ 04:40), Max: 98.1 (02-04-23 @ 20:34)  HR: 53 (02-05-23 @ 04:40) (50 - 66)  BP: 143/91 (02-05-23 @ 04:40) (134/80 - 147/80)  BP(mean): --  RR: 18 (02-04-23 @ 20:34) (18 - 18)  SpO2: 98% (02-04-23 @ 20:34) (94% - 98%)        General Exam:   General Appearance: Appropriately dressed and in no acute distress       Head: Normocephalic, atraumatic and no dysmorphic features  Ear, Nose, and Throat: Moist mucous membranes  CVS: S1S2+  Resp: No SOB, no wheeze or rhonchi  GI: soft NT/ND  Extremities: No edema or cyanosis  Skin: No bruises or rashes     Neurological Exam:  Mental Status: Awake, alert and oriented x 3.  Able to follow simple and complex verbal commands. Able to name and repeat. fluent speech. No obvious aphasia or dysarthria noted.   Cranial Nerves: PERRL, EOMI, VFFC, sensation V1-V3 intact,  no obvious facial asymmetry, equal elevation of palate, scm/trap 5/5, tongue is midline on protrusion. no obvious papilledema on fundoscopic exam. hearing is grossly intact.   Motor: Normal bulk, tone and strength throughout. Fine finger movements were intact and symmetric. no tremors or drift noted.    Sensation: Intact to light touch and pinprick throughout. no right/left confusion. no extinction to tactile on DSS.    Reflexes: 1+ throughout at biceps, brachioradialis, triceps, patellars and ankles bilaterally and equal. No clonus. R toe and L toe were both downgoing.  Coordination: No dysmetria on FNF   Gait: deferred         I personally reviewed the below data/images/labs:      CBC Full  -  ( 05 Feb 2023 07:56 )  WBC Count : 3.93 K/uL  RBC Count : 3.40 M/uL  Hemoglobin : 9.8 g/dL  Hematocrit : 30.0 %  Platelet Count - Automated : 203 K/uL  Mean Cell Volume : 88.2 fl  Mean Cell Hemoglobin : 28.8 pg  Mean Cell Hemoglobin Concentration : 32.7 gm/dL  Auto Neutrophil # : x  Auto Lymphocyte # : x  Auto Monocyte # : x  Auto Eosinophil # : x  Auto Basophil # : x  Auto Neutrophil % : x  Auto Lymphocyte % : x  Auto Monocyte % : x  Auto Eosinophil % : x  Auto Basophil % : x         02-05    x   |  x   |  20  ----------------------------<  83  x    |  23  |  1.11    Ca    9.1      05 Feb 2023 07:57  Phos  2.4     02-05  Mg     1.9     02-05             Patient name: BELLE MENENDEZ  YOB: 1960   Age: 62 (F)   MR#: [id number]  Study Date: 1/25/2023  [facility]  Study quality: Technically good  Referring Physician: Bibiana Merchant MD  Blood Pressure: 111/54 mmHg  Height: 157 cm  Weight: 70 kg  BSA: 1.7 m2  ------------------------------------------------------------------------  PROCEDURE: Transthoracic echocardiogram with 2-D, M-Mode  and complete spectral and color flow Doppler.  INDICATION: Abnormal electrocardiogram (ECG) (EKG) (R94.31)  ------------------------------------------------------------------------  Dimensions:    Normal Values:  LA:     3.9    2.0 - 4.0 cm  Ao:     2.9    2.0 - 3.8 cm  SEPTUM: 1.0    0.6 - 1.2 cm  PWT:    0.8    0.6 - 1.1 cm  LVIDd:  4.7    3.0 - 5.6 cm  LVIDs:  2.6    1.8 - 4.0 cm  Derived variables:  LVMI: 83 g/m2  RWT: 0.34  Fractional short: 45 %  EF (Carreno Rule): 61 %Doppler Peak Velocity (m/sec):  AoV=1.4  ------------------------------------------------------------------------  Observations:  Mitral Valve: Normal mitral valve. Minimal mitral  regurgitation.  Aortic Valve/Aorta: Thickened trileaflet aortic valve. Peak  transaortic valve gradient equals 8 mm Hg. No aortic valve  regurgitation seen. Peak left ventricular outflow tract  gradient equals 5 mm Hg, mean gradient is equal to 2 mm Hg,  LVOT velocity time integral equals 26 cm.  Aortic Root: 2.9 cm.  Ascending Aorta: 3.3 cm.  LVOT diameter: 1.9 cm.  Left Atrium: Normal left atrium.  LA volume index = 25  cc/m2.  Left Ventricle: Normal left ventricular systolic function.  No segmental wall motion abnormalities. Normal left  ventricular internal dimensions and wall thickness. Normal  diastolic function.  Right Heart: Normal right atrium. Normal right ventricular  size and function. Normal tricuspid valve. Mild tricuspid  regurgitation. Pulmonic valve not well visualized, probably  normal. No pulmonic regurgitation.  Pericardium/Pleura: Normal pericardium with no pericardial  effusion.  Hemodynamic: Estimated right atrial pressure is 8 mm Hg.  Estimated right ventricular systolic pressure equals 31 mm  Hg, assuming right atrial pressure equals 8 mm Hg,  consistent with normal pulmonary pressures.  ------------------------------------------------------------------------  Conclusions:  1. Normal left ventricular internal dimensions and wall  thickness.  2. Normal left ventricular systolic function. No segmental  wall motion abnormalities.  3. Normal right ventricular size and function.  4. Estimated pulmonary artery systolic pressure equals 31  mm Hg, assuming right atrial pressure equals 8  mm Hg,  consistent with normal pulmonary pressures.  ------------------------------------------------------------------------  Confirmed on  1/25/2023 - 17:33:29 by PAULA Kimble  ------------------------------------------------------------------------    < end of copied text >    < from: CT Head No Cont (01.29.23 @ 22:44) >    ACC: [id number] EXAM:  CT BRAIN   ORDERED BY: JESSICA MALDONADOO     PROCEDURE DATE:  01/29/2023          INTERPRETATION:  INDICATIONS:  AMS    h/i brain aneurysm    TECHNIQUE:  Serial axial images were obtained from the skull base to the   vertex without intravenous contrast. Sagittal and Coronal reformats were   performed    COMPARISON EXAMINATION: 5/7/2022    FINDINGS:  VENTRICLES AND SULCI:  Right frontal shunt catheter with its tip at the   level of the septum pellucidum. 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 Evidence of right parietal   parasagittal distribution infarct seen on the prior as well. Bifrontal   encephalomalacia with evidence of aneurysm clips in the midline at the   level of the interhemispheric fissure in the distribution of the anterior   cerebral artery. Right basal ganglia lacunar infarct seen on the prior as   well  EXTRA-AXIAL:  No mass or collection is seen.  VISUALIZED SINUSES:  Clear.  VISUALIZED MASTOIDS:  Clear.  CALVARIUM:  Bifrontal craniectomy with cranioplasty  MISCELLANEOUS:  None.    IMPRESSION:  Ventricles are better visualized compared with the prior   though no evidence of hydrocephalus. Bifrontal cranioplasty. 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 Small right parietal parasagittal   infarct    --- End of Report ---     < from: CT Head No Cont (01.29.23 @ 22:44) >    ACC: [id number] EXAM:  CT BRAIN   ORDERED BY: JESSICA HORTON     PROCEDURE DATE:  01/29/2023          INTERPRETATION:  INDICATIONS:  AMS    h/i brain aneurysm    TECHNIQUE:  Serial axial images were obtained from the skull base to the   vertex without intravenous contrast. Sagittal and Coronal reformats were   performed    COMPARISON EXAMINATION: 5/7/2022    FINDINGS:  VENTRICLES AND SULCI:  Right frontal shunt catheter with its tip at the   level of the septum pellucidum. 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 Evidence of right parietal   parasagittal distribution infarct seen on the prior as well. Bifrontal   encephalomalacia with evidence of aneurysm clips in the midline at the   level of the interhemispheric fissure in the distribution of the anterior   cerebral artery. Right basal ganglia lacunar infarct seen on the prior as   well  EXTRA-AXIAL:  No mass or collection is seen.  VISUALIZED SINUSES:  Clear.  VISUALIZED MASTOIDS:  Clear.  CALVARIUM:  Bifrontal craniectomy with cranioplasty  MISCELLANEOUS:  None.    IMPRESSION:  Ventricles are better visualized compared with the prior   though no evidence of hydrocephalus. Bifrontal cranioplasty. 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 Small right parietal parasagittal   infarct    --- End of Report ---            DANYA AGUDELO MD; Attending Radiologist  This document has been electronically signed. Jan 30 2023 10:51AM    < end of copied text >

## 2023-02-06 LAB
ANION GAP SERPL CALC-SCNC: 12 MMOL/L — SIGNIFICANT CHANGE UP (ref 5–17)
APTT BLD: 35.6 SEC — HIGH (ref 27.5–35.5)
BUN SERPL-MCNC: 19 MG/DL — SIGNIFICANT CHANGE UP (ref 7–23)
CALCIUM SERPL-MCNC: 9.2 MG/DL — SIGNIFICANT CHANGE UP (ref 8.4–10.5)
CHLORIDE SERPL-SCNC: 107 MMOL/L — SIGNIFICANT CHANGE UP (ref 96–108)
CO2 SERPL-SCNC: 23 MMOL/L — SIGNIFICANT CHANGE UP (ref 22–31)
CREAT SERPL-MCNC: 1.15 MG/DL — SIGNIFICANT CHANGE UP (ref 0.5–1.3)
EGFR: 54 ML/MIN/1.73M2 — LOW
GLUCOSE SERPL-MCNC: 89 MG/DL — SIGNIFICANT CHANGE UP (ref 70–99)
HCT VFR BLD CALC: 31.2 % — LOW (ref 34.5–45)
HGB BLD-MCNC: 10.2 G/DL — LOW (ref 11.5–15.5)
INR BLD: 1.96 RATIO — HIGH (ref 0.88–1.16)
MAGNESIUM SERPL-MCNC: 2 MG/DL — SIGNIFICANT CHANGE UP (ref 1.6–2.6)
MCHC RBC-ENTMCNC: 28.8 PG — SIGNIFICANT CHANGE UP (ref 27–34)
MCHC RBC-ENTMCNC: 32.7 GM/DL — SIGNIFICANT CHANGE UP (ref 32–36)
MCV RBC AUTO: 88.1 FL — SIGNIFICANT CHANGE UP (ref 80–100)
NRBC # BLD: 0 /100 WBCS — SIGNIFICANT CHANGE UP (ref 0–0)
PHOSPHATE SERPL-MCNC: 3.4 MG/DL — SIGNIFICANT CHANGE UP (ref 2.5–4.5)
PLATELET # BLD AUTO: 215 K/UL — SIGNIFICANT CHANGE UP (ref 150–400)
POTASSIUM SERPL-MCNC: 3.3 MMOL/L — LOW (ref 3.5–5.3)
POTASSIUM SERPL-SCNC: 3.3 MMOL/L — LOW (ref 3.5–5.3)
PROTHROM AB SERPL-ACNC: 22.9 SEC — HIGH (ref 10.5–13.4)
RBC # BLD: 3.54 M/UL — LOW (ref 3.8–5.2)
RBC # FLD: 14.9 % — HIGH (ref 10.3–14.5)
SODIUM SERPL-SCNC: 142 MMOL/L — SIGNIFICANT CHANGE UP (ref 135–145)
WBC # BLD: 4.78 K/UL — SIGNIFICANT CHANGE UP (ref 3.8–10.5)
WBC # FLD AUTO: 4.78 K/UL — SIGNIFICANT CHANGE UP (ref 3.8–10.5)

## 2023-02-06 PROCEDURE — 95816 EEG AWAKE AND DROWSY: CPT | Mod: 26

## 2023-02-06 PROCEDURE — 99233 SBSQ HOSP IP/OBS HIGH 50: CPT

## 2023-02-06 RX ORDER — LIDOCAINE HCL 20 MG/ML
4 VIAL (ML) INJECTION ONCE
Refills: 0 | Status: DISCONTINUED | OUTPATIENT
Start: 2023-02-06 | End: 2023-02-08

## 2023-02-06 RX ORDER — POTASSIUM CHLORIDE 20 MEQ
10 PACKET (EA) ORAL
Refills: 0 | Status: COMPLETED | OUTPATIENT
Start: 2023-02-06 | End: 2023-02-06

## 2023-02-06 RX ORDER — WARFARIN SODIUM 2.5 MG/1
3 TABLET ORAL ONCE
Refills: 0 | Status: DISCONTINUED | OUTPATIENT
Start: 2023-02-06 | End: 2023-02-06

## 2023-02-06 RX ORDER — WARFARIN SODIUM 2.5 MG/1
1 TABLET ORAL
Qty: 0 | Refills: 0 | DISCHARGE

## 2023-02-06 RX ADMIN — OXCARBAZEPINE 300 MILLIGRAM(S): 300 TABLET, FILM COATED ORAL at 06:25

## 2023-02-06 RX ADMIN — PANTOPRAZOLE SODIUM 40 MILLIGRAM(S): 20 TABLET, DELAYED RELEASE ORAL at 06:25

## 2023-02-06 RX ADMIN — LOSARTAN POTASSIUM 100 MILLIGRAM(S): 100 TABLET, FILM COATED ORAL at 06:25

## 2023-02-06 RX ADMIN — LEVETIRACETAM 1500 MILLIGRAM(S): 250 TABLET, FILM COATED ORAL at 06:24

## 2023-02-06 RX ADMIN — LACOSAMIDE 100 MILLIGRAM(S): 50 TABLET ORAL at 06:24

## 2023-02-06 RX ADMIN — OXCARBAZEPINE 300 MILLIGRAM(S): 300 TABLET, FILM COATED ORAL at 19:31

## 2023-02-06 RX ADMIN — Medication 4 MILLIGRAM(S): at 22:09

## 2023-02-06 RX ADMIN — LEVETIRACETAM 1500 MILLIGRAM(S): 250 TABLET, FILM COATED ORAL at 19:31

## 2023-02-06 RX ADMIN — ATORVASTATIN CALCIUM 80 MILLIGRAM(S): 80 TABLET, FILM COATED ORAL at 22:09

## 2023-02-06 RX ADMIN — AMLODIPINE BESYLATE 10 MILLIGRAM(S): 2.5 TABLET ORAL at 06:25

## 2023-02-06 RX ADMIN — LACOSAMIDE 100 MILLIGRAM(S): 50 TABLET ORAL at 19:53

## 2023-02-06 RX ADMIN — Medication 100 MILLIEQUIVALENT(S): at 09:23

## 2023-02-06 RX ADMIN — Medication 81 MILLIGRAM(S): at 12:27

## 2023-02-06 RX ADMIN — Medication 100 MILLIGRAM(S): at 06:24

## 2023-02-06 RX ADMIN — Medication 100 MILLIGRAM(S): at 19:32

## 2023-02-06 RX ADMIN — CHLORHEXIDINE GLUCONATE 1 APPLICATION(S): 213 SOLUTION TOPICAL at 06:25

## 2023-02-06 NOTE — CONSULT NOTE ADULT - ASSESSMENT
Jeimy Denis  62F Hx CAD s/p PCI 2003, bifrontal hemicrani for aneurysm clipping 2013 w/Dr. Ambrosio, adm medicine for bradycardia. Neurology consulted for dizziness, ordered MR from 2/5/2023 w/chronic L occipital stroke/bifrontal encephalomalacia, and new dural thickening vs SDH.  Exam: (baseline per pt) AOx3, PERRL, EOMI, blind in R eye, no facial, no drift, BUE 5/5 LLE 5/5 RLE 4  -CTH to f/u potential collection for stablity  -No neurosurgerical intervention, defer workup to neurology, should follow up with Dr. Ambrosio to compare with historic MR

## 2023-02-06 NOTE — PROVIDER CONTACT NOTE (OTHER) - RECOMMENDATIONS
ACP notified.
Provider made aware PRN zofran
Will continue to monitor.
ACP notified.
ACP notified.
Notify provider
Made provider aware.
Notify ACP -Teresita Ciales to come and assess patient.

## 2023-02-06 NOTE — PROVIDER CONTACT NOTE (OTHER) - REASON
AMS: patient acting differently and having slower/slurred speech
Emesis x2
Pt bradycardic on telemetry monitor
Tele event: Patient converted from sinus bradycardia HR 45-55 to Junctional Rhythm HR 38-45
Patient with episode of nausea and emesis
pt refusing labs
Patient refusal
Patient with episode of emesis

## 2023-02-06 NOTE — PROVIDER CONTACT NOTE (OTHER) - NAME OF MD/NP/PA/DO NOTIFIED:
ACP Ni-gemini
Ajay ACP
Fariba Tan NP
ESTER Avilez
[facility] [place]
ACP Bonnie Abad
ACP Ale Abad
joey Miller

## 2023-02-06 NOTE — PROGRESS NOTE ADULT - SUBJECTIVE AND OBJECTIVE BOX
Chief Complaint:  Patient is a 62y old  Female who presents with a chief complaint of bradycardia (06 Feb 2023 10:39)      Date of service 02-06-23 @ 13:21      Interval Events:   Patient seen and examined.   EEG in progress.   Planned for EGD today.     Hospital Medications:  acetaminophen     Tablet .. 650 milliGRAM(s) Oral every 6 hours PRN  aluminum hydroxide/magnesium hydroxide/simethicone Suspension 30 milliLiter(s) Oral every 4 hours PRN  amLODIPine   Tablet 10 milliGRAM(s) Oral daily  aspirin enteric coated 81 milliGRAM(s) Oral daily  atorvastatin 80 milliGRAM(s) Oral at bedtime  chlorhexidine 2% Cloths 1 Application(s) Topical <User Schedule>  doxazosin 4 milliGRAM(s) Oral at bedtime  hydrochlorothiazide 25 milliGRAM(s) Oral daily  influenza   Vaccine 0.5 milliLiter(s) IntraMuscular once  lacosamide 100 milliGRAM(s) Oral two times a day  lactated ringers. 1000 milliLiter(s) IV Continuous <Continuous>  levETIRAcetam 1500 milliGRAM(s) Oral two times a day  losartan 100 milliGRAM(s) Oral daily  melatonin 3 milliGRAM(s) Oral at bedtime PRN  ondansetron   Disintegrating Tablet 4 milliGRAM(s) Oral three times a day  OXcarbazepine 300 milliGRAM(s) Oral two times a day  pantoprazole    Tablet 40 milliGRAM(s) Oral before breakfast  senna 2 Tablet(s) Oral at bedtime  warfarin 3 milliGRAM(s) Oral once  zonisamide 100 milliGRAM(s) Oral two times a day        Review of Systems:  General:  No wt loss, fevers, chills, night sweats, fatigue,   Eyes:  Good vision, no reported pain  ENT:  No sore throat, pain, runny nose, dysphagia  CV:  No pain, palpitations, hypo/hypertension  Resp:  No dyspnea, cough, tachypnea, wheezing  GI:  See HPI  :  No pain, bleeding, incontinence, nocturia  Muscle:  No pain, weakness  Neuro:  No weakness, tingling, memory problems  Psych:  No fatigue, insomnia, mood problems, depression  Endocrine:  No polyuria, polydipsia, cold/heat intolerance  Heme:  No petechiae, ecchymosis, easy bruisability  Integumentary:  No rash, edema    PHYSICAL EXAM:   Vital Signs:  Vital Signs Last 24 Hrs  T(C): 36.6 (06 Feb 2023 12:44), Max: 36.9 (05 Feb 2023 20:00)  T(F): 97.9 (06 Feb 2023 12:44), Max: 98.4 (05 Feb 2023 20:00)  HR: 75 (06 Feb 2023 12:44) (59 - 77)  BP: 129/83 (06 Feb 2023 12:44) (129/83 - 151/91)  BP(mean): --  RR: 18 (06 Feb 2023 12:44) (18 - 18)  SpO2: 97% (06 Feb 2023 12:44) (97% - 100%)    Parameters below as of 06 Feb 2023 11:07  Patient On (Oxygen Delivery Method): room air      Daily Height in cm: 157.5 (06 Feb 2023 12:44)    Daily       PHYSICAL EXAM:     GENERAL:  Appears stated age, well-groomed, well-nourished, no distress  HEENT:  NC/AT,  conjunctivae anicteric, clear and pink,   NECK: supple, trachea midline  CHEST:  Full & symmetric excursion, no increased effort, breath sounds clear  HEART:  Regular rhythm, no JVD  ABDOMEN:  Soft, non-tender, non-distended, normoactive bowel sounds,  no masses , no hepatosplenomegaly  EXTREMITIES:  no cyanosis,clubbing or edema  SKIN:  No rash, erythema, or, ecchymoses, no jaundice  NEURO:  Alert, non-focal, no asterixis  PSYCH: Appropriate affect, oriented to place and time  RECTAL: Deferred      LABS Personally reviewed by me:                        10.2   4.78  )-----------( 215      ( 06 Feb 2023 06:40 )             31.2     Mean Cell Volume: 88.1 fl (02-06-23 @ 06:40)    02-06    142  |  107  |  19  ----------------------------<  89  3.3<L>   |  23  |  1.15    Ca    9.2      06 Feb 2023 06:40  Phos  3.4     02-06  Mg     2.0     02-06        PT/INR - ( 06 Feb 2023 06:41 )   PT: 22.9 sec;   INR: 1.96 ratio         PTT - ( 06 Feb 2023 06:41 )  PTT:35.6 sec                            10.2   4.78  )-----------( 215      ( 06 Feb 2023 06:40 )             31.2                         9.8    3.93  )-----------( 203      ( 05 Feb 2023 07:56 )             30.0                         10.4   4.18  )-----------( 200      ( 04 Feb 2023 07:10 )             31.8       Imaging personally reviewed by me:

## 2023-02-06 NOTE — CONSULT NOTE ADULT - CONSULT REASON
focal meningeal thickening v SDH
symptomatic bradycardia
Bradycardia
seizure hx   aneurysm  stroke
nausea/vomiting

## 2023-02-06 NOTE — PROGRESS NOTE ADULT - SUBJECTIVE AND OBJECTIVE BOX
DATE OF SERVICE: 02-06-23 @ 15:08    Patient is a 62y old  Female who presents with a chief complaint of bradycardia (06 Feb 2023 14:52)      INTERVAL HISTORY: Feels ok.     REVIEW OF SYSTEMS:  CONSTITUTIONAL: No weakness  EYES/ENT: No visual changes;  No throat pain   NECK: No pain or stiffness  RESPIRATORY: No cough, wheezing; No shortness of breath  CARDIOVASCULAR: No chest pain or palpitations  GASTROINTESTINAL: No abdominal  pain. No nausea, vomiting, or hematemesis  GENITOURINARY: No dysuria, frequency or hematuria  NEUROLOGICAL: No stroke like symptoms  SKIN: No rashes    TELEMETRY Personally reviewed: SR   	  MEDICATIONS:  amLODIPine   Tablet 10 milliGRAM(s) Oral daily  doxazosin 4 milliGRAM(s) Oral at bedtime  hydrochlorothiazide 25 milliGRAM(s) Oral daily  losartan 100 milliGRAM(s) Oral daily        PHYSICAL EXAM:  T(C): 36.7 (02-06-23 @ 14:55), Max: 36.9 (02-05-23 @ 20:00)  HR: 74 (02-06-23 @ 14:55) (59 - 77)  BP: 115/78 (02-06-23 @ 14:55) (115/78 - 151/91)  RR: 18 (02-06-23 @ 14:55) (18 - 18)  SpO2: 95% (02-06-23 @ 14:55) (95% - 100%)  Wt(kg): --  I&O's Summary    05 Feb 2023 07:01  -  06 Feb 2023 07:00  --------------------------------------------------------  IN: 700 mL / OUT: 0 mL / NET: 700 mL    06 Feb 2023 07:01  -  06 Feb 2023 15:08  --------------------------------------------------------  IN: 0 mL / OUT: 0 mL / NET: 0 mL      Height (cm): 157.5 (02-06 @ 12:44)  Weight (kg): 70.3 (02-06 @ 12:44)  BMI (kg/m2): 28.3 (02-06 @ 12:44)  BSA (m2): 1.72 (02-06 @ 12:44)    Appearance: In no distress	  HEENT:    PERRL, EOMI	  Cardiovascular:  S1 S2, No JVD  Respiratory: Lungs clear to auscultation	  Gastrointestinal:  Soft, Non-tender, + BS	  Vascularature:  No edema of LE  Psychiatric: Appropriate affect   Neuro: no acute focal deficits                               10.2   4.78  )-----------( 215      ( 06 Feb 2023 06:40 )             31.2     02-06    142  |  107  |  19  ----------------------------<  89  3.3<L>   |  23  |  1.15    Ca    9.2      06 Feb 2023 06:40  Phos  3.4     02-06  Mg     2.0     02-06          Labs personally reviewed      ASSESSMENT/PLAN: 	      Ms. Denis is a 63 yo female with PMH of CAD s/p PCI in 2003, HTN, brain aneursyms s/p hemicraniotomy and clipping in 2013 c/b epilepsy chronic DVT now on coumadin who presents with symptomatic bradycardia.    Problem/Plan -1  Problem: Bradycardia  - ECG reveals SB; tele shows HR mostly in 30-40s   - TTE unremarkable  - TSH wnl  - HR  improved  60 - 70s bpm  - remain off Clonidine and Labetalol  - No need for monitor as OP as HR has been stable while on cont tele    Problem/Plan -2  Problem: Hypertension  - Patient hypotensive with EMS but on multiple antihypertensive meds  - c/w all home meds with the exception of labetalol and clonidine     Problem/Plan -3  Problem: Coronary Artery Disease  - s/p remote PCI  - Denies CP or SOB  - Echo unremarkable  - c/w ASA 81mg PO daily, rosuvastatin 40mg PO daily    Problem/Plan -4  Problem: hx of DVT  - c/w coumadin and dose to INR 2-3  - Discussed with vascular Dr Powell, c/w coumadin    Problem/Plan -5  Problem: Hx of Brain Aneursyms with intervention and Epilepsy  - Now with increased forgetfulness and lethargy  - Neuro recs appreciated.   - CTH shows no evidence of hydrocephalus, Bifrontal encephalomalacia, Old left PCA infarct, Small right parietal parasagittal infarct  - Keppra dose adjusted based on serum level  - As per daughter, reports increased forgetfulness and repetitiveness  - No need for ILR for patient already on AC with coumadin and HR have improved off Clonidine and Labetalol  - Appreciate Neuro input on MRI Brain and EEG     Problem/Plan -6  Problem: Emesis  - No WBC elevation, no fever  - Not a/w meals  - Denies Diarrhea, epigastic pain  - Lipase wnl  - Abdominal XR wnl  - Appreciate GI consult   - CT A/P with no acute pathology  - Plan for endoscopy today for drop in Hgb        Kaykay Goldstein, AG-NP   Delfino Everett DO [facility]  Cardiovascular Medicine  [address], [address]  Available through call or text on [employer]  Office: [phone]

## 2023-02-06 NOTE — PROVIDER CONTACT NOTE (OTHER) - DATE AND TIME:
02-Feb-2023 10:34
26-Jan-2023 11:20
27-Jan-2023 10:30
06-Feb-2023 10:31
25-Jan-2023 03:15
27-Jan-2023 19:05
29-Jan-2023 21:20
27-Jan-2023 00:30

## 2023-02-06 NOTE — EEG REPORT - NS EEG TEXT BOX
[facility]   COMPREHENSIVE EPILEPSY CENTER   REPORT OF ROUTINE VIDEO EEG     [facility]: [address] [place] [address] [address], [address], [place] [address], Ph#: [phone]  [facility]: [phone] [place] [address], Ph#: [phone]  [facility]: [address] [place] [address], Ph#: [phone]    Patient Name: BELLE MENENDEZ  Age and : 62y (60)  MRN #: [id number]  Location: [address]   Referring Physician: Laurita Louis    Study Date: 23    _____________________________________________________________  TECHNICAL INFORMATION    Placement and Labeling of Electrodes:  The EEG was performed utilizing 20 channels referential EEG connections (coronal over temporal over parasagittal montage) using all standard 10-20 electrode placements with EKG.  Recording was at a sampling rate of 256 samples per second per channel.  Time synchronized digital video recording was done simultaneously with EEG recording.  A low light infrared camera was used for low light recording.  Hema and seizure detection algorithms were utilized.    _____________________________________________________________  HISTORY    Patient is a 62y old  Female who presents with a chief complaint of bradycardia (2023 10:39)      PERTINENT MEDICATION:  MEDICATIONS  (STANDING):  amLODIPine   Tablet 10 milliGRAM(s) Oral daily  aspirin enteric coated 81 milliGRAM(s) Oral daily  atorvastatin 80 milliGRAM(s) Oral at bedtime  chlorhexidine 2% Cloths 1 Application(s) Topical <User Schedule>  doxazosin 4 milliGRAM(s) Oral at bedtime  hydrochlorothiazide 25 milliGRAM(s) Oral daily  influenza   Vaccine 0.5 milliLiter(s) IntraMuscular once  lacosamide 100 milliGRAM(s) Oral two times a day  lactated ringers. 1000 milliLiter(s) (75 mL/Hr) IV Continuous <Continuous>  levETIRAcetam 1500 milliGRAM(s) Oral two times a day  losartan 100 milliGRAM(s) Oral daily  ondansetron   Disintegrating Tablet 4 milliGRAM(s) Oral three times a day  OXcarbazepine 300 milliGRAM(s) Oral two times a day  pantoprazole    Tablet 40 milliGRAM(s) Oral before breakfast  senna 2 Tablet(s) Oral at bedtime  warfarin 3 milliGRAM(s) Oral once  zonisamide 100 milliGRAM(s) Oral two times a day    _____________________________________________________________  STUDY INTERPRETATION    Findings: The background was continuous and reactive. During wakefulness, the posterior dominant rhythm consisted of symmetric, well-modulated 8 Hz activity, with amplitude to 30 uV, that attenuated to eye opening.    Background Slowing:  No generalized background slowing was present.  Excess diffuse polymorphic delta slowing.  Diffuse theta and polymorphic delta slowing.  Background predominantly consisted of theta, delta and faster activities.    Focal Slowing:   None were present.      Sleep Background:  Drowsiness and stage II sleep transients were not recorded.  Stage II sleep transients were not recorded.    Other Non-Epileptiform Findings:  None were present.    Interictal Epileptiform Activity:   None were present.    Events:  No event or seizure recorded.    Activation Procedures:   Hyperventilation was not performed.    Photic stimulation was not performed.     Artifacts:  Intermittent myogenic and movement artifacts were noted.    ECG:  The heart rate on single channel ECG was predominantly between 70-80 BPM.    _____________________________________________________________  EEG SUMMARY/CLASSIFICATION    Abnormal EEG in the awake, drowsy and asleep states.  - Mild generalized slowing.    _____________________________________________________________  EEG IMPRESSION/CLINICAL CORRELATE    Abnormal EEG study.    Mild nonspecific diffuse or multifocal cerebral dysfunction.   No epileptiform pattern or seizure seen.    _____________________________________________________________    Jaison Saleh MD   Epilepsy Fellow, [facility]	      This is a preliminary read      ------------------------------------  EEG Reading Room: [phone]  On Call Service After Hours: [phone] [facility]   COMPREHENSIVE EPILEPSY CENTER   REPORT OF ROUTINE VIDEO EEG     [facility]: [address] [place] [address] [address], [place] [address], Ph#: [phone]  [facility]: [phone] [place] [address], Ph#: [phone]  [facility]: [address] [place] [address], Ph#: [phone]    Patient Name: BELLE MENENDEZ  Age and : 62y (60)  MRN #: [id number]  Location: [address]   Referring Physician: Laurita Louis    Study Date: 23    _____________________________________________________________  TECHNICAL INFORMATION    Placement and Labeling of Electrodes:  The EEG was performed utilizing 20 channels referential EEG connections (coronal over temporal over parasagittal montage) using all standard 10-20 electrode placements with EKG.  Recording was at a sampling rate of 256 samples per second per channel.  Time synchronized digital video recording was done simultaneously with EEG recording.  A low light infrared camera was used for low light recording.  Hema and seizure detection algorithms were utilized.    _____________________________________________________________  HISTORY    Patient is a 62y old  Female who presents with a chief complaint of bradycardia (2023 10:39)      PERTINENT MEDICATION:  MEDICATIONS  (STANDING):  amLODIPine   Tablet 10 milliGRAM(s) Oral daily  aspirin enteric coated 81 milliGRAM(s) Oral daily  atorvastatin 80 milliGRAM(s) Oral at bedtime  chlorhexidine 2% Cloths 1 Application(s) Topical <User Schedule>  doxazosin 4 milliGRAM(s) Oral at bedtime  hydrochlorothiazide 25 milliGRAM(s) Oral daily  influenza   Vaccine 0.5 milliLiter(s) IntraMuscular once  lacosamide 100 milliGRAM(s) Oral two times a day  lactated ringers. 1000 milliLiter(s) (75 mL/Hr) IV Continuous <Continuous>  levETIRAcetam 1500 milliGRAM(s) Oral two times a day  losartan 100 milliGRAM(s) Oral daily  ondansetron   Disintegrating Tablet 4 milliGRAM(s) Oral three times a day  OXcarbazepine 300 milliGRAM(s) Oral two times a day  pantoprazole    Tablet 40 milliGRAM(s) Oral before breakfast  senna 2 Tablet(s) Oral at bedtime  warfarin 3 milliGRAM(s) Oral once  zonisamide 100 milliGRAM(s) Oral two times a day    _____________________________________________________________  STUDY INTERPRETATION    Findings: The background was continuous and reactive. During wakefulness, the posterior dominant rhythm consisted of symmetric, well-modulated 8 Hz activity, with amplitude to 30 uV, that attenuated to eye opening.    Background Slowing:  Diffuse theta and polymorphic delta slowing.    Focal Slowing:   Intermittent left temporal polymorphic delta slowing      Sleep Background:  Drowsiness and stage II sleep transients were not recorded.  Stage II sleep transients were not recorded.    Other Non-Epileptiform Findings:  None were present.    Interictal Epileptiform Activity:   None were present.    Events:  No event or seizure recorded.    Activation Procedures:   Hyperventilation was not performed.    Photic stimulation was performed did not provoke any abnormalities    Artifacts:  Intermittent myogenic and movement artifacts were noted.    ECG:  The heart rate on single channel ECG was predominantly between 70-90 BPM.    _____________________________________________________________  EEG SUMMARY/CLASSIFICATION    Abnormal EEG in the awake, drowsy and asleep states.  - Mild generalized slowing.  - Intermittent left temporal polymorphic delta slowing  _____________________________________________________________  EEG IMPRESSION/CLINICAL CORRELATE    Abnormal EEG study.    Regional cortical dysfunction in the left temporal area  Mild nonspecific diffuse or multifocal cerebral dysfunction.   No epileptiform pattern or seizure seen.    _____________________________________________________________    Jaison Saleh MD   Epilepsy Fellow, [facility]	      This is a preliminary read      ------------------------------------  EEG Reading Room: [phone]  On Call Service After Hours: [phone] [facility]   COMPREHENSIVE EPILEPSY CENTER   REPORT OF ROUTINE VIDEO EEG     [facility]: [address] [place] [address] , [address], [place] [address], Ph#: [phone]  [facility]: [phone] [place] [address], Ph#: [phone]  [facility]: [address] [place] [address], Ph#: [phone]    Patient Name: BELLE MENENDEZ  Age and : 62y (60)  MRN #: [id number]  Location: [address]   Referring Physician: Laurita Louis    Study Date: 23    _____________________________________________________________  TECHNICAL INFORMATION    Placement and Labeling of Electrodes:  The EEG was performed utilizing 20 channels referential EEG connections (coronal over temporal over parasagittal montage) using all standard 10-20 electrode placements with EKG.  Recording was at a sampling rate of 256 samples per second per channel.  Time synchronized digital video recording was done simultaneously with EEG recording.  A low light infrared camera was used for low light recording.  Hema and seizure detection algorithms were utilized.    _____________________________________________________________  HISTORY    Patient is a 62y old  Female who presents with a chief complaint of bradycardia (2023 10:39)      PERTINENT MEDICATION  MEDICATIONS  (STANDING):  amLODIPine   Tablet 10 milliGRAM(s) Oral daily  aspirin enteric coated 81 milliGRAM(s) Oral daily  atorvastatin 80 milliGRAM(s) Oral at bedtime  chlorhexidine 2% Cloths 1 Application(s) Topical <User Schedule>  doxazosin 4 milliGRAM(s) Oral at bedtime  hydrochlorothiazide 25 milliGRAM(s) Oral daily  influenza   Vaccine 0.5 milliLiter(s) IntraMuscular once  lacosamide 100 milliGRAM(s) Oral two times a day  lactated ringers. 1000 milliLiter(s) (75 mL/Hr) IV Continuous <Continuous>  levETIRAcetam 1500 milliGRAM(s) Oral two times a day  losartan 100 milliGRAM(s) Oral daily  ondansetron   Disintegrating Tablet 4 milliGRAM(s) Oral three times a day  OXcarbazepine 300 milliGRAM(s) Oral two times a day  pantoprazole    Tablet 40 milliGRAM(s) Oral before breakfast  senna 2 Tablet(s) Oral at bedtime  warfarin 3 milliGRAM(s) Oral once  zonisamide 100 milliGRAM(s) Oral two times a day    _____________________________________________________________  STUDY INTERPRETATION    Findings: The background was continuous and reactive. During wakefulness, the posterior dominant rhythm consisted of symmetric, well-modulated 8 Hz activity, with amplitude to 30 uV, that attenuated to eye opening.    Background Slowing:  Diffuse theta and polymorphic delta slowing.    Focal Slowing:   Intermittent left temporal polymorphic delta slowing      Sleep Background:  Drowsiness and stage II sleep transients were not recorded.  Stage II sleep transients were not recorded.    Other Non-Epileptiform Findings:  None were present.    Interictal Epileptiform Activity:   None were present.    Events:  No event or seizure recorded.    Activation Procedures:   Hyperventilation was not performed.    Photic stimulation was performed did not provoke any abnormalities    Artifacts:  Intermittent myogenic and movement artifacts were noted.    ECG:  The heart rate on single channel ECG was predominantly between 70-90 BPM.    _____________________________________________________________  EEG SUMMARY/CLASSIFICATION    Abnormal EEG in the awake, drowsy and asleep states.  - Mild generalized slowing.  - Intermittent left temporal polymorphic delta slowing  _____________________________________________________________  EEG IMPRESSION/CLINICAL CORRELATE    Abnormal EEG study.    Regional cortical dysfunction in the left temporal area  Mild nonspecific diffuse or multifocal cerebral dysfunction.   No epileptiform pattern or seizure seen.    _____________________________________________________________    Jaison Saleh MD   Epilepsy Fellow, [facility]	      Jose Guadalupe Bowen MD  EEG/Epilepsy Attending   ------------------------------------  EEG Reading Room: [phone]  On Call Service After Hours: [phone]

## 2023-02-06 NOTE — PROGRESS NOTE ADULT - ASSESSMENT
62F pmh brain aneurysm s/p hemicraniectomy and clipping (2013), seizures post hemicraniectomy (on Keppra), chronic DVTs on coumadin and hypertension admitted for eval of bradycardia, with course c/b intractable nausea/vomiting of unclear etiology.

## 2023-02-06 NOTE — PROVIDER CONTACT NOTE (OTHER) - SITUATION
pt refusing labs
AMS: patient acting differently and having slower/slurred speech
MD ordered potassium riders. Patient only tolerated first dose.
Pt spit up around 11:30 PM and then vomited once an hour later  No signs of bleeding - vomit consisted of chewed up food from dinner
Patient with episode of emesis
Patient with episode of nausea and emesis
Pt bradycardic on telemetry monitor
Tele event: Patient converted from sinus bradycardia HR 45-55 to Junctional Rhythm HR 38-45

## 2023-02-06 NOTE — PROGRESS NOTE ADULT - ASSESSMENT
1. Nausea / vomiting now improved. Etiology unclear. ?caused by antiepileptics   -CT abdomen with no acute pathology  -MRI brain with R frontal ventric. dural thicening vs subdural 2mm in bilateral cerebral and cerebellar convexities-- neuro following    -planned for EGD today 2/6     2. Chronic DVTs  - on coumadin, supratherapeutic INR    3. Hx of aneurysm s/p hemicraniectomy and clipping  - on ASA and statin     4. Anemia  -slight downtrend in hgb yesterday, now trending up  -no deficiencies on iron panel   -if downtrends again can check fobt     5. Seizure disorder  - per neurology     6. CAD s/p stent         Attending supervision statement: I have personally seen and examined the patient. I fully participated in the care of this patient. I have made amendments to the documentation where necessary, and agree with the history, physical exam, and plan as outlined by the ACP.

## 2023-02-06 NOTE — PROVIDER CONTACT NOTE (OTHER) - BACKGROUND
DX: Sinus bradycardia, HTN PMH: Stroke DVT, Aneurysm, seizure, epilepsy
61 yo female admitted for bradycardia
Dx: bradycardia  PMH: seizure, HTN, CAD, stroke, DVT, aneurysm, HLD
dx: bradycardia  hx: seizure HTN stroke epilepsy
admitting diagnosis bradycardia
63 Yo female admitted for Bradycardia, AMS, N/V, HTN

## 2023-02-06 NOTE — PROVIDER CONTACT NOTE (OTHER) - ACTION/TREATMENT ORDERED:
ACP notified.
provider aware.
ACP notified.
Will continue to monitor.
ACP notified.
Notified ACP -Teresita Oxford and assessed patient at bedside. CT Head ordered. Provider and nurse went with patient to [facility]. Will continue to monitor. Patient safety maintained.
Will continue to monitor
provider made aware. Will continue to monitor

## 2023-02-06 NOTE — PROGRESS NOTE ADULT - NSPROGADDITIONALINFOA_GEN_ALL_CORE
MR brain ---> bilateral cerebral and cerebellar  convexities measuring 2 mm suggesting dural thickening versus subdural   collections.--> Further recs per Neurology and Neurosurgery.

## 2023-02-06 NOTE — PRE PROCEDURE NOTE - PRE PROCEDURE EVALUATION
Attending Physician:                        rubi    Procedure: egd    Indication for Procedure:anemia  ________________________________________________________  PAST MEDICAL & SURGICAL HISTORY:  HTN (hypertension)      Epilepsy      Coronary artery disease      Brain aneurysm      Hyperlipidemia      Coronary artery disease      Deep vein thrombosis (DVT)      Seizure      Hypertension      Stroke      Aneurysm      Cholecystostomy care      Aneurysm  see HPI  s/p cerebral aneurysm clipping in Dec 2013, after which pt was in 30 day induced coma, had  shunt,  tracheostomy and reversal, followed by rehab      S/P  shunt      History of cranioplasty  B/L 04/14      H/O craniotomy      Presence of IVC filter      S/P primary angioplasty with coronary stent      H/O cerebral aneurysm repair  clipping        ALLERGIES:  [This allergen will not trigger allergy alert] IV DYE, IODINE CONTRAST (Unknown)  [This allergen will not trigger allergy alert] Sulfa (Sulfonamide Antibiotics) (Hives)  Clindamycin Phosphate (Rash)  doxycycline (Rash)  latex (Unknown)  penicillin (Hives)  sulfa drugs (Other)    HOME MEDICATIONS:  acetaminophen 325 mg oral tablet: 2 tab(s) orally every 6 hours, As needed, Temp greater or equal to 38C (100.4F), Mild Pain (1 - 3)  amLODIPine 10 mg oral tablet: 2 tab(s) orally once a day  aspirin 81 mg oral delayed release tablet: 1 tab(s) orally once a day  doxazosin 4 mg oral tablet: 1 tab(s) orally once a day (at bedtime)  hydroCHLOROthiazide 25 mg oral tablet: 1 tab(s) orally once a day  lacosamide 100 mg oral tablet: 1 tab(s) orally 2 times a day  levETIRAcetam 750 mg oral tablet: 2 tab(s) orally 2 times a day  melatonin 3 mg oral tablet: 1 tab(s) orally once a day (at bedtime), As needed, Insomnia  olmesartan 40 mg oral tablet: 1 tab(s) orally once a day  rosuvastatin 40 mg oral tablet: 1 tab(s) orally once a day  senna leaf extract oral tablet: 2 tab(s) orally once a day (at bedtime)  zonisamide 50 mg oral capsule: 2 cap(s) orally 2 times a day    AICD/PPM: [ ] yes   [ ] no    PERTINENT LAB DATA:                        10.2   4.78  )-----------( 215      ( 06 Feb 2023 06:40 )             31.2     02-06    142  |  107  |  19  ----------------------------<  89  3.3<L>   |  23  |  1.15    Ca    9.2      06 Feb 2023 06:40  Phos  3.4     02-06  Mg     2.0     02-06      PT/INR - ( 06 Feb 2023 06:41 )   PT: 22.9 sec;   INR: 1.96 ratio         PTT - ( 06 Feb 2023 06:41 )  PTT:35.6 sec            PHYSICAL EXAMINATION:    Height (cm): 157.5  Weight (kg): 70.3  BMI (kg/m2): 28.3  BSA (m2): 1.72T(C): 36.2  HR: 61  BP: 141/72  RR: 18  SpO2: 99%    Constitutional: NAD  HEENT: PERRLA, EOMI,    Neck:  No JVD  Respiratory: CTAB/L  Cardiovascular: S1 and S2  Gastrointestinal: BS+, soft, NT/ND  Extremities: No peripheral edema  Neurological: A/O x 3, no focal deficits  Psychiatric: Normal mood, normal affect  Skin: No rashes    ASA Class: I [ ]  II [ ]  III [x ]  IV [ ]    COMMENTS:    The patient is a suitable candidate for the planned procedure unless box checked [ ]  No, explain:

## 2023-02-06 NOTE — PROGRESS NOTE ADULT - ASSESSMENT
61yo black F with CAD s/p PCI 2003, htn, brain aneurysm s/p hemicraniectomy and clipping (2013), seizures post hemicraniectomy, chronic DVTs on Coumadin p/w bradycardia .   CTH and CTA H/N 5/2022: frontal lobes encephalomalacia, old L occipital infarct, CTA H/N neg, aneusym clipi in MANUEL   TTE as above 1/25  + COVID  CTH old L PCA infarct, evidence of cranioplasty. no acute findings   EEG with seizure focus no seiuzres ; R frontal spikes. potential for seizure from R frontal   CTH 1/29 stable. bifrontal crani and encephalomalacia. old L PCA and R parietal infarct   2/5: recalled for n/v, patient states the only episode she has was when she was being discharged. now she is AAOx3, no n/v, feels at her baseline . last saw neuro dr doran 3-4 months ago   MRI brain with R frontal ventric.  dural thicening vs subdural 2mm in bilateral cerebral and cerebellar convexities     Impression:   1) aneurysm s/p clipping and hemicrani  2) epilepsy  3) occipital stroke L and R parietal, ESUS     - neurosx for MRI findings   - planning EGD today with GI    - repeat EEG pending   - AC for DVT , on coumadin   - c/w home AEDs --> on keppra 1500mg BID, vimpat 100mg BID, Oxcarbazepine 300mg BID, and zonisamide 100mg BID   - for secondary stroke prevention c/w ASA and statin therapy with LDL goal < 70    - cardio following  - GI following   - EP , would benefit from ILR for gema cardiac and to r/o occult AFib as cause of her occipital stroke, parietal  --> refused. consider event monitor   - telemetry  - PT/OT/SS/SLP, OOBC  - check FS, glucose control <180  - GI/DVT ppx  - sees Dr doran outaptient ( she states she saw him about 3 months ago and he has been also adjusting meds in past)    dcp when stable   spoke with ACP, GI, cardio, and PCP   Tristan Braun MD  Vascular Neurology  Office: [phone]

## 2023-02-06 NOTE — PROGRESS NOTE ADULT - SUBJECTIVE AND OBJECTIVE BOX
Neurology Progress Note    S: Patient seen and examined. states she is at her baseline. AAOx3 no n/v ; endoscopy today ; c/o that her IV burns her     Medication:  MEDICATIONS  (STANDING):  amLODIPine   Tablet 10 milliGRAM(s) Oral daily  aspirin enteric coated 81 milliGRAM(s) Oral daily  atorvastatin 80 milliGRAM(s) Oral at bedtime  chlorhexidine 2% Cloths 1 Application(s) Topical <User Schedule>  doxazosin 4 milliGRAM(s) Oral at bedtime  hydrochlorothiazide 25 milliGRAM(s) Oral daily  influenza   Vaccine 0.5 milliLiter(s) IntraMuscular once  lacosamide 100 milliGRAM(s) Oral two times a day  lactated ringers. 1000 milliLiter(s) (75 mL/Hr) IV Continuous <Continuous>  levETIRAcetam 1500 milliGRAM(s) Oral two times a day  losartan 100 milliGRAM(s) Oral daily  ondansetron   Disintegrating Tablet 4 milliGRAM(s) Oral three times a day  OXcarbazepine 300 milliGRAM(s) Oral two times a day  pantoprazole    Tablet 40 milliGRAM(s) Oral before breakfast  senna 2 Tablet(s) Oral at bedtime  zonisamide 100 milliGRAM(s) Oral two times a day    MEDICATIONS  (PRN):  acetaminophen     Tablet .. 650 milliGRAM(s) Oral every 6 hours PRN Temp greater or equal to 38C (100.4F), Mild Pain (1 - 3)  aluminum hydroxide/magnesium hydroxide/simethicone Suspension 30 milliLiter(s) Oral every 4 hours PRN Dyspepsia  melatonin 3 milliGRAM(s) Oral at bedtime PRN Insomnia      Vitals:    Vital Signs Last 24 Hrs  T(C): 36.8 (02-06-23 @ 11:07), Max: 36.9 (02-05-23 @ 20:00)  T(F): 98.3 (02-06-23 @ 11:07), Max: 98.4 (02-05-23 @ 20:00)  HR: 72 (02-06-23 @ 11:07) (59 - 77)  BP: 136/87 (02-06-23 @ 11:07) (133/79 - 151/91)  BP(mean): --  RR: 18 (02-06-23 @ 11:07) (18 - 18)  SpO2: 98% (02-06-23 @ 11:07) (98% - 100%)      General Exam:   General Appearance: Appropriately dressed and in no acute distress       Head: Normocephalic, atraumatic and no dysmorphic features  Ear, Nose, and Throat: Moist mucous membranes  CVS: S1S2+  Resp: No SOB, no wheeze or rhonchi  GI: soft NT/ND  Extremities: No edema or cyanosis  Skin: No bruises or rashes     Neurological Exam:  Mental Status: Awake, alert and oriented x 3.  Able to follow simple and complex verbal commands. Able to name and repeat. fluent speech. No obvious aphasia or dysarthria noted.   Cranial Nerves: PERRL, EOMI, VFFC, sensation V1-V3 intact,  no obvious facial asymmetry, equal elevation of palate, scm/trap 5/5, tongue is midline on protrusion. no obvious papilledema on fundoscopic exam. hearing is grossly intact.   Motor: Normal bulk, tone and strength throughout. Fine finger movements were intact and symmetric. no tremors or drift noted.    Sensation: Intact to light touch and pinprick throughout. no right/left confusion. no extinction to tactile on DSS.    Reflexes: 1+ throughout at biceps, brachioradialis, triceps, patellars and ankles bilaterally and equal. No clonus. R toe and L toe were both downgoing.  Coordination: No dysmetria on FNF   Gait: deferred         I personally reviewed the below data/images/labs:    CBC Full  -  ( 06 Feb 2023 06:40 )  WBC Count : 4.78 K/uL  RBC Count : 3.54 M/uL  Hemoglobin : 10.2 g/dL  Hematocrit : 31.2 %  Platelet Count - Automated : 215 K/uL  Mean Cell Volume : 88.1 fl  Mean Cell Hemoglobin : 28.8 pg  Mean Cell Hemoglobin Concentration : 32.7 gm/dL  Auto Neutrophil # : x  Auto Lymphocyte # : x  Auto Monocyte # : x  Auto Eosinophil # : x  Auto Basophil # : x  Auto Neutrophil % : x  Auto Lymphocyte % : x  Auto Monocyte % : x  Auto Eosinophil % : x  Auto Basophil % : x      02-06    142  |  107  |  19  ----------------------------<  89  3.3<L>   |  23  |  1.15    Ca    9.2      06 Feb 2023 06:40  Phos  3.4     02-06  Mg     2.0     02-06             Patient name: BELLE MENENDEZ  YOB: 1960   Age: 62 (F)   MR#: [id number]  Study Date: 1/25/2023  [facility]  Study quality: Technically good  Referring Physician: Bibiana Merchant MD  Blood Pressure: 111/54 mmHg  Height: 157 cm  Weight: 70 kg  BSA: 1.7 m2  ------------------------------------------------------------------------  PROCEDURE: Transthoracic echocardiogram with 2-D, M-Mode  and complete spectral and color flow Doppler.  INDICATION: Abnormal electrocardiogram (ECG) (EKG) (R94.31)  ------------------------------------------------------------------------  Dimensions:    Normal Values:  LA:     3.9    2.0 - 4.0 cm  Ao:     2.9    2.0 - 3.8 cm  SEPTUM: 1.0    0.6 - 1.2 cm  PWT:    0.8    0.6 - 1.1 cm  LVIDd:  4.7    3.0 - 5.6 cm  LVIDs:  2.6    1.8 - 4.0 cm  Derived variables:  LVMI: 83 g/m2  RWT: 0.34  Fractional short: 45 %  EF (Carreno Rule): 61 %Doppler Peak Velocity (m/sec):  AoV=1.4  ------------------------------------------------------------------------  Observations:  Mitral Valve: Normal mitral valve. Minimal mitral  regurgitation.  Aortic Valve/Aorta: Thickened trileaflet aortic valve. Peak  transaortic valve gradient equals 8 mm Hg. No aortic valve  regurgitation seen. Peak left ventricular outflow tract  gradient equals 5 mm Hg, mean gradient is equal to 2 mm Hg,  LVOT velocity time integral equals 26 cm.  Aortic Root: 2.9 cm.  Ascending Aorta: 3.3 cm.  LVOT diameter: 1.9 cm.  Left Atrium: Normal left atrium.  LA volume index = 25  cc/m2.  Left Ventricle: Normal left ventricular systolic function.  No segmental wall motion abnormalities. Normal left  ventricular internal dimensions and wall thickness. Normal  diastolic function.  Right Heart: Normal right atrium. Normal right ventricular  size and function. Normal tricuspid valve. Mild tricuspid  regurgitation. Pulmonic valve not well visualized, probably  normal. No pulmonic regurgitation.  Pericardium/Pleura: Normal pericardium with no pericardial  effusion.  Hemodynamic: Estimated right atrial pressure is 8 mm Hg.  Estimated right ventricular systolic pressure equals 31 mm  Hg, assuming right atrial pressure equals 8 mm Hg,  consistent with normal pulmonary pressures.  ------------------------------------------------------------------------  Conclusions:  1. Normal left ventricular internal dimensions and wall  thickness.  2. Normal left ventricular systolic function. No segmental  wall motion abnormalities.  3. Normal right ventricular size and function.  4. Estimated pulmonary artery systolic pressure equals 31  mm Hg, assuming right atrial pressure equals 8  mm Hg,  consistent with normal pulmonary pressures.  ------------------------------------------------------------------------  Confirmed on  1/25/2023 - 17:33:29 by PAULA Kimble  ------------------------------------------------------------------------    < end of copied text >    < from: CT Head No Cont (01.29.23 @ 22:44) >    ACC: [id number] EXAM:  CT BRAIN   ORDERED BY: JESSICA JUDGE CINO     PROCEDURE DATE:  01/29/2023          INTERPRETATION:  INDICATIONS:  AMS    h/i brain aneurysm    TECHNIQUE:  Serial axial images were obtained from the skull base to the   vertex without intravenous contrast. Sagittal and Coronal reformats were   performed    COMPARISON EXAMINATION: 5/7/2022    FINDINGS:  VENTRICLES AND SULCI:  Right frontal shunt catheter with its tip at the   level of the septum pellucidum. 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 Evidence of right parietal   parasagittal distribution infarct seen on the prior as well. Bifrontal   encephalomalacia with evidence of aneurysm clips in the midline at the   level of the interhemispheric fissure in the distribution of the anterior   cerebral artery. Right basal ganglia lacunar infarct seen on the prior as   well  EXTRA-AXIAL:  No mass or collection is seen.  VISUALIZED SINUSES:  Clear.  VISUALIZED MASTOIDS:  Clear.  CALVARIUM:  Bifrontal craniectomy with cranioplasty  MISCELLANEOUS:  None.    IMPRESSION:  Ventricles are better visualized compared with the prior   though no evidence of hydrocephalus. Bifrontal cranioplasty. 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 Small right parietal parasagittal   infarct    --- End of Report ---     < from: CT Head No Cont (01.29.23 @ 22:44) >    ACC: [id number] EXAM:  CT BRAIN   ORDERED BY: JESSICA JUDGE CINO     PROCEDURE DATE:  01/29/2023          INTERPRETATION:  INDICATIONS:  AMS    h/i brain aneurysm    TECHNIQUE:  Serial axial images were obtained from the skull base to the   vertex without intravenous contrast. Sagittal and Coronal reformats were   performed    COMPARISON EXAMINATION: 5/7/2022    FINDINGS:  VENTRICLES AND SULCI:  Right frontal shunt catheter with its tip at the   level of the septum pellucidum. 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 Evidence of right parietal   parasagittal distribution infarct seen on the prior as well. Bifrontal   encephalomalacia with evidence of aneurysm clips in the midline at the   level of the interhemispheric fissure in the distribution of the anterior   cerebral artery. Right basal ganglia lacunar infarct seen on the prior as   well  EXTRA-AXIAL:  No mass or collection is seen.  VISUALIZED SINUSES:  Clear.  VISUALIZED MASTOIDS:  Clear.  CALVARIUM:  Bifrontal craniectomy with cranioplasty  MISCELLANEOUS:  None.    IMPRESSION:  Ventricles are better visualized compared with the prior   though no evidence of hydrocephalus. Bifrontal cranioplasty. 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 Small right parietal parasagittal   infarct    --- End of Report ---       DANYA AGUDELO MD; Attending Radiologist  This document has been electronically signed. Jan 30 2023 10:51AM    < end of copied text >  < from: MR Head No Cont (02.05.23 @ 21:25) >    ACC: [id number] EXAM:  MR BRAIN   ORDERED BY: EUSEBIO BRITO     PROCEDURE DATE:  02/05/2023          INTERPRETATION:  CLINICAL INFORMATION: AMS. MMR. Admitting Dxs: R00.1 LOW   HR.    TECHNIQUE: Multiplanar, multisequence brain MRI was performedwithout   intravenous contrast.    COMPARISON: CT head 1/29/2023.    FINDINGS:    Right frontal approach ventriculostomy catheter with tip in the right   frontal horn. No hydrocephalus.    Bilateral frontal craniectomy and cranioplasty. Artifact in the anterior   interhemispheric region from embolic material. Encephalomalacia and   gliosis in the bilateral anterior frontal lobes with chronic hemorrhage.   Chronic left occipital infarct. Chronic lacunar infarcts in the bilateral   basal ganglia, thalami, evette and cerebellum.    T2/FLAIR hyperintensity along the bilateral cerebral and cerebellar   convexities measuring 2 mm suggesting dural thickening versus subdural   collections.    There is no evidence of acute infarct. Scattered foci of T2/FLAIR   hyperintensity in the bilateral hemispheric white matter are nonspecific   but likely related to chronic white matter microvascular ischemic disease.    The paranasal sinuses demonstrate no signal abnormality. The mastoids   demonstrate no signal abnormality.  Bilateral orbits are within normal   limits.      IMPRESSION:  1.  Right frontal approach ventriculostomy catheter with tip in the right   frontal horn. No hydrocephalus.  2.  T2/FLAIR hyperintensity along the bilateral cerebral and cerebellar   convexities measuring 2 mm suggesting dural thickening versus subdural   collections.    --- End of Report ---            JOELLE AGUILERA MD; Attending Radiologist  This document has been electronically signed. Feb 5 2023 10:21PM    < end of copied text >

## 2023-02-06 NOTE — PROGRESS NOTE ADULT - SUBJECTIVE AND OBJECTIVE BOX
Andre Reyes, M.D.  Pager: [phone]  Office: [phone]    Patient is a 62y old  Female who presents with a chief complaint of bradycardia (05 Feb 2023 14:19)          SUBJECTIVE / OVERNIGHT EVENTS:    No acute overnight events.    ROS: ( - ) Fever, ( - )Chills,  ( - )Nausea/Vomiting, ( - ) Cough, ( - )Shortness of breath, ( - )Chest Pain    MEDICATIONS  (STANDING):  amLODIPine   Tablet 10 milliGRAM(s) Oral daily  aspirin enteric coated 81 milliGRAM(s) Oral daily  atorvastatin 80 milliGRAM(s) Oral at bedtime  chlorhexidine 2% Cloths 1 Application(s) Topical <User Schedule>  doxazosin 4 milliGRAM(s) Oral at bedtime  hydrochlorothiazide 25 milliGRAM(s) Oral daily  influenza   Vaccine 0.5 milliLiter(s) IntraMuscular once  lacosamide 100 milliGRAM(s) Oral two times a day  lactated ringers. 1000 milliLiter(s) (75 mL/Hr) IV Continuous <Continuous>  levETIRAcetam 1500 milliGRAM(s) Oral two times a day  losartan 100 milliGRAM(s) Oral daily  ondansetron   Disintegrating Tablet 4 milliGRAM(s) Oral three times a day  OXcarbazepine 300 milliGRAM(s) Oral two times a day  pantoprazole    Tablet 40 milliGRAM(s) Oral before breakfast  potassium chloride  10 mEq/100 mL IVPB 10 milliEquivalent(s) IV Intermittent every 1 hour  senna 2 Tablet(s) Oral at bedtime  zonisamide 100 milliGRAM(s) Oral two times a day    MEDICATIONS  (PRN):  acetaminophen     Tablet .. 650 milliGRAM(s) Oral every 6 hours PRN Temp greater or equal to 38C (100.4F), Mild Pain (1 - 3)  aluminum hydroxide/magnesium hydroxide/simethicone Suspension 30 milliLiter(s) Oral every 4 hours PRN Dyspepsia  melatonin 3 milliGRAM(s) Oral at bedtime PRN Insomnia          T(C): 36.8 (02-06 @ 04:37), Max: 36.9 (02-05 @ 20:00)   HR: 64   BP: 133/79   RR: 18   SpO2: 98%    PHYSICAL EXAM:    CONSTITUTIONAL: NAD, well-developed, well-groomed  EYES: PERRLA; conjunctiva and sclera clear  ENMT: Moist oral mucosa, no pharyngeal injection or exudates; normal dentition  NECK: Supple, no palpable masses; no thyromegaly  RESPIRATORY: Normal respiratory effort; lungs are clear to auscultation bilaterally  CARDIOVASCULAR: Regular rate and rhythm, normal S1 and S2, no murmur/rub/gallop; No lower extremity edema; Peripheral pulses are 2+ bilaterally  ABDOMEN: Nontender to palpation, normoactive bowel sounds, no rebound/guarding; No hepatosplenomegaly  MUSCULOSKELETAL:  no clubbing or cyanosis of digits; no joint swelling or tenderness to palpation  PSYCH: A+O to person, place, and time; affect appropriate  NEUROLOGY: CN 2-12 are intact and symmetric; no gross sensory deficits   SKIN: No rashes; no palpable lesions      LABS:                        10.2   4.78  )-----------( 215      ( 06 Feb 2023 06:40 )             31.2      02-06    142  |  107  |  19  ----------------------------<  89  3.3<L>   |  23  |  1.15    Ca    9.2      06 Feb 2023 06:40  Phos  3.4     02-06  Mg     2.0     02-06         CAPILLARY BLOOD GLUCOSE          RADIOLOGY & ADDITIONAL TESTS:    Imaging Personally Reviewed:  Consultant(s) Notes Reviewed:    Care Discussed with Consultants/Other Providers:   Andre Reyes, M.D.  Pager: [phone]  Office: [phone]    Patient is a 62y old  Female who presents with a chief complaint of bradycardia (05 Feb 2023 14:19)          SUBJECTIVE / OVERNIGHT EVENTS:    No acute overnight events.  dizziness has resolved.    ROS: ( - ) Fever, ( - )Chills,  ( - )Nausea/Vomiting, ( - ) Cough, ( - )Shortness of breath, ( - )Chest Pain    MEDICATIONS  (STANDING):  amLODIPine   Tablet 10 milliGRAM(s) Oral daily  aspirin enteric coated 81 milliGRAM(s) Oral daily  atorvastatin 80 milliGRAM(s) Oral at bedtime  chlorhexidine 2% Cloths 1 Application(s) Topical <User Schedule>  doxazosin 4 milliGRAM(s) Oral at bedtime  hydrochlorothiazide 25 milliGRAM(s) Oral daily  influenza   Vaccine 0.5 milliLiter(s) IntraMuscular once  lacosamide 100 milliGRAM(s) Oral two times a day  lactated ringers. 1000 milliLiter(s) (75 mL/Hr) IV Continuous <Continuous>  levETIRAcetam 1500 milliGRAM(s) Oral two times a day  losartan 100 milliGRAM(s) Oral daily  ondansetron   Disintegrating Tablet 4 milliGRAM(s) Oral three times a day  OXcarbazepine 300 milliGRAM(s) Oral two times a day  pantoprazole    Tablet 40 milliGRAM(s) Oral before breakfast  potassium chloride  10 mEq/100 mL IVPB 10 milliEquivalent(s) IV Intermittent every 1 hour  senna 2 Tablet(s) Oral at bedtime  zonisamide 100 milliGRAM(s) Oral two times a day    MEDICATIONS  (PRN):  acetaminophen     Tablet .. 650 milliGRAM(s) Oral every 6 hours PRN Temp greater or equal to 38C (100.4F), Mild Pain (1 - 3)  aluminum hydroxide/magnesium hydroxide/simethicone Suspension 30 milliLiter(s) Oral every 4 hours PRN Dyspepsia  melatonin 3 milliGRAM(s) Oral at bedtime PRN Insomnia          T(C): 36.8 (02-06 @ 04:37), Max: 36.9 (02-05 @ 20:00)   HR: 64   BP: 133/79   RR: 18   SpO2: 98%    PHYSICAL EXAM:    CONSTITUTIONAL: NAD, well-developed, well-groomed  EYES: PERRLA; conjunctiva and sclera clear  ENMT: Moist oral mucosa, no pharyngeal injection or exudates; normal dentition  NECK: Supple, no palpable masses; no thyromegaly  RESPIRATORY: Normal respiratory effort; lungs are clear to auscultation bilaterally  CARDIOVASCULAR: Regular rate and rhythm, normal S1 and S2, no murmur/rub/gallop; No lower extremity edema; Peripheral pulses are 2+ bilaterally  ABDOMEN: Nontender to palpation, normoactive bowel sounds, no rebound/guarding; No hepatosplenomegaly  MUSCULOSKELETAL:  no clubbing or cyanosis of digits; no joint swelling or tenderness to palpation  PSYCH: A+O to person, place, and time; affect appropriate  NEUROLOGY: CN 2-12 are intact and symmetric; no gross sensory deficits   SKIN: No rashes; no palpable lesions      LABS:                        10.2   4.78  )-----------( 215      ( 06 Feb 2023 06:40 )             31.2      02-06    142  |  107  |  19  ----------------------------<  89  3.3<L>   |  23  |  1.15    Ca    9.2      06 Feb 2023 06:40  Phos  3.4     02-06  Mg     2.0     02-06         CAPILLARY BLOOD GLUCOSE          RADIOLOGY & ADDITIONAL TESTS:    Imaging Personally Reviewed:  Consultant(s) Notes Reviewed:    Care Discussed with Consultants/Other Providers:

## 2023-02-06 NOTE — PROVIDER CONTACT NOTE (OTHER) - ASSESSMENT
MD ordered potassium riders. Patient only tolerated first dose. Risks and benefits explained. Patient adamantly refused.
Patient A&O4. Patient with episode of emesis. Patient denies CP, SOB, headache. Patient s/p third episode of emesis during my shift. Patient returned to sinus bradycardia HR 60s without intervention. Neuro check WDL. VSS.
Pt A&Ox4. VSS. No s/s of bleeding. Pt denies cp, denies SOB, denies pain. Patient removed IV and when questioned she didn't know why she pulled it out. Neuro checked done and patient speaking slower than usual and wasn't answering date of birth or the current year correctly. Patient was slower in following commands.
Pt a&ox4. As per telemetry tech, patient HR 30s on cardiac monitor, gema to 36 bpm. Pt sleeping at time. Pt woken up, HR 44bpm, other VS as charted. Pt asymptomatic, denies chest pain and denies shortness of breath
A&Ox4. VSS. Pt refusing labs. Attempted 3 times and also refused phlebotomy
Patient A&O4. Patient denies CP, SOB. Patient stated nausea resolved post emesis episode. Neuro check WDL. VSS.
Patient A&O4. Patient with episode of emesis. Patient denies CP, SOB, headache. Patient stated nausea resolved post emesis episode. HR 47 on cardiac monitor. Neuro check WDL. VSS.
VSS no complaint of chest pain sob dizziness or palpitations

## 2023-02-06 NOTE — CONSULT NOTE ADULT - SUBJECTIVE AND OBJECTIVE BOX
p ([id number])     HPI: Jeimy Denis  62F Hx CAD s/p PCI 2003, bifrontal hemicrani for aneurysm clipping 2013 w/Dr. Ambrosio, adm medicine for bradycardia. Neurology consulted for dizziness, ordered MR from 2/5/2023 w/chronic L occipital stroke/bifrontal encephalomalacia, and new dural thickening vs SDH.  Exam: (baseline per pt) AOx3, PERRL, EOMI, blind in R eye, no facial, no drift, BUE 5/5 LLE 5/5 RLE 4    --Anticoagulation:  aspirin enteric coated 81 milliGRAM(s) Oral daily  warfarin 3 milliGRAM(s) Oral once    =====================  PAST MEDICAL HISTORY   HTN (hypertension)    Epilepsy    Coronary artery disease    Brain aneurysm    Hyperlipidemia    Coronary artery disease    Deep vein thrombosis (DVT)    Seizure    Hypertension    Stroke    Aneurysm      PAST SURGICAL HISTORY   Cholecystostomy care    Aneurysm    S/P  shunt    History of cranioplasty    H/O craniotomy    Presence of IVC filter    S/P primary angioplasty with coronary stent    H/O cerebral aneurysm repair      [This allergen will not trigger allergy alert] IV DYE, IODINE CONTRAST (Unknown)  [This allergen will not trigger allergy alert] Sulfa (Sulfonamide Antibiotics) (Hives)  Clindamycin Phosphate (Rash)  doxycycline (Rash)  latex (Unknown)  penicillin (Hives)  sulfa drugs (Other)      MEDICATIONS:  Antibiotics:    Neuro:  acetaminophen     Tablet .. 650 milliGRAM(s) Oral every 6 hours PRN  lacosamide 100 milliGRAM(s) Oral two times a day  levETIRAcetam 1500 milliGRAM(s) Oral two times a day  melatonin 3 milliGRAM(s) Oral at bedtime PRN  ondansetron   Disintegrating Tablet 4 milliGRAM(s) Oral three times a day  OXcarbazepine 300 milliGRAM(s) Oral two times a day  zonisamide 100 milliGRAM(s) Oral two times a day    Other:  aluminum hydroxide/magnesium hydroxide/simethicone Suspension 30 milliLiter(s) Oral every 4 hours PRN  amLODIPine   Tablet 10 milliGRAM(s) Oral daily  atorvastatin 80 milliGRAM(s) Oral at bedtime  doxazosin 4 milliGRAM(s) Oral at bedtime  hydrochlorothiazide 25 milliGRAM(s) Oral daily  influenza   Vaccine 0.5 milliLiter(s) IntraMuscular once  lactated ringers. 1000 milliLiter(s) IV Continuous <Continuous>  losartan 100 milliGRAM(s) Oral daily  pantoprazole    Tablet 40 milliGRAM(s) Oral before breakfast  senna 2 Tablet(s) Oral at bedtime      SOCIAL HISTORY:   Occupation:   Marital Status:     FAMILY HISTORY:  No pertinent family history in first degree relatives        ROS: Negative except per HPI    LABS:  PT/INR - ( 06 Feb 2023 06:41 )   PT: 22.9 sec;   INR: 1.96 ratio         PTT - ( 06 Feb 2023 06:41 )  PTT:35.6 sec                        10.2   4.78  )-----------( 215      ( 06 Feb 2023 06:40 )             31.2     02-06    142  |  107  |  19  ----------------------------<  89  3.3<L>   |  23  |  1.15    Ca    9.2      06 Feb 2023 06:40  Phos  3.4     02-06  Mg     2.0     02-06

## 2023-02-07 LAB
ANION GAP SERPL CALC-SCNC: 11 MMOL/L — SIGNIFICANT CHANGE UP (ref 5–17)
APTT BLD: 30 SEC — SIGNIFICANT CHANGE UP (ref 27.5–35.5)
BUN SERPL-MCNC: 18 MG/DL — SIGNIFICANT CHANGE UP (ref 7–23)
CALCIUM SERPL-MCNC: 9.5 MG/DL — SIGNIFICANT CHANGE UP (ref 8.4–10.5)
CHLORIDE SERPL-SCNC: 108 MMOL/L — SIGNIFICANT CHANGE UP (ref 96–108)
CO2 SERPL-SCNC: 23 MMOL/L — SIGNIFICANT CHANGE UP (ref 22–31)
CREAT SERPL-MCNC: 1.23 MG/DL — SIGNIFICANT CHANGE UP (ref 0.5–1.3)
EGFR: 50 ML/MIN/1.73M2 — LOW
GLUCOSE SERPL-MCNC: 99 MG/DL — SIGNIFICANT CHANGE UP (ref 70–99)
HCT VFR BLD CALC: 31.5 % — LOW (ref 34.5–45)
HGB BLD-MCNC: 10.2 G/DL — LOW (ref 11.5–15.5)
INR BLD: 1.54 RATIO — HIGH (ref 0.88–1.16)
LEVETIRACETAM SERPL-MCNC: 78 UG/ML — HIGH (ref 10–40)
MCHC RBC-ENTMCNC: 28.6 PG — SIGNIFICANT CHANGE UP (ref 27–34)
MCHC RBC-ENTMCNC: 32.4 GM/DL — SIGNIFICANT CHANGE UP (ref 32–36)
MCV RBC AUTO: 88.2 FL — SIGNIFICANT CHANGE UP (ref 80–100)
NRBC # BLD: 0 /100 WBCS — SIGNIFICANT CHANGE UP (ref 0–0)
PLATELET # BLD AUTO: 215 K/UL — SIGNIFICANT CHANGE UP (ref 150–400)
POTASSIUM SERPL-MCNC: 3.3 MMOL/L — LOW (ref 3.5–5.3)
POTASSIUM SERPL-SCNC: 3.3 MMOL/L — LOW (ref 3.5–5.3)
PROTHROM AB SERPL-ACNC: 18 SEC — HIGH (ref 10.5–13.4)
RBC # BLD: 3.57 M/UL — LOW (ref 3.8–5.2)
RBC # FLD: 14.8 % — HIGH (ref 10.3–14.5)
SODIUM SERPL-SCNC: 142 MMOL/L — SIGNIFICANT CHANGE UP (ref 135–145)
WBC # BLD: 5.31 K/UL — SIGNIFICANT CHANGE UP (ref 3.8–10.5)
WBC # FLD AUTO: 5.31 K/UL — SIGNIFICANT CHANGE UP (ref 3.8–10.5)

## 2023-02-07 PROCEDURE — 70450 CT HEAD/BRAIN W/O DYE: CPT | Mod: 26

## 2023-02-07 PROCEDURE — 99232 SBSQ HOSP IP/OBS MODERATE 35: CPT

## 2023-02-07 RX ORDER — WARFARIN SODIUM 2.5 MG/1
5 TABLET ORAL ONCE
Refills: 0 | Status: COMPLETED | OUTPATIENT
Start: 2023-02-07 | End: 2023-02-07

## 2023-02-07 RX ADMIN — PANTOPRAZOLE SODIUM 40 MILLIGRAM(S): 20 TABLET, DELAYED RELEASE ORAL at 05:53

## 2023-02-07 RX ADMIN — WARFARIN SODIUM 5 MILLIGRAM(S): 2.5 TABLET ORAL at 21:26

## 2023-02-07 RX ADMIN — LACOSAMIDE 100 MILLIGRAM(S): 50 TABLET ORAL at 17:06

## 2023-02-07 RX ADMIN — LEVETIRACETAM 1500 MILLIGRAM(S): 250 TABLET, FILM COATED ORAL at 17:06

## 2023-02-07 RX ADMIN — LOSARTAN POTASSIUM 100 MILLIGRAM(S): 100 TABLET, FILM COATED ORAL at 05:53

## 2023-02-07 RX ADMIN — Medication 4 MILLIGRAM(S): at 21:24

## 2023-02-07 RX ADMIN — LACOSAMIDE 100 MILLIGRAM(S): 50 TABLET ORAL at 05:52

## 2023-02-07 RX ADMIN — Medication 81 MILLIGRAM(S): at 11:13

## 2023-02-07 RX ADMIN — Medication 100 MILLIGRAM(S): at 05:54

## 2023-02-07 RX ADMIN — ATORVASTATIN CALCIUM 80 MILLIGRAM(S): 80 TABLET, FILM COATED ORAL at 21:24

## 2023-02-07 RX ADMIN — Medication 100 MILLIGRAM(S): at 17:06

## 2023-02-07 RX ADMIN — LEVETIRACETAM 1500 MILLIGRAM(S): 250 TABLET, FILM COATED ORAL at 05:52

## 2023-02-07 RX ADMIN — CHLORHEXIDINE GLUCONATE 1 APPLICATION(S): 213 SOLUTION TOPICAL at 05:54

## 2023-02-07 RX ADMIN — OXCARBAZEPINE 300 MILLIGRAM(S): 300 TABLET, FILM COATED ORAL at 17:06

## 2023-02-07 RX ADMIN — OXCARBAZEPINE 300 MILLIGRAM(S): 300 TABLET, FILM COATED ORAL at 05:53

## 2023-02-07 RX ADMIN — AMLODIPINE BESYLATE 10 MILLIGRAM(S): 2.5 TABLET ORAL at 05:53

## 2023-02-07 NOTE — PROGRESS NOTE ADULT - ASSESSMENT
1. Nausea / vomiting now improved. Etiology unclear. ?caused by antiepileptics   -CT abdomen with no acute pathology  -MRI brain with R frontal ventric. dural thicening vs subdural 2mm in bilateral cerebral and cerebellar convexities-- neuro following    -EGD on 2/6 was normal  -h pylori stool testing ordered      2. Chronic DVTs  - on coumadin, supratherapeutic INR    3. Hx of aneurysm s/p hemicraniectomy and clipping  - on ASA and statin   - pending CT head to f/u potential SDH on MR    4. Anemia  -slight downtrend in hgb, now trending up  -no deficiencies on iron panel   -if downtrends again can check fobt     5. Seizure disorder  - per neurology     6. CAD s/p stent         Attending supervision statement: I have personally seen and examined the patient. I fully participated in the care of this patient. I have made amendments to the documentation where necessary, and agree with the history, physical exam, and plan as outlined by the ACP.

## 2023-02-07 NOTE — PROGRESS NOTE ADULT - NSPROGADDITIONALINFOA_GEN_ALL_CORE
MR brain ---> bilateral cerebral and cerebellar  convexities measuring 2 mm suggesting dural thickening versus subdural collections.--> Neurosurgery recs appreciated --> will get CTH to f/u potential collection for stability; No neurosurgerical intervention, pt should follow up with Dr. Parish Ambrosio. office #: [phone] MR brain ---> bilateral cerebral and cerebellar  convexities measuring 2 mm suggesting dural thickening versus subdural collections.--> Neurosurgery recs appreciated --> will get CTH to f/u potential collection for stability; No neurosurgerical intervention, pt should follow up with Dr. Parish Ambrosio. office #: [phone]    Dispo: if CT head findings are benign will plan for discharge home this evening, pt is aware and in agreement of the plan

## 2023-02-07 NOTE — PROGRESS NOTE ADULT - SUBJECTIVE AND OBJECTIVE BOX
Andre Reyes, M.D.  Pager: [phone]  Office: [phone]    Patient is a 62y old  Female who presents with a chief complaint of bradycardia (07 Feb 2023 10:55)          SUBJECTIVE / OVERNIGHT EVENTS:    No acute overnight events.    ROS: ( - ) Fever, ( - )Chills,  ( - )Nausea/Vomiting, ( - ) Cough, ( - )Shortness of breath, ( - )Chest Pain    MEDICATIONS  (STANDING):  amLODIPine   Tablet 10 milliGRAM(s) Oral daily  aspirin enteric coated 81 milliGRAM(s) Oral daily  atorvastatin 80 milliGRAM(s) Oral at bedtime  chlorhexidine 2% Cloths 1 Application(s) Topical <User Schedule>  doxazosin 4 milliGRAM(s) Oral at bedtime  hydrochlorothiazide 25 milliGRAM(s) Oral daily  influenza   Vaccine 0.5 milliLiter(s) IntraMuscular once  lacosamide 100 milliGRAM(s) Oral two times a day  lactated ringers. 1000 milliLiter(s) (75 mL/Hr) IV Continuous <Continuous>  levETIRAcetam 1500 milliGRAM(s) Oral two times a day  lidocaine 4% Injection for Nebulization 4 milliLiter(s) Nebulizer once  losartan 100 milliGRAM(s) Oral daily  ondansetron   Disintegrating Tablet 4 milliGRAM(s) Oral three times a day  OXcarbazepine 300 milliGRAM(s) Oral two times a day  pantoprazole    Tablet 40 milliGRAM(s) Oral before breakfast  senna 2 Tablet(s) Oral at bedtime  warfarin 5 milliGRAM(s) Oral once  zonisamide 100 milliGRAM(s) Oral two times a day    MEDICATIONS  (PRN):  acetaminophen     Tablet .. 650 milliGRAM(s) Oral every 6 hours PRN Temp greater or equal to 38C (100.4F), Mild Pain (1 - 3)  aluminum hydroxide/magnesium hydroxide/simethicone Suspension 30 milliLiter(s) Oral every 4 hours PRN Dyspepsia  melatonin 3 milliGRAM(s) Oral at bedtime PRN Insomnia          T(C): 36.8 (02-07 @ 11:34), Max: 36.8 (02-07 @ 11:34)   HR: 58   BP: 103/68   RR: 18   SpO2: 98%    PHYSICAL EXAM:    CONSTITUTIONAL: NAD, well-developed, well-groomed  EYES: PERRLA; conjunctiva and sclera clear  ENMT: Moist oral mucosa, no pharyngeal injection or exudates; normal dentition  NECK: Supple, no palpable masses; no thyromegaly  RESPIRATORY: Normal respiratory effort; lungs are clear to auscultation bilaterally  CARDIOVASCULAR: Regular rate and rhythm, normal S1 and S2, no murmur/rub/gallop; No lower extremity edema; Peripheral pulses are 2+ bilaterally  ABDOMEN: Nontender to palpation, normoactive bowel sounds, no rebound/guarding; No hepatosplenomegaly  MUSCULOSKELETAL:  no clubbing or cyanosis of digits; no joint swelling or tenderness to palpation  PSYCH: A+O to person, place, and time; affect appropriate  NEUROLOGY: CN 2-12 are intact and symmetric; no gross sensory deficits   SKIN: No rashes; no palpable lesions      LABS:                        10.2   5.31  )-----------( 215      ( 07 Feb 2023 06:53 )             31.5      02-07    142  |  108  |  18  ----------------------------<  99  3.3<L>   |  23  |  1.23    Ca    9.5      07 Feb 2023 06:53  Phos  3.4     02-06  Mg     2.0     02-06         CAPILLARY BLOOD GLUCOSE          RADIOLOGY & ADDITIONAL TESTS:    Imaging Personally Reviewed:  Consultant(s) Notes Reviewed:    Care Discussed with Consultants/Other Providers:   Andre Reyes, M.D.  Pager: [phone]  Office: [phone]    Patient is a 62y old  Female who presents with a chief complaint of bradycardia (07 Feb 2023 10:55)          SUBJECTIVE / OVERNIGHT EVENTS:    No acute overnight events.  Pt feeling well today  no dizziness no nausea or vomiting    ROS: ( - ) Fever, ( - )Chills,  ( - )Nausea/Vomiting, ( - ) Cough, ( - )Shortness of breath, ( - )Chest Pain    MEDICATIONS  (STANDING):  amLODIPine   Tablet 10 milliGRAM(s) Oral daily  aspirin enteric coated 81 milliGRAM(s) Oral daily  atorvastatin 80 milliGRAM(s) Oral at bedtime  chlorhexidine 2% Cloths 1 Application(s) Topical <User Schedule>  doxazosin 4 milliGRAM(s) Oral at bedtime  hydrochlorothiazide 25 milliGRAM(s) Oral daily  influenza   Vaccine 0.5 milliLiter(s) IntraMuscular once  lacosamide 100 milliGRAM(s) Oral two times a day  lactated ringers. 1000 milliLiter(s) (75 mL/Hr) IV Continuous <Continuous>  levETIRAcetam 1500 milliGRAM(s) Oral two times a day  lidocaine 4% Injection for Nebulization 4 milliLiter(s) Nebulizer once  losartan 100 milliGRAM(s) Oral daily  ondansetron   Disintegrating Tablet 4 milliGRAM(s) Oral three times a day  OXcarbazepine 300 milliGRAM(s) Oral two times a day  pantoprazole    Tablet 40 milliGRAM(s) Oral before breakfast  senna 2 Tablet(s) Oral at bedtime  warfarin 5 milliGRAM(s) Oral once  zonisamide 100 milliGRAM(s) Oral two times a day    MEDICATIONS  (PRN):  acetaminophen     Tablet .. 650 milliGRAM(s) Oral every 6 hours PRN Temp greater or equal to 38C (100.4F), Mild Pain (1 - 3)  aluminum hydroxide/magnesium hydroxide/simethicone Suspension 30 milliLiter(s) Oral every 4 hours PRN Dyspepsia  melatonin 3 milliGRAM(s) Oral at bedtime PRN Insomnia          T(C): 36.8 (02-07 @ 11:34), Max: 36.8 (02-07 @ 11:34)   HR: 58   BP: 103/68   RR: 18   SpO2: 98%    PHYSICAL EXAM:    CONSTITUTIONAL: NAD, well-developed, well-groomed  ENMT: Moist oral mucosa, no pharyngeal injection or exudates; normal dentition  NECK: Supple  RESPIRATORY: Normal respiratory effort; lungs are clear to auscultation bilaterally  CARDIOVASCULAR: Regular rate and rhythm, normal S1 and S2, no murmur/rub/gallop; No lower extremity edema; Peripheral pulses are 2+ bilaterally  ABDOMEN: Nontender to palpation, normoactive bowel sounds, no rebound/guarding; No hepatosplenomegaly  MUSCULOSKELETAL:  no clubbing or cyanosis of digits; no joint swelling or tenderness to palpation  PSYCH: A+O to person, place, and time; affect appropriate  NEUROLOGY: CN 2-12 are intact and symmetric; no gross sensory deficits   SKIN: No rashes; no palpable lesions      LABS:                        10.2   5.31  )-----------( 215      ( 07 Feb 2023 06:53 )             31.5      02-07    142  |  108  |  18  ----------------------------<  99  3.3<L>   |  23  |  1.23    Ca    9.5      07 Feb 2023 06:53  Phos  3.4     02-06  Mg     2.0     02-06         CAPILLARY BLOOD GLUCOSE          RADIOLOGY & ADDITIONAL TESTS:    Imaging Personally Reviewed:  Consultant(s) Notes Reviewed:    Care Discussed with Consultants/Other Providers:

## 2023-02-07 NOTE — PROGRESS NOTE ADULT - SUBJECTIVE AND OBJECTIVE BOX
Neurology Progress Note    S: Patient seen and examined. states she is at her baseline. AAOx3 no n/v     Medication:  MEDICATIONS  (STANDING):  amLODIPine   Tablet 10 milliGRAM(s) Oral daily  aspirin enteric coated 81 milliGRAM(s) Oral daily  atorvastatin 80 milliGRAM(s) Oral at bedtime  chlorhexidine 2% Cloths 1 Application(s) Topical <User Schedule>  doxazosin 4 milliGRAM(s) Oral at bedtime  hydrochlorothiazide 25 milliGRAM(s) Oral daily  influenza   Vaccine 0.5 milliLiter(s) IntraMuscular once  lacosamide 100 milliGRAM(s) Oral two times a day  lactated ringers. 1000 milliLiter(s) (75 mL/Hr) IV Continuous <Continuous>  levETIRAcetam 1500 milliGRAM(s) Oral two times a day  lidocaine 4% Injection for Nebulization 4 milliLiter(s) Nebulizer once  losartan 100 milliGRAM(s) Oral daily  ondansetron   Disintegrating Tablet 4 milliGRAM(s) Oral three times a day  OXcarbazepine 300 milliGRAM(s) Oral two times a day  pantoprazole    Tablet 40 milliGRAM(s) Oral before breakfast  senna 2 Tablet(s) Oral at bedtime  warfarin 5 milliGRAM(s) Oral once  zonisamide 100 milliGRAM(s) Oral two times a day    MEDICATIONS  (PRN):  acetaminophen     Tablet .. 650 milliGRAM(s) Oral every 6 hours PRN Temp greater or equal to 38C (100.4F), Mild Pain (1 - 3)  aluminum hydroxide/magnesium hydroxide/simethicone Suspension 30 milliLiter(s) Oral every 4 hours PRN Dyspepsia  melatonin 3 milliGRAM(s) Oral at bedtime PRN Insomnia      Vitals:    Vital Signs Last 24 Hrs  T(C): 36.8 (07 Feb 2023 11:34), Max: 36.8 (07 Feb 2023 11:34)  T(F): 98.3 (07 Feb 2023 11:34), Max: 98.3 (07 Feb 2023 11:34)  HR: 58 (07 Feb 2023 11:34) (52 - 74)  BP: 103/68 (07 Feb 2023 11:34) (100/56 - 157/99)  BP(mean): 110 (06 Feb 2023 17:35) (110 - 110)  RR: 18 (07 Feb 2023 11:34) (18 - 20)  SpO2: 98% (07 Feb 2023 11:34) (95% - 100%)    Parameters below as of 07 Feb 2023 11:34  Patient On (Oxygen Delivery Method): room air        General Exam:   General Appearance: Appropriately dressed and in no acute distress       Head: Normocephalic, atraumatic and no dysmorphic features  Ear, Nose, and Throat: Moist mucous membranes  CVS: S1S2+  Resp: No SOB, no wheeze or rhonchi  GI: soft NT/ND  Extremities: No edema or cyanosis  Skin: No bruises or rashes     Neurological Exam:  Mental Status: Awake, alert and oriented x 3.  Able to follow simple and complex verbal commands. Able to name and repeat. fluent speech. No obvious aphasia or dysarthria noted.   Cranial Nerves: PERRL, EOMI, VFFC, sensation V1-V3 intact,  no obvious facial asymmetry, equal elevation of palate, scm/trap 5/5, tongue is midline on protrusion. no obvious papilledema on fundoscopic exam. hearing is grossly intact.   Motor: Normal bulk, tone and strength throughout. Fine finger movements were intact and symmetric. no tremors or drift noted.    Sensation: Intact to light touch and pinprick throughout. no right/left confusion. no extinction to tactile on DSS.    Reflexes: 1+ throughout at biceps, brachioradialis, triceps, patellars and ankles bilaterally and equal. No clonus. R toe and L toe were both downgoing.  Coordination: No dysmetria on FNF   Gait: deferred         I personally reviewed the below data/images/labs:    CBC Full  -  ( 07 Feb 2023 06:53 )  WBC Count : 5.31 K/uL  RBC Count : 3.57 M/uL  Hemoglobin : 10.2 g/dL  Hematocrit : 31.5 %  Platelet Count - Automated : 215 K/uL  Mean Cell Volume : 88.2 fl  Mean Cell Hemoglobin : 28.6 pg  Mean Cell Hemoglobin Concentration : 32.4 gm/dL  Auto Neutrophil # : x  Auto Lymphocyte # : x  Auto Monocyte # : x  Auto Eosinophil # : x  Auto Basophil # : x  Auto Neutrophil % : x  Auto Lymphocyte % : x  Auto Monocyte % : x  Auto Eosinophil % : x  Auto Basophil % : x    02-07    142  |  108  |  18  ----------------------------<  99  3.3<L>   |  23  |  1.23    Ca    9.5      07 Feb 2023 06:53  Phos  3.4     02-06  Mg     2.0     02-06             Patient name: BELLE MENENDEZ  YOB: 1960   Age: 62 (F)   MR#: [id number]  Study Date: 1/25/2023  [facility]  Study quality: Technically good  Referring Physician: Bibiana Merchant MD  Blood Pressure: 111/54 mmHg  Height: 157 cm  Weight: 70 kg  BSA: 1.7 m2  ------------------------------------------------------------------------  PROCEDURE: Transthoracic echocardiogram with 2-D, M-Mode  and complete spectral and color flow Doppler.  INDICATION: Abnormal electrocardiogram (ECG) (EKG) (R94.31)  ------------------------------------------------------------------------  Dimensions:    Normal Values:  LA:     3.9    2.0 - 4.0 cm  Ao:     2.9    2.0 - 3.8 cm  SEPTUM: 1.0    0.6 - 1.2 cm  PWT:    0.8    0.6 - 1.1 cm  LVIDd:  4.7    3.0 - 5.6 cm  LVIDs:  2.6    1.8 - 4.0 cm  Derived variables:  LVMI: 83 g/m2  RWT: 0.34  Fractional short: 45 %  EF (Carreno Rule): 61 %Doppler Peak Velocity (m/sec):  AoV=1.4  ------------------------------------------------------------------------  Observations:  Mitral Valve: Normal mitral valve. Minimal mitral  regurgitation.  Aortic Valve/Aorta: Thickened trileaflet aortic valve. Peak  transaortic valve gradient equals 8 mm Hg. No aortic valve  regurgitation seen. Peak left ventricular outflow tract  gradient equals 5 mm Hg, mean gradient is equal to 2 mm Hg,  LVOT velocity time integral equals 26 cm.  Aortic Root: 2.9 cm.  Ascending Aorta: 3.3 cm.  LVOT diameter: 1.9 cm.  Left Atrium: Normal left atrium.  LA volume index = 25  cc/m2.  Left Ventricle: Normal left ventricular systolic function.  No segmental wall motion abnormalities. Normal left  ventricular internal dimensions and wall thickness. Normal  diastolic function.  Right Heart: Normal right atrium. Normal right ventricular  size and function. Normal tricuspid valve. Mild tricuspid  regurgitation. Pulmonic valve not well visualized, probably  normal. No pulmonic regurgitation.  Pericardium/Pleura: Normal pericardium with no pericardial  effusion.  Hemodynamic: Estimated right atrial pressure is 8 mm Hg.  Estimated right ventricular systolic pressure equals 31 mm  Hg, assuming right atrial pressure equals 8 mm Hg,  consistent with normal pulmonary pressures.  ------------------------------------------------------------------------  Conclusions:  1. Normal left ventricular internal dimensions and wall  thickness.  2. Normal left ventricular systolic function. No segmental  wall motion abnormalities.  3. Normal right ventricular size and function.  4. Estimated pulmonary artery systolic pressure equals 31  mm Hg, assuming right atrial pressure equals 8  mm Hg,  consistent with normal pulmonary pressures.  ------------------------------------------------------------------------  Confirmed on  1/25/2023 - 17:33:29 by PAULA Kimble  ------------------------------------------------------------------------    < end of copied text >    < from: CT Head No Cont (01.29.23 @ 22:44) >    ACC: [id number] EXAM:  CT BRAIN   ORDERED BY: JESSICA HORTON     PROCEDURE DATE:  01/29/2023          INTERPRETATION:  INDICATIONS:  AMS    h/i brain aneurysm    TECHNIQUE:  Serial axial images were obtained from the skull base to the   vertex without intravenous contrast. Sagittal and Coronal reformats were   performed    COMPARISON EXAMINATION: 5/7/2022    FINDINGS:  VENTRICLES AND SULCI:  Right frontal shunt catheter with its tip at the   level of the septum pellucidum. 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 Evidence of right parietal   parasagittal distribution infarct seen on the prior as well. Bifrontal   encephalomalacia with evidence of aneurysm clips in the midline at the   level of the interhemispheric fissure in the distribution of the anterior   cerebral artery. Right basal ganglia lacunar infarct seen on the prior as   well  EXTRA-AXIAL:  No mass or collection is seen.  VISUALIZED SINUSES:  Clear.  VISUALIZED MASTOIDS:  Clear.  CALVARIUM:  Bifrontal craniectomy with cranioplasty  MISCELLANEOUS:  None.    IMPRESSION:  Ventricles are better visualized compared with the prior   though no evidence of hydrocephalus. Bifrontal cranioplasty. 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 Small right parietal parasagittal   infarct    --- End of Report ---     < from: CT Head No Cont (01.29.23 @ 22:44) >    ACC: [id number] EXAM:  CT BRAIN   ORDERED BY: JESSICA HORTON     PROCEDURE DATE:  01/29/2023          INTERPRETATION:  INDICATIONS:  AMS    h/i brain aneurysm    TECHNIQUE:  Serial axial images were obtained from the skull base to the   vertex without intravenous contrast. Sagittal and Coronal reformats were   performed    COMPARISON EXAMINATION: 5/7/2022    FINDINGS:  VENTRICLES AND SULCI:  Right frontal shunt catheter with its tip at the   level of the septum pellucidum. 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 Evidence of right parietal   parasagittal distribution infarct seen on the prior as well. Bifrontal   encephalomalacia with evidence of aneurysm clips in the midline at the   level of the interhemispheric fissure in the distribution of the anterior   cerebral artery. Right basal ganglia lacunar infarct seen on the prior as   well  EXTRA-AXIAL:  No mass or collection is seen.  VISUALIZED SINUSES:  Clear.  VISUALIZED MASTOIDS:  Clear.  CALVARIUM:  Bifrontal craniectomy with cranioplasty  MISCELLANEOUS:  None.    IMPRESSION:  Ventricles are better visualized compared with the prior   though no evidence of hydrocephalus. Bifrontal cranioplasty. 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 Small right parietal parasagittal   infarct    --- End of Report ---       DANYA AGUDELO MD; Attending Radiologist  This document has been electronically signed. Jan 30 2023 10:51AM    < end of copied text >  < from: MR Head No Cont (02.05.23 @ 21:25) >    ACC: [id number] EXAM:  MR BRAIN   ORDERED BY: EUSEBIO BRITO     PROCEDURE DATE:  02/05/2023          INTERPRETATION:  CLINICAL INFORMATION: AMS. MMR. Admitting Dxs: R00.1 LOW   HR.    TECHNIQUE: Multiplanar, multisequence brain MRI was performedwithout   intravenous contrast.    COMPARISON: CT head 1/29/2023.    FINDINGS:    Right frontal approach ventriculostomy catheter with tip in the right   frontal horn. No hydrocephalus.    Bilateral frontal craniectomy and cranioplasty. Artifact in the anterior   interhemispheric region from embolic material. Encephalomalacia and   gliosis in the bilateral anterior frontal lobes with chronic hemorrhage.   Chronic left occipital infarct. Chronic lacunar infarcts in the bilateral   basal ganglia, thalami, evette and cerebellum.    T2/FLAIR hyperintensity along the bilateral cerebral and cerebellar   convexities measuring 2 mm suggesting dural thickening versus subdural   collections.    There is no evidence of acute infarct. Scattered foci of T2/FLAIR   hyperintensity in the bilateral hemispheric white matter are nonspecific   but likely related to chronic white matter microvascular ischemic disease.    The paranasal sinuses demonstrate no signal abnormality. The mastoids   demonstrate no signal abnormality.  Bilateral orbits are within normal   limits.      IMPRESSION:  1.  Right frontal approach ventriculostomy catheter with tip in the right   frontal horn. No hydrocephalus.  2.  T2/FLAIR hyperintensity along the bilateral cerebral and cerebellar   convexities measuring 2 mm suggesting dural thickening versus subdural   collections.    --- End of Report ---     EEG IMPRESSION/CLINICAL CORRELATE    Abnormal EEG study.    Regional cortical dysfunction in the left temporal area  Mild nonspecific diffuse or multifocal cerebral dysfunction.   No epileptiform pattern or seizure seen.

## 2023-02-07 NOTE — PROGRESS NOTE ADULT - SUBJECTIVE AND OBJECTIVE BOX
Chief Complaint:  Patient is a 62y old  Female who presents with a chief complaint of bradycardia (06 Feb 2023 15:07)      Date of service 02-07-23 @ 10:31      Interval Events:   Patient seen and examined.   s/p EGD yesterday  No acute overnight events.   No nausea or vomiting.   Tolerating diet.     Hospital Medications:  acetaminophen     Tablet .. 650 milliGRAM(s) Oral every 6 hours PRN  aluminum hydroxide/magnesium hydroxide/simethicone Suspension 30 milliLiter(s) Oral every 4 hours PRN  amLODIPine   Tablet 10 milliGRAM(s) Oral daily  aspirin enteric coated 81 milliGRAM(s) Oral daily  atorvastatin 80 milliGRAM(s) Oral at bedtime  chlorhexidine 2% Cloths 1 Application(s) Topical <User Schedule>  doxazosin 4 milliGRAM(s) Oral at bedtime  hydrochlorothiazide 25 milliGRAM(s) Oral daily  influenza   Vaccine 0.5 milliLiter(s) IntraMuscular once  lacosamide 100 milliGRAM(s) Oral two times a day  lactated ringers. 1000 milliLiter(s) IV Continuous <Continuous>  levETIRAcetam 1500 milliGRAM(s) Oral two times a day  lidocaine 4% Injection for Nebulization 4 milliLiter(s) Nebulizer once  losartan 100 milliGRAM(s) Oral daily  melatonin 3 milliGRAM(s) Oral at bedtime PRN  ondansetron   Disintegrating Tablet 4 milliGRAM(s) Oral three times a day  OXcarbazepine 300 milliGRAM(s) Oral two times a day  pantoprazole    Tablet 40 milliGRAM(s) Oral before breakfast  senna 2 Tablet(s) Oral at bedtime  zonisamide 100 milliGRAM(s) Oral two times a day        Review of Systems:  General:  No wt loss, fevers, chills, night sweats, fatigue,   Eyes:  Good vision, no reported pain  ENT:  No sore throat, pain, runny nose, dysphagia  CV:  No pain, palpitations, hypo/hypertension  Resp:  No dyspnea, cough, tachypnea, wheezing  GI:  See HPI  :  No pain, bleeding, incontinence, nocturia  Muscle:  No pain, weakness  Neuro:  No weakness, tingling, memory problems  Psych:  No fatigue, insomnia, mood problems, depression  Endocrine:  No polyuria, polydipsia, cold/heat intolerance  Heme:  No petechiae, ecchymosis, easy bruisability  Integumentary:  No rash, edema    PHYSICAL EXAM:   Vital Signs:  Vital Signs Last 24 Hrs  T(C): 36.7 (07 Feb 2023 04:56), Max: 36.8 (06 Feb 2023 11:07)  T(F): 98.1 (07 Feb 2023 04:56), Max: 98.3 (06 Feb 2023 11:07)  HR: 52 (07 Feb 2023 05:51) (52 - 75)  BP: 118/67 (07 Feb 2023 05:51) (100/56 - 157/99)  BP(mean): 110 (06 Feb 2023 17:35) (110 - 110)  RR: 18 (07 Feb 2023 05:51) (18 - 20)  SpO2: 98% (07 Feb 2023 05:51) (95% - 100%)    Parameters below as of 07 Feb 2023 05:51  Patient On (Oxygen Delivery Method): room air      Daily Height in cm: 157.48 (06 Feb 2023 17:13)    Daily       PHYSICAL EXAM:     GENERAL:  Appears stated age, well-groomed, well-nourished, no distress  HEENT:  NC/AT,  conjunctivae anicteric, clear and pink,   NECK: supple, trachea midline  CHEST:  Full & symmetric excursion, no increased effort, breath sounds clear  HEART:  Regular rhythm, no JVD  ABDOMEN:  Soft, non-tender, non-distended, normoactive bowel sounds,  no masses , no hepatosplenomegaly  EXTREMITIES:  no cyanosis,clubbing or edema  SKIN:  No rash, erythema, or, ecchymoses, no jaundice  NEURO:  Alert, non-focal, no asterixis  PSYCH: Appropriate affect, oriented to place and time  RECTAL: Deferred      LABS Personally reviewed by me:                        10.2   5.31  )-----------( 215      ( 07 Feb 2023 06:53 )             31.5     Mean Cell Volume: 88.2 fl (02-07-23 @ 06:53)    02-07    142  |  108  |  18  ----------------------------<  99  3.3<L>   |  23  |  1.23    Ca    9.5      07 Feb 2023 06:53  Phos  3.4     02-06  Mg     2.0     02-06        PT/INR - ( 07 Feb 2023 06:53 )   PT: 18.0 sec;   INR: 1.54 ratio         PTT - ( 07 Feb 2023 06:53 )  PTT:30.0 sec                            10.2   5.31  )-----------( 215      ( 07 Feb 2023 06:53 )             31.5                         10.2   4.78  )-----------( 215      ( 06 Feb 2023 06:40 )             31.2                         9.8    3.93  )-----------( 203      ( 05 Feb 2023 07:56 )             30.0       Imaging personally reviewed by me:

## 2023-02-07 NOTE — PROGRESS NOTE ADULT - SUBJECTIVE AND OBJECTIVE BOX
DATE OF SERVICE: 02-07-23 @ 10:55    Patient is a 62y old  Female who presents with a chief complaint of bradycardia (07 Feb 2023 10:31)      INTERVAL HISTORY: Feels ok.     REVIEW OF SYSTEMS:  CONSTITUTIONAL: No weakness  EYES/ENT: No visual changes;  No throat pain   NECK: No pain or stiffness  RESPIRATORY: No cough, wheezing; No shortness of breath  CARDIOVASCULAR: No chest pain or palpitations  GASTROINTESTINAL: No abdominal  pain. No nausea, vomiting, or hematemesis  GENITOURINARY: No dysuria, frequency or hematuria  NEUROLOGICAL: No stroke like symptoms  SKIN: No rashes    TELEMETRY Personally reviewed: 50-60 SB/SR   	  MEDICATIONS:  amLODIPine   Tablet 10 milliGRAM(s) Oral daily  doxazosin 4 milliGRAM(s) Oral at bedtime  hydrochlorothiazide 25 milliGRAM(s) Oral daily  losartan 100 milliGRAM(s) Oral daily        PHYSICAL EXAM:  T(C): 36.7 (02-07-23 @ 04:56), Max: 36.8 (02-06-23 @ 11:07)  HR: 52 (02-07-23 @ 05:51) (52 - 75)  BP: 118/67 (02-07-23 @ 05:51) (100/56 - 157/99)  RR: 18 (02-07-23 @ 05:51) (18 - 20)  SpO2: 98% (02-07-23 @ 05:51) (95% - 100%)  Wt(kg): --  I&O's Summary    06 Feb 2023 07:01  -  07 Feb 2023 07:00  --------------------------------------------------------  IN: 0 mL / OUT: 0 mL / NET: 0 mL    07 Feb 2023 07:01  -  07 Feb 2023 10:55  --------------------------------------------------------  IN: 200 mL / OUT: 0 mL / NET: 200 mL      Height (cm): 157.5 (02-06 @ 17:13)  Weight (kg): 70.3 (02-06 @ 17:13)  BMI (kg/m2): 28.3 (02-06 @ 17:13)  BSA (m2): 1.72 (02-06 @ 17:13)    Appearance: In no distress	  HEENT:    PERRL, EOMI	  Cardiovascular:  S1 S2, No JVD  Respiratory: Lungs clear to auscultation	  Gastrointestinal:  Soft, Non-tender, + BS	  Vascularature:  No edema of LE  Psychiatric: Appropriate affect   Neuro: no acute focal deficits                               10.2   5.31  )-----------( 215      ( 07 Feb 2023 06:53 )             31.5     02-07    142  |  108  |  18  ----------------------------<  99  3.3<L>   |  23  |  1.23    Ca    9.5      07 Feb 2023 06:53  Phos  3.4     02-06  Mg     2.0     02-06          Labs personally reviewed      ASSESSMENT/PLAN: 	  Ms. Denis is a 61 yo female with PMH of CAD s/p PCI in 2003, HTN, brain aneursyms s/p hemicraniotomy and clipping in 2013 c/b epilepsy chronic DVT now on coumadin who presents with symptomatic bradycardia.    Problem/Plan -1  Problem: Bradycardia  - ECG reveals SB; tele shows HR mostly in 30-40s   - TTE unremarkable  - TSH wnl  - HR  improved  60 - 70s bpm  - remain off Clonidine and Labetalol  - No need for monitor as OP as HR has been stable while on cont tele    Problem/Plan -2  Problem: Hypertension  - Patient hypotensive with EMS but on multiple antihypertensive meds  - c/w all home meds with the exception of labetalol and clonidine     Problem/Plan -3  Problem: Coronary Artery Disease  - s/p remote PCI  - Denies CP or SOB  - Echo unremarkable  - c/w ASA 81mg PO daily, rosuvastatin 40mg PO daily    Problem/Plan -4  Problem: hx of DVT  - c/w coumadin and dose to INR 2-3  - Discussed with vascular Dr Powell, c/w coumadin    Problem/Plan -5  Problem: Hx of Brain Aneursyms with intervention and Epilepsy  - Now with increased forgetfulness and lethargy  - Neuro recs appreciated.   - CTH shows no evidence of hydrocephalus, Bifrontal encephalomalacia, Old left PCA infarct, Small right parietal parasagittal infarct  - Keppra dose adjusted based on serum level  - As per daughter, reports increased forgetfulness and repetitiveness  - No need for ILR for patient already on AC with coumadin and HR have improved off Clonidine and Labetalol  - Appreciate Neuro input on MRI Brain and EEG     Problem/Plan -6  Problem: Emesis  - No WBC elevation, no fever  - Not a/w meals  - Denies Diarrhea, epigastic pain  - Lipase wnl  - Abdominal XR wnl  - Appreciate GI consult   - CT A/P with no acute pathology  - Endoscopy wnl. Vomitting has revolved.   - F/u H. Pylori stool    Problem/Plan -7  Problem: SAH  - Suspected SAH noted on MR Brain  - NSGx consulted: recommend repeat CTH, no acute intervention at this time  - Will cont with coumadin  - Appreciate Neuro input          Kaykay Goldstein, RIDDHI-NP   Delfino Everett DO [facility]  Cardiovascular Medicine  [address] [place] [address], [address]  Available through call or text on [employer] TEAM[employer]  Office: [phone]

## 2023-02-07 NOTE — PROGRESS NOTE ADULT - ASSESSMENT
61yo black F with CAD s/p PCI 2003, htn, brain aneurysm s/p hemicraniectomy and clipping (2013), seizures post hemicraniectomy, chronic DVTs on Coumadin p/w bradycardia .   CTH and CTA H/N 5/2022: frontal lobes encephalomalacia, old L occipital infarct, CTA H/N neg, aneusym clipi in [facility]   TTE as above 1/25  + COVID  CTH old L PCA infarct, evidence of cranioplasty. no acute findings   EEG with seizure focus no seiuzres ; R frontal spikes. potential for seizure from R frontal   CTH 1/29 stable. bifrontal crani and encephalomalacia. old L PCA and R parietal infarct   2/5: recalled for n/v, patient states the only episode she has was when she was being discharged. now she is AAOx3, no n/v, feels at her baseline . last saw neuro dr doran 3-4 months ago   MRI brain with R frontal ventric.  dural thicening vs subdural 2mm in bilateral cerebral and cerebellar convexities   s/p EGD 2/6   repeat EEG 2/6 with known left temporal cortical dysfunction but no seizures or epileptiform pattern seen     Impression:   1) aneurysm s/p clipping and hemicrani  2) epilepsy  3) occipital stroke L and R parietal, ESUS     - neurosx for MRI findings --> recs CTH. will f/u   - AC for DVT , on coumadin   - c/w home AEDs --> on keppra 1500mg BID, vimpat 100mg BID, Oxcarbazepine 300mg BID, and zonisamide 100mg BID   - for secondary stroke prevention c/w ASA and statin therapy with LDL goal < 70    - cardio following  - GI following   - EP , would benefit from ILR for gema cardiac and to r/o occult AFib as cause of her occipital stroke, parietal  --> refused. consider event monitor   - telemetry  - PT/OT/SS/SLP, OOBC  - check FS, glucose control <180  - GI/DVT ppx  - sees Dr doran outaptient ( she states she saw him about 3 months ago and he has been also adjusting meds in past)    dcp when stable   spoke with ACP, GI, cardio, and PCP   - spoke with daughter 2/7 via facetime on phone with patient   Tristan Braun MD  Vascular Neurology  Office: [phone]

## 2023-02-08 VITALS
SYSTOLIC BLOOD PRESSURE: 129 MMHG | OXYGEN SATURATION: 96 % | HEART RATE: 66 BPM | RESPIRATION RATE: 18 BRPM | DIASTOLIC BLOOD PRESSURE: 84 MMHG | TEMPERATURE: 99 F

## 2023-02-08 LAB
ANION GAP SERPL CALC-SCNC: 11 MMOL/L — SIGNIFICANT CHANGE UP (ref 5–17)
BUN SERPL-MCNC: 23 MG/DL — SIGNIFICANT CHANGE UP (ref 7–23)
CALCIUM SERPL-MCNC: 9.4 MG/DL — SIGNIFICANT CHANGE UP (ref 8.4–10.5)
CHLORIDE SERPL-SCNC: 107 MMOL/L — SIGNIFICANT CHANGE UP (ref 96–108)
CO2 SERPL-SCNC: 23 MMOL/L — SIGNIFICANT CHANGE UP (ref 22–31)
CREAT SERPL-MCNC: 1.4 MG/DL — HIGH (ref 0.5–1.3)
EGFR: 43 ML/MIN/1.73M2 — LOW
GLUCOSE SERPL-MCNC: 96 MG/DL — SIGNIFICANT CHANGE UP (ref 70–99)
HCT VFR BLD CALC: 31.2 % — LOW (ref 34.5–45)
HGB BLD-MCNC: 10.4 G/DL — LOW (ref 11.5–15.5)
INR BLD: 1.28 RATIO — HIGH (ref 0.88–1.16)
MCHC RBC-ENTMCNC: 29.1 PG — SIGNIFICANT CHANGE UP (ref 27–34)
MCHC RBC-ENTMCNC: 33.3 GM/DL — SIGNIFICANT CHANGE UP (ref 32–36)
MCV RBC AUTO: 87.4 FL — SIGNIFICANT CHANGE UP (ref 80–100)
NRBC # BLD: 0 /100 WBCS — SIGNIFICANT CHANGE UP (ref 0–0)
PLATELET # BLD AUTO: 210 K/UL — SIGNIFICANT CHANGE UP (ref 150–400)
POTASSIUM SERPL-MCNC: 3.2 MMOL/L — LOW (ref 3.5–5.3)
POTASSIUM SERPL-SCNC: 3.2 MMOL/L — LOW (ref 3.5–5.3)
PROTHROM AB SERPL-ACNC: 14.9 SEC — HIGH (ref 10.5–13.4)
RBC # BLD: 3.57 M/UL — LOW (ref 3.8–5.2)
RBC # FLD: 14.6 % — HIGH (ref 10.3–14.5)
SODIUM SERPL-SCNC: 141 MMOL/L — SIGNIFICANT CHANGE UP (ref 135–145)
WBC # BLD: 4.27 K/UL — SIGNIFICANT CHANGE UP (ref 3.8–10.5)
WBC # FLD AUTO: 4.27 K/UL — SIGNIFICANT CHANGE UP (ref 3.8–10.5)

## 2023-02-08 PROCEDURE — 93975 VASCULAR STUDY: CPT

## 2023-02-08 PROCEDURE — 82435 ASSAY OF BLOOD CHLORIDE: CPT

## 2023-02-08 PROCEDURE — 85025 COMPLETE CBC W/AUTO DIFF WBC: CPT

## 2023-02-08 PROCEDURE — 87641 MR-STAPH DNA AMP PROBE: CPT

## 2023-02-08 PROCEDURE — 93970 EXTREMITY STUDY: CPT

## 2023-02-08 PROCEDURE — 80177 DRUG SCRN QUAN LEVETIRACETAM: CPT

## 2023-02-08 PROCEDURE — 84443 ASSAY THYROID STIM HORMONE: CPT

## 2023-02-08 PROCEDURE — 70551 MRI BRAIN STEM W/O DYE: CPT

## 2023-02-08 PROCEDURE — 71045 X-RAY EXAM CHEST 1 VIEW: CPT

## 2023-02-08 PROCEDURE — 74018 RADEX ABDOMEN 1 VIEW: CPT

## 2023-02-08 PROCEDURE — 87640 STAPH A DNA AMP PROBE: CPT

## 2023-02-08 PROCEDURE — 85730 THROMBOPLASTIN TIME PARTIAL: CPT

## 2023-02-08 PROCEDURE — 84702 CHORIONIC GONADOTROPIN TEST: CPT

## 2023-02-08 PROCEDURE — 80053 COMPREHEN METABOLIC PANEL: CPT

## 2023-02-08 PROCEDURE — 83690 ASSAY OF LIPASE: CPT

## 2023-02-08 PROCEDURE — 84295 ASSAY OF SERUM SODIUM: CPT

## 2023-02-08 PROCEDURE — 74176 CT ABD & PELVIS W/O CONTRAST: CPT

## 2023-02-08 PROCEDURE — 85014 HEMATOCRIT: CPT

## 2023-02-08 PROCEDURE — 93306 TTE W/DOPPLER COMPLETE: CPT

## 2023-02-08 PROCEDURE — 84132 ASSAY OF SERUM POTASSIUM: CPT

## 2023-02-08 PROCEDURE — 99285 EMERGENCY DEPT VISIT HI MDM: CPT

## 2023-02-08 PROCEDURE — 82728 ASSAY OF FERRITIN: CPT

## 2023-02-08 PROCEDURE — 84436 ASSAY OF TOTAL THYROXINE: CPT

## 2023-02-08 PROCEDURE — 93005 ELECTROCARDIOGRAM TRACING: CPT

## 2023-02-08 PROCEDURE — 83735 ASSAY OF MAGNESIUM: CPT

## 2023-02-08 PROCEDURE — 84484 ASSAY OF TROPONIN QUANT: CPT

## 2023-02-08 PROCEDURE — 85027 COMPLETE CBC AUTOMATED: CPT

## 2023-02-08 PROCEDURE — 0225U NFCT DS DNA&RNA 21 SARSCOV2: CPT

## 2023-02-08 PROCEDURE — 83605 ASSAY OF LACTIC ACID: CPT

## 2023-02-08 PROCEDURE — 97110 THERAPEUTIC EXERCISES: CPT

## 2023-02-08 PROCEDURE — 80076 HEPATIC FUNCTION PANEL: CPT

## 2023-02-08 PROCEDURE — 82803 BLOOD GASES ANY COMBINATION: CPT

## 2023-02-08 PROCEDURE — 36415 COLL VENOUS BLD VENIPUNCTURE: CPT

## 2023-02-08 PROCEDURE — 80203 DRUG SCREEN QUANT ZONISAMIDE: CPT

## 2023-02-08 PROCEDURE — 85610 PROTHROMBIN TIME: CPT

## 2023-02-08 PROCEDURE — 82962 GLUCOSE BLOOD TEST: CPT

## 2023-02-08 PROCEDURE — 97161 PT EVAL LOW COMPLEX 20 MIN: CPT

## 2023-02-08 PROCEDURE — 85018 HEMOGLOBIN: CPT

## 2023-02-08 PROCEDURE — 84100 ASSAY OF PHOSPHORUS: CPT

## 2023-02-08 PROCEDURE — 87637 SARSCOV2&INF A&B&RSV AMP PRB: CPT

## 2023-02-08 PROCEDURE — 82330 ASSAY OF CALCIUM: CPT

## 2023-02-08 PROCEDURE — 70450 CT HEAD/BRAIN W/O DYE: CPT

## 2023-02-08 PROCEDURE — 97116 GAIT TRAINING THERAPY: CPT

## 2023-02-08 PROCEDURE — 95816 EEG AWAKE AND DROWSY: CPT

## 2023-02-08 PROCEDURE — 80183 DRUG SCRN QUANT OXCARBAZEPIN: CPT

## 2023-02-08 PROCEDURE — U0005: CPT

## 2023-02-08 PROCEDURE — 82947 ASSAY GLUCOSE BLOOD QUANT: CPT

## 2023-02-08 PROCEDURE — 83540 ASSAY OF IRON: CPT

## 2023-02-08 PROCEDURE — 84480 ASSAY TRIIODOTHYRONINE (T3): CPT

## 2023-02-08 PROCEDURE — U0003: CPT

## 2023-02-08 PROCEDURE — 80048 BASIC METABOLIC PNL TOTAL CA: CPT

## 2023-02-08 PROCEDURE — 99232 SBSQ HOSP IP/OBS MODERATE 35: CPT

## 2023-02-08 RX ORDER — ENOXAPARIN SODIUM 100 MG/ML
100 INJECTION SUBCUTANEOUS ONCE
Refills: 0 | Status: COMPLETED | OUTPATIENT
Start: 2023-02-08 | End: 2023-02-08

## 2023-02-08 RX ORDER — PANTOPRAZOLE SODIUM 20 MG/1
1 TABLET, DELAYED RELEASE ORAL
Qty: 30 | Refills: 0
Start: 2023-02-08 | End: 2023-03-09

## 2023-02-08 RX ORDER — POTASSIUM CHLORIDE 20 MEQ
40 PACKET (EA) ORAL ONCE
Refills: 0 | Status: COMPLETED | OUTPATIENT
Start: 2023-02-08 | End: 2023-02-08

## 2023-02-08 RX ORDER — POTASSIUM CHLORIDE 20 MEQ
1 PACKET (EA) ORAL
Qty: 30 | Refills: 0
Start: 2023-02-08 | End: 2023-03-09

## 2023-02-08 RX ORDER — ONDANSETRON 8 MG/1
1 TABLET, FILM COATED ORAL
Qty: 90 | Refills: 0
Start: 2023-02-08 | End: 2023-03-09

## 2023-02-08 RX ORDER — WARFARIN SODIUM 2.5 MG/1
1 TABLET ORAL
Qty: 0 | Refills: 0 | DISCHARGE
Start: 2023-02-08

## 2023-02-08 RX ORDER — WARFARIN SODIUM 2.5 MG/1
1 TABLET ORAL
Qty: 30 | Refills: 0
Start: 2023-02-08 | End: 2023-03-09

## 2023-02-08 RX ORDER — SENNA PLUS 8.6 MG/1
2 TABLET ORAL
Qty: 60 | Refills: 0
Start: 2023-02-08 | End: 2023-03-09

## 2023-02-08 RX ORDER — WARFARIN SODIUM 2.5 MG/1
5 TABLET ORAL ONCE
Refills: 0 | Status: DISCONTINUED | OUTPATIENT
Start: 2023-02-08 | End: 2023-02-08

## 2023-02-08 RX ADMIN — LACOSAMIDE 100 MILLIGRAM(S): 50 TABLET ORAL at 17:02

## 2023-02-08 RX ADMIN — Medication 100 MILLIGRAM(S): at 17:02

## 2023-02-08 RX ADMIN — OXCARBAZEPINE 300 MILLIGRAM(S): 300 TABLET, FILM COATED ORAL at 06:05

## 2023-02-08 RX ADMIN — AMLODIPINE BESYLATE 10 MILLIGRAM(S): 2.5 TABLET ORAL at 06:05

## 2023-02-08 RX ADMIN — CHLORHEXIDINE GLUCONATE 1 APPLICATION(S): 213 SOLUTION TOPICAL at 06:07

## 2023-02-08 RX ADMIN — Medication 40 MILLIEQUIVALENT(S): at 11:58

## 2023-02-08 RX ADMIN — Medication 100 MILLIGRAM(S): at 06:04

## 2023-02-08 RX ADMIN — LEVETIRACETAM 1500 MILLIGRAM(S): 250 TABLET, FILM COATED ORAL at 17:03

## 2023-02-08 RX ADMIN — PANTOPRAZOLE SODIUM 40 MILLIGRAM(S): 20 TABLET, DELAYED RELEASE ORAL at 06:04

## 2023-02-08 RX ADMIN — LEVETIRACETAM 1500 MILLIGRAM(S): 250 TABLET, FILM COATED ORAL at 06:05

## 2023-02-08 RX ADMIN — LACOSAMIDE 100 MILLIGRAM(S): 50 TABLET ORAL at 06:07

## 2023-02-08 RX ADMIN — OXCARBAZEPINE 300 MILLIGRAM(S): 300 TABLET, FILM COATED ORAL at 17:02

## 2023-02-08 RX ADMIN — Medication 81 MILLIGRAM(S): at 11:58

## 2023-02-08 RX ADMIN — LOSARTAN POTASSIUM 100 MILLIGRAM(S): 100 TABLET, FILM COATED ORAL at 06:04

## 2023-02-08 RX ADMIN — ENOXAPARIN SODIUM 100 MILLIGRAM(S): 100 INJECTION SUBCUTANEOUS at 12:02

## 2023-02-08 NOTE — DIETITIAN INITIAL EVALUATION ADULT - REASON INDICATOR FOR ASSESSMENT
Pt seen for Length of stay.   Source: Pt, Electronic Medical Record.  Chart reviewed, events noted.

## 2023-02-08 NOTE — PROGRESS NOTE ADULT - ASSESSMENT
1. Nausea / vomiting now improved. Etiology unclear. ?caused by antiepileptics   -CT abdomen with no acute pathology  -MRI brain with R frontal ventric. dural thicening vs subdural 2mm in bilateral cerebral and cerebellar convexities-- neuro following    -EGD on 2/6 was normal  -h pylori stool testing ordered      2. Chronic DVTs  - on coumadin, supratherapeutic INR    3. Hx of aneurysm s/p hemicraniectomy and clipping  - on ASA and statin   - rpt CT head with no acute changes     4. Anemia-- hgb now stable   -slight downtrend in hgb, now trending up  -no deficiencies on iron panel   -if downtrends again can check fobt     5. Seizure disorder  - s/p EEG with no seizure activity   - per neurology     6. CAD s/p stent     DC planning         Attending supervision statement: I have personally seen and examined the patient. I fully participated in the care of this patient. I have made amendments to the documentation where necessary, and agree with the history, physical exam, and plan as outlined by the ACP.

## 2023-02-08 NOTE — PROGRESS NOTE ADULT - SUBJECTIVE AND OBJECTIVE BOX
Chief Complaint:  Patient is a 62y old  Female who presents with a chief complaint of bradycardia (08 Feb 2023 10:48)      Date of service 02-08-23 @ 11:12      Interval Events:   Patient seen and examined.   No nausea, vomiting.  Tolerating diet well.     Hospital Medications:  acetaminophen     Tablet .. 650 milliGRAM(s) Oral every 6 hours PRN  aluminum hydroxide/magnesium hydroxide/simethicone Suspension 30 milliLiter(s) Oral every 4 hours PRN  amLODIPine   Tablet 10 milliGRAM(s) Oral daily  aspirin enteric coated 81 milliGRAM(s) Oral daily  atorvastatin 80 milliGRAM(s) Oral at bedtime  chlorhexidine 2% Cloths 1 Application(s) Topical <User Schedule>  doxazosin 4 milliGRAM(s) Oral at bedtime  enoxaparin Injectable 100 milliGRAM(s) SubCutaneous once  hydrochlorothiazide 25 milliGRAM(s) Oral daily  influenza   Vaccine 0.5 milliLiter(s) IntraMuscular once  lacosamide 100 milliGRAM(s) Oral two times a day  lactated ringers. 1000 milliLiter(s) IV Continuous <Continuous>  levETIRAcetam 1500 milliGRAM(s) Oral two times a day  lidocaine 4% Injection for Nebulization 4 milliLiter(s) Nebulizer once  losartan 100 milliGRAM(s) Oral daily  melatonin 3 milliGRAM(s) Oral at bedtime PRN  ondansetron   Disintegrating Tablet 4 milliGRAM(s) Oral three times a day  OXcarbazepine 300 milliGRAM(s) Oral two times a day  pantoprazole    Tablet 40 milliGRAM(s) Oral before breakfast  potassium chloride    Tablet ER 40 milliEquivalent(s) Oral once  senna 2 Tablet(s) Oral at bedtime  warfarin 5 milliGRAM(s) Oral once  zonisamide 100 milliGRAM(s) Oral two times a day        Review of Systems:  General:  No wt loss, fevers, chills, night sweats, fatigue,   Eyes:  Good vision, no reported pain  ENT:  No sore throat, pain, runny nose, dysphagia  CV:  No pain, palpitations, hypo/hypertension  Resp:  No dyspnea, cough, tachypnea, wheezing  GI:  See HPI  :  No pain, bleeding, incontinence, nocturia  Muscle:  No pain, weakness  Neuro:  No weakness, tingling, memory problems  Psych:  No fatigue, insomnia, mood problems, depression  Endocrine:  No polyuria, polydipsia, cold/heat intolerance  Heme:  No petechiae, ecchymosis, easy bruisability  Integumentary:  No rash, edema    PHYSICAL EXAM:   Vital Signs:  Vital Signs Last 24 Hrs  T(C): 36.7 (08 Feb 2023 04:20), Max: 37 (07 Feb 2023 20:22)  T(F): 98.1 (08 Feb 2023 04:20), Max: 98.6 (07 Feb 2023 20:22)  HR: 62 (08 Feb 2023 04:20) (58 - 62)  BP: 118/63 (08 Feb 2023 04:20) (103/68 - 139/75)  BP(mean): --  RR: 18 (08 Feb 2023 04:20) (18 - 18)  SpO2: 98% (08 Feb 2023 04:20) (98% - 100%)    Parameters below as of 08 Feb 2023 04:20  Patient On (Oxygen Delivery Method): room air      Daily     Daily       PHYSICAL EXAM:     GENERAL:  Appears stated age, well-groomed, well-nourished, no distress  HEENT:  NC/AT,  conjunctivae anicteric, clear and pink,   NECK: supple, trachea midline  CHEST:  Full & symmetric excursion, no increased effort, breath sounds clear  HEART:  Regular rhythm, no JVD  ABDOMEN:  Soft, non-tender, non-distended, normoactive bowel sounds,  no masses , no hepatosplenomegaly  EXTREMITIES:  no cyanosis,clubbing or edema  SKIN:  No rash, erythema, or, ecchymoses, no jaundice  NEURO:  Alert, non-focal, no asterixis  PSYCH: Appropriate affect, oriented to place and time  RECTAL: Deferred      LABS Personally reviewed by me:                        10.4   4.27  )-----------( 210      ( 08 Feb 2023 05:56 )             31.2     Mean Cell Volume: 87.4 fl (02-08-23 @ 05:56)    02-08    141  |  107  |  23  ----------------------------<  96  3.2<L>   |  23  |  1.40<H>    Ca    9.4      08 Feb 2023 05:56        PT/INR - ( 08 Feb 2023 05:56 )   PT: 14.9 sec;   INR: 1.28 ratio         PTT - ( 07 Feb 2023 06:53 )  PTT:30.0 sec                            10.4   4.27  )-----------( 210      ( 08 Feb 2023 05:56 )             31.2                         10.2   5.31  )-----------( 215      ( 07 Feb 2023 06:53 )             31.5                         10.2   4.78  )-----------( 215      ( 06 Feb 2023 06:40 )             31.2       Imaging personally reviewed by me:

## 2023-02-08 NOTE — PROGRESS NOTE ADULT - ASSESSMENT
61yo black F with CAD s/p PCI 2003, htn, brain aneurysm s/p hemicraniectomy and clipping (2013), seizures post hemicraniectomy, chronic DVTs on Coumadin p/w bradycardia .   CTH and CTA H/N 5/2022: frontal lobes encephalomalacia, old L occipital infarct, CTA H/N neg, aneusym clipi in [facility]   TTE as above 1/25  + COVID  CTH old L PCA infarct, evidence of cranioplasty. no acute findings   EEG with seizure focus no seiuzres ; R frontal spikes. potential for seizure from R frontal   CTH 1/29 stable. bifrontal crani and encephalomalacia. old L PCA and R parietal infarct   2/5: recalled for n/v, patient states the only episode she has was when she was being discharged. now she is AAOx3, no n/v, feels at her baseline . last saw neuro dr doran 3-4 months ago   MRI brain with R frontal ventric.  dural thicening vs subdural 2mm in bilateral cerebral and cerebellar convexities   s/p EGD 2/6   repeat EEG 2/6 with known left temporal cortical dysfunction but no seizures or epileptiform pattern seen   CTH no hemorrhage     Impression:   1) aneurysm s/p clipping and hemicrani  2) epilepsy  3) occipital stroke L and R parietal, ESUS     - neurosx, recs appreicated   - AC for DVT , on coumadin   - c/w home AEDs --> on keppra 1500mg BID, vimpat 100mg BID, Oxcarbazepine 300mg BID, and zonisamide 100mg BID   - for secondary stroke prevention c/w ASA and statin therapy with LDL goal < 70    - cardio following  - GI following   - EP , would benefit from ILR for gema cardiac and to r/o occult AFib as cause of her occipital stroke, parietal  --> refused. consider event monitor   - telemetry  - PT/OT/SS/SLP, OOBC  - check FS, glucose control <180  - GI/DVT ppx  - sees Dr doran outaptient ( she states she saw him about 3 months ago and he has been also adjusting meds in past)    dcp when stable   spoke with ACP, GI, cardio, and PCP   - spoke with daughter 2/7 via facetime on phone with patient   no objection to d/c planning   Tristan Braun MD  Vascular Neurology  Office: [phone]

## 2023-02-08 NOTE — DIETITIAN INITIAL EVALUATION ADULT - ORAL INTAKE PTA/DIET HISTORY
Pt reports good/normal appetite and PO intake PTA. Pt reports consuming regular foods at home; confirms NKFA.

## 2023-02-08 NOTE — PROGRESS NOTE ADULT - SUBJECTIVE AND OBJECTIVE BOX
DATE OF SERVICE: 02-08-23 @ 11:13    Patient is a 62y old  Female who presents with a chief complaint of bradycardia (08 Feb 2023 10:48)      INTERVAL HISTORY: no complaints     REVIEW OF SYSTEMS:  CONSTITUTIONAL: No weakness  EYES/ENT: No visual changes;  No throat pain   NECK: No pain or stiffness  RESPIRATORY: No cough, wheezing; No shortness of breath  CARDIOVASCULAR: No chest pain or palpitations  GASTROINTESTINAL: No abdominal  pain. No nausea, vomiting, or hematemesis  GENITOURINARY: No dysuria, frequency or hematuria  NEUROLOGICAL: No stroke like symptoms  SKIN: No rashes  	  MEDICATIONS:  amLODIPine   Tablet 10 milliGRAM(s) Oral daily  doxazosin 4 milliGRAM(s) Oral at bedtime  hydrochlorothiazide 25 milliGRAM(s) Oral daily  losartan 100 milliGRAM(s) Oral daily        PHYSICAL EXAM:  T(C): 36.7 (02-08-23 @ 04:20), Max: 37 (02-07-23 @ 20:22)  HR: 62 (02-08-23 @ 04:20) (58 - 62)  BP: 118/63 (02-08-23 @ 04:20) (103/68 - 139/75)  RR: 18 (02-08-23 @ 04:20) (18 - 18)  SpO2: 98% (02-08-23 @ 04:20) (98% - 100%)  Wt(kg): --  I&O's Summary    07 Feb 2023 07:01  -  08 Feb 2023 07:00  --------------------------------------------------------  IN: 460 mL / OUT: 0 mL / NET: 460 mL    08 Feb 2023 07:01  -  08 Feb 2023 11:13  --------------------------------------------------------  IN: 200 mL / OUT: 0 mL / NET: 200 mL          Appearance: In no distress	  HEENT:    PERRL, EOMI	  Cardiovascular:  S1 S2, No JVD  Respiratory: Lungs clear to auscultation	  Gastrointestinal:  Soft, Non-tender, + BS	  Vascularature:  No edema of LE  Psychiatric: Appropriate affect   Neuro: no acute focal deficits                               10.4   4.27  )-----------( 210      ( 08 Feb 2023 05:56 )             31.2     02-08    141  |  107  |  23  ----------------------------<  96  3.2<L>   |  23  |  1.40<H>    Ca    9.4      08 Feb 2023 05:56          Labs personally reviewed      ASSESSMENT/PLAN: 	  Ms. Denis is a 61 yo female with PMH of CAD s/p PCI in 2003, HTN, brain aneursyms s/p hemicraniotomy and clipping in 2013 c/b epilepsy chronic DVT now on coumadin who presents with symptomatic bradycardia.    Problem/Plan -1  Problem: Bradycardia  - ECG reveals SB; tele shows HR mostly in 30-40s   - TTE unremarkable  - TSH wnl  - HR  improved  60 - 70s bpm  - remain off Clonidine and Labetalol  - No need for monitor as OP as HR has been stable while on cont tele    Problem/Plan -2  Problem: Hypertension  - Patient hypotensive with EMS but on multiple antihypertensive meds  - c/w all home meds with the exception of labetalol and clonidine     Problem/Plan -3  Problem: Coronary Artery Disease  - s/p remote PCI  - Denies CP or SOB  - Echo unremarkable  - c/w ASA 81mg PO daily, rosuvastatin 40mg PO daily    Problem/Plan -4  Problem: hx of DVT  - c/w coumadin and dose to INR 2-3  - Discussed with vascular Dr Powell, c/w coumadin    Problem/Plan -5  Problem: Hx of Brain Aneursyms with intervention and Epilepsy  - Now with increased forgetfulness and lethargy  - Neuro recs appreciated.   - CTH shows no evidence of hydrocephalus, Bifrontal encephalomalacia, Old left PCA infarct, Small right parietal parasagittal infarct  - Keppra dose adjusted based on serum level  - As per daughter, reports increased forgetfulness and repetitiveness  - No need for ILR for patient already on AC with coumadin and HR have improved off Clonidine and Labetalol  - Appreciate Neuro input on MRI Brain and EEG     Problem/Plan -6  Problem: Emesis  - No WBC elevation, no fever  - Not a/w meals  - Denies Diarrhea, epigastic pain  - Lipase wnl  - Abdominal XR wnl  - Appreciate GI consult   - CT A/P with no acute pathology  - Endoscopy wnl. Vomitting has revolved.   - F/u H. Pylori stool    Problem/Plan -7  Problem: SAH  - Suspected SAH noted on MR Brain  - NSGx consulted: recommend repeat CTH, no acute intervention at this time  - Will cont with coumadin  - Appreciate Neuro input            RENÉE Magana DO [facility]  Cardiovascular Medicine  [address] [facility] [facility], [address]  Office: [phone]  Available via call/text on [employer] Teams

## 2023-02-08 NOTE — PROGRESS NOTE ADULT - NS ATTEND AMEND GEN_ALL_CORE FT
Patient care and plan discussed and reviewed with Advanced Care Provider. Plan as outlined above edited by me to reflect our discussion.
No indication for pacemaker. Tolerating discontinuation of labetalol and halving dose of clonidine without hypertension and heart rate has come up at 10 bpm. EP will sign off. OK for discharge from our standpoint. Patient does not want extended outpatient telemetry monitoring
Patient care and plan discussed and reviewed with Advanced Care Provider. Plan as outlined above edited by me to reflect our discussion.

## 2023-02-08 NOTE — DIETITIAN INITIAL EVALUATION ADULT - NSFNSADHERENCEPTAFT_GEN_A_CORE
Pt denies following any therapeutic diet at home. Pt endorses she avoids "sugar free" foods because they interfere with her seizure medications. Pt also reports avoiding caffeine secondary to heart complications associated when she consumes it.

## 2023-02-08 NOTE — PROGRESS NOTE ADULT - PROBLEM SELECTOR PROBLEM 6
CAD (coronary artery disease)
History of DVT (deep vein thrombosis)
CAD (coronary artery disease)
CAD (coronary artery disease)
History of DVT (deep vein thrombosis)

## 2023-02-08 NOTE — PROGRESS NOTE ADULT - PROVIDER SPECIALTY LIST ADULT
Cardiology
Gastroenterology
Hospitalist
Neurology
Cardiology
Electrophysiology
Electrophysiology
Hospitalist
Hospitalist
Neurology
Neurology
Cardiology
Gastroenterology
Neurology
Hospitalist
Gastroenterology
Hospitalist

## 2023-02-08 NOTE — PROGRESS NOTE ADULT - PROBLEM SELECTOR PLAN 1
Sinus bradycardia, likely medication induced - BB vs clonidine  Labetalol discontinued; clonidine dose   TTE reviewed; TSH wnl  F/u further EP recs  Cont to monitor on tele  ILR vs biotel on discharge
Sinus bradycardia, likely medication induced - BB vs clonidine  Labetolol dose decreased to once a day, held this AM; bradycardia persists  Hold labetolol for now  F/u further EP recs  F/u TTE  F/u TSH  Cont to monitor on tele  ILR vs biotel on discharge
Unclear cause of nausea/vomiting   -keppra level elevated; per neuro, elevated level does not cause nausea or bradycardia, switch to oral  EEG reviewed with Dr. Braun - no active seizure. continue current AED doses.   -AXR reviewed; no evidence of obstruction  -repeat CTH showing no acute changes from prior imaging   -GI consulted - zofran (changed to ODT) switched to pre-meals. no further diarrhea so GI pcr not sent- will continue to monitor. now tolerating diet better  -Monitor electrolytes  -c/w antiemetics
Likely 2/2 COVID infection  -f/u AED levels to r/o toxicity given recent medication changes  -AXR reviewed; no evidence of obstruction  -Monitor electrolytes  -Antiemetics as needed  -Although no focal deficits, might consider CTH if above results negative and symptoms persist
Unclear cause of nausea/vomiting   appears to have improved currently   -keppra level elevated; per neuro, elevated level does not cause nausea or bradycardia, switch to oral  EEG reviewed with Dr. Braun - no active seizure. continue current AED doses.   - 24 hr EEG ordered to be done   -AXR reviewed; no evidence of obstruction  -repeat CTH showing no acute changes from prior imaging  - MRI head ordered to be done    -GI f/u appreciated- zofran (changed to ODT) switched to pre-meals. no further diarrhea so GI pcr not sent- will continue to monitor. now tolerating diet better. will check GES for possible gastroparesis. CT abdomen also ordered to be done    -Monitor electrolytes  -c/w antiemetics, prior physician discussed possible start of reglan and potential side effects with patient
resolved  egd planned for today  -keppra level elevated; per neuro, elevated level does not cause nausea or bradycardia, switch to oral  EEG reviewed with Dr. Braun - no active seizure. continue current AED doses.
Acute onset and unclear reason; pt denies associated abdominal pain, diarrhea.  -Will send levels of antiepileptics to r/o toxicity given recent medication changes  -AXR reviewed; no evidence of obstruction  -F/u HCG (unlikely), ALP, LFTs, viral panel  -Monitor electrolytes  -Antiemetics as needed  -Although no focal deficits, might consider CTH if above results negative and symptoms persist
Now that covid is negative, etiology once again unknown  -keppra level elevated; per neuro, elevated level does not cause nausea or bradycardia, switch to oral  -f/u final AED levels to r/o toxicity given recent medication changes  -AXR reviewed; no evidence of obstruction  -Monitor electrolytes  -Antiemetics as needed  -Although no focal deficits, will order CTH noncon given hx of aneurysm and current slowed speech
Unclear cause of nausea/vomiting   -keppra level elevated; per neuro, elevated level does not cause nausea or bradycardia, switch to oral  EEG reviewed with Dr. Braun - no active seizure. continue current AED doses.   -AXR reviewed; no evidence of obstruction  -repeat CTH showing no acute changes from prior imaging   -GI f/u appreciated- zofran (changed to ODT) switched to pre-meals. no further diarrhea so GI pcr not sent- will continue to monitor. now tolerating diet better. will check GES for possible gastroparesis. still no abd pain/tenderness to warrant a ct a/p (which would need to be without contrast in setting of ROBINA) at this time.   -Monitor electrolytes  -c/w antiemetics, discussed possible start of reglan and potential side effects with patient
Unclear cause of nausea/vomiting   -keppra level elevated; per neuro, elevated level does not cause nausea or bradycardia, switch to oral  -f/u final AED levels to r/o toxicity given recent medication changes - pending   -AXR reviewed; no evidence of obstruction  -repeat CTH showing no acute changes from prior imaging   -GI consulted - zofran switched to pre-meals, will check GI PCR if diarrhea recurs; to c/t follow   -Monitor electrolytes  -c/w antiemetics
resolved  s/p egd-->normal findings  -keppra level elevated; per neuro, elevated level does not cause nausea or bradycardia, switch to oral  EEG reviewed with Dr. Braun - no active seizure. continue current AED doses.
Unclear cause of nausea/vomiting   appears to have improved currently   -keppra level elevated; per neuro, elevated level does not cause nausea or bradycardia, switch to oral  EEG reviewed with Dr. Braun - no active seizure. continue current AED doses.   - 24 hr EEG ordered to be done   -AXR reviewed; no evidence of obstruction  -repeat CTH showing no acute changes from prior imaging  - MRI head ordered to be done    -GI f/u appreciated- zofran (changed to ODT) switched to pre-meals. no further diarrhea so GI pcr not sent- will continue to monitor. now tolerating diet better. will check GES for possible gastroparesis. CT abdomen also ordered to be done    -Monitor electrolytes  -c/w antiemetics, prior physician discussed possible start of reglan and potential side effects with patient
resolved  s/p egd-->normal findings  -keppra level elevated; per neuro, elevated level does not cause nausea or bradycardia, switch to oral  EEG reviewed with Dr. Braun - no active seizure. continue current AED doses.

## 2023-02-08 NOTE — PROGRESS NOTE ADULT - PROBLEM SELECTOR PLAN 2
Sinus bradycardia, likely medication induced - BB vs clonidine; improved  Labetalol and clonidine now discontinued per cardiology  TTE reviewed; TSH wnl  F/u further EP recs  Cont to monitor on tele  ILR vs biotel on discharge
Sinus bradycardia, likely medication induced - BB vs clonidine; improved  Labetalol and clonidine now discontinued per cardiology  TTE reviewed; TSH wnl  F/u further EP recs  Cont to monitor on tele  Pt is open to biotel or zio patch on dc
BP at goal  Cont clonidine, losartan, amlodipine, HCTZ  F/u Renal U/S r/o DULCE  Cont to monitor vitals
BP at goal  Cont clonidine, losartan, amlodipine, HCTZ  Renal US without evidence of DULCE  Cont to monitor vitals
Sinus bradycardia, likely medication induced - BB vs clonidine; improved  Labetalol and clonidine now discontinued per cardiology  TTE reviewed; TSH wnl  F/u further EP recs  Cont to monitor on tele  Pt is open to biotel or zio patch on dc
Sinus bradycardia, likely medication induced - BB vs clonidine; improved  Labetalol and clonidine now discontinued per cardiology  TTE reviewed; TSH wnl  F/u further EP recs  Cont to monitor on tele  Pt is open to biotel or zio patch on dc
Sinus bradycardia, likely medication induced - BB vs clonidine; improved  Labetalol and clonidine now discontinued per cardiology  TTE reviewed; TSH wnl  Cont to monitor on tele  no biotel on discharge - no further bradycardia and pt will follow up with Dr. Ojeda as outpatient.
Sinus bradycardia, likely medication induced - BB vs clonidine; now resolved.  Labetalol and clonidine now discontinued per cardiology  TTE reviewed; TSH wnl  Cont to monitor on tele  no biotel on discharge - no further bradycardia and pt will follow up with Dr. Ojeda as outpatient.
Sinus bradycardia, likely medication induced - BB vs clonidine; now resolved.  Labetalol and clonidine now discontinued per cardiology  TTE reviewed; TSH wnl  Cont to monitor on tele  no biotel on discharge - no further bradycardia and pt will follow up with Dr. Ojeda as outpatient.
Sinus bradycardia, likely medication induced - BB vs clonidine; improved  Labetalol and clonidine now discontinued per cardiology  TTE reviewed; TSH wnl  Cont to monitor on tele  no biotel on discharge - no further bradycardia and pt will follow up with Dr. Ojeda as outpatient.
Sinus bradycardia, likely medication induced - BB vs clonidine; improved  Labetalol and clonidine now discontinued per cardiology  TTE reviewed; TSH wnl  F/u further EP recs  Cont to monitor on tele  ILR vs biotel on discharge
Sinus bradycardia, likely medication induced - BB vs clonidine; improved  Labetalol and clonidine now discontinued per cardiology  TTE reviewed; TSH wnl  Cont to monitor on tele  no biotel on discharge - no further bradycardia and pt will follow up with Dr. Ojeda as outpatient.
Sinus bradycardia, likely medication induced - BB vs clonidine; now resolved.  Labetalol and clonidine now discontinued per cardiology  TTE reviewed; TSH wnl  Cont to monitor on tele  no biotel on discharge - no further bradycardia and pt will follow up with Dr. Ojeda as outpatient.

## 2023-02-08 NOTE — DIETITIAN INITIAL EVALUATION ADULT - REASON
Pt with no overt signs of muscle wasting or fat loss. Pt reports good appetite and PO intake PTA and in house.

## 2023-02-08 NOTE — DIETITIAN INITIAL EVALUATION ADULT - PERTINENT MEDS FT
MEDICATIONS  (STANDING):  amLODIPine   Tablet 10 milliGRAM(s) Oral daily  aspirin enteric coated 81 milliGRAM(s) Oral daily  atorvastatin 80 milliGRAM(s) Oral at bedtime  chlorhexidine 2% Cloths 1 Application(s) Topical <User Schedule>  doxazosin 4 milliGRAM(s) Oral at bedtime  hydrochlorothiazide 25 milliGRAM(s) Oral daily  influenza   Vaccine 0.5 milliLiter(s) IntraMuscular once  lacosamide 100 milliGRAM(s) Oral two times a day  lactated ringers. 1000 milliLiter(s) (75 mL/Hr) IV Continuous <Continuous>  levETIRAcetam 1500 milliGRAM(s) Oral two times a day  lidocaine 4% Injection for Nebulization 4 milliLiter(s) Nebulizer once  losartan 100 milliGRAM(s) Oral daily  ondansetron   Disintegrating Tablet 4 milliGRAM(s) Oral three times a day  OXcarbazepine 300 milliGRAM(s) Oral two times a day  pantoprazole    Tablet 40 milliGRAM(s) Oral before breakfast  senna 2 Tablet(s) Oral at bedtime  warfarin 5 milliGRAM(s) Oral once  zonisamide 100 milliGRAM(s) Oral two times a day    MEDICATIONS  (PRN):  acetaminophen     Tablet .. 650 milliGRAM(s) Oral every 6 hours PRN Temp greater or equal to 38C (100.4F), Mild Pain (1 - 3)  aluminum hydroxide/magnesium hydroxide/simethicone Suspension 30 milliLiter(s) Oral every 4 hours PRN Dyspepsia  melatonin 3 milliGRAM(s) Oral at bedtime PRN Insomnia

## 2023-02-08 NOTE — PROGRESS NOTE ADULT - PROBLEM SELECTOR PLAN 3
BP at goal  Cont losartan, amlodipine, HCTZ  Renal US without evidence of DULCE  Cont to monitor vitals
Cont home Levetiracetam, Lacosamide and Zonisamide.
BP at goal  Cont losartan, amlodipine, HCTZ  Renal US without evidence of DULCE  Cont to monitor vitals
BP at goal  Cont losartan, amlodipine, HCTZ  Renal US without evidence of DULCE  Cont to monitor vitals
- creatinine improving from before but still elevated at 1.47 this morning  - will continue with IVF for now and monitor   - monitor BMP daily
BP at goal  Cont losartan, amlodipine, HCTZ  Renal US without evidence of DULCE  Cont to monitor vitals
- resolved s/p IVF
- resolved s/p IVF
Cont home Levetiracetam, Lacosamide and Zonisamide.
BP at goal  Cont losartan, amlodipine, HCTZ  Renal US without evidence of DULCE  Cont to monitor vitals
- resolved s/p IVF
BP at goal  Cont losartan, amlodipine, HCTZ  Renal US without evidence of DULCE  Cont to monitor vitals
- now resolved s/p IVF   - continue to monitor BMP daily

## 2023-02-08 NOTE — PROGRESS NOTE ADULT - REASON FOR ADMISSION
bradycardia

## 2023-02-08 NOTE — DIETITIAN INITIAL EVALUATION ADULT - PERTINENT LABORATORY DATA
02-08    141  |  107  |  23  ----------------------------<  96  3.2<L>   |  23  |  1.40<H>    Ca    9.4      08 Feb 2023 05:56

## 2023-02-08 NOTE — PROGRESS NOTE ADULT - PROBLEM SELECTOR PROBLEM 1
Bradycardia
Vomiting
Vomiting
Bradycardia
Vomiting

## 2023-02-08 NOTE — DIETITIAN INITIAL EVALUATION ADULT - NSFNSGIIOFT_GEN_A_CORE
Pt endorses nausea/vomiting x admission, but reports last episode of vomiting was last week - noted, pt prescribed zofran. Pt denies diarrhea/constipation, per pt last BM x 2 days. Bowel regimen: senna.

## 2023-02-08 NOTE — PROGRESS NOTE ADULT - PROBLEM SELECTOR PLAN 6
Stable
- INR down trending. will increase coumadin back to her home dose of 5 mg tonight  - check INR daily
- INR down trending. will increase coumadin back to her home dose of 5 mg tonight  - check INR daily
Stable
home coumadin held 1/28, 1/29, and 1/30 due to supratherapeutic INR  INR supratherapeutic- held again 2/3 and 2/4  - INR now within range; per pharmacy recs, can give dose of 3mg tonight  - will give dose of 3mg tonight and monitor    - check INR daily
Stable
Cont home coumadin; held 1/28, 1/29, and 1/30 due to supratherapeutic INR  INR supratherapeutic- held again 2/3; will plan to hold again tonight since still elevated   - check INR in am
home coumadin held 1/28, 1/29, and 1/30 due to supratherapeutic INR  INR supratherapeutic- held again 2/3 and 2/4  - INR now within range.  - will give another dose of 3mg tonight     - check INR daily

## 2023-02-08 NOTE — DIETITIAN INITIAL EVALUATION ADULT - NS FNS DIET ORDER
Doing well.    Diet, Regular:   DASH/TLC {Sodium & Cholesterol Restricted} (DASH) (01-24-23 @ 21:08) [Active]

## 2023-02-08 NOTE — PROGRESS NOTE ADULT - NS ATTEND OPT1 GEN_ALL_CORE

## 2023-02-08 NOTE — PROGRESS NOTE ADULT - SUBJECTIVE AND OBJECTIVE BOX
Neurology Progress Note    S: Patient seen and examined. states she is at her baseline. AAOx3 no n/v ; CTH no hemorrhage     Medication:  MEDICATIONS  (STANDING):  amLODIPine   Tablet 10 milliGRAM(s) Oral daily  aspirin enteric coated 81 milliGRAM(s) Oral daily  atorvastatin 80 milliGRAM(s) Oral at bedtime  chlorhexidine 2% Cloths 1 Application(s) Topical <User Schedule>  doxazosin 4 milliGRAM(s) Oral at bedtime  hydrochlorothiazide 25 milliGRAM(s) Oral daily  influenza   Vaccine 0.5 milliLiter(s) IntraMuscular once  lacosamide 100 milliGRAM(s) Oral two times a day  lactated ringers. 1000 milliLiter(s) (75 mL/Hr) IV Continuous <Continuous>  levETIRAcetam 1500 milliGRAM(s) Oral two times a day  lidocaine 4% Injection for Nebulization 4 milliLiter(s) Nebulizer once  losartan 100 milliGRAM(s) Oral daily  ondansetron   Disintegrating Tablet 4 milliGRAM(s) Oral three times a day  OXcarbazepine 300 milliGRAM(s) Oral two times a day  pantoprazole    Tablet 40 milliGRAM(s) Oral before breakfast  senna 2 Tablet(s) Oral at bedtime  warfarin 5 milliGRAM(s) Oral once  zonisamide 100 milliGRAM(s) Oral two times a day    MEDICATIONS  (PRN):  acetaminophen     Tablet .. 650 milliGRAM(s) Oral every 6 hours PRN Temp greater or equal to 38C (100.4F), Mild Pain (1 - 3)  aluminum hydroxide/magnesium hydroxide/simethicone Suspension 30 milliLiter(s) Oral every 4 hours PRN Dyspepsia  melatonin 3 milliGRAM(s) Oral at bedtime PRN Insomnia      Vitals:    Vital Signs Last 24 Hrs  T(C): 36.8 (02-08-23 @ 11:32), Max: 37 (02-07-23 @ 20:22)  T(F): 98.3 (02-08-23 @ 11:32), Max: 98.6 (02-07-23 @ 20:22)  HR: 68 (02-08-23 @ 11:32) (60 - 68)  BP: 110/69 (02-08-23 @ 11:32) (110/69 - 139/75)  BP(mean): --  RR: 18 (02-08-23 @ 11:32) (18 - 18)  SpO2: 97% (02-08-23 @ 11:32) (97% - 100%)          General Exam:   General Appearance: Appropriately dressed and in no acute distress       Head: Normocephalic, atraumatic and no dysmorphic features  Ear, Nose, and Throat: Moist mucous membranes  CVS: S1S2+  Resp: No SOB, no wheeze or rhonchi  GI: soft NT/ND  Extremities: No edema or cyanosis  Skin: No bruises or rashes     Neurological Exam:  Mental Status: Awake, alert and oriented x 3.  Able to follow simple and complex verbal commands. Able to name and repeat. fluent speech. No obvious aphasia or dysarthria noted.   Cranial Nerves: PERRL, EOMI, VFFC, sensation V1-V3 intact,  no obvious facial asymmetry, equal elevation of palate, scm/trap 5/5, tongue is midline on protrusion. no obvious papilledema on fundoscopic exam. hearing is grossly intact.   Motor: Normal bulk, tone and strength throughout. Fine finger movements were intact and symmetric. no tremors or drift noted.    Sensation: Intact to light touch and pinprick throughout. no right/left confusion. no extinction to tactile on DSS.    Reflexes: 1+ throughout at biceps, brachioradialis, triceps, patellars and ankles bilaterally and equal. No clonus. R toe and L toe were both downgoing.  Coordination: No dysmetria on FNF   Gait: deferred         I personally reviewed the below data/images/labs:  CBC Full  -  ( 08 Feb 2023 05:56 )  WBC Count : 4.27 K/uL  RBC Count : 3.57 M/uL  Hemoglobin : 10.4 g/dL  Hematocrit : 31.2 %  Platelet Count - Automated : 210 K/uL  Mean Cell Volume : 87.4 fl  Mean Cell Hemoglobin : 29.1 pg  Mean Cell Hemoglobin Concentration : 33.3 gm/dL  Auto Neutrophil # : x  Auto Lymphocyte # : x  Auto Monocyte # : x  Auto Eosinophil # : x  Auto Basophil # : x  Auto Neutrophil % : x  Auto Lymphocyte % : x  Auto Monocyte % : x  Auto Eosinophil % : x  Auto Basophil % : x    02-08    141  |  107  |  23  ----------------------------<  96  3.2<L>   |  23  |  1.40<H>    Ca    9.4      08 Feb 2023 05:56           Patient name: BELLE MENENDEZ  YOB: 1960   Age: 62 (F)   MR#: [id number]  Study Date: 1/25/2023  [facility]  Study quality: Technically good  Referring Physician: Bibiana Merchant MD  Blood Pressure: 111/54 mmHg  Height: 157 cm  Weight: 70 kg  BSA: 1.7 m2  ------------------------------------------------------------------------  PROCEDURE: Transthoracic echocardiogram with 2-D, M-Mode  and complete spectral and color flow Doppler.  INDICATION: Abnormal electrocardiogram (ECG) (EKG) (R94.31)  ------------------------------------------------------------------------  Dimensions:    Normal Values:  LA:     3.9    2.0 - 4.0 cm  Ao:     2.9    2.0 - 3.8 cm  SEPTUM: 1.0    0.6 - 1.2 cm  PWT:    0.8    0.6 - 1.1 cm  LVIDd:  4.7    3.0 - 5.6 cm  LVIDs:  2.6    1.8 - 4.0 cm  Derived variables:  LVMI: 83 g/m2  RWT: 0.34  Fractional short: 45 %  EF (Carreno Rule): 61 %Doppler Peak Velocity (m/sec):  AoV=1.4  ------------------------------------------------------------------------  Observations:  Mitral Valve: Normal mitral valve. Minimal mitral  regurgitation.  Aortic Valve/Aorta: Thickened trileaflet aortic valve. Peak  transaortic valve gradient equals 8 mm Hg. No aortic valve  regurgitation seen. Peak left ventricular outflow tract  gradient equals 5 mm Hg, mean gradient is equal to 2 mm Hg,  LVOT velocity time integral equals 26 cm.  Aortic Root: 2.9 cm.  Ascending Aorta: 3.3 cm.  LVOT diameter: 1.9 cm.  Left Atrium: Normal left atrium.  LA volume index = 25  cc/m2.  Left Ventricle: Normal left ventricular systolic function.  No segmental wall motion abnormalities. Normal left  ventricular internal dimensions and wall thickness. Normal  diastolic function.  Right Heart: Normal right atrium. Normal right ventricular  size and function. Normal tricuspid valve. Mild tricuspid  regurgitation. Pulmonic valve not well visualized, probably  normal. No pulmonic regurgitation.  Pericardium/Pleura: Normal pericardium with no pericardial  effusion.  Hemodynamic: Estimated right atrial pressure is 8 mm Hg.  Estimated right ventricular systolic pressure equals 31 mm  Hg, assuming right atrial pressure equals 8 mm Hg,  consistent with normal pulmonary pressures.  ------------------------------------------------------------------------  Conclusions:  1. Normal left ventricular internal dimensions and wall  thickness.  2. Normal left ventricular systolic function. No segmental  wall motion abnormalities.  3. Normal right ventricular size and function.  4. Estimated pulmonary artery systolic pressure equals 31  mm Hg, assuming right atrial pressure equals 8  mm Hg,  consistent with normal pulmonary pressures.  ------------------------------------------------------------------------  Confirmed on  1/25/2023 - 17:33:29 by PAULA Kimble  ------------------------------------------------------------------------    < end of copied text >    < from: CT Head No Cont (01.29.23 @ 22:44) >    ACC: [id number] EXAM:  CT BRAIN   ORDERED BY: JESSICA JUDGE CINO     PROCEDURE DATE:  01/29/2023          INTERPRETATION:  INDICATIONS:  AMS    h/i brain aneurysm    TECHNIQUE:  Serial axial images were obtained from the skull base to the   vertex without intravenous contrast. Sagittal and Coronal reformats were   performed    COMPARISON EXAMINATION: 5/7/2022    FINDINGS:  VENTRICLES AND SULCI:  Right frontal shunt catheter with its tip at the   level of the septum pellucidum. 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 Evidence of right parietal   parasagittal distribution infarct seen on the prior as well. Bifrontal   encephalomalacia with evidence of aneurysm clips in the midline at the   level of the interhemispheric fissure in the distribution of the anterior   cerebral artery. Right basal ganglia lacunar infarct seen on the prior as   well  EXTRA-AXIAL:  No mass or collection is seen.  VISUALIZED SINUSES:  Clear.  VISUALIZED MASTOIDS:  Clear.  CALVARIUM:  Bifrontal craniectomy with cranioplasty  MISCELLANEOUS:  None.    IMPRESSION:  Ventricles are better visualized compared with the prior   though no evidence of hydrocephalus. Bifrontal cranioplasty. 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 Small right parietal parasagittal   infarct    --- End of Report ---     < from: CT Head No Cont (01.29.23 @ 22:44) >    ACC: [id number] EXAM:  CT BRAIN   ORDERED BY: JESSICA HORTON     PROCEDURE DATE:  01/29/2023          INTERPRETATION:  INDICATIONS:  AMS    h/i brain aneurysm    TECHNIQUE:  Serial axial images were obtained from the skull base to the   vertex without intravenous contrast. Sagittal and Coronal reformats were   performed    COMPARISON EXAMINATION: 5/7/2022    FINDINGS:  VENTRICLES AND SULCI:  Right frontal shunt catheter with its tip at the   level of the septum pellucidum. 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 Evidence of right parietal   parasagittal distribution infarct seen on the prior as well. Bifrontal   encephalomalacia with evidence of aneurysm clips in the midline at the   level of the interhemispheric fissure in the distribution of the anterior   cerebral artery. Right basal ganglia lacunar infarct seen on the prior as   well  EXTRA-AXIAL:  No mass or collection is seen.  VISUALIZED SINUSES:  Clear.  VISUALIZED MASTOIDS:  Clear.  CALVARIUM:  Bifrontal craniectomy with cranioplasty  MISCELLANEOUS:  None.    IMPRESSION:  Ventricles are better visualized compared with the prior   though no evidence of hydrocephalus. Bifrontal cranioplasty. 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 Small right parietal parasagittal   infarct    --- End of Report ---       DANYA AGUDELO MD; Attending Radiologist  This document has been electronically signed. Jan 30 2023 10:51AM    < end of copied text >  < from: MR Head No Cont (02.05.23 @ 21:25) >    ACC: [id number] EXAM:  MR BRAIN   ORDERED BY: EUSEBIO BRITO     PROCEDURE DATE:  02/05/2023          INTERPRETATION:  CLINICAL INFORMATION: AMS. MMR. Admitting Dxs: R00.1 LOW   HR.    TECHNIQUE: Multiplanar, multisequence brain MRI was performedwithout   intravenous contrast.    COMPARISON: CT head 1/29/2023.    FINDINGS:    Right frontal approach ventriculostomy catheter with tip in the right   frontal horn. No hydrocephalus.    Bilateral frontal craniectomy and cranioplasty. Artifact in the anterior   interhemispheric region from embolic material. Encephalomalacia and   gliosis in the bilateral anterior frontal lobes with chronic hemorrhage.   Chronic left occipital infarct. Chronic lacunar infarcts in the bilateral   basal ganglia, thalami, evette and cerebellum.    T2/FLAIR hyperintensity along the bilateral cerebral and cerebellar   convexities measuring 2 mm suggesting dural thickening versus subdural   collections.    There is no evidence of acute infarct. Scattered foci of T2/FLAIR   hyperintensity in the bilateral hemispheric white matter are nonspecific   but likely related to chronic white matter microvascular ischemic disease.    The paranasal sinuses demonstrate no signal abnormality. The mastoids   demonstrate no signal abnormality.  Bilateral orbits are within normal   limits.      IMPRESSION:  1.  Right frontal approach ventriculostomy catheter with tip in the right   frontal horn. No hydrocephalus.  2.  T2/FLAIR hyperintensity along the bilateral cerebral and cerebellar   convexities measuring 2 mm suggesting dural thickening versus subdural   collections.    --- End of Report ---     EEG IMPRESSION/CLINICAL CORRELATE    Abnormal EEG study.    Regional cortical dysfunction in the left temporal area  Mild nonspecific diffuse or multifocal cerebral dysfunction.   No epileptiform pattern or seizure seen.      < from: CT Head No Cont (02.07.23 @ 18:41) >    ACC: [id number] EXAM:  CT BRAIN   ORDERED BY:  UMER VAZQUEZ DATE:  02/07/2023          INTERPRETATION:  Noncontrast CT of the brain.    CLINICAL INDICATION:  Tiny bilateral subdural collections versus dural   thickening seen on recent MRI    TECHNIQUE : Axial CT scanning of the brain was obtained from the skull   base to the vertex without the administration of intravenous contrast.   Sagittal and coronal reformats were provided.    COMPARISON: MRI brain 2/5/2023    FINDINGS:    Redemonstration of bifrontal craniectomy and cranioplasty.    Redemonstration of right frontal approach tracheostomy catheter in   unchanged position.    Redemonstration of extensive encephalomalacia and gliosis involving the   bilateral anterior frontallobes.    Ventricles are similar in size. No hydrocephalus, midline shift, or   effacement of basal cisterns.    Chronic left occipital, bilateral cerebellar hemisphere, and left basal   ganglia infarcts.    Minimal prominence of the extra-axial spaces along the lateral   convexities.    No acute intracranial hemorrhage or brain edema.    The visualized paranasal sinuses and mastoid air cells are clear.    IMPRESSION:    Minimal prominence of the extra-axial spaces along the lateral   convexities.    No acute intracranial hemorrhage or brain edema.    Chronic ischemic changes.    --- End of Report ---            TREVIN AVALOS MD; Attending Radiologist  This document has been electronically signed. Feb 8 2023  9:15AM    < end of copied text >

## 2023-02-08 NOTE — PROGRESS NOTE ADULT - PROBLEM SELECTOR PLAN 5
Cont home coumadin; held 1/28 and 1/29 due to supratherappeutic INR  F/u daily INR
Cont home coumadin; held 1/28, 1/29, and 1/30 due to supratherapeutic INR  INR supratherapeutic- hold coumadin tonight - check INR in am
Cont home Levetiracetam, Lacosamide and Zonisamide.  levetiracetam level elevated (see above)   no change in dose needed per neuro
Stable
Cont home coumadin; held 1/28, 1/29, and 1/30 due to supratherapeutic INR  likely will be in range tomorrow - will dose coumadin 7.5mg tonight (home dose)   monitor INR
Cont home Levetiracetam, Lacosamide and Zonisamide.  levetiracetam level elevated (see above)   no change in dose needed per neuro
Cont home coumadin; held 1/28, 1/29 due to supratherappeutic INR  INR remains elevated - hold coumadin tonight, recheck INR in AM
Stable
Cont home coumadin  F/u daily INR
Cont home Levetiracetam, Lacosamide and Zonisamide.  levetiracetam level elevated (see above)   no change in dose needed per neuro
Cont home coumadin  F/u daily INR
Cont home Levetiracetam, Lacosamide and Zonisamide.  levetiracetam level elevated (see above)   no change in dose needed per neuro
Cont home Levetiracetam, Lacosamide and Zonisamide.  levetiracetam level elevated (see above)   no change in dose needed per neuro

## 2023-02-08 NOTE — DIETITIAN INITIAL EVALUATION ADULT - ADD RECOMMEND
1) Recommend liberalizing to Regular diet as tolerated. Defer texture/consistency to SLP/team.   2) Recommend providing Multivitamin daily to prevent micronutrient deficiencies pending no medical contraindications.  3) Continue to monitor PO intake, weight, labs, skin, GI status, and diet.  4) RD remains available for diet education PRN.

## 2023-02-08 NOTE — DIETITIAN INITIAL EVALUATION ADULT - OTHER INFO
Per pt, UBW: 155-160 pounds   Dosing wt: 155 pounds (2/06)  Wt hx per [facility] HIE in pounds: 149 (1/23/23), 150 (12/28/22), 155 (10/24/22).   No significant wt changes noted; RD to continue to monitor weight trends as able.   Nutritionally Pertinent Meds in-house: Coumadin, IVF, zofran, protonix, atorvastatin, HCZ.   Nutritionally Pertinent Labs: low K.   - GI: Nausea / vomiting now improved. Etiology unclear. ?caused by antiepileptics; EGD on 2/6 was normal.  Per pt, UBW: 155-160 pounds   Dosing wt: 155 pounds (2/06)  Wt hx per [facility] HIE in pounds: 149 (1/23/23), 150 (12/28/22), 155 (10/24/22).   No significant wt changes noted; RD to continue to monitor weight trends as able.   Nutritionally Pertinent Meds in-house: Coumadin, IVF, zofran, protonix, atorvastatin, HCZ.   Nutritionally Pertinent Labs: low K.   - GI: Nausea / vomiting now improved. Etiology unclear. ?caused by antiepileptics; EGD on 2/6 was normal.   - Cardio: following for bradycardia.

## 2023-02-08 NOTE — PROGRESS NOTE ADULT - PROBLEM SELECTOR PROBLEM 4
Seizure
History of DVT (deep vein thrombosis)
Hypertension
Seizure
Seizure
History of DVT (deep vein thrombosis)
Hypertension
Hypertension
Seizure
Seizure
Hypertension
Seizure
Hypertension
Seizure
Seizure

## 2023-02-08 NOTE — DIETITIAN INITIAL EVALUATION ADULT - REASON FOR ADMISSION
Bradycardia    Per chart: "62F [facility] brain aneurysm s/p hemicraniectomy and clipping (2013), seizures post hemicraniectomy (on Keppra), chronic DVTs on coumadin and hypertension admitted for eval of bradycardia, with course c/b intractable nausea/vomiting of unclear etiology."

## 2023-02-08 NOTE — PROGRESS NOTE ADULT - SUBJECTIVE AND OBJECTIVE BOX
Andre Reyes, M.D.  Pager: [phone]  Office: [phone]    Patient is a 62y old  Female who presents with a chief complaint of bradycardia (07 Feb 2023 14:46)          SUBJECTIVE / OVERNIGHT EVENTS:    No acute overnight events.    ROS: ( - ) Fever, ( - )Chills,  ( - )Nausea/Vomiting, ( - ) Cough, ( - )Shortness of breath, ( - )Chest Pain    MEDICATIONS  (STANDING):  amLODIPine   Tablet 10 milliGRAM(s) Oral daily  aspirin enteric coated 81 milliGRAM(s) Oral daily  atorvastatin 80 milliGRAM(s) Oral at bedtime  chlorhexidine 2% Cloths 1 Application(s) Topical <User Schedule>  doxazosin 4 milliGRAM(s) Oral at bedtime  hydrochlorothiazide 25 milliGRAM(s) Oral daily  influenza   Vaccine 0.5 milliLiter(s) IntraMuscular once  lacosamide 100 milliGRAM(s) Oral two times a day  lactated ringers. 1000 milliLiter(s) (75 mL/Hr) IV Continuous <Continuous>  levETIRAcetam 1500 milliGRAM(s) Oral two times a day  lidocaine 4% Injection for Nebulization 4 milliLiter(s) Nebulizer once  losartan 100 milliGRAM(s) Oral daily  ondansetron   Disintegrating Tablet 4 milliGRAM(s) Oral three times a day  OXcarbazepine 300 milliGRAM(s) Oral two times a day  pantoprazole    Tablet 40 milliGRAM(s) Oral before breakfast  senna 2 Tablet(s) Oral at bedtime  zonisamide 100 milliGRAM(s) Oral two times a day    MEDICATIONS  (PRN):  acetaminophen     Tablet .. 650 milliGRAM(s) Oral every 6 hours PRN Temp greater or equal to 38C (100.4F), Mild Pain (1 - 3)  aluminum hydroxide/magnesium hydroxide/simethicone Suspension 30 milliLiter(s) Oral every 4 hours PRN Dyspepsia  melatonin 3 milliGRAM(s) Oral at bedtime PRN Insomnia          T(C): 36.7 (02-08 @ 04:20), Max: 37 (02-07 @ 20:22)   HR: 62   BP: 118/63   RR: 18   SpO2: 98%    PHYSICAL EXAM:    CONSTITUTIONAL: NAD, well-developed, well-groomed  EYES: PERRLA; conjunctiva and sclera clear  ENMT: Moist oral mucosa, no pharyngeal injection or exudates; normal dentition  NECK: Supple, no palpable masses; no thyromegaly  RESPIRATORY: Normal respiratory effort; lungs are clear to auscultation bilaterally  CARDIOVASCULAR: Regular rate and rhythm, normal S1 and S2, no murmur/rub/gallop; No lower extremity edema; Peripheral pulses are 2+ bilaterally  ABDOMEN: Nontender to palpation, normoactive bowel sounds, no rebound/guarding; No hepatosplenomegaly  MUSCULOSKELETAL:  no clubbing or cyanosis of digits; no joint swelling or tenderness to palpation  PSYCH: A+O to person, place, and time; affect appropriate  NEUROLOGY: CN 2-12 are intact and symmetric; no gross sensory deficits   SKIN: No rashes; no palpable lesions      LABS:                        10.4   4.27  )-----------( 210      ( 08 Feb 2023 05:56 )             31.2      02-08    141  |  107  |  23  ----------------------------<  96  3.2<L>   |  23  |  1.40<H>    Ca    9.4      08 Feb 2023 05:56         CAPILLARY BLOOD GLUCOSE          RADIOLOGY & ADDITIONAL TESTS:    Imaging Personally Reviewed:  Consultant(s) Notes Reviewed:    Care Discussed with Consultants/Other Providers:   Andre Reyes, M.D.  Pager: [phone]  Office: [phone]    Patient is a 62y old  Female who presents with a chief complaint of bradycardia (07 Feb 2023 14:46)          SUBJECTIVE / OVERNIGHT EVENTS:    No acute overnight events.  feels well. no complaints    ROS: ( - ) Fever, ( - )Chills,  ( - )Nausea/Vomiting, ( - ) Cough, ( - )Shortness of breath, ( - )Chest Pain    MEDICATIONS  (STANDING):  amLODIPine   Tablet 10 milliGRAM(s) Oral daily  aspirin enteric coated 81 milliGRAM(s) Oral daily  atorvastatin 80 milliGRAM(s) Oral at bedtime  chlorhexidine 2% Cloths 1 Application(s) Topical <User Schedule>  doxazosin 4 milliGRAM(s) Oral at bedtime  hydrochlorothiazide 25 milliGRAM(s) Oral daily  influenza   Vaccine 0.5 milliLiter(s) IntraMuscular once  lacosamide 100 milliGRAM(s) Oral two times a day  lactated ringers. 1000 milliLiter(s) (75 mL/Hr) IV Continuous <Continuous>  levETIRAcetam 1500 milliGRAM(s) Oral two times a day  lidocaine 4% Injection for Nebulization 4 milliLiter(s) Nebulizer once  losartan 100 milliGRAM(s) Oral daily  ondansetron   Disintegrating Tablet 4 milliGRAM(s) Oral three times a day  OXcarbazepine 300 milliGRAM(s) Oral two times a day  pantoprazole    Tablet 40 milliGRAM(s) Oral before breakfast  senna 2 Tablet(s) Oral at bedtime  zonisamide 100 milliGRAM(s) Oral two times a day    MEDICATIONS  (PRN):  acetaminophen     Tablet .. 650 milliGRAM(s) Oral every 6 hours PRN Temp greater or equal to 38C (100.4F), Mild Pain (1 - 3)  aluminum hydroxide/magnesium hydroxide/simethicone Suspension 30 milliLiter(s) Oral every 4 hours PRN Dyspepsia  melatonin 3 milliGRAM(s) Oral at bedtime PRN Insomnia          T(C): 36.7 (02-08 @ 04:20), Max: 37 (02-07 @ 20:22)   HR: 62   BP: 118/63   RR: 18   SpO2: 98%    PHYSICAL EXAM:    CONSTITUTIONAL: NAD, well-developed, well-groomed  EYES: PERRLA; conjunctiva and sclera clear  ENMT: Moist oral mucosa, no pharyngeal injection or exudates; normal dentition  NECK: Supple, no palpable masses; no thyromegaly  RESPIRATORY: Normal respiratory effort; lungs are clear to auscultation bilaterally  CARDIOVASCULAR: Regular rate and rhythm, normal S1 and S2, no murmur/rub/gallop; No lower extremity edema; Peripheral pulses are 2+ bilaterally  ABDOMEN: Nontender to palpation, normoactive bowel sounds, no rebound/guarding; No hepatosplenomegaly  MUSCULOSKELETAL:  no clubbing or cyanosis of digits; no joint swelling or tenderness to palpation  PSYCH: A+O to person, place, and time; affect appropriate  NEUROLOGY: CN 2-12 are intact and symmetric; no gross sensory deficits   SKIN: No rashes; no palpable lesions      LABS:                        10.4   4.27  )-----------( 210      ( 08 Feb 2023 05:56 )             31.2      02-08    141  |  107  |  23  ----------------------------<  96  3.2<L>   |  23  |  1.40<H>    Ca    9.4      08 Feb 2023 05:56         CAPILLARY BLOOD GLUCOSE          RADIOLOGY & ADDITIONAL TESTS:    Imaging Personally Reviewed:  < from: CT Head No Cont (02.07.23 @ 18:41) >  Minimal prominence of the extra-axial spaces along the lateral   convexities.    No acute intracranial hemorrhage or brain edema.    Chronic ischemic changes.    < end of copied text >    Consultant(s) Notes Reviewed:    Care Discussed with Consultants/Other Providers:

## 2023-02-08 NOTE — DIETITIAN INITIAL EVALUATION ADULT - ENERGY INTAKE
Adequate (%) In house, pt reports good PO intake. Flowsheets indicate adequate (%) PO intake. Observed pt's lunch tray at bedside ~% consumed.

## 2023-02-08 NOTE — PROGRESS NOTE ADULT - PROBLEM SELECTOR PROBLEM 3
Hypertension
Hypertension
ROBINA (acute kidney injury)
Seizure
Hypertension
ROBINA (acute kidney injury)
Seizure
ROBINA (acute kidney injury)
Hypertension

## 2023-02-08 NOTE — PROGRESS NOTE ADULT - PROBLEM SELECTOR PROBLEM 5
History of DVT (deep vein thrombosis)
Seizure
History of DVT (deep vein thrombosis)
History of DVT (deep vein thrombosis)
Seizure
CAD (coronary artery disease)
History of DVT (deep vein thrombosis)
CAD (coronary artery disease)
History of DVT (deep vein thrombosis)
Seizure
History of DVT (deep vein thrombosis)
Seizure
Seizure

## 2023-02-08 NOTE — DIETITIAN INITIAL EVALUATION ADULT - NSPROEDAABILITYLEARN_GEN_A_NUR
4 Education not warranted/feasible at this time. Pt preferences obtained. Pt made aware RD remains available PRN./none

## 2023-02-08 NOTE — PROGRESS NOTE ADULT - PROBLEM SELECTOR PLAN 4
Cont home coumadin  F/u daily INR
BP at goal  Cont losartan, amlodipine, HCTZ  Renal US without evidence of DULCE  Cont to monitor vitals
Cont home Levetiracetam, Lacosamide and Zonisamide.
Cont home Levetiracetam, Lacosamide and Zonisamide.
Cont home Levetiracetam, Lacosamide and Zonisamide.  levetiracetam level elevated (see above)   no change in dose needed per neuro
Cont home Levetiracetam, Lacosamide and Zonisamide.  f/u levels
Cont home coumadin  F/u daily INR
BP at goal  Cont losartan, amlodipine, HCTZ  Renal US without evidence of DULCE  Cont to monitor vitals
Cont home Levetiracetam, Lacosamide and Zonisamide.  levetiracetam level elevated (see above)   no change in dose needed per neuro
Cont home Levetiracetam, Lacosamide and Zonisamide.
BP at goal  Cont losartan, amlodipine, HCTZ  Renal US without evidence of DULCE  Cont to monitor vitals

## 2023-02-14 ENCOUNTER — APPOINTMENT (OUTPATIENT)
Dept: INTERNAL MEDICINE | Facility: CLINIC | Age: 63
End: 2023-02-14
Payer: COMMERCIAL

## 2023-02-14 VITALS
HEIGHT: 62 IN | DIASTOLIC BLOOD PRESSURE: 80 MMHG | HEART RATE: 63 BPM | WEIGHT: 146 LBS | BODY MASS INDEX: 26.87 KG/M2 | OXYGEN SATURATION: 98 % | SYSTOLIC BLOOD PRESSURE: 130 MMHG

## 2023-02-14 DIAGNOSIS — I95.9 HYPOTENSION, UNSPECIFIED: ICD-10-CM

## 2023-02-14 LAB
ALBUMIN SERPL ELPH-MCNC: 4.4 G/DL
ALP BLD-CCNC: 95 U/L
ALT SERPL-CCNC: 16 U/L
ANION GAP SERPL CALC-SCNC: 10 MMOL/L
AST SERPL-CCNC: 14 U/L
BASOPHILS # BLD AUTO: 0.02 K/UL
BASOPHILS NFR BLD AUTO: 0.4 %
BILIRUB SERPL-MCNC: 0.2 MG/DL
BUN SERPL-MCNC: 16 MG/DL
CALCIUM SERPL-MCNC: 9.6 MG/DL
CHLORIDE SERPL-SCNC: 109 MMOL/L
CO2 SERPL-SCNC: 25 MMOL/L
CREAT SERPL-MCNC: 1.11 MG/DL
EGFR: 56 ML/MIN/1.73M2
EOSINOPHIL # BLD AUTO: 0.04 K/UL
EOSINOPHIL NFR BLD AUTO: 0.8 %
GLUCOSE SERPL-MCNC: 86 MG/DL
HCT VFR BLD CALC: 31.5 %
HGB BLD-MCNC: 10 G/DL
IMM GRANULOCYTES NFR BLD AUTO: 0.2 %
INR PPP: 1.27 RATIO
LYMPHOCYTES # BLD AUTO: 1.3 K/UL
LYMPHOCYTES NFR BLD AUTO: 25 %
MAN DIFF?: NORMAL
MCHC RBC-ENTMCNC: 29.2 PG
MCHC RBC-ENTMCNC: 31.7 GM/DL
MCV RBC AUTO: 91.8 FL
MONOCYTES # BLD AUTO: 0.33 K/UL
MONOCYTES NFR BLD AUTO: 6.3 %
NEUTROPHILS # BLD AUTO: 3.51 K/UL
NEUTROPHILS NFR BLD AUTO: 67.3 %
PLATELET # BLD AUTO: 198 K/UL
POTASSIUM SERPL-SCNC: 3.4 MMOL/L
PROT SERPL-MCNC: 8.3 G/DL
PT BLD: 14.8 SEC
RBC # BLD: 3.43 M/UL
RBC # FLD: 14.8 %
SODIUM SERPL-SCNC: 144 MMOL/L
WBC # FLD AUTO: 5.21 K/UL

## 2023-02-14 PROCEDURE — G0444 DEPRESSION SCREEN ANNUAL: CPT | Mod: 59

## 2023-02-14 PROCEDURE — 99496 TRANSJ CARE MGMT HIGH F2F 7D: CPT

## 2023-02-14 RX ORDER — METHYLPREDNISOLONE 4 MG/1
4 TABLET ORAL
Qty: 1 | Refills: 1 | Status: DISCONTINUED | COMMUNITY
Start: 2022-12-28 | End: 2023-02-14

## 2023-02-14 RX ORDER — ONDANSETRON 4 MG/1
4 TABLET, ORALLY DISINTEGRATING ORAL 3 TIMES DAILY
Refills: 0 | Status: ACTIVE | COMMUNITY
Start: 2023-02-14

## 2023-02-14 RX ORDER — LACOSAMIDE 100 MG/1
100 TABLET ORAL TWICE DAILY
Refills: 0 | Status: ACTIVE | COMMUNITY
Start: 2023-02-14

## 2023-02-14 RX ORDER — CHOLECALCIFEROL (VITAMIN D3) 1250 MCG
1.25 MG CAPSULE ORAL
Qty: 1 | Refills: 0 | Status: COMPLETED | COMMUNITY
Start: 2020-10-16 | End: 2023-02-14

## 2023-02-14 RX ORDER — BENZONATATE 200 MG/1
200 CAPSULE ORAL
Qty: 40 | Refills: 0 | Status: DISCONTINUED | COMMUNITY
Start: 2022-12-28 | End: 2023-02-14

## 2023-02-14 RX ORDER — GLUCOSAMINE HCL/CHONDROITIN SU 500-400 MG
3 CAPSULE ORAL AT BEDTIME
Refills: 0 | Status: ACTIVE | COMMUNITY
Start: 2023-02-14

## 2023-02-14 RX ORDER — LABETALOL HYDROCHLORIDE 200 MG/1
200 TABLET, FILM COATED ORAL
Qty: 180 | Refills: 2 | Status: DISCONTINUED | COMMUNITY
End: 2023-02-14

## 2023-02-14 RX ORDER — CLONIDINE HYDROCHLORIDE 0.2 MG/1
0.2 TABLET ORAL
Qty: 180 | Refills: 2 | Status: COMPLETED | COMMUNITY
Start: 2019-05-06 | End: 2023-02-14

## 2023-02-14 RX ORDER — AZITHROMYCIN 250 MG/1
250 TABLET, FILM COATED ORAL
Qty: 1 | Refills: 0 | Status: DISCONTINUED | COMMUNITY
Start: 2022-12-28 | End: 2023-02-14

## 2023-02-14 NOTE — REVIEW OF SYSTEMS
Interval History:+ generalized weakness, SOB,cough     Review of Systems   Constitutional: Positive for fatigue. Negative for appetite change, chills, diaphoresis and fever.   HENT: Negative for congestion, nosebleeds, sore throat and trouble swallowing.    Eyes: Negative for pain, discharge and visual disturbance.   Respiratory: Positive for cough and shortness of breath. Negative for apnea, chest tightness, wheezing and stridor.    Cardiovascular: Negative for chest pain, palpitations and leg swelling.   Gastrointestinal: Negative for abdominal distention, abdominal pain, blood in stool, constipation, diarrhea, nausea and vomiting.   Endocrine: Negative for cold intolerance and heat intolerance.   Genitourinary: Negative for difficulty urinating, dysuria, flank pain, frequency and urgency.   Musculoskeletal: Negative for arthralgias, back pain, joint swelling, myalgias, neck pain and neck stiffness.   Skin: Negative for rash and wound.   Allergic/Immunologic: Negative for food allergies and immunocompromised state.   Neurological: Positive for weakness. Negative for dizziness, seizures, syncope, facial asymmetry, light-headedness and headaches.   Hematological: Negative for adenopathy.   Psychiatric/Behavioral: Negative for agitation, behavioral problems and confusion. The patient is not nervous/anxious.      Objective:     Vital Signs (Most Recent):  Temp: 98.5 °F (36.9 °C) (03/27/19 1611)  Pulse: 69 (03/27/19 1613)  Resp: 16 (03/27/19 1613)  BP: 125/60 (03/27/19 1611)  SpO2: 98 % (03/27/19 1613) Vital Signs (24h Range):  Temp:  [97.3 °F (36.3 °C)-103.2 °F (39.6 °C)] 98.5 °F (36.9 °C)  Pulse:  [63-94] 69  Resp:  [12-24] 16  SpO2:  [92 %-98 %] 98 %  BP: (108-187)/(54-87) 125/60     Weight: 86.2 kg (190 lb 0.6 oz)  Body mass index is 28.06 kg/m².    Intake/Output Summary (Last 24 hours) at 3/27/2019 1703  Last data filed at 3/27/2019 1649  Gross per 24 hour   Intake 765.83 ml   Output --   Net 765.83 ml       Physical Exam   Constitutional: He is oriented to person, place, and time. He appears well-developed and well-nourished.   HENT:   Head: Normocephalic and atraumatic.   Nose: Nose normal.   Mouth/Throat: Oropharynx is clear and moist.   Eyes: Pupils are equal, round, and reactive to light. EOM are normal. No scleral icterus.   Neck: Normal range of motion. Neck supple.   Cardiovascular: Normal rate, regular rhythm, normal heart sounds and intact distal pulses. Exam reveals no gallop and no friction rub.   No murmur heard.  Pulmonary/Chest: Effort normal. No respiratory distress. He has no wheezes.   Rhonchi right mid to upper lung fields    Abdominal: Soft. Bowel sounds are normal. He exhibits no distension. There is no tenderness.   Musculoskeletal: Normal range of motion. He exhibits no edema.   Neurological: He is alert and oriented to person, place, and time. No cranial nerve deficit.   Skin: Skin is warm and dry. No rash noted.   Psychiatric: He has a normal mood and affect. His behavior is normal.   Nursing note and vitals reviewed.      Significant Labs:   BMP:   Recent Labs   Lab 03/27/19  0345 03/27/19  0634   GLU 97 134*    139   K 3.9 3.7    108   CO2 25 19*   BUN 18 19   CREATININE 1.1 0.9   CALCIUM 10.3 9.1   MG 1.6  --      CBC:   Recent Labs   Lab 03/27/19  0345 03/27/19  0634   WBC 9.73 14.99*   HGB 13.4* 11.7*   HCT 40.5 35.7*    145*     CMP:   Recent Labs   Lab 03/27/19  0345 03/27/19  0634    139   K 3.9 3.7    108   CO2 25 19*   GLU 97 134*   BUN 18 19   CREATININE 1.1 0.9   CALCIUM 10.3 9.1   PROT 7.2  --    ALBUMIN 3.8  --    BILITOT 0.7  --    ALKPHOS 136*  --    AST 27  --    ALT 14  --    ANIONGAP 12 12   EGFRNONAA >60 >60     All pertinent labs within the past 24 hours have been reviewed.    Significant Imaging:  Imaging Results          X-Ray Chest AP Portable (Final result)  Result time 03/27/19 08:04:55    Final result by Robbin Preciado MD (03/27/19  08:04:55)                 Impression:      Area of consolidation with central lucency within the right upper lobe suggests airspace disease with possible necrotizing pneumonia. Additional areas of patchy consolidation suspected within the right lower lobe.   Recommend follow-up noncontrast chest CT for further evaluation.      Electronically signed by: Robbin Preciado MD  Date:    03/27/2019  Time:    08:04             Narrative:    EXAMINATION:  XR CHEST AP PORTABLE    CLINICAL HISTORY:  Sepsis;    FINDINGS:  Single view of the chest.  Aorta demonstrates atherosclerotic disease.  Sternotomy wires are intact.  Left-sided pacing device demonstrates similar configuration.    Cardiac silhouette is enlarged.  Area of consolidation with central lucency within the right upper lobe suggests airspace disease with possible necrotizing pneumonia.  Additional areas of patchy consolidation suspected within the right lower lobe.  Recommend follow-up noncontrast chest CT for further evaluation.  Remaining lung fields are clear..  Bones demonstate small effusion.                               [Negative] : Heme/Lymph [Chest Pain] : no chest pain [Dizziness] : no dizziness

## 2023-02-14 NOTE — HISTORY OF PRESENT ILLNESS
[Post-hospitalization from ___ Hospital] : Post-hospitalization from [unfilled] Hospital [Admitted on: ___] : The patient was admitted on [unfilled] [Discharge Summary] : discharge summary [Pertinent Labs] : pertinent labs [Radiology Findings] : radiology findings [Discharge Med List] : discharge medication list [Med Reconciliation] : medication reconciliation has been completed [Discharged on ___] : discharged on [unfilled] [Patient Contacted By: ____] : and contacted by [unfilled] [FreeTextEntry2] : BELLE MENENDEZ 61 y/o F with PMHx of Anemia, Brain Aneurysms, Seizures, HTN, HLD, CAD, Vitamin D deficiency and 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 Pt was admitted for bradycardia, with vomiting likely with bradycardia due to medications clonidine and metoprolol which were stopped and HR improved\par S/p egd no acute etiology found for vomiting which self resolved\par \par Patient feels well. No complaints. Denies chest pain, SOB, ARREDONDO, dizziness, diaphoresis, palpitations, LE swelling, orthopnea, syncope, n/v, headache, bleeding.\par Denies melena, hematochezia, hematemesis, or any bleeding.\par \par \par

## 2023-02-22 ENCOUNTER — NON-APPOINTMENT (OUTPATIENT)
Age: 63
End: 2023-02-22

## 2023-02-24 ENCOUNTER — RX RENEWAL (OUTPATIENT)
Age: 63
End: 2023-02-24

## 2023-02-27 ENCOUNTER — LABORATORY RESULT (OUTPATIENT)
Age: 63
End: 2023-02-27

## 2023-03-02 RX ORDER — ASPIRIN ENTERIC COATED TABLETS 81 MG 81 MG/1
81 TABLET, DELAYED RELEASE ORAL DAILY
Qty: 90 | Refills: 1 | Status: ACTIVE | COMMUNITY
Start: 2020-01-09 | End: 1900-01-01

## 2023-03-06 ENCOUNTER — LABORATORY RESULT (OUTPATIENT)
Age: 63
End: 2023-03-06

## 2023-03-13 ENCOUNTER — RX RENEWAL (OUTPATIENT)
Age: 63
End: 2023-03-13

## 2023-03-13 ENCOUNTER — LABORATORY RESULT (OUTPATIENT)
Age: 63
End: 2023-03-13

## 2023-03-20 ENCOUNTER — LABORATORY RESULT (OUTPATIENT)
Age: 63
End: 2023-03-20

## 2023-03-27 ENCOUNTER — LABORATORY RESULT (OUTPATIENT)
Age: 63
End: 2023-03-27

## 2023-04-03 ENCOUNTER — LABORATORY RESULT (OUTPATIENT)
Age: 63
End: 2023-04-03

## 2023-04-04 ENCOUNTER — APPOINTMENT (OUTPATIENT)
Dept: INTERNAL MEDICINE | Facility: CLINIC | Age: 63
End: 2023-04-04
Payer: COMMERCIAL

## 2023-04-08 NOTE — PROGRESS NOTE ADULT - PROBLEM SELECTOR PROBLEM 2
Bradycardia
Hypertension
Bradycardia
Hypertension
carried
Bradycardia

## 2023-04-10 ENCOUNTER — LABORATORY RESULT (OUTPATIENT)
Age: 63
End: 2023-04-10

## 2023-04-17 ENCOUNTER — LABORATORY RESULT (OUTPATIENT)
Age: 63
End: 2023-04-17

## 2023-04-19 ENCOUNTER — NON-APPOINTMENT (OUTPATIENT)
Age: 63
End: 2023-04-19

## 2023-04-19 ENCOUNTER — APPOINTMENT (OUTPATIENT)
Dept: INTERNAL MEDICINE | Facility: CLINIC | Age: 63
End: 2023-04-19
Payer: COMMERCIAL

## 2023-04-19 VITALS
HEART RATE: 49 BPM | OXYGEN SATURATION: 99 % | BODY MASS INDEX: 27.23 KG/M2 | SYSTOLIC BLOOD PRESSURE: 140 MMHG | WEIGHT: 148 LBS | HEIGHT: 62 IN | DIASTOLIC BLOOD PRESSURE: 80 MMHG

## 2023-04-19 VITALS — DIASTOLIC BLOOD PRESSURE: 65 MMHG | SYSTOLIC BLOOD PRESSURE: 132 MMHG

## 2023-04-19 VITALS — OXYGEN SATURATION: 99 % | HEART RATE: 62 BPM

## 2023-04-19 LAB
ALBUMIN SERPL ELPH-MCNC: 4.6 G/DL
ALP BLD-CCNC: 106 U/L
ALT SERPL-CCNC: 11 U/L
ANION GAP SERPL CALC-SCNC: 13 MMOL/L
AST SERPL-CCNC: 11 U/L
BASOPHILS # BLD AUTO: 0.02 K/UL
BASOPHILS NFR BLD AUTO: 0.4 %
BILIRUB SERPL-MCNC: 0.2 MG/DL
BUN SERPL-MCNC: 13 MG/DL
CALCIUM SERPL-MCNC: 9.6 MG/DL
CHLORIDE SERPL-SCNC: 112 MMOL/L
CK SERPL-CCNC: 102 U/L
CO2 SERPL-SCNC: 21 MMOL/L
CREAT SERPL-MCNC: 1.13 MG/DL
EGFR: 55 ML/MIN/1.73M2
EOSINOPHIL # BLD AUTO: 0.03 K/UL
EOSINOPHIL NFR BLD AUTO: 0.7 %
ESTIMATED AVERAGE GLUCOSE: 108 MG/DL
GLUCOSE SERPL-MCNC: 82 MG/DL
HBA1C MFR BLD HPLC: 5.4 %
HCT VFR BLD CALC: 33.6 %
HGB BLD-MCNC: 10.6 G/DL
IMM GRANULOCYTES NFR BLD AUTO: 0.2 %
LYMPHOCYTES # BLD AUTO: 1.26 K/UL
LYMPHOCYTES NFR BLD AUTO: 27.5 %
MAN DIFF?: NORMAL
MCHC RBC-ENTMCNC: 29 PG
MCHC RBC-ENTMCNC: 31.5 GM/DL
MCV RBC AUTO: 91.8 FL
MONOCYTES # BLD AUTO: 0.31 K/UL
MONOCYTES NFR BLD AUTO: 6.8 %
NEUTROPHILS # BLD AUTO: 2.96 K/UL
NEUTROPHILS NFR BLD AUTO: 64.4 %
PLATELET # BLD AUTO: 227 K/UL
POTASSIUM SERPL-SCNC: 4.1 MMOL/L
PROT SERPL-MCNC: 7.7 G/DL
RBC # BLD: 3.66 M/UL
RBC # FLD: 16.4 %
SODIUM SERPL-SCNC: 146 MMOL/L
T4 FREE SERPL-MCNC: 1 NG/DL
TSH SERPL-ACNC: 1.15 UIU/ML
WBC # FLD AUTO: 4.59 K/UL

## 2023-04-19 PROCEDURE — 93000 ELECTROCARDIOGRAM COMPLETE: CPT

## 2023-04-19 PROCEDURE — 99214 OFFICE O/P EST MOD 30 MIN: CPT | Mod: 25

## 2023-04-19 NOTE — HISTORY OF PRESENT ILLNESS
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 Pt was hospitalized in 2/23 thought to have syptomati bradycardia from metoprolol and clonidine which are stopped. On subsequent visit hr improved to 60s with no symptoms. Today HR in 40s again despite being off meds\par \par Patient feels well. No complaints. Denies chest pain, sob, mace, dizziness, diaphoresis, palpitations, LE swelling, orthopnea, syncope, n/v, headache.\par

## 2023-04-19 NOTE — PHYSICAL EXAM
[No Acute Distress] : no acute distress [Well Nourished] : well nourished [Well Developed] : well developed [Well-Appearing] : well-appearing [Normal Sclera/Conjunctiva] : normal sclera/conjunctiva [Normal Outer Ear/Nose] : the outer ears and nose were normal in appearance [Normal Oropharynx] : the oropharynx was normal [No JVD] : no jugular venous distention [No Lymphadenopathy] : no lymphadenopathy [Supple] : supple [No Respiratory Distress] : no respiratory distress  [No Accessory Muscle Use] : no accessory muscle use [Clear to Auscultation] : lungs were clear to auscultation bilaterally [Regular Rhythm] : with a regular rhythm [Normal S1, S2] : normal S1 and S2 [No Murmur] : no murmur heard [No Carotid Bruits] : no carotid bruits [No Varicosities] : no varicosities [Pedal Pulses Present] : the pedal pulses are present [No Edema] : there was no peripheral edema [No Extremity Clubbing/Cyanosis] : no extremity clubbing/cyanosis [Soft] : abdomen soft [Non Tender] : non-tender [Non-distended] : non-distended [Normal Bowel Sounds] : normal bowel sounds [Normal Anterior Cervical Nodes] : no anterior cervical lymphadenopathy [No CVA Tenderness] : no CVA  tenderness [No Spinal Tenderness] : no spinal tenderness [No Joint Swelling] : no joint swelling [Grossly Normal Strength/Tone] : grossly normal strength/tone [No Rash] : no rash [Coordination Grossly Intact] : coordination grossly intact [No Focal Deficits] : no focal deficits [Normal Gait] : normal gait [Normal Affect] : the affect was normal [Alert and Oriented x3] : oriented to person, place, and time [de-identified] : gema

## 2023-04-24 ENCOUNTER — LABORATORY RESULT (OUTPATIENT)
Age: 63
End: 2023-04-24

## 2023-04-25 ENCOUNTER — NON-APPOINTMENT (OUTPATIENT)
Age: 63
End: 2023-04-25

## 2023-04-25 ENCOUNTER — APPOINTMENT (OUTPATIENT)
Dept: CARDIOLOGY | Facility: CLINIC | Age: 63
End: 2023-04-25
Payer: COMMERCIAL

## 2023-04-25 VITALS
OXYGEN SATURATION: 100 % | BODY MASS INDEX: 27.97 KG/M2 | WEIGHT: 152 LBS | DIASTOLIC BLOOD PRESSURE: 77 MMHG | SYSTOLIC BLOOD PRESSURE: 174 MMHG | HEART RATE: 44 BPM | HEIGHT: 62 IN

## 2023-04-25 DIAGNOSIS — Z86.79 PERSONAL HISTORY OF OTHER DISEASES OF THE CIRCULATORY SYSTEM: ICD-10-CM

## 2023-04-25 DIAGNOSIS — Z82.49 FAMILY HISTORY OF ISCHEMIC HEART DISEASE AND OTHER DISEASES OF THE CIRCULATORY SYSTEM: ICD-10-CM

## 2023-04-25 PROCEDURE — 99214 OFFICE O/P EST MOD 30 MIN: CPT

## 2023-04-25 NOTE — ASSESSMENT
[FreeTextEntry1] : Assessment:\par 1. History of DVT in 2014 has been on Coumadin since\par 2. History of Brain Aneurysm s/p Hemicraniectomy and Clipping\par 3. History of Seizures on Keppra\par 4. H/o multiple ulcerations to the L shin area s/p debridement last in 2021 - healed\par 5. Bradycardia s/p recent admission in January/February\par 6. Presence of IVC filter\par 7. L Femoral Vein DVT noted in 2/2023\par \par Plan:\par 1. Repeat LE venous duplex to assess for DVT.\par 2. Since she is on Oxcarbazepine, there is a drug interaction with this medication and a DOAC such as Eliquis.\par     There us some data to suggest that Oxcarbazepine can lower the efficacy of Eliquis.\par     Coumadin is the preferred agent. \par 3. IVC filter to remain in place, was placed 10 years ago.\par 4. Continue excellent care with Dr. Ojeda. \par 5. Follow-up in 6 months. Call with any questions.

## 2023-04-25 NOTE — REVIEW OF SYSTEMS
[Negative] : Heme/Lymph [FreeTextEntry5] : see HPI [FreeTextEntry9] : see HPI [de-identified] : see HPI

## 2023-04-25 NOTE — PHYSICAL EXAM
[Respiration, Rhythm And Depth] : normal respiratory rhythm and effort [Auscultation Breath Sounds / Voice Sounds] : lungs were clear to auscultation bilaterally [Heart Sounds] : normal S1 and S2 [Bowel Sounds] : normal bowel sounds [Abdomen Soft] : soft [Abdomen Tenderness] : non-tender [Abnormal Walk] : normal gait [] : no ischemic changes [Oriented To Time, Place, And Person] : oriented to person, place, and time [FreeTextEntry1] : see above No

## 2023-04-25 NOTE — HISTORY OF PRESENT ILLNESS
[FreeTextEntry1] : 4/25\par \par Patient was admitted 1/24-2/8 for bradycardia and N/V. Hydrated for ROBINA. EGD normal.\par \par Labetalol and Clonidine were discontinued.  \par \par CT Abd/Pelvis in 2/2023 showed: no acute pathology. Chronic IVC filter.\par \par Patient reports IVC filter was placed in 2013.\par \par Last LE venous duplex 2/1 showed: acute occlusive DVT in the left femoral vein in the mid and distal thigh.\par \par Reports home SBP is 120s/130s. \par \par Denies C/P or SOB. No LE pain or edema. 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 Last hospitalization was in 5/2022 for syncope, seizure and rhabdomyolysis.  Reports home SBP 130s. Initial SBP 90s by electronic cuff by medical assistant in office. Repeat manual BP 10/5/70 bilaterally.  \par \par Medications:\par Norvasc 10 mg daily\par ASA 81 mg daily\par Coumadin\par Clonidine 0.1 mg TID\par Doxazosin 4 mg QD\par HCTZ 25 mg QD\par Labetalol 200 mg BID\par Keppra\par Olmesartan 20 mg QD \par Crestor 20 mg QD\par Zosoziamide

## 2023-04-27 NOTE — PHYSICAL THERAPY INITIAL EVALUATION ADULT - SIT-TO-STAND BALANCE
Chief Complaint   Patient presents with    Coronary Artery Disease    Cholesterol Problem    Chest Pain    Follow-up     6 MONTHS     Vitals:    04/27/23 0916   BP: 128/81   BP 1 Location: Right arm   BP Patient Position: Sitting   Pulse: 68   Height: 5' 9\" (1.753 m)   Weight: 189 lb (85.7 kg)   SpO2: 98%     Chest pain      Denied     Palpations    Denied    SOB    Denied    Dizziness    Denied    Swelling in hands/feet    Denied     Recent hospital stays    Denied     ER END OF January D/T CHEST PRESSURE AND HIGH BLOOD PRESSURE, TURNED OUT TO BE SHINGLES fair minus

## 2023-05-01 ENCOUNTER — LABORATORY RESULT (OUTPATIENT)
Age: 63
End: 2023-05-01

## 2023-05-08 ENCOUNTER — NON-APPOINTMENT (OUTPATIENT)
Age: 63
End: 2023-05-08

## 2023-05-08 ENCOUNTER — LABORATORY RESULT (OUTPATIENT)
Age: 63
End: 2023-05-08

## 2023-05-10 NOTE — ED PROVIDER NOTE - NS_BEDUNITTYPES_ED_ALL_ED
Measure In Units Or Cc's?: units Reconstitution Date (Optional): 05/10/2023 Detail Level: Detailed Post-Care Instructions: Patient instructed to not lie down for 4 hours and limit physical activity for 24 hours. Dilution (U/ 0.1cc): 10 Consent: Written consent obtained. Risks include but not limited to lid/brow ptosis, bruising, swelling, diplopia, temporary effect, incomplete chemical denervation. Show Inventory Tab: Show Quantity Per Injection Site (Units): 6 Quantity Per Injection Site (Units): 9 Quantity Per Injection Site (Units): 12 TELEMETRY

## 2023-05-19 ENCOUNTER — NON-APPOINTMENT (OUTPATIENT)
Age: 63
End: 2023-05-19

## 2023-05-23 ENCOUNTER — APPOINTMENT (OUTPATIENT)
Dept: CARDIOLOGY | Facility: CLINIC | Age: 63
End: 2023-05-23

## 2023-06-05 ENCOUNTER — NON-APPOINTMENT (OUTPATIENT)
Age: 63
End: 2023-06-05

## 2023-06-27 ENCOUNTER — TRANSCRIPTION ENCOUNTER (OUTPATIENT)
Age: 63
End: 2023-06-27

## 2023-06-27 ENCOUNTER — NON-APPOINTMENT (OUTPATIENT)
Age: 63
End: 2023-06-27

## 2023-06-27 ENCOUNTER — APPOINTMENT (OUTPATIENT)
Dept: CARDIOLOGY | Facility: CLINIC | Age: 63
End: 2023-06-27
Payer: COMMERCIAL

## 2023-06-27 VITALS
DIASTOLIC BLOOD PRESSURE: 90 MMHG | TEMPERATURE: 97.8 F | HEART RATE: 56 BPM | OXYGEN SATURATION: 98 % | SYSTOLIC BLOOD PRESSURE: 140 MMHG | BODY MASS INDEX: 25.76 KG/M2 | WEIGHT: 140 LBS | HEIGHT: 62 IN

## 2023-06-27 DIAGNOSIS — E55.9 VITAMIN D DEFICIENCY, UNSPECIFIED: ICD-10-CM

## 2023-06-27 PROCEDURE — 93000 ELECTROCARDIOGRAM COMPLETE: CPT

## 2023-06-27 PROCEDURE — 99214 OFFICE O/P EST MOD 30 MIN: CPT

## 2023-06-27 NOTE — HISTORY OF PRESENT ILLNESS
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 Patient was brought to the ED at [facility] 5/8/23 s/p seizure admitted to neuro ICU.CT head showed no acute pathology, but with right frontal  shunt and stable ventricular size and chronic bifrontal encephalomalacia and remote infarcts. PAtient was monitored in hospital since 5/16/23 and discharged to rehab were she still resides.\par Patient denies chest pain, dyspnea, palpitations, dizziness, syncope, changes in bowel/bladder habits or appetite.

## 2023-06-28 ENCOUNTER — NON-APPOINTMENT (OUTPATIENT)
Age: 63
End: 2023-06-28

## 2023-07-03 DIAGNOSIS — E87.6 HYPOKALEMIA: ICD-10-CM

## 2023-07-11 ENCOUNTER — LABORATORY RESULT (OUTPATIENT)
Age: 63
End: 2023-07-11

## 2023-07-18 ENCOUNTER — LABORATORY RESULT (OUTPATIENT)
Age: 63
End: 2023-07-18

## 2023-07-25 ENCOUNTER — LABORATORY RESULT (OUTPATIENT)
Age: 63
End: 2023-07-25

## 2023-07-28 ENCOUNTER — LABORATORY RESULT (OUTPATIENT)
Age: 63
End: 2023-07-28

## 2023-08-01 ENCOUNTER — LABORATORY RESULT (OUTPATIENT)
Age: 63
End: 2023-08-01

## 2023-08-08 ENCOUNTER — LABORATORY RESULT (OUTPATIENT)
Age: 63
End: 2023-08-08

## 2023-08-15 ENCOUNTER — LABORATORY RESULT (OUTPATIENT)
Age: 63
End: 2023-08-15

## 2023-08-16 ENCOUNTER — LABORATORY RESULT (OUTPATIENT)
Age: 63
End: 2023-08-16

## 2023-08-21 ENCOUNTER — APPOINTMENT (OUTPATIENT)
Dept: INTERNAL MEDICINE | Facility: CLINIC | Age: 63
End: 2023-08-21

## 2023-08-22 ENCOUNTER — LABORATORY RESULT (OUTPATIENT)
Age: 63
End: 2023-08-22

## 2023-08-23 ENCOUNTER — LABORATORY RESULT (OUTPATIENT)
Age: 63
End: 2023-08-23

## 2023-08-24 ENCOUNTER — LABORATORY RESULT (OUTPATIENT)
Age: 63
End: 2023-08-24

## 2023-08-29 ENCOUNTER — LABORATORY RESULT (OUTPATIENT)
Age: 63
End: 2023-08-29

## 2023-08-30 ENCOUNTER — LABORATORY RESULT (OUTPATIENT)
Age: 63
End: 2023-08-30

## 2023-08-31 ENCOUNTER — LABORATORY RESULT (OUTPATIENT)
Age: 63
End: 2023-08-31

## 2023-09-01 ENCOUNTER — LABORATORY RESULT (OUTPATIENT)
Age: 63
End: 2023-09-01

## 2023-09-05 ENCOUNTER — LABORATORY RESULT (OUTPATIENT)
Age: 63
End: 2023-09-05

## 2023-09-12 ENCOUNTER — LABORATORY RESULT (OUTPATIENT)
Age: 63
End: 2023-09-12

## 2023-09-18 ENCOUNTER — LABORATORY RESULT (OUTPATIENT)
Age: 63
End: 2023-09-18

## 2023-09-26 ENCOUNTER — LABORATORY RESULT (OUTPATIENT)
Age: 63
End: 2023-09-26

## 2023-10-02 ENCOUNTER — APPOINTMENT (OUTPATIENT)
Dept: INTERNAL MEDICINE | Facility: CLINIC | Age: 63
End: 2023-10-02
Payer: COMMERCIAL

## 2023-10-02 VITALS
RESPIRATION RATE: 16 BRPM | DIASTOLIC BLOOD PRESSURE: 70 MMHG | HEIGHT: 62 IN | BODY MASS INDEX: 27.6 KG/M2 | WEIGHT: 150 LBS | SYSTOLIC BLOOD PRESSURE: 110 MMHG | TEMPERATURE: 98.4 F | OXYGEN SATURATION: 98 % | HEART RATE: 63 BPM

## 2023-10-02 DIAGNOSIS — R00.1 BRADYCARDIA, UNSPECIFIED: ICD-10-CM

## 2023-10-02 DIAGNOSIS — N63.10 UNSPECIFIED LUMP IN THE RIGHT BREAST, UNSPECIFIED QUADRANT: ICD-10-CM

## 2023-10-02 PROCEDURE — 99214 OFFICE O/P EST MOD 30 MIN: CPT

## 2023-10-02 RX ORDER — LEVETIRACETAM 750 MG/1
750 TABLET, FILM COATED ORAL
Qty: 1 | Refills: 0 | Status: ACTIVE | COMMUNITY
Start: 2021-12-14

## 2023-10-02 RX ORDER — ZONISAMIDE 50 MG/1
50 CAPSULE ORAL TWICE DAILY
Qty: 1 | Refills: 0 | Status: ACTIVE | COMMUNITY
Start: 2021-07-22

## 2023-10-03 ENCOUNTER — LABORATORY RESULT (OUTPATIENT)
Age: 63
End: 2023-10-03

## 2023-10-03 PROBLEM — N63.10 LUMP OF RIGHT BREAST: Status: ACTIVE | Noted: 2023-10-02

## 2023-10-03 PROBLEM — R00.1 BRADYCARDIA: Status: ACTIVE | Noted: 2023-01-24

## 2023-10-03 LAB
ALBUMIN SERPL ELPH-MCNC: 4.3 G/DL
ALP BLD-CCNC: 105 U/L
ALT SERPL-CCNC: 8 U/L
ANION GAP SERPL CALC-SCNC: 8 MMOL/L
AST SERPL-CCNC: 15 U/L
BASOPHILS # BLD AUTO: 0.02 K/UL
BASOPHILS NFR BLD AUTO: 0.5 %
BILIRUB SERPL-MCNC: <0.2 MG/DL
BUN SERPL-MCNC: 20 MG/DL
CALCIUM SERPL-MCNC: 9.6 MG/DL
CHLORIDE SERPL-SCNC: 112 MMOL/L
CHOLEST SERPL-MCNC: 197 MG/DL
CK SERPL-CCNC: 79 U/L
CO2 SERPL-SCNC: 25 MMOL/L
CREAT SERPL-MCNC: 0.97 MG/DL
EGFR: 66 ML/MIN/1.73M2
EOSINOPHIL # BLD AUTO: 0.05 K/UL
EOSINOPHIL NFR BLD AUTO: 1.2 %
ESTIMATED AVERAGE GLUCOSE: 111 MG/DL
GLUCOSE SERPL-MCNC: 77 MG/DL
HBA1C MFR BLD HPLC: 5.5 %
HCT VFR BLD CALC: 33.7 %
HDLC SERPL-MCNC: 57 MG/DL
HGB BLD-MCNC: 10.6 G/DL
IMM GRANULOCYTES NFR BLD AUTO: 0.2 %
INR PPP: 2.17 RATIO
LDLC SERPL CALC-MCNC: 119 MG/DL
LYMPHOCYTES # BLD AUTO: 1.56 K/UL
LYMPHOCYTES NFR BLD AUTO: 36.1 %
MAN DIFF?: NORMAL
MCHC RBC-ENTMCNC: 28.8 PG
MCHC RBC-ENTMCNC: 31.5 GM/DL
MCV RBC AUTO: 91.6 FL
MONOCYTES # BLD AUTO: 0.36 K/UL
MONOCYTES NFR BLD AUTO: 8.3 %
NEUTROPHILS # BLD AUTO: 2.32 K/UL
NEUTROPHILS NFR BLD AUTO: 53.7 %
NONHDLC SERPL-MCNC: 140 MG/DL
PLATELET # BLD AUTO: 184 K/UL
POTASSIUM SERPL-SCNC: 4.6 MMOL/L
PROT SERPL-MCNC: 8.1 G/DL
PT BLD: 24 SEC
RBC # BLD: 3.68 M/UL
RBC # FLD: 15.8 %
SODIUM SERPL-SCNC: 145 MMOL/L
T4 FREE SERPL-MCNC: 0.8 NG/DL
TRIGL SERPL-MCNC: 120 MG/DL
TSH SERPL-ACNC: 0.71 UIU/ML
WBC # FLD AUTO: 4.32 K/UL

## 2023-10-04 ENCOUNTER — LABORATORY RESULT (OUTPATIENT)
Age: 63
End: 2023-10-04

## 2023-10-05 ENCOUNTER — RESULT REVIEW (OUTPATIENT)
Age: 63
End: 2023-10-05

## 2023-10-05 ENCOUNTER — LABORATORY RESULT (OUTPATIENT)
Age: 63
End: 2023-10-05

## 2023-10-05 ENCOUNTER — APPOINTMENT (OUTPATIENT)
Dept: MAMMOGRAPHY | Facility: CLINIC | Age: 63
End: 2023-10-05
Payer: COMMERCIAL

## 2023-10-05 ENCOUNTER — APPOINTMENT (OUTPATIENT)
Dept: ULTRASOUND IMAGING | Facility: CLINIC | Age: 63
End: 2023-10-05
Payer: COMMERCIAL

## 2023-10-05 DIAGNOSIS — R92.8 OTHER ABNORMAL AND INCONCLUSIVE FINDINGS ON DIAGNOSTIC IMAGING OF BREAST: ICD-10-CM

## 2023-10-05 PROCEDURE — 77066 DX MAMMO INCL CAD BI: CPT

## 2023-10-05 PROCEDURE — G0279: CPT

## 2023-10-05 PROCEDURE — 76641 ULTRASOUND BREAST COMPLETE: CPT | Mod: 50

## 2023-10-06 NOTE — CHART NOTE - NSCHARTNOTEFT_GEN_A_CORE
Day RN to Night PA during change of shift- Patient was discharged to home with . RN on her way off from shift came across patient throwing up in trash can by elevators. Patient was brought back in by night RN. Patient was assessed at bedside with  present as well. Patient no longer feels nauseous however feels weak from the emesis. Patient recommended to stay overnight for observation. Patient and  agreed. Patient is to receive 5mg coumadin per day team recommendation. Will reassess labs again in AM and supplement overnight as needed. Most likely will discharge in AM if no acute events overnight. Attending was notified by day NP of discharge cancelation.     Vital Signs Last 24 Hrs  T(C): 36.8 (02 Feb 2023 19:30), Max: 37.2 (02 Feb 2023 11:08)  T(F): 98.2 (02 Feb 2023 19:30), Max: 98.9 (02 Feb 2023 11:08)  HR: 86 (02 Feb 2023 19:30) (66 - 87)  BP: 129/85 (02 Feb 2023 19:30) (109/70 - 140/95)  BP(mean): --  RR: 18 (02 Feb 2023 19:30) (18 - 18)  SpO2: 97% (02 Feb 2023 19:30) (97% - 100%)    Parameters below as of 02 Feb 2023 19:30  Patient On (Oxygen Delivery Method): room air      Cayla Mccauley PA-C  [id number] DISPLAY PLAN FREE TEXT DISPLAY PLAN FREE TEXT DISPLAY PLAN FREE TEXT DISPLAY PLAN FREE TEXT DISPLAY PLAN FREE TEXT DISPLAY PLAN FREE TEXT DISPLAY PLAN FREE TEXT DISPLAY PLAN FREE TEXT DISPLAY PLAN FREE TEXT DISPLAY PLAN FREE TEXT DISPLAY PLAN FREE TEXT DISPLAY PLAN FREE TEXT

## 2023-10-09 ENCOUNTER — APPOINTMENT (OUTPATIENT)
Dept: ULTRASOUND IMAGING | Facility: CLINIC | Age: 63
End: 2023-10-09
Payer: COMMERCIAL

## 2023-10-09 ENCOUNTER — RESULT REVIEW (OUTPATIENT)
Age: 63
End: 2023-10-09

## 2023-10-09 ENCOUNTER — LABORATORY RESULT (OUTPATIENT)
Age: 63
End: 2023-10-09

## 2023-10-09 PROCEDURE — 19083 BX BREAST 1ST LESION US IMAG: CPT | Mod: RT

## 2023-10-09 PROCEDURE — 77065 DX MAMMO INCL CAD UNI: CPT | Mod: RT

## 2023-10-10 ENCOUNTER — LABORATORY RESULT (OUTPATIENT)
Age: 63
End: 2023-10-10

## 2023-10-12 ENCOUNTER — LABORATORY RESULT (OUTPATIENT)
Age: 63
End: 2023-10-12

## 2023-10-12 ENCOUNTER — RX RENEWAL (OUTPATIENT)
Age: 63
End: 2023-10-12

## 2023-10-12 RX ORDER — DOXAZOSIN 4 MG/1
4 TABLET ORAL
Qty: 90 | Refills: 3 | Status: ACTIVE | COMMUNITY
Start: 2019-05-06 | End: 1900-01-01

## 2023-10-13 ENCOUNTER — LABORATORY RESULT (OUTPATIENT)
Age: 63
End: 2023-10-13

## 2023-10-14 ENCOUNTER — NON-APPOINTMENT (OUTPATIENT)
Age: 63
End: 2023-10-14

## 2023-10-17 ENCOUNTER — LABORATORY RESULT (OUTPATIENT)
Age: 63
End: 2023-10-17

## 2023-10-18 ENCOUNTER — APPOINTMENT (OUTPATIENT)
Dept: SURGICAL ONCOLOGY | Facility: CLINIC | Age: 63
End: 2023-10-18
Payer: COMMERCIAL

## 2023-10-18 ENCOUNTER — NON-APPOINTMENT (OUTPATIENT)
Age: 63
End: 2023-10-18

## 2023-10-18 VITALS
HEIGHT: 62 IN | HEART RATE: 60 BPM | BODY MASS INDEX: 27.6 KG/M2 | SYSTOLIC BLOOD PRESSURE: 160 MMHG | OXYGEN SATURATION: 99 % | WEIGHT: 150 LBS | DIASTOLIC BLOOD PRESSURE: 88 MMHG

## 2023-10-18 PROCEDURE — 99205 OFFICE O/P NEW HI 60 MIN: CPT | Mod: 25

## 2023-10-18 PROCEDURE — 38505 NEEDLE BIOPSY LYMPH NODES: CPT

## 2023-10-20 ENCOUNTER — RESULT REVIEW (OUTPATIENT)
Age: 63
End: 2023-10-20

## 2023-10-24 ENCOUNTER — APPOINTMENT (OUTPATIENT)
Dept: ULTRASOUND IMAGING | Facility: CLINIC | Age: 63
End: 2023-10-24
Payer: COMMERCIAL

## 2023-10-24 ENCOUNTER — APPOINTMENT (OUTPATIENT)
Dept: MAMMOGRAPHY | Facility: CLINIC | Age: 63
End: 2023-10-24
Payer: COMMERCIAL

## 2023-10-24 ENCOUNTER — LABORATORY RESULT (OUTPATIENT)
Age: 63
End: 2023-10-24

## 2023-10-24 PROCEDURE — 77066 DX MAMMO INCL CAD BI: CPT

## 2023-10-24 PROCEDURE — 76642 ULTRASOUND BREAST LIMITED: CPT | Mod: RT

## 2023-10-24 PROCEDURE — G0279: CPT

## 2023-10-25 ENCOUNTER — APPOINTMENT (OUTPATIENT)
Dept: CT IMAGING | Facility: CLINIC | Age: 63
End: 2023-10-25
Payer: COMMERCIAL

## 2023-10-25 PROCEDURE — 74177 CT ABD & PELVIS W/CONTRAST: CPT

## 2023-10-25 PROCEDURE — 71260 CT THORAX DX C+: CPT

## 2023-10-26 ENCOUNTER — RESULT REVIEW (OUTPATIENT)
Age: 63
End: 2023-10-26

## 2023-10-26 ENCOUNTER — APPOINTMENT (OUTPATIENT)
Dept: MULTI SPECIALTY CLINIC | Facility: CLINIC | Age: 63
End: 2023-10-26
Payer: COMMERCIAL

## 2023-10-26 ENCOUNTER — OUTPATIENT (OUTPATIENT)
Dept: OUTPATIENT SERVICES | Facility: HOSPITAL | Age: 63
LOS: 1 days | Discharge: HOME | End: 2023-10-26
Payer: COMMERCIAL

## 2023-10-26 ENCOUNTER — NON-APPOINTMENT (OUTPATIENT)
Age: 63
End: 2023-10-26

## 2023-10-26 DIAGNOSIS — Z95.5 PRESENCE OF CORONARY ANGIOPLASTY IMPLANT AND GRAFT: Chronic | ICD-10-CM

## 2023-10-26 DIAGNOSIS — Z63.5 DISRUPTION OF FAMILY BY SEPARATION AND DIVORCE: ICD-10-CM

## 2023-10-26 DIAGNOSIS — I72.9 ANEURYSM OF UNSPECIFIED SITE: Chronic | ICD-10-CM

## 2023-10-26 DIAGNOSIS — Z01.818 ENCOUNTER FOR OTHER PREPROCEDURAL EXAMINATION: ICD-10-CM

## 2023-10-26 DIAGNOSIS — Z98.89 OTHER SPECIFIED POSTPROCEDURAL STATES: Chronic | ICD-10-CM

## 2023-10-26 DIAGNOSIS — Z98.2 PRESENCE OF CEREBROSPINAL FLUID DRAINAGE DEVICE: Chronic | ICD-10-CM

## 2023-10-26 DIAGNOSIS — Z43.4 ENCOUNTER FOR ATTENTION TO OTHER ARTIFICIAL OPENINGS OF DIGESTIVE TRACT: Chronic | ICD-10-CM

## 2023-10-26 DIAGNOSIS — C50.919 MALIGNANT NEOPLASM OF UNSPECIFIED SITE OF UNSPECIFIED FEMALE BREAST: ICD-10-CM

## 2023-10-26 DIAGNOSIS — Z80.0 FAMILY HISTORY OF MALIGNANT NEOPLASM OF DIGESTIVE ORGANS: ICD-10-CM

## 2023-10-26 LAB
APTT BLD: 49.5 SEC
BASOPHILS # BLD AUTO: 0.01 K/UL — SIGNIFICANT CHANGE UP (ref 0–0.2)
BASOPHILS # BLD AUTO: 0.01 K/UL — SIGNIFICANT CHANGE UP (ref 0–0.2)
BASOPHILS NFR BLD AUTO: 0.3 % — SIGNIFICANT CHANGE UP (ref 0–2)
BASOPHILS NFR BLD AUTO: 0.3 % — SIGNIFICANT CHANGE UP (ref 0–2)
EOSINOPHIL # BLD AUTO: 0.04 K/UL — SIGNIFICANT CHANGE UP (ref 0–0.5)
EOSINOPHIL # BLD AUTO: 0.04 K/UL — SIGNIFICANT CHANGE UP (ref 0–0.5)
EOSINOPHIL NFR BLD AUTO: 1.1 % — SIGNIFICANT CHANGE UP (ref 0–6)
EOSINOPHIL NFR BLD AUTO: 1.1 % — SIGNIFICANT CHANGE UP (ref 0–6)
HCT VFR BLD CALC: 33.2 % — LOW (ref 34.5–45)
HCT VFR BLD CALC: 33.2 % — LOW (ref 34.5–45)
HGB BLD-MCNC: 10.9 G/DL — LOW (ref 11.5–15.5)
HGB BLD-MCNC: 10.9 G/DL — LOW (ref 11.5–15.5)
IMM GRANULOCYTES NFR BLD AUTO: 0.3 % — SIGNIFICANT CHANGE UP (ref 0–0.9)
IMM GRANULOCYTES NFR BLD AUTO: 0.3 % — SIGNIFICANT CHANGE UP (ref 0–0.9)
INR PPP: 3.19 RATIO
LYMPHOCYTES # BLD AUTO: 1.2 K/UL — SIGNIFICANT CHANGE UP (ref 1–3.3)
LYMPHOCYTES # BLD AUTO: 1.2 K/UL — SIGNIFICANT CHANGE UP (ref 1–3.3)
LYMPHOCYTES # BLD AUTO: 32.5 % — SIGNIFICANT CHANGE UP (ref 13–44)
LYMPHOCYTES # BLD AUTO: 32.5 % — SIGNIFICANT CHANGE UP (ref 13–44)
MCHC RBC-ENTMCNC: 29.6 PG — SIGNIFICANT CHANGE UP (ref 27–34)
MCHC RBC-ENTMCNC: 29.6 PG — SIGNIFICANT CHANGE UP (ref 27–34)
MCHC RBC-ENTMCNC: 32.8 G/DL — SIGNIFICANT CHANGE UP (ref 32–36)
MCHC RBC-ENTMCNC: 32.8 G/DL — SIGNIFICANT CHANGE UP (ref 32–36)
MCV RBC AUTO: 90.2 FL — SIGNIFICANT CHANGE UP (ref 80–100)
MCV RBC AUTO: 90.2 FL — SIGNIFICANT CHANGE UP (ref 80–100)
MONOCYTES # BLD AUTO: 0.3 K/UL — SIGNIFICANT CHANGE UP (ref 0–0.9)
MONOCYTES # BLD AUTO: 0.3 K/UL — SIGNIFICANT CHANGE UP (ref 0–0.9)
MONOCYTES NFR BLD AUTO: 8.1 % — SIGNIFICANT CHANGE UP (ref 2–14)
MONOCYTES NFR BLD AUTO: 8.1 % — SIGNIFICANT CHANGE UP (ref 2–14)
NEUTROPHILS # BLD AUTO: 2.13 K/UL — SIGNIFICANT CHANGE UP (ref 1.8–7.4)
NEUTROPHILS # BLD AUTO: 2.13 K/UL — SIGNIFICANT CHANGE UP (ref 1.8–7.4)
NEUTROPHILS NFR BLD AUTO: 57.7 % — SIGNIFICANT CHANGE UP (ref 43–77)
NEUTROPHILS NFR BLD AUTO: 57.7 % — SIGNIFICANT CHANGE UP (ref 43–77)
NRBC # BLD: 0 /100 WBCS — SIGNIFICANT CHANGE UP (ref 0–0)
NRBC # BLD: 0 /100 WBCS — SIGNIFICANT CHANGE UP (ref 0–0)
PLATELET # BLD AUTO: 192 K/UL — SIGNIFICANT CHANGE UP (ref 150–400)
PLATELET # BLD AUTO: 192 K/UL — SIGNIFICANT CHANGE UP (ref 150–400)
PT BLD: 34.9 SEC
RBC # BLD: 3.68 M/UL — LOW (ref 3.8–5.2)
RBC # BLD: 3.68 M/UL — LOW (ref 3.8–5.2)
RBC # FLD: 14.6 % — HIGH (ref 10.3–14.5)
RBC # FLD: 14.6 % — HIGH (ref 10.3–14.5)
WBC # BLD: 3.69 K/UL — LOW (ref 3.8–10.5)
WBC # BLD: 3.69 K/UL — LOW (ref 3.8–10.5)
WBC # FLD AUTO: 3.69 K/UL — LOW (ref 3.8–10.5)
WBC # FLD AUTO: 3.69 K/UL — LOW (ref 3.8–10.5)

## 2023-10-26 PROCEDURE — 99205 OFFICE O/P NEW HI 60 MIN: CPT

## 2023-10-26 PROCEDURE — 99204 OFFICE O/P NEW MOD 45 MIN: CPT | Mod: 25

## 2023-10-26 SDOH — SOCIAL STABILITY - SOCIAL INSECURITY: DISRUPTION OF FAMILY BY SEPARATION AND DIVORCE: Z63.5

## 2023-10-27 ENCOUNTER — LABORATORY RESULT (OUTPATIENT)
Age: 63
End: 2023-10-27

## 2023-10-27 ENCOUNTER — APPOINTMENT (OUTPATIENT)
Dept: CARDIOLOGY | Facility: CLINIC | Age: 63
End: 2023-10-27

## 2023-10-27 LAB
ALBUMIN SERPL ELPH-MCNC: 4.4 G/DL
ALP BLD-CCNC: 102 U/L
ALT SERPL-CCNC: 11 U/L
ANION GAP SERPL CALC-SCNC: 12 MMOL/L
AST SERPL-CCNC: 17 U/L
BILIRUB SERPL-MCNC: 0.2 MG/DL
BUN SERPL-MCNC: 15 MG/DL
CALCIUM SERPL-MCNC: 9.3 MG/DL
CHLORIDE SERPL-SCNC: 110 MMOL/L
CO2 SERPL-SCNC: 24 MMOL/L
CONFIRM: 48.9 SEC
CREAT SERPL-MCNC: 1.04 MG/DL
DRVVT 1H NP PPP: 36.3 SEC
DRVVT IMM 1:2 NP PPP: NORMAL
DRVVT SCREEN TO CONFIRM RATIO: 0.94 RATIO
EGFR: 60 ML/MIN/1.73M2
GLUCOSE SERPL-MCNC: 87 MG/DL
HBV CORE IGG+IGM SER QL: NONREACTIVE
HBV SURFACE AB SER QL: NONREACTIVE
HBV SURFACE AG SER QL: NONREACTIVE
HCV AB SER QL: NONREACTIVE
HCV S/CO RATIO: 0.11 S/CO
POTASSIUM SERPL-SCNC: 4.2 MMOL/L
PROT SERPL-MCNC: 8 G/DL
SCREEN DRVVT: 54.1 SEC
SILICA CLOTTING TIME INTERPRETATION: NORMAL
SILICA CLOTTING TIME S/C: 0.83 RATIO
SODIUM SERPL-SCNC: 146 MMOL/L

## 2023-10-29 LAB
B2 GLYCOPROT1 IGA SERPL IA-ACNC: <5 SAU
B2 GLYCOPROT1 IGG SER-ACNC: <5 SGU
B2 GLYCOPROT1 IGM SER-ACNC: <5 SMU

## 2023-10-30 LAB
APTT HEX PL PPP: NEGATIVE
HEX-1: 41 SECS
HEX-2: 49.6 SECS

## 2023-10-31 ENCOUNTER — LABORATORY RESULT (OUTPATIENT)
Age: 63
End: 2023-10-31

## 2023-11-01 ENCOUNTER — LABORATORY RESULT (OUTPATIENT)
Age: 63
End: 2023-11-01

## 2023-11-02 LAB
CARDIOLIPIN IGM SER-MCNC: <5 GPL
CARDIOLIPIN IGM SER-MCNC: <5 MPL

## 2023-11-03 ENCOUNTER — RESULT REVIEW (OUTPATIENT)
Age: 63
End: 2023-11-03

## 2023-11-03 ENCOUNTER — NON-APPOINTMENT (OUTPATIENT)
Age: 63
End: 2023-11-03

## 2023-11-04 ENCOUNTER — NON-APPOINTMENT (OUTPATIENT)
Age: 63
End: 2023-11-04

## 2023-11-07 ENCOUNTER — APPOINTMENT (OUTPATIENT)
Dept: ULTRASOUND IMAGING | Facility: CLINIC | Age: 63
End: 2023-11-07
Payer: COMMERCIAL

## 2023-11-07 ENCOUNTER — LABORATORY RESULT (OUTPATIENT)
Age: 63
End: 2023-11-07

## 2023-11-07 PROCEDURE — 19083 BX BREAST 1ST LESION US IMAG: CPT | Mod: RT

## 2023-11-07 PROCEDURE — 77065 DX MAMMO INCL CAD UNI: CPT | Mod: RT

## 2023-11-08 ENCOUNTER — LABORATORY RESULT (OUTPATIENT)
Age: 63
End: 2023-11-08

## 2023-11-09 ENCOUNTER — APPOINTMENT (OUTPATIENT)
Dept: CARDIOLOGY | Facility: CLINIC | Age: 63
End: 2023-11-09
Payer: COMMERCIAL

## 2023-11-09 ENCOUNTER — APPOINTMENT (OUTPATIENT)
Dept: HEMATOLOGY ONCOLOGY | Facility: CLINIC | Age: 63
End: 2023-11-09
Payer: COMMERCIAL

## 2023-11-09 ENCOUNTER — RESULT CHARGE (OUTPATIENT)
Age: 63
End: 2023-11-09

## 2023-11-09 ENCOUNTER — NON-APPOINTMENT (OUTPATIENT)
Age: 63
End: 2023-11-09

## 2023-11-09 ENCOUNTER — LABORATORY RESULT (OUTPATIENT)
Age: 63
End: 2023-11-09

## 2023-11-09 VITALS
OXYGEN SATURATION: 99 % | WEIGHT: 152.12 LBS | RESPIRATION RATE: 16 BRPM | TEMPERATURE: 97 F | DIASTOLIC BLOOD PRESSURE: 81 MMHG | HEART RATE: 63 BPM | BODY MASS INDEX: 27.82 KG/M2 | SYSTOLIC BLOOD PRESSURE: 174 MMHG

## 2023-11-09 PROCEDURE — 93306 TTE W/DOPPLER COMPLETE: CPT

## 2023-11-09 PROCEDURE — 93010 ELECTROCARDIOGRAM REPORT: CPT

## 2023-11-09 PROCEDURE — 99215 OFFICE O/P EST HI 40 MIN: CPT

## 2023-11-09 PROCEDURE — 93356 MYOCRD STRAIN IMG SPCKL TRCK: CPT

## 2023-11-10 ENCOUNTER — APPOINTMENT (OUTPATIENT)
Dept: NUCLEAR MEDICINE | Facility: IMAGING CENTER | Age: 63
End: 2023-11-10
Payer: COMMERCIAL

## 2023-11-10 ENCOUNTER — OUTPATIENT (OUTPATIENT)
Dept: OUTPATIENT SERVICES | Facility: HOSPITAL | Age: 63
LOS: 1 days | End: 2023-11-10
Payer: COMMERCIAL

## 2023-11-10 DIAGNOSIS — Z95.5 PRESENCE OF CORONARY ANGIOPLASTY IMPLANT AND GRAFT: Chronic | ICD-10-CM

## 2023-11-10 DIAGNOSIS — Z98.89 OTHER SPECIFIED POSTPROCEDURAL STATES: Chronic | ICD-10-CM

## 2023-11-10 DIAGNOSIS — C50.911 MALIGNANT NEOPLASM OF UNSPECIFIED SITE OF RIGHT FEMALE BREAST: ICD-10-CM

## 2023-11-10 DIAGNOSIS — I72.9 ANEURYSM OF UNSPECIFIED SITE: Chronic | ICD-10-CM

## 2023-11-10 DIAGNOSIS — Z43.4 ENCOUNTER FOR ATTENTION TO OTHER ARTIFICIAL OPENINGS OF DIGESTIVE TRACT: Chronic | ICD-10-CM

## 2023-11-10 DIAGNOSIS — Z98.2 PRESENCE OF CEREBROSPINAL FLUID DRAINAGE DEVICE: Chronic | ICD-10-CM

## 2023-11-10 PROCEDURE — 78306 BONE IMAGING WHOLE BODY: CPT | Mod: 26

## 2023-11-10 PROCEDURE — A9561: CPT

## 2023-11-10 PROCEDURE — 78306 BONE IMAGING WHOLE BODY: CPT

## 2023-11-12 ENCOUNTER — NON-APPOINTMENT (OUTPATIENT)
Age: 63
End: 2023-11-12

## 2023-11-13 LAB — CORE LAB BIOPSY: NORMAL

## 2023-11-17 ENCOUNTER — LABORATORY RESULT (OUTPATIENT)
Age: 63
End: 2023-11-17

## 2023-11-18 ENCOUNTER — NON-APPOINTMENT (OUTPATIENT)
Age: 63
End: 2023-11-18

## 2023-11-20 ENCOUNTER — APPOINTMENT (OUTPATIENT)
Dept: ENDOVASCULAR SURGERY | Facility: CLINIC | Age: 63
End: 2023-11-20

## 2023-11-20 VITALS
HEART RATE: 63 BPM | TEMPERATURE: 97 F | OXYGEN SATURATION: 97 % | SYSTOLIC BLOOD PRESSURE: 156 MMHG | WEIGHT: 150 LBS | HEIGHT: 61 IN | BODY MASS INDEX: 28.32 KG/M2 | RESPIRATION RATE: 18 BRPM | DIASTOLIC BLOOD PRESSURE: 84 MMHG

## 2023-11-20 RX ORDER — PANTOPRAZOLE 40 MG/1
40 TABLET, DELAYED RELEASE ORAL DAILY
Qty: 90 | Refills: 1 | Status: DISCONTINUED | COMMUNITY
Start: 2023-02-14 | End: 2023-11-20

## 2023-11-21 ENCOUNTER — LABORATORY RESULT (OUTPATIENT)
Age: 63
End: 2023-11-21

## 2023-11-21 RX ORDER — ONDANSETRON 8 MG/1
8 TABLET ORAL EVERY 8 HOURS
Qty: 20 | Refills: 1 | Status: ACTIVE | COMMUNITY
Start: 2023-11-21 | End: 1900-01-01

## 2023-11-21 RX ORDER — LIDOCAINE AND PRILOCAINE 25; 25 MG/G; MG/G
2.5-2.5 CREAM TOPICAL
Qty: 20 | Refills: 2 | Status: ACTIVE | COMMUNITY
Start: 2023-11-21 | End: 1900-01-01

## 2023-11-24 ENCOUNTER — RESULT REVIEW (OUTPATIENT)
Age: 63
End: 2023-11-24

## 2023-11-24 ENCOUNTER — OUTPATIENT (OUTPATIENT)
Dept: OUTPATIENT SERVICES | Facility: HOSPITAL | Age: 63
LOS: 1 days | End: 2023-11-24
Payer: COMMERCIAL

## 2023-11-24 ENCOUNTER — TRANSCRIPTION ENCOUNTER (OUTPATIENT)
Age: 63
End: 2023-11-24

## 2023-11-24 VITALS
DIASTOLIC BLOOD PRESSURE: 71 MMHG | RESPIRATION RATE: 16 BRPM | HEIGHT: 61 IN | WEIGHT: 149.91 LBS | OXYGEN SATURATION: 95 % | TEMPERATURE: 98 F | SYSTOLIC BLOOD PRESSURE: 139 MMHG | HEART RATE: 58 BPM

## 2023-11-24 VITALS
OXYGEN SATURATION: 98 % | DIASTOLIC BLOOD PRESSURE: 78 MMHG | RESPIRATION RATE: 14 BRPM | SYSTOLIC BLOOD PRESSURE: 144 MMHG | HEART RATE: 72 BPM

## 2023-11-24 DIAGNOSIS — Z98.89 OTHER SPECIFIED POSTPROCEDURAL STATES: Chronic | ICD-10-CM

## 2023-11-24 DIAGNOSIS — Z43.4 ENCOUNTER FOR ATTENTION TO OTHER ARTIFICIAL OPENINGS OF DIGESTIVE TRACT: Chronic | ICD-10-CM

## 2023-11-24 DIAGNOSIS — C50.911 MALIGNANT NEOPLASM OF UNSPECIFIED SITE OF RIGHT FEMALE BREAST: ICD-10-CM

## 2023-11-24 DIAGNOSIS — I72.9 ANEURYSM OF UNSPECIFIED SITE: Chronic | ICD-10-CM

## 2023-11-24 DIAGNOSIS — Z95.5 PRESENCE OF CORONARY ANGIOPLASTY IMPLANT AND GRAFT: Chronic | ICD-10-CM

## 2023-11-24 DIAGNOSIS — Z98.2 PRESENCE OF CEREBROSPINAL FLUID DRAINAGE DEVICE: Chronic | ICD-10-CM

## 2023-11-24 PROCEDURE — C1769: CPT

## 2023-11-24 PROCEDURE — C1887: CPT

## 2023-11-24 PROCEDURE — 77001 FLUOROGUIDE FOR VEIN DEVICE: CPT

## 2023-11-24 PROCEDURE — C1788: CPT

## 2023-11-24 PROCEDURE — 77001 FLUOROGUIDE FOR VEIN DEVICE: CPT | Mod: 26

## 2023-11-24 PROCEDURE — 36561 INSERT TUNNELED CV CATH: CPT | Mod: LT

## 2023-11-24 PROCEDURE — C1894: CPT

## 2023-11-24 PROCEDURE — 76937 US GUIDE VASCULAR ACCESS: CPT | Mod: 26

## 2023-11-24 PROCEDURE — 36561 INSERT TUNNELED CV CATH: CPT

## 2023-11-24 PROCEDURE — 76937 US GUIDE VASCULAR ACCESS: CPT

## 2023-11-24 RX ORDER — HYDROMORPHONE HYDROCHLORIDE 2 MG/ML
0.25 INJECTION INTRAMUSCULAR; INTRAVENOUS; SUBCUTANEOUS
Refills: 0 | Status: DISCONTINUED | OUTPATIENT
Start: 2023-11-24 | End: 2023-11-24

## 2023-11-24 RX ORDER — ONDANSETRON 8 MG/1
4 TABLET, FILM COATED ORAL ONCE
Refills: 0 | Status: DISCONTINUED | OUTPATIENT
Start: 2023-11-24 | End: 2023-12-08

## 2023-11-24 RX ORDER — OXYCODONE AND ACETAMINOPHEN 5; 325 MG/1; MG/1
5-325 TABLET ORAL
Qty: 6 | Refills: 0 | Status: ACTIVE | COMMUNITY
Start: 2023-11-24 | End: 1900-01-01

## 2023-11-24 RX ORDER — FENTANYL CITRATE 50 UG/ML
25 INJECTION INTRAVENOUS
Refills: 0 | Status: DISCONTINUED | OUTPATIENT
Start: 2023-11-24 | End: 2023-11-24

## 2023-11-24 NOTE — PACU DISCHARGE NOTE - NSCLINEINSERTRD_GEN_ALL_CORE
No TRANSFER - IN REPORT:    Verbal report received from [address] [place] [address] (name) on Ciara Yang  being received from Cath Lab(unit) for routine progression of care      Report consisted of patients Situation, Background, Assessment and   Recommendations(SBAR). Information from the following report(s) SBAR, Procedure Summary, Intake/Output, MAR, and Recent Results was reviewed with the receiving nurse. Opportunity for questions and clarification was provided. Assessment completed upon patients arrival to unit and care assumed. 1005: Pt arrived on unit, first set of vitals obtained. TR band checked, site clean and intact. 1100: Dr. Ivana Long at bedside. Received orders to consult nephro for hyponatremia. 1200: Dr. Yasmine Mares at bedside. Received orders for labs. 1345: Nephro alerted to critical results, calcium of 6.3 and potassium 2.6- received orders for DDVAP, dextrose 5%, and potassium sodium phosphates. Also orders to recheck renal/BMP at 1800.     1430: Urinalysis obtained and sent. 1600: Report given to [employer].

## 2023-11-24 NOTE — ASU DISCHARGE PLAN (ADULT/PEDIATRIC) - NS MD DC FALL RISK RISK
For information on Fall & Injury Prevention, visit: [email or web address] OR  [email or web address] OR  https://www.cdc.gov/steadi/patient.html

## 2023-11-24 NOTE — PROGRESS NOTE ADULT - SUBJECTIVE AND OBJECTIVE BOX
Interventional Radiology    HPI: 63y Female with gynecologic malignancy for chest port    Allergies: penicillin (Hives)  latex (Unknown)  doxycycline (Rash)  Clindamycin Phosphate (Rash)  [This allergen will not trigger allergy alert] Sulfa (Sulfonamide Antibiotics) (Hives)  sulfa drugs (Other)  [This allergen will not trigger allergy alert] IV DYE, IODINE CONTRAST (Unknown)    Medications (Abx/Cardiac/Anticoagulation/Blood Products)      Data:  154.9, 154.9  68  T(C): 36.5  HR: 58  BP: 139/71  RR: 16  SpO2: 95%    Exam  General: No acute distress  Chest: Non labored breathing  Abdomen: Non-distended  Extremities: No swelling, warm          Imaging:     Plan: 63y Female presents for chest wall port.    -Risks/Benefits/alternatives explained with the patient and/or healthcare proxy and witnessed informed consent obtained.    Interventional Radiology    HPI: 63y Female with right breast ca for chest port    Allergies: penicillin (Hives)  latex (Unknown)  doxycycline (Rash)  Clindamycin Phosphate (Rash)  [This allergen will not trigger allergy alert] Sulfa (Sulfonamide Antibiotics) (Hives)  sulfa drugs (Other)  [This allergen will not trigger allergy alert] IV DYE, IODINE CONTRAST (Unknown)    Medications (Abx/Cardiac/Anticoagulation/Blood Products)      Data:  154.9, 154.9  68  T(C): 36.5  HR: 58  BP: 139/71  RR: 16  SpO2: 95%    Exam  General: No acute distress  Chest: Non labored breathing  Abdomen: Non-distended  Extremities: No swelling, warm          Imaging:     Plan: 63y Female presents for chest wall port.    -Risks/Benefits/alternatives explained with the patient and/or healthcare proxy and witnessed informed consent obtained.

## 2023-11-24 NOTE — ASU DISCHARGE PLAN (ADULT/PEDIATRIC) - NURSING INSTRUCTIONS
Please feel free to contact us at [phone] if any questions or concerns arise. After 6PM on Monday through Friday (and weekends and holidays) please call [phone] and ask for the radiology resident on call to be paged..

## 2023-11-24 NOTE — ASU DISCHARGE PLAN (ADULT/PEDIATRIC) - SIGNS AND SYMPTOMS OF INFECTION: FEVER, REDNESS, SWELLING, FOUL SMELLING DISCHARGE
If ERM progresses and the patient’s symptoms worsen or visual acuity decreases significantly, patient may require vitrectomy and membrane peeling in the future. Statement Selected

## 2023-11-24 NOTE — ASU PREOP CHECKLIST - DENTURES
[facility]  [facility] [address] [address]  Dept: [phone]  Dept Fax: [phone]    Office Progress/Follow Up Note  Date ofpatient's visit: 5/31/2022  Patient's Name:  Livia Burrows YOB: 1958            Patient Care Team:  Derek Maguire MD as PCP - General (Internal Medicine)  ================================================================    REASON FOR VISIT/CHIEF COMPLAINT:  Hypertension (1 month f/u)    HISTORY OF PRESENTING ILLNESS:  History was obtained from: patient, electronic medical record. Gordon lentz 59 y.o. is here for a follow-up for long standing essential hypertension. Today 5/31/2022-  Self-pay visit    Essential hypertension-was started on amlodipine 10 mg on top of lisinopril hydrochlorothiazide 20/25. Blood pressure today is 119/75. Patient has been compliant in checking her blood pressure readings at home all the readings were around 120/80 as per patient. Has been checking her blood pressure and quite frequently. Has good diet control, compliant with medication. Denies any side effects with amlodipine, labs showed hypokalemia at 3.7 denies any cramps or palpitations, leg swelling    She has been persistently smoking 10 cigarettes/day, has history of mild asthma not on any rescue inhaler, patient has mild wheezing on exertion. Will prescribe her albuterol rescue inhaler for now patient is self-pay and cannot afford medications. Patient has been advised to cut down on smoking which she agrees and she is qualifying for CT lung cancer screening, patient indicates that she does not have the money for the CT image and wants to hold off on it for now. History of low back pain with sciatica as per patient has been using only Tylenol denies any symptoms of radiation.     Hyperlipidemia on statin compliant with medication    History of sigmoid diverticulosis with recent weight loss of 20 pounds in the Mixed hyperlipidemia    Mild intermittent asthma without complication    Pre-diabetes    Smoking greater than 20 pack years    Sigmoid diverticulosis    Chronic bilateral low back pain with left-sided sciatica       Health Maintenance Due   Topic Date Due    Breast cancer screen  01/30/2019    COVID-19 Vaccine (3 - Booster for Moderna series) 11/09/2021    Colorectal Cancer Screen  03/16/2022    Lipids  03/29/2022       No Known Allergies      Current Outpatient Medications   Medication Sig Dispense Refill    lisinopril-hydroCHLOROthiazide (PRINZIDE;ZESTORETIC) 20-25 MG per tablet take 1 tablet by mouth once daily 30 tablet 1    acetaminophen (TYLENOL) 500 MG tablet Take 1 tablet by mouth 4 times daily as needed for Pain 120 tablet 0    amLODIPine (NORVASC) 10 MG tablet Take 1 tablet by mouth daily 30 tablet 5    albuterol sulfate  (90 Base) MCG/ACT inhaler inhale 2 puffs by mouth every 6 hours if needed for wheezing 8.5 g 3    pravastatin (PRAVACHOL) 40 MG tablet take 1 tablet by mouth at bedtime 120 tablet 3    Blood Pressure KIT Use to monitor blood pressure daily and as needed 1 kit 0     No current facility-administered medications for this visit. Social History     Tobacco Use    Smoking status: Current Every Day Smoker     Packs/day: 0.50     Years: 35.00     Pack years: 17.50     Types: Cigarettes    Smokeless tobacco: Never Used   Vaping Use    Vaping Use: Never used   Substance Use Topics    Alcohol use: No     Comment: recovering alcoholic    Drug use: Yes     Comment: amari occassionally       Family History   Problem Relation Age of Onset    Heart Disease Mother     Lung Cancer Mother 54    Heart Disease Father     Stroke Father         REVIEW OF SYSTEMS:  Review of Systems   Constitutional: Negative. Negative for activity change, appetite change, fatigue, fever and unexpected weight change. HENT: Negative. Eyes: Negative.     Respiratory: Positive for shortness of breath and wheezing. Cardiovascular: Negative. Negative for chest pain, palpitations and leg swelling. Gastrointestinal: Negative. Negative for abdominal pain, anal bleeding, blood in stool, constipation, diarrhea and rectal pain. Endocrine: Negative. Genitourinary: Negative. Musculoskeletal: Positive for back pain. Skin: Negative. Allergic/Immunologic: Negative. Neurological: Negative. Hematological: Negative. Psychiatric/Behavioral: Negative. PHYSICAL EXAM:  Vitals:    05/31/22 0955   BP: 119/75   Site: Left Upper Arm   Position: Sitting   Cuff Size: Medium Adult   Pulse: 89   Temp: 97.2 °F (36.2 °C)   SpO2: 96%   Weight: 141 lb (64 kg)   Height: 5' 1\" (1.549 m)     BP Readings from Last 3 Encounters:   05/31/22 119/75   03/30/22 (!) 167/93   03/09/22 (!) 168/91        Physical Exam  Vitals reviewed. Constitutional:       General: She is awake. She is not in acute distress. Appearance: Normal appearance. She is well-developed. HENT:      Head: Normocephalic and atraumatic. Nose: Nose normal.      Mouth/Throat:      Mouth: Mucous membranes are moist.   Eyes:      Conjunctiva/sclera: Conjunctivae normal.   Cardiovascular:      Rate and Rhythm: Normal rate. Pulses: Normal pulses. Heart sounds: Normal heart sounds, S1 normal and S2 normal.   Pulmonary:      Effort: Pulmonary effort is normal. No respiratory distress. Breath sounds: Normal air entry. Examination of the right-upper field reveals wheezing. Examination of the left-upper field reveals wheezing. Examination of the right-middle field reveals wheezing. Examination of the left-middle field reveals wheezing. Examination of the right-lower field reveals wheezing. Examination of the left-lower field reveals wheezing. Wheezing present. No rhonchi. Comments: Mild wheezing noted on end inspiration. Chest:      Chest wall: No tenderness.    Musculoskeletal:      Right knee: Normal. Left knee: Normal.   Skin:     General: Skin is moist.   Neurological:      General: No focal deficit present. Mental Status: She is alert and oriented to person, place, and time. Mental status is at baseline. Psychiatric:         Attention and Perception: Attention normal.         Mood and Affect: Mood normal.         Behavior: Behavior normal. Behavior is cooperative. Thought Content: Thought content normal.           DIAGNOSTIC FINDINGS:  CBC:  Lab Results   Component Value Date    WBC 10.6 03/09/2022    HGB 13.1 03/09/2022     03/09/2022       BMP:    Lab Results   Component Value Date     03/09/2022    K 3.7 03/09/2022     03/09/2022    CO2 24 03/09/2022    BUN 16 03/09/2022    CREATININE 0.78 03/09/2022    GLUCOSE 80 03/09/2022    GLUCOSE 92 04/10/2012       HEMOGLOBIN A1C:   Lab Results   Component Value Date    LABA1C 5.5 12/15/2021       FASTING LIPID PANEL:  Lab Results   Component Value Date    CHOL 214 (H) 03/29/2021    HDL 47 03/29/2021    TRIG 265 (H) 03/29/2021       ASSESSMENT AND PLAN:  Lise Rodriguez was seen today for hypertension.     Diagnoses and all orders for this visit:    Essential hypertension well controlled    Mixed hyperlipidemia    Smoking greater than 20 pack years    Chronic bilateral low back pain with left-sided sciatica    Pre-diabetes    Sigmoid diverticulosis    Mild intermittent asthma without complication    Colon cancer screening      Essential hypertension well-controlled-continue with amlodipine 10 and lisinopril hydrochlorothiazide 2025 mg, continue to check home blood pressure daily    Mixed hyperlipidemia continue with pravastatin 40 mg tablet     Active smoking 20 pack years, cannot get CT lung cancer screening as per patient financial reasons, advised to quit smoking    Mild intermittent asthma without complication albuterol as needed as for rescue inhaler, patient indicates she cannot afford many medications would like to cut down on smoking first and then get rechecked in next office visit    History of sigmoid diverticulosis with colon cancer screening indicated- cannot afford colonoscopy Cologuard prescribed, cannot afford GI referral as per patient which was previously given    Chronic low back pain Tylenol as needed    Prediabetes A1c in the lab due to cost being cheaper than in office A1c check    FOLLOW UP AND INSTRUCTIONS:  Return in about 6 months (around 11/30/2022) for Asthma, hypertension follow-up. · Anum Lu received counseling on the following healthy behaviors: exercise and tobacco cessation    · Discussed use, benefit, and side effects of prescribed medications. Barriers to medication compliance addressed. All patient questions answered. Pt voiced understanding. · Patient given educational materials - see patient instructions    Oziel Meyer MD  Internal Medicine Resident, [facility]; [place], [place]  5/31/2022, 10:28 AM      This note is created with the assistance of a speech-recognition program. While intending to generate a document that actually reflects the content of thevisit, the document can still have some mistakes which may not have been identified and corrected by editing. no

## 2023-11-24 NOTE — ASU PATIENT PROFILE, ADULT - FALL HARM RISK - RISK INTERVENTIONS

## 2023-11-26 ENCOUNTER — NON-APPOINTMENT (OUTPATIENT)
Age: 63
End: 2023-11-26

## 2023-11-27 ENCOUNTER — LABORATORY RESULT (OUTPATIENT)
Age: 63
End: 2023-11-27

## 2023-11-28 ENCOUNTER — NON-APPOINTMENT (OUTPATIENT)
Age: 63
End: 2023-11-28

## 2023-11-28 ENCOUNTER — LABORATORY RESULT (OUTPATIENT)
Age: 63
End: 2023-11-28

## 2023-11-29 DIAGNOSIS — C50.911 MALIGNANT NEOPLASM OF UNSPECIFIED SITE OF RIGHT FEMALE BREAST: ICD-10-CM

## 2023-11-29 DIAGNOSIS — Z45.2 ENCOUNTER FOR ADJUSTMENT AND MANAGEMENT OF VASCULAR ACCESS DEVICE: ICD-10-CM

## 2023-11-30 ENCOUNTER — RESULT REVIEW (OUTPATIENT)
Age: 63
End: 2023-11-30

## 2023-11-30 ENCOUNTER — APPOINTMENT (OUTPATIENT)
Dept: HEMATOLOGY ONCOLOGY | Facility: CLINIC | Age: 63
End: 2023-11-30

## 2023-11-30 ENCOUNTER — NON-APPOINTMENT (OUTPATIENT)
Age: 63
End: 2023-11-30

## 2023-11-30 ENCOUNTER — APPOINTMENT (OUTPATIENT)
Dept: INFUSION THERAPY | Facility: HOSPITAL | Age: 63
End: 2023-11-30

## 2023-11-30 LAB
ALBUMIN SERPL ELPH-MCNC: 4 G/DL — SIGNIFICANT CHANGE UP (ref 3.3–5)
ALBUMIN SERPL ELPH-MCNC: 4 G/DL — SIGNIFICANT CHANGE UP (ref 3.3–5)
ALP SERPL-CCNC: 100 U/L — SIGNIFICANT CHANGE UP (ref 40–120)
ALP SERPL-CCNC: 100 U/L — SIGNIFICANT CHANGE UP (ref 40–120)
ALT FLD-CCNC: 12 U/L — SIGNIFICANT CHANGE UP (ref 10–45)
ALT FLD-CCNC: 12 U/L — SIGNIFICANT CHANGE UP (ref 10–45)
ANION GAP SERPL CALC-SCNC: 9 MMOL/L — SIGNIFICANT CHANGE UP (ref 5–17)
ANION GAP SERPL CALC-SCNC: 9 MMOL/L — SIGNIFICANT CHANGE UP (ref 5–17)
AST SERPL-CCNC: 20 U/L — SIGNIFICANT CHANGE UP (ref 10–40)
AST SERPL-CCNC: 20 U/L — SIGNIFICANT CHANGE UP (ref 10–40)
BASOPHILS # BLD AUTO: 0.03 K/UL — SIGNIFICANT CHANGE UP (ref 0–0.2)
BASOPHILS # BLD AUTO: 0.03 K/UL — SIGNIFICANT CHANGE UP (ref 0–0.2)
BASOPHILS NFR BLD AUTO: 0.7 % — SIGNIFICANT CHANGE UP (ref 0–2)
BASOPHILS NFR BLD AUTO: 0.7 % — SIGNIFICANT CHANGE UP (ref 0–2)
BILIRUB SERPL-MCNC: <0.2 MG/DL — SIGNIFICANT CHANGE UP (ref 0.2–1.2)
BILIRUB SERPL-MCNC: <0.2 MG/DL — SIGNIFICANT CHANGE UP (ref 0.2–1.2)
BUN SERPL-MCNC: 18 MG/DL — SIGNIFICANT CHANGE UP (ref 7–23)
BUN SERPL-MCNC: 18 MG/DL — SIGNIFICANT CHANGE UP (ref 7–23)
CALCIUM SERPL-MCNC: 8.8 MG/DL — SIGNIFICANT CHANGE UP (ref 8.4–10.5)
CALCIUM SERPL-MCNC: 8.8 MG/DL — SIGNIFICANT CHANGE UP (ref 8.4–10.5)
CHLORIDE SERPL-SCNC: 115 MMOL/L — HIGH (ref 96–108)
CHLORIDE SERPL-SCNC: 115 MMOL/L — HIGH (ref 96–108)
CO2 SERPL-SCNC: 20 MMOL/L — LOW (ref 22–31)
CO2 SERPL-SCNC: 20 MMOL/L — LOW (ref 22–31)
CREAT SERPL-MCNC: 1.07 MG/DL — SIGNIFICANT CHANGE UP (ref 0.5–1.3)
CREAT SERPL-MCNC: 1.07 MG/DL — SIGNIFICANT CHANGE UP (ref 0.5–1.3)
EGFR: 58 ML/MIN/1.73M2 — LOW
EGFR: 58 ML/MIN/1.73M2 — LOW
EOSINOPHIL # BLD AUTO: 0.08 K/UL — SIGNIFICANT CHANGE UP (ref 0–0.5)
EOSINOPHIL # BLD AUTO: 0.08 K/UL — SIGNIFICANT CHANGE UP (ref 0–0.5)
EOSINOPHIL NFR BLD AUTO: 1.9 % — SIGNIFICANT CHANGE UP (ref 0–6)
EOSINOPHIL NFR BLD AUTO: 1.9 % — SIGNIFICANT CHANGE UP (ref 0–6)
GLUCOSE SERPL-MCNC: 97 MG/DL — SIGNIFICANT CHANGE UP (ref 70–99)
GLUCOSE SERPL-MCNC: 97 MG/DL — SIGNIFICANT CHANGE UP (ref 70–99)
HCT VFR BLD CALC: 32 % — LOW (ref 34.5–45)
HCT VFR BLD CALC: 32 % — LOW (ref 34.5–45)
HGB BLD-MCNC: 11.1 G/DL — LOW (ref 11.5–15.5)
HGB BLD-MCNC: 11.1 G/DL — LOW (ref 11.5–15.5)
IMM GRANULOCYTES NFR BLD AUTO: 2.4 % — HIGH (ref 0–0.9)
IMM GRANULOCYTES NFR BLD AUTO: 2.4 % — HIGH (ref 0–0.9)
LYMPHOCYTES # BLD AUTO: 1.04 K/UL — SIGNIFICANT CHANGE UP (ref 1–3.3)
LYMPHOCYTES # BLD AUTO: 1.04 K/UL — SIGNIFICANT CHANGE UP (ref 1–3.3)
LYMPHOCYTES # BLD AUTO: 24.6 % — SIGNIFICANT CHANGE UP (ref 13–44)
LYMPHOCYTES # BLD AUTO: 24.6 % — SIGNIFICANT CHANGE UP (ref 13–44)
MCHC RBC-ENTMCNC: 29.6 PG — SIGNIFICANT CHANGE UP (ref 27–34)
MCHC RBC-ENTMCNC: 29.6 PG — SIGNIFICANT CHANGE UP (ref 27–34)
MCHC RBC-ENTMCNC: 34.7 G/DL — SIGNIFICANT CHANGE UP (ref 32–36)
MCHC RBC-ENTMCNC: 34.7 G/DL — SIGNIFICANT CHANGE UP (ref 32–36)
MCV RBC AUTO: 85.3 FL — SIGNIFICANT CHANGE UP (ref 80–100)
MCV RBC AUTO: 85.3 FL — SIGNIFICANT CHANGE UP (ref 80–100)
MONOCYTES # BLD AUTO: 0.32 K/UL — SIGNIFICANT CHANGE UP (ref 0–0.9)
MONOCYTES # BLD AUTO: 0.32 K/UL — SIGNIFICANT CHANGE UP (ref 0–0.9)
MONOCYTES NFR BLD AUTO: 7.6 % — SIGNIFICANT CHANGE UP (ref 2–14)
MONOCYTES NFR BLD AUTO: 7.6 % — SIGNIFICANT CHANGE UP (ref 2–14)
NEUTROPHILS # BLD AUTO: 2.65 K/UL — SIGNIFICANT CHANGE UP (ref 1.8–7.4)
NEUTROPHILS # BLD AUTO: 2.65 K/UL — SIGNIFICANT CHANGE UP (ref 1.8–7.4)
NEUTROPHILS NFR BLD AUTO: 62.8 % — SIGNIFICANT CHANGE UP (ref 43–77)
NEUTROPHILS NFR BLD AUTO: 62.8 % — SIGNIFICANT CHANGE UP (ref 43–77)
NRBC # BLD: 0 /100 WBCS — SIGNIFICANT CHANGE UP (ref 0–0)
NRBC # BLD: 0 /100 WBCS — SIGNIFICANT CHANGE UP (ref 0–0)
PLATELET # BLD AUTO: 162 K/UL — SIGNIFICANT CHANGE UP (ref 150–400)
PLATELET # BLD AUTO: 162 K/UL — SIGNIFICANT CHANGE UP (ref 150–400)
POTASSIUM SERPL-MCNC: 3.8 MMOL/L — SIGNIFICANT CHANGE UP (ref 3.5–5.3)
POTASSIUM SERPL-MCNC: 3.8 MMOL/L — SIGNIFICANT CHANGE UP (ref 3.5–5.3)
POTASSIUM SERPL-SCNC: 3.8 MMOL/L — SIGNIFICANT CHANGE UP (ref 3.5–5.3)
POTASSIUM SERPL-SCNC: 3.8 MMOL/L — SIGNIFICANT CHANGE UP (ref 3.5–5.3)
PROT SERPL-MCNC: 7.5 G/DL — SIGNIFICANT CHANGE UP (ref 6–8.3)
PROT SERPL-MCNC: 7.5 G/DL — SIGNIFICANT CHANGE UP (ref 6–8.3)
RBC # BLD: 3.75 M/UL — LOW (ref 3.8–5.2)
RBC # BLD: 3.75 M/UL — LOW (ref 3.8–5.2)
RBC # FLD: 15.6 % — HIGH (ref 10.3–14.5)
RBC # FLD: 15.6 % — HIGH (ref 10.3–14.5)
SODIUM SERPL-SCNC: 145 MMOL/L — SIGNIFICANT CHANGE UP (ref 135–145)
SODIUM SERPL-SCNC: 145 MMOL/L — SIGNIFICANT CHANGE UP (ref 135–145)
T4 FREE+ TSH PNL SERPL: 2 UIU/ML — SIGNIFICANT CHANGE UP (ref 0.27–4.2)
T4 FREE+ TSH PNL SERPL: 2 UIU/ML — SIGNIFICANT CHANGE UP (ref 0.27–4.2)
WBC # BLD: 4.22 K/UL — SIGNIFICANT CHANGE UP (ref 3.8–10.5)
WBC # BLD: 4.22 K/UL — SIGNIFICANT CHANGE UP (ref 3.8–10.5)
WBC # FLD AUTO: 4.22 K/UL — SIGNIFICANT CHANGE UP (ref 3.8–10.5)
WBC # FLD AUTO: 4.22 K/UL — SIGNIFICANT CHANGE UP (ref 3.8–10.5)

## 2023-12-01 DIAGNOSIS — R11.2 NAUSEA WITH VOMITING, UNSPECIFIED: ICD-10-CM

## 2023-12-01 DIAGNOSIS — Z51.11 ENCOUNTER FOR ANTINEOPLASTIC CHEMOTHERAPY: ICD-10-CM

## 2023-12-05 ENCOUNTER — LABORATORY RESULT (OUTPATIENT)
Age: 63
End: 2023-12-05

## 2023-12-07 ENCOUNTER — APPOINTMENT (OUTPATIENT)
Dept: INFUSION THERAPY | Facility: HOSPITAL | Age: 63
End: 2023-12-07

## 2023-12-07 ENCOUNTER — RESULT REVIEW (OUTPATIENT)
Age: 63
End: 2023-12-07

## 2023-12-07 ENCOUNTER — APPOINTMENT (OUTPATIENT)
Dept: HEMATOLOGY ONCOLOGY | Facility: CLINIC | Age: 63
End: 2023-12-07

## 2023-12-07 LAB
ALBUMIN SERPL ELPH-MCNC: 4.2 G/DL — SIGNIFICANT CHANGE UP (ref 3.3–5)
ALBUMIN SERPL ELPH-MCNC: 4.2 G/DL — SIGNIFICANT CHANGE UP (ref 3.3–5)
ALP SERPL-CCNC: 102 U/L — SIGNIFICANT CHANGE UP (ref 40–120)
ALP SERPL-CCNC: 102 U/L — SIGNIFICANT CHANGE UP (ref 40–120)
ALT FLD-CCNC: 12 U/L — SIGNIFICANT CHANGE UP (ref 10–45)
ALT FLD-CCNC: 12 U/L — SIGNIFICANT CHANGE UP (ref 10–45)
ANION GAP SERPL CALC-SCNC: 9 MMOL/L — SIGNIFICANT CHANGE UP (ref 5–17)
ANION GAP SERPL CALC-SCNC: 9 MMOL/L — SIGNIFICANT CHANGE UP (ref 5–17)
AST SERPL-CCNC: 16 U/L — SIGNIFICANT CHANGE UP (ref 10–40)
AST SERPL-CCNC: 16 U/L — SIGNIFICANT CHANGE UP (ref 10–40)
BASOPHILS # BLD AUTO: 0.02 K/UL — SIGNIFICANT CHANGE UP (ref 0–0.2)
BASOPHILS # BLD AUTO: 0.02 K/UL — SIGNIFICANT CHANGE UP (ref 0–0.2)
BASOPHILS NFR BLD AUTO: 0.5 % — SIGNIFICANT CHANGE UP (ref 0–2)
BASOPHILS NFR BLD AUTO: 0.5 % — SIGNIFICANT CHANGE UP (ref 0–2)
BILIRUB SERPL-MCNC: 0.2 MG/DL — SIGNIFICANT CHANGE UP (ref 0.2–1.2)
BILIRUB SERPL-MCNC: 0.2 MG/DL — SIGNIFICANT CHANGE UP (ref 0.2–1.2)
BUN SERPL-MCNC: 18 MG/DL — SIGNIFICANT CHANGE UP (ref 7–23)
BUN SERPL-MCNC: 18 MG/DL — SIGNIFICANT CHANGE UP (ref 7–23)
CALCIUM SERPL-MCNC: 9.7 MG/DL — SIGNIFICANT CHANGE UP (ref 8.4–10.5)
CALCIUM SERPL-MCNC: 9.7 MG/DL — SIGNIFICANT CHANGE UP (ref 8.4–10.5)
CHLORIDE SERPL-SCNC: 113 MMOL/L — HIGH (ref 96–108)
CHLORIDE SERPL-SCNC: 113 MMOL/L — HIGH (ref 96–108)
CO2 SERPL-SCNC: 20 MMOL/L — LOW (ref 22–31)
CO2 SERPL-SCNC: 20 MMOL/L — LOW (ref 22–31)
CREAT SERPL-MCNC: 1.1 MG/DL — SIGNIFICANT CHANGE UP (ref 0.5–1.3)
CREAT SERPL-MCNC: 1.1 MG/DL — SIGNIFICANT CHANGE UP (ref 0.5–1.3)
EGFR: 56 ML/MIN/1.73M2 — LOW
EGFR: 56 ML/MIN/1.73M2 — LOW
EOSINOPHIL # BLD AUTO: 0.02 K/UL — SIGNIFICANT CHANGE UP (ref 0–0.5)
EOSINOPHIL # BLD AUTO: 0.02 K/UL — SIGNIFICANT CHANGE UP (ref 0–0.5)
EOSINOPHIL NFR BLD AUTO: 0.5 % — SIGNIFICANT CHANGE UP (ref 0–6)
EOSINOPHIL NFR BLD AUTO: 0.5 % — SIGNIFICANT CHANGE UP (ref 0–6)
GLUCOSE SERPL-MCNC: 83 MG/DL — SIGNIFICANT CHANGE UP (ref 70–99)
GLUCOSE SERPL-MCNC: 83 MG/DL — SIGNIFICANT CHANGE UP (ref 70–99)
HCT VFR BLD CALC: 30.5 % — LOW (ref 34.5–45)
HCT VFR BLD CALC: 30.5 % — LOW (ref 34.5–45)
HGB BLD-MCNC: 10.5 G/DL — LOW (ref 11.5–15.5)
HGB BLD-MCNC: 10.5 G/DL — LOW (ref 11.5–15.5)
IMM GRANULOCYTES NFR BLD AUTO: 1.9 % — HIGH (ref 0–0.9)
IMM GRANULOCYTES NFR BLD AUTO: 1.9 % — HIGH (ref 0–0.9)
LYMPHOCYTES # BLD AUTO: 0.83 K/UL — LOW (ref 1–3.3)
LYMPHOCYTES # BLD AUTO: 0.83 K/UL — LOW (ref 1–3.3)
LYMPHOCYTES # BLD AUTO: 22.7 % — SIGNIFICANT CHANGE UP (ref 13–44)
LYMPHOCYTES # BLD AUTO: 22.7 % — SIGNIFICANT CHANGE UP (ref 13–44)
MCHC RBC-ENTMCNC: 29.5 PG — SIGNIFICANT CHANGE UP (ref 27–34)
MCHC RBC-ENTMCNC: 29.5 PG — SIGNIFICANT CHANGE UP (ref 27–34)
MCHC RBC-ENTMCNC: 34.4 G/DL — SIGNIFICANT CHANGE UP (ref 32–36)
MCHC RBC-ENTMCNC: 34.4 G/DL — SIGNIFICANT CHANGE UP (ref 32–36)
MCV RBC AUTO: 85.7 FL — SIGNIFICANT CHANGE UP (ref 80–100)
MCV RBC AUTO: 85.7 FL — SIGNIFICANT CHANGE UP (ref 80–100)
MONOCYTES # BLD AUTO: 0.28 K/UL — SIGNIFICANT CHANGE UP (ref 0–0.9)
MONOCYTES # BLD AUTO: 0.28 K/UL — SIGNIFICANT CHANGE UP (ref 0–0.9)
MONOCYTES NFR BLD AUTO: 7.7 % — SIGNIFICANT CHANGE UP (ref 2–14)
MONOCYTES NFR BLD AUTO: 7.7 % — SIGNIFICANT CHANGE UP (ref 2–14)
NEUTROPHILS # BLD AUTO: 2.43 K/UL — SIGNIFICANT CHANGE UP (ref 1.8–7.4)
NEUTROPHILS # BLD AUTO: 2.43 K/UL — SIGNIFICANT CHANGE UP (ref 1.8–7.4)
NEUTROPHILS NFR BLD AUTO: 66.7 % — SIGNIFICANT CHANGE UP (ref 43–77)
NEUTROPHILS NFR BLD AUTO: 66.7 % — SIGNIFICANT CHANGE UP (ref 43–77)
NRBC # BLD: 0 /100 WBCS — SIGNIFICANT CHANGE UP (ref 0–0)
NRBC # BLD: 0 /100 WBCS — SIGNIFICANT CHANGE UP (ref 0–0)
PLATELET # BLD AUTO: 180 K/UL — SIGNIFICANT CHANGE UP (ref 150–400)
PLATELET # BLD AUTO: 180 K/UL — SIGNIFICANT CHANGE UP (ref 150–400)
POTASSIUM SERPL-MCNC: 4.3 MMOL/L — SIGNIFICANT CHANGE UP (ref 3.5–5.3)
POTASSIUM SERPL-MCNC: 4.3 MMOL/L — SIGNIFICANT CHANGE UP (ref 3.5–5.3)
POTASSIUM SERPL-SCNC: 4.3 MMOL/L — SIGNIFICANT CHANGE UP (ref 3.5–5.3)
POTASSIUM SERPL-SCNC: 4.3 MMOL/L — SIGNIFICANT CHANGE UP (ref 3.5–5.3)
PROT SERPL-MCNC: 7.7 G/DL — SIGNIFICANT CHANGE UP (ref 6–8.3)
PROT SERPL-MCNC: 7.7 G/DL — SIGNIFICANT CHANGE UP (ref 6–8.3)
RBC # BLD: 3.56 M/UL — LOW (ref 3.8–5.2)
RBC # BLD: 3.56 M/UL — LOW (ref 3.8–5.2)
RBC # FLD: 15.5 % — HIGH (ref 10.3–14.5)
RBC # FLD: 15.5 % — HIGH (ref 10.3–14.5)
SODIUM SERPL-SCNC: 143 MMOL/L — SIGNIFICANT CHANGE UP (ref 135–145)
SODIUM SERPL-SCNC: 143 MMOL/L — SIGNIFICANT CHANGE UP (ref 135–145)
WBC # BLD: 3.65 K/UL — LOW (ref 3.8–10.5)
WBC # BLD: 3.65 K/UL — LOW (ref 3.8–10.5)
WBC # FLD AUTO: 3.65 K/UL — LOW (ref 3.8–10.5)
WBC # FLD AUTO: 3.65 K/UL — LOW (ref 3.8–10.5)

## 2023-12-09 LAB
CORTICOSTEROID BINDING GLOBULIN RESULT: 1.9 MG/DL — SIGNIFICANT CHANGE UP
CORTICOSTEROID BINDING GLOBULIN RESULT: 1.9 MG/DL — SIGNIFICANT CHANGE UP
CORTIS F/TOTAL MFR SERPL: 4.9 % — SIGNIFICANT CHANGE UP
CORTIS F/TOTAL MFR SERPL: 4.9 % — SIGNIFICANT CHANGE UP
CORTIS SERPL-MCNC: 6.3 UG/DL — SIGNIFICANT CHANGE UP
CORTIS SERPL-MCNC: 6.3 UG/DL — SIGNIFICANT CHANGE UP
CORTISOL, FREE RESULT: 0.31 UG/DL — SIGNIFICANT CHANGE UP
CORTISOL, FREE RESULT: 0.31 UG/DL — SIGNIFICANT CHANGE UP

## 2023-12-12 ENCOUNTER — LABORATORY RESULT (OUTPATIENT)
Age: 63
End: 2023-12-12

## 2023-12-14 ENCOUNTER — RESULT REVIEW (OUTPATIENT)
Age: 63
End: 2023-12-14

## 2023-12-14 ENCOUNTER — APPOINTMENT (OUTPATIENT)
Dept: INFUSION THERAPY | Facility: HOSPITAL | Age: 63
End: 2023-12-14

## 2023-12-14 ENCOUNTER — APPOINTMENT (OUTPATIENT)
Dept: HEMATOLOGY ONCOLOGY | Facility: CLINIC | Age: 63
End: 2023-12-14

## 2023-12-14 ENCOUNTER — APPOINTMENT (OUTPATIENT)
Dept: HEMATOLOGY ONCOLOGY | Facility: CLINIC | Age: 63
End: 2023-12-14
Payer: COMMERCIAL

## 2023-12-14 VITALS
HEART RATE: 56 BPM | DIASTOLIC BLOOD PRESSURE: 82 MMHG | SYSTOLIC BLOOD PRESSURE: 146 MMHG | BODY MASS INDEX: 28.72 KG/M2 | WEIGHT: 152.12 LBS | RESPIRATION RATE: 18 BRPM | OXYGEN SATURATION: 99 % | HEIGHT: 61 IN | TEMPERATURE: 97.5 F

## 2023-12-14 LAB
ALBUMIN SERPL ELPH-MCNC: 4.3 G/DL — SIGNIFICANT CHANGE UP (ref 3.3–5)
ALBUMIN SERPL ELPH-MCNC: 4.3 G/DL — SIGNIFICANT CHANGE UP (ref 3.3–5)
ALP SERPL-CCNC: 89 U/L — SIGNIFICANT CHANGE UP (ref 40–120)
ALP SERPL-CCNC: 89 U/L — SIGNIFICANT CHANGE UP (ref 40–120)
ALT FLD-CCNC: 8 U/L — LOW (ref 10–45)
ALT FLD-CCNC: 8 U/L — LOW (ref 10–45)
ANION GAP SERPL CALC-SCNC: 9 MMOL/L — SIGNIFICANT CHANGE UP (ref 5–17)
ANION GAP SERPL CALC-SCNC: 9 MMOL/L — SIGNIFICANT CHANGE UP (ref 5–17)
AST SERPL-CCNC: 22 U/L — SIGNIFICANT CHANGE UP (ref 10–40)
AST SERPL-CCNC: 22 U/L — SIGNIFICANT CHANGE UP (ref 10–40)
BASOPHILS # BLD AUTO: 0.03 K/UL — SIGNIFICANT CHANGE UP (ref 0–0.2)
BASOPHILS # BLD AUTO: 0.03 K/UL — SIGNIFICANT CHANGE UP (ref 0–0.2)
BASOPHILS NFR BLD AUTO: 0.8 % — SIGNIFICANT CHANGE UP (ref 0–2)
BASOPHILS NFR BLD AUTO: 0.8 % — SIGNIFICANT CHANGE UP (ref 0–2)
BILIRUB SERPL-MCNC: 0.3 MG/DL — SIGNIFICANT CHANGE UP (ref 0.2–1.2)
BILIRUB SERPL-MCNC: 0.3 MG/DL — SIGNIFICANT CHANGE UP (ref 0.2–1.2)
BUN SERPL-MCNC: 16 MG/DL — SIGNIFICANT CHANGE UP (ref 7–23)
BUN SERPL-MCNC: 16 MG/DL — SIGNIFICANT CHANGE UP (ref 7–23)
CALCIUM SERPL-MCNC: 9.2 MG/DL — SIGNIFICANT CHANGE UP (ref 8.4–10.5)
CALCIUM SERPL-MCNC: 9.2 MG/DL — SIGNIFICANT CHANGE UP (ref 8.4–10.5)
CHLORIDE SERPL-SCNC: 114 MMOL/L — HIGH (ref 96–108)
CHLORIDE SERPL-SCNC: 114 MMOL/L — HIGH (ref 96–108)
CO2 SERPL-SCNC: 21 MMOL/L — LOW (ref 22–31)
CO2 SERPL-SCNC: 21 MMOL/L — LOW (ref 22–31)
CREAT SERPL-MCNC: 0.99 MG/DL — SIGNIFICANT CHANGE UP (ref 0.5–1.3)
CREAT SERPL-MCNC: 0.99 MG/DL — SIGNIFICANT CHANGE UP (ref 0.5–1.3)
EGFR: 64 ML/MIN/1.73M2 — SIGNIFICANT CHANGE UP
EGFR: 64 ML/MIN/1.73M2 — SIGNIFICANT CHANGE UP
EOSINOPHIL # BLD AUTO: 0.03 K/UL — SIGNIFICANT CHANGE UP (ref 0–0.5)
EOSINOPHIL # BLD AUTO: 0.03 K/UL — SIGNIFICANT CHANGE UP (ref 0–0.5)
EOSINOPHIL NFR BLD AUTO: 0.8 % — SIGNIFICANT CHANGE UP (ref 0–6)
EOSINOPHIL NFR BLD AUTO: 0.8 % — SIGNIFICANT CHANGE UP (ref 0–6)
GLUCOSE SERPL-MCNC: 80 MG/DL — SIGNIFICANT CHANGE UP (ref 70–99)
GLUCOSE SERPL-MCNC: 80 MG/DL — SIGNIFICANT CHANGE UP (ref 70–99)
HCT VFR BLD CALC: 29.9 % — LOW (ref 34.5–45)
HCT VFR BLD CALC: 29.9 % — LOW (ref 34.5–45)
HGB BLD-MCNC: 10.3 G/DL — LOW (ref 11.5–15.5)
HGB BLD-MCNC: 10.3 G/DL — LOW (ref 11.5–15.5)
IMM GRANULOCYTES NFR BLD AUTO: 2.8 % — HIGH (ref 0–0.9)
IMM GRANULOCYTES NFR BLD AUTO: 2.8 % — HIGH (ref 0–0.9)
LYMPHOCYTES # BLD AUTO: 1 K/UL — SIGNIFICANT CHANGE UP (ref 1–3.3)
LYMPHOCYTES # BLD AUTO: 1 K/UL — SIGNIFICANT CHANGE UP (ref 1–3.3)
LYMPHOCYTES # BLD AUTO: 27.7 % — SIGNIFICANT CHANGE UP (ref 13–44)
LYMPHOCYTES # BLD AUTO: 27.7 % — SIGNIFICANT CHANGE UP (ref 13–44)
MCHC RBC-ENTMCNC: 29.7 PG — SIGNIFICANT CHANGE UP (ref 27–34)
MCHC RBC-ENTMCNC: 29.7 PG — SIGNIFICANT CHANGE UP (ref 27–34)
MCHC RBC-ENTMCNC: 34.4 G/DL — SIGNIFICANT CHANGE UP (ref 32–36)
MCHC RBC-ENTMCNC: 34.4 G/DL — SIGNIFICANT CHANGE UP (ref 32–36)
MCV RBC AUTO: 86.2 FL — SIGNIFICANT CHANGE UP (ref 80–100)
MCV RBC AUTO: 86.2 FL — SIGNIFICANT CHANGE UP (ref 80–100)
MONOCYTES # BLD AUTO: 0.25 K/UL — SIGNIFICANT CHANGE UP (ref 0–0.9)
MONOCYTES # BLD AUTO: 0.25 K/UL — SIGNIFICANT CHANGE UP (ref 0–0.9)
MONOCYTES NFR BLD AUTO: 6.9 % — SIGNIFICANT CHANGE UP (ref 2–14)
MONOCYTES NFR BLD AUTO: 6.9 % — SIGNIFICANT CHANGE UP (ref 2–14)
NEUTROPHILS # BLD AUTO: 2.2 K/UL — SIGNIFICANT CHANGE UP (ref 1.8–7.4)
NEUTROPHILS # BLD AUTO: 2.2 K/UL — SIGNIFICANT CHANGE UP (ref 1.8–7.4)
NEUTROPHILS NFR BLD AUTO: 61 % — SIGNIFICANT CHANGE UP (ref 43–77)
NEUTROPHILS NFR BLD AUTO: 61 % — SIGNIFICANT CHANGE UP (ref 43–77)
NRBC # BLD: 0 /100 WBCS — SIGNIFICANT CHANGE UP (ref 0–0)
NRBC # BLD: 0 /100 WBCS — SIGNIFICANT CHANGE UP (ref 0–0)
PLATELET # BLD AUTO: 172 K/UL — SIGNIFICANT CHANGE UP (ref 150–400)
PLATELET # BLD AUTO: 172 K/UL — SIGNIFICANT CHANGE UP (ref 150–400)
POTASSIUM SERPL-MCNC: 4.2 MMOL/L — SIGNIFICANT CHANGE UP (ref 3.5–5.3)
POTASSIUM SERPL-MCNC: 4.2 MMOL/L — SIGNIFICANT CHANGE UP (ref 3.5–5.3)
POTASSIUM SERPL-SCNC: 4.2 MMOL/L — SIGNIFICANT CHANGE UP (ref 3.5–5.3)
POTASSIUM SERPL-SCNC: 4.2 MMOL/L — SIGNIFICANT CHANGE UP (ref 3.5–5.3)
PROT SERPL-MCNC: 7.9 G/DL — SIGNIFICANT CHANGE UP (ref 6–8.3)
PROT SERPL-MCNC: 7.9 G/DL — SIGNIFICANT CHANGE UP (ref 6–8.3)
RBC # BLD: 3.47 M/UL — LOW (ref 3.8–5.2)
RBC # BLD: 3.47 M/UL — LOW (ref 3.8–5.2)
RBC # FLD: 15.7 % — HIGH (ref 10.3–14.5)
RBC # FLD: 15.7 % — HIGH (ref 10.3–14.5)
SODIUM SERPL-SCNC: 144 MMOL/L — SIGNIFICANT CHANGE UP (ref 135–145)
SODIUM SERPL-SCNC: 144 MMOL/L — SIGNIFICANT CHANGE UP (ref 135–145)
WBC # BLD: 3.61 K/UL — LOW (ref 3.8–10.5)
WBC # BLD: 3.61 K/UL — LOW (ref 3.8–10.5)
WBC # FLD AUTO: 3.61 K/UL — LOW (ref 3.8–10.5)
WBC # FLD AUTO: 3.61 K/UL — LOW (ref 3.8–10.5)

## 2023-12-14 PROCEDURE — 99214 OFFICE O/P EST MOD 30 MIN: CPT

## 2023-12-14 NOTE — REVIEW OF SYSTEMS
[Diarrhea: Grade 0] : Diarrhea: Grade 0 [Negative] : Allergic/Immunologic [de-identified] : memory loss (short term); recurrent seizures [de-identified] : increased stress due to diagnosis, separation from spouse since 7/2023 and spouse having sold their home; has not spoken with him since mid-9/2023

## 2023-12-14 NOTE — REASON FOR VISIT
[Follow-Up Visit] : a follow-up [Other: _____] : [unfilled] [FreeTextEntry2] : Triple Negative Breast cancer

## 2023-12-14 NOTE — ASSESSMENT
[FreeTextEntry1] : 62 yo woman with history of subarachnoid hemorrhage in 2013 s/p craniotomy for evacuation and  shunt placement, recurrent DVTs requiring anticoagulation but needs warfarin due to interaction of DOACs with oxcabazepine (Trileptal), found in 10/2023 to have at least Stage IIIA triple negative breast cancer.  - Patient currently receiving neoadjuvant Taxol 80mg/m2 weekly X 12 with concomitant pembrolizumab 200mg every 3 weeks for 4 treatments.  CBC okay to proceed with C3 of taxol. -Upon completion of this will have repeat imaging with Contrast Enhanced Mammogram and possible sonogram to evaluate status of her lymph nodes - post operatively, will determine role for additional therapy including Adriamycin and possibly capecitabine with close monitoring of renal function along with post operative pembrolizumab - will also need adjuvant radiation therapy given the large size of tumor and axillary LN involvement - EKG 11/9/2023 - marked sinus bradycardia noted - compared to previous EKGs done w/ Dr. Ojeda and no clinically significant change  - patient remains on warfarin due to potential interaction of DOACs with Oxcarbezepine.  Currently on hold pending repeat INR tomorrow.  -all questions from patient and her daughter were addressed and answered.     [Medication(s)] : Medication(s)

## 2023-12-14 NOTE — HISTORY OF PRESENT ILLNESS
[Disease: _____________________] : Disease: [unfilled] [T: ___] : T[unfilled] [N: ___] : N[unfilled] [M: ___] : M[unfilled] [AJCC Stage: ____] : AJCC Stage: [unfilled] [de-identified] : Seen as part of [facility] - accompanied by daughter;   Patient with prior mammogram in  showing normal findings. She has history of CVA in , aneurysmal subarachnoid hemorrhage s/p clipping and  shunt placement, bifrontal encephalomalacia, epilepsy with recurrent seizures including in 2022, 2023 and more recently in 2023, on multiple antiseizure meds (Keppra (levetiracetam), Vimpat (lacosamide) , Trileptal (oxcarbazepine) and zonisamide.  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  She noted a palpable mass in the right breast on 10/5/23.  Diagnostic bilateral mammogram on 10/5/23 showed mammographic and sonographic findings corresponding to the palpable mass in the upper outer quadrant of the right breast, consistent with an underlying carcinoma. Abnormal lymph nodes in the right axilla consistent with involvement by tumor. Biopsy recommended. BI-RADS 5.  Right breast biopsy 10/9/23, 10:00 7cm Pathology - IDC, poorly differentiated, measuring 2.5mm ER negative, HI negative, HER-2 negative.  She was seen by Surgical Oncology (Dr. Roger Lyn); axillary LN biopsy 10/18/23 - positive for carcinoma identical to morphology of the right breast lesion  Due to  shunt, she was not a candidate for pre-operative breawst MRI and instead underwent contrast enhanced mammogram on 10/24/23 -  Biopsy-proven malignancy at the right 10:00 axis with associated enhancement spanning up to 6.1 cm.  - Newly noted subcentimeter mass at the 10:00, 5 cm from nipple location felt to correspond with a focal asymmetry or medial and inferior to the primary cancer favoring a satellite lesion.  Staging CT C/A/P 10/25/23 - right breast mass with right axillary lymphadenopathy.  - No definite evidence of distant metastatic disease. A cluster of several non-enlarged prevascular mediastinal lymph nodes is nonspecific.  - A subpleural 4 mm right middle lobe pulmonary nodule is likely benign.  Staging Bone Scan ordered and pending  Risk Stratification - ; 1st full term pregnancy at age 18, no OCPs, no HRT, menarche unknown exact age, menopause unknown exact age; + family history of breast cancer in mother, colon cancer in maternal aunt Genetic testing - ordered 10/18/23  [de-identified] : Triple negative [de-identified] : ER-OK-Her2- [de-identified] : 12/14/23 Patient presents today for follow up.  S/p C2 Taxol/ C1 pembro. She c/o fleeting tingling in feet.  Denies any functional deficits.   In addition, states occasional diarrhea after she eats, ~1-2 x's/day.   She also states for past few days has sensation on scalp.  She states feels as if she is wearing headband.  She denies any headache, dizziness, n/v, sob, CP.  She states overall she is doing well, she is eating and drinking well. Also followed by Dr. Ojeda for cardiology and coumadin monitoring.  Yesterday's INR came back 4.62, patient currently holding coumadin and will have repeat INR done tomorrow.     [90: Able to carry normal activity; minor signs or symptoms of disease.] : 90: Able to carry normal activity; minor signs or symptoms of disease.  [ECOG Performance Status: 1 - Restricted in physically strenuous activity but ambulatory and able to carry out work of a light or sedentary nature] : Performance Status: 1 - Restricted in physically strenuous activity but ambulatory and able to carry out work of a light or sedentary nature, e.g., light house work, office work

## 2023-12-14 NOTE — END OF VISIT
[Time Spent: ___ minutes] : I have spent [unfilled] minutes of time on the encounter. [FreeTextEntry3] : Patient being seen as per physician's primary plan of care.

## 2023-12-14 NOTE — PHYSICAL EXAM
[Normal] : affect appropriate [de-identified] : vision loss left eye [de-identified] : Port present left chest wall with no signs of infection. [de-identified] : Right breast 6cm mass palpated in upper outer quadrant of the breast; +1cm right axillary LN; left breast with no skin or nipple changes, no discrete palpable masses, no palpable axillary nodes.  [de-identified] : Right axillary LAD palpated [de-identified] : Right  shunt in place

## 2023-12-15 ENCOUNTER — LABORATORY RESULT (OUTPATIENT)
Age: 63
End: 2023-12-15

## 2023-12-16 ENCOUNTER — NON-APPOINTMENT (OUTPATIENT)
Age: 63
End: 2023-12-16

## 2023-12-19 ENCOUNTER — LABORATORY RESULT (OUTPATIENT)
Age: 63
End: 2023-12-19

## 2023-12-20 ENCOUNTER — LABORATORY RESULT (OUTPATIENT)
Age: 63
End: 2023-12-20

## 2023-12-21 ENCOUNTER — APPOINTMENT (OUTPATIENT)
Dept: HEMATOLOGY ONCOLOGY | Facility: CLINIC | Age: 63
End: 2023-12-21

## 2023-12-21 ENCOUNTER — RESULT REVIEW (OUTPATIENT)
Age: 63
End: 2023-12-21

## 2023-12-21 ENCOUNTER — APPOINTMENT (OUTPATIENT)
Dept: INFUSION THERAPY | Facility: HOSPITAL | Age: 63
End: 2023-12-21

## 2023-12-21 LAB
ALBUMIN SERPL ELPH-MCNC: 4.1 G/DL — SIGNIFICANT CHANGE UP (ref 3.3–5)
ALBUMIN SERPL ELPH-MCNC: 4.1 G/DL — SIGNIFICANT CHANGE UP (ref 3.3–5)
ALP SERPL-CCNC: 88 U/L — SIGNIFICANT CHANGE UP (ref 40–120)
ALP SERPL-CCNC: 88 U/L — SIGNIFICANT CHANGE UP (ref 40–120)
ALT FLD-CCNC: 8 U/L — LOW (ref 10–45)
ALT FLD-CCNC: 8 U/L — LOW (ref 10–45)
ANION GAP SERPL CALC-SCNC: 10 MMOL/L — SIGNIFICANT CHANGE UP (ref 5–17)
ANION GAP SERPL CALC-SCNC: 10 MMOL/L — SIGNIFICANT CHANGE UP (ref 5–17)
AST SERPL-CCNC: 21 U/L — SIGNIFICANT CHANGE UP (ref 10–40)
AST SERPL-CCNC: 21 U/L — SIGNIFICANT CHANGE UP (ref 10–40)
BASOPHILS # BLD AUTO: 0.02 K/UL — SIGNIFICANT CHANGE UP (ref 0–0.2)
BASOPHILS # BLD AUTO: 0.02 K/UL — SIGNIFICANT CHANGE UP (ref 0–0.2)
BASOPHILS NFR BLD AUTO: 0.5 % — SIGNIFICANT CHANGE UP (ref 0–2)
BASOPHILS NFR BLD AUTO: 0.5 % — SIGNIFICANT CHANGE UP (ref 0–2)
BILIRUB SERPL-MCNC: 0.2 MG/DL — SIGNIFICANT CHANGE UP (ref 0.2–1.2)
BILIRUB SERPL-MCNC: 0.2 MG/DL — SIGNIFICANT CHANGE UP (ref 0.2–1.2)
BUN SERPL-MCNC: 12 MG/DL — SIGNIFICANT CHANGE UP (ref 7–23)
BUN SERPL-MCNC: 12 MG/DL — SIGNIFICANT CHANGE UP (ref 7–23)
CALCIUM SERPL-MCNC: 9.5 MG/DL — SIGNIFICANT CHANGE UP (ref 8.4–10.5)
CALCIUM SERPL-MCNC: 9.5 MG/DL — SIGNIFICANT CHANGE UP (ref 8.4–10.5)
CHLORIDE SERPL-SCNC: 111 MMOL/L — HIGH (ref 96–108)
CHLORIDE SERPL-SCNC: 111 MMOL/L — HIGH (ref 96–108)
CO2 SERPL-SCNC: 23 MMOL/L — SIGNIFICANT CHANGE UP (ref 22–31)
CO2 SERPL-SCNC: 23 MMOL/L — SIGNIFICANT CHANGE UP (ref 22–31)
CREAT SERPL-MCNC: 1 MG/DL — SIGNIFICANT CHANGE UP (ref 0.5–1.3)
CREAT SERPL-MCNC: 1 MG/DL — SIGNIFICANT CHANGE UP (ref 0.5–1.3)
EGFR: 63 ML/MIN/1.73M2 — SIGNIFICANT CHANGE UP
EGFR: 63 ML/MIN/1.73M2 — SIGNIFICANT CHANGE UP
EOSINOPHIL # BLD AUTO: 0.02 K/UL — SIGNIFICANT CHANGE UP (ref 0–0.5)
EOSINOPHIL # BLD AUTO: 0.02 K/UL — SIGNIFICANT CHANGE UP (ref 0–0.5)
EOSINOPHIL NFR BLD AUTO: 0.5 % — SIGNIFICANT CHANGE UP (ref 0–6)
EOSINOPHIL NFR BLD AUTO: 0.5 % — SIGNIFICANT CHANGE UP (ref 0–6)
GLUCOSE SERPL-MCNC: 86 MG/DL — SIGNIFICANT CHANGE UP (ref 70–99)
GLUCOSE SERPL-MCNC: 86 MG/DL — SIGNIFICANT CHANGE UP (ref 70–99)
HCT VFR BLD CALC: 29.1 % — LOW (ref 34.5–45)
HCT VFR BLD CALC: 29.1 % — LOW (ref 34.5–45)
HGB BLD-MCNC: 9.6 G/DL — LOW (ref 11.5–15.5)
HGB BLD-MCNC: 9.6 G/DL — LOW (ref 11.5–15.5)
IMM GRANULOCYTES NFR BLD AUTO: 1.5 % — HIGH (ref 0–0.9)
IMM GRANULOCYTES NFR BLD AUTO: 1.5 % — HIGH (ref 0–0.9)
LYMPHOCYTES # BLD AUTO: 1.14 K/UL — SIGNIFICANT CHANGE UP (ref 1–3.3)
LYMPHOCYTES # BLD AUTO: 1.14 K/UL — SIGNIFICANT CHANGE UP (ref 1–3.3)
LYMPHOCYTES # BLD AUTO: 28.4 % — SIGNIFICANT CHANGE UP (ref 13–44)
LYMPHOCYTES # BLD AUTO: 28.4 % — SIGNIFICANT CHANGE UP (ref 13–44)
MCHC RBC-ENTMCNC: 29.4 PG — SIGNIFICANT CHANGE UP (ref 27–34)
MCHC RBC-ENTMCNC: 29.4 PG — SIGNIFICANT CHANGE UP (ref 27–34)
MCHC RBC-ENTMCNC: 33 G/DL — SIGNIFICANT CHANGE UP (ref 32–36)
MCHC RBC-ENTMCNC: 33 G/DL — SIGNIFICANT CHANGE UP (ref 32–36)
MCV RBC AUTO: 89 FL — SIGNIFICANT CHANGE UP (ref 80–100)
MCV RBC AUTO: 89 FL — SIGNIFICANT CHANGE UP (ref 80–100)
MONOCYTES # BLD AUTO: 0.27 K/UL — SIGNIFICANT CHANGE UP (ref 0–0.9)
MONOCYTES # BLD AUTO: 0.27 K/UL — SIGNIFICANT CHANGE UP (ref 0–0.9)
MONOCYTES NFR BLD AUTO: 6.7 % — SIGNIFICANT CHANGE UP (ref 2–14)
MONOCYTES NFR BLD AUTO: 6.7 % — SIGNIFICANT CHANGE UP (ref 2–14)
NEUTROPHILS # BLD AUTO: 2.5 K/UL — SIGNIFICANT CHANGE UP (ref 1.8–7.4)
NEUTROPHILS # BLD AUTO: 2.5 K/UL — SIGNIFICANT CHANGE UP (ref 1.8–7.4)
NEUTROPHILS NFR BLD AUTO: 62.4 % — SIGNIFICANT CHANGE UP (ref 43–77)
NEUTROPHILS NFR BLD AUTO: 62.4 % — SIGNIFICANT CHANGE UP (ref 43–77)
NRBC # BLD: 0 /100 WBCS — SIGNIFICANT CHANGE UP (ref 0–0)
NRBC # BLD: 0 /100 WBCS — SIGNIFICANT CHANGE UP (ref 0–0)
PLATELET # BLD AUTO: 200 K/UL — SIGNIFICANT CHANGE UP (ref 150–400)
PLATELET # BLD AUTO: 200 K/UL — SIGNIFICANT CHANGE UP (ref 150–400)
POTASSIUM SERPL-MCNC: 4 MMOL/L — SIGNIFICANT CHANGE UP (ref 3.5–5.3)
POTASSIUM SERPL-MCNC: 4 MMOL/L — SIGNIFICANT CHANGE UP (ref 3.5–5.3)
POTASSIUM SERPL-SCNC: 4 MMOL/L — SIGNIFICANT CHANGE UP (ref 3.5–5.3)
POTASSIUM SERPL-SCNC: 4 MMOL/L — SIGNIFICANT CHANGE UP (ref 3.5–5.3)
PROT SERPL-MCNC: 7.5 G/DL — SIGNIFICANT CHANGE UP (ref 6–8.3)
PROT SERPL-MCNC: 7.5 G/DL — SIGNIFICANT CHANGE UP (ref 6–8.3)
RBC # BLD: 3.27 M/UL — LOW (ref 3.8–5.2)
RBC # BLD: 3.27 M/UL — LOW (ref 3.8–5.2)
RBC # FLD: 15.9 % — HIGH (ref 10.3–14.5)
RBC # FLD: 15.9 % — HIGH (ref 10.3–14.5)
SODIUM SERPL-SCNC: 143 MMOL/L — SIGNIFICANT CHANGE UP (ref 135–145)
SODIUM SERPL-SCNC: 143 MMOL/L — SIGNIFICANT CHANGE UP (ref 135–145)
WBC # BLD: 4.01 K/UL — SIGNIFICANT CHANGE UP (ref 3.8–10.5)
WBC # BLD: 4.01 K/UL — SIGNIFICANT CHANGE UP (ref 3.8–10.5)
WBC # FLD AUTO: 4.01 K/UL — SIGNIFICANT CHANGE UP (ref 3.8–10.5)
WBC # FLD AUTO: 4.01 K/UL — SIGNIFICANT CHANGE UP (ref 3.8–10.5)

## 2023-12-22 ENCOUNTER — LABORATORY RESULT (OUTPATIENT)
Age: 63
End: 2023-12-22

## 2023-12-22 ENCOUNTER — OUTPATIENT (OUTPATIENT)
Dept: OUTPATIENT SERVICES | Facility: HOSPITAL | Age: 63
LOS: 1 days | Discharge: HOME | End: 2023-12-22

## 2023-12-22 DIAGNOSIS — I72.9 ANEURYSM OF UNSPECIFIED SITE: Chronic | ICD-10-CM

## 2023-12-22 DIAGNOSIS — Z43.4 ENCOUNTER FOR ATTENTION TO OTHER ARTIFICIAL OPENINGS OF DIGESTIVE TRACT: Chronic | ICD-10-CM

## 2023-12-22 DIAGNOSIS — Z98.2 PRESENCE OF CEREBROSPINAL FLUID DRAINAGE DEVICE: Chronic | ICD-10-CM

## 2023-12-22 DIAGNOSIS — Z98.89 OTHER SPECIFIED POSTPROCEDURAL STATES: Chronic | ICD-10-CM

## 2023-12-22 DIAGNOSIS — C50.919 MALIGNANT NEOPLASM OF UNSPECIFIED SITE OF UNSPECIFIED FEMALE BREAST: ICD-10-CM

## 2023-12-22 DIAGNOSIS — Z95.5 PRESENCE OF CORONARY ANGIOPLASTY IMPLANT AND GRAFT: Chronic | ICD-10-CM

## 2023-12-26 ENCOUNTER — LABORATORY RESULT (OUTPATIENT)
Age: 63
End: 2023-12-26

## 2023-12-27 ENCOUNTER — LABORATORY RESULT (OUTPATIENT)
Age: 63
End: 2023-12-27

## 2023-12-28 ENCOUNTER — RESULT REVIEW (OUTPATIENT)
Age: 63
End: 2023-12-28

## 2023-12-28 ENCOUNTER — APPOINTMENT (OUTPATIENT)
Dept: INFUSION THERAPY | Facility: HOSPITAL | Age: 63
End: 2023-12-28

## 2023-12-28 ENCOUNTER — NON-APPOINTMENT (OUTPATIENT)
Age: 63
End: 2023-12-28

## 2023-12-28 ENCOUNTER — APPOINTMENT (OUTPATIENT)
Dept: HEMATOLOGY ONCOLOGY | Facility: CLINIC | Age: 63
End: 2023-12-28

## 2023-12-28 LAB
ALBUMIN SERPL ELPH-MCNC: 4.4 G/DL — SIGNIFICANT CHANGE UP (ref 3.3–5)
ALBUMIN SERPL ELPH-MCNC: 4.4 G/DL — SIGNIFICANT CHANGE UP (ref 3.3–5)
ALP SERPL-CCNC: 90 U/L — SIGNIFICANT CHANGE UP (ref 40–120)
ALP SERPL-CCNC: 90 U/L — SIGNIFICANT CHANGE UP (ref 40–120)
ALT FLD-CCNC: 12 U/L — SIGNIFICANT CHANGE UP (ref 10–45)
ALT FLD-CCNC: 12 U/L — SIGNIFICANT CHANGE UP (ref 10–45)
ANION GAP SERPL CALC-SCNC: 11 MMOL/L — SIGNIFICANT CHANGE UP (ref 5–17)
ANION GAP SERPL CALC-SCNC: 11 MMOL/L — SIGNIFICANT CHANGE UP (ref 5–17)
AST SERPL-CCNC: 20 U/L — SIGNIFICANT CHANGE UP (ref 10–40)
AST SERPL-CCNC: 20 U/L — SIGNIFICANT CHANGE UP (ref 10–40)
BASOPHILS # BLD AUTO: 0.04 K/UL — SIGNIFICANT CHANGE UP (ref 0–0.2)
BASOPHILS # BLD AUTO: 0.04 K/UL — SIGNIFICANT CHANGE UP (ref 0–0.2)
BASOPHILS NFR BLD AUTO: 0.9 % — SIGNIFICANT CHANGE UP (ref 0–2)
BASOPHILS NFR BLD AUTO: 0.9 % — SIGNIFICANT CHANGE UP (ref 0–2)
BILIRUB SERPL-MCNC: 0.3 MG/DL — SIGNIFICANT CHANGE UP (ref 0.2–1.2)
BILIRUB SERPL-MCNC: 0.3 MG/DL — SIGNIFICANT CHANGE UP (ref 0.2–1.2)
BUN SERPL-MCNC: 19 MG/DL — SIGNIFICANT CHANGE UP (ref 7–23)
BUN SERPL-MCNC: 19 MG/DL — SIGNIFICANT CHANGE UP (ref 7–23)
CALCIUM SERPL-MCNC: 9.4 MG/DL — SIGNIFICANT CHANGE UP (ref 8.4–10.5)
CALCIUM SERPL-MCNC: 9.4 MG/DL — SIGNIFICANT CHANGE UP (ref 8.4–10.5)
CHLORIDE SERPL-SCNC: 110 MMOL/L — HIGH (ref 96–108)
CHLORIDE SERPL-SCNC: 110 MMOL/L — HIGH (ref 96–108)
CO2 SERPL-SCNC: 23 MMOL/L — SIGNIFICANT CHANGE UP (ref 22–31)
CO2 SERPL-SCNC: 23 MMOL/L — SIGNIFICANT CHANGE UP (ref 22–31)
CREAT SERPL-MCNC: 1.3 MG/DL — SIGNIFICANT CHANGE UP (ref 0.5–1.3)
CREAT SERPL-MCNC: 1.3 MG/DL — SIGNIFICANT CHANGE UP (ref 0.5–1.3)
EGFR: 46 ML/MIN/1.73M2 — LOW
EGFR: 46 ML/MIN/1.73M2 — LOW
EOSINOPHIL # BLD AUTO: 0.02 K/UL — SIGNIFICANT CHANGE UP (ref 0–0.5)
EOSINOPHIL # BLD AUTO: 0.02 K/UL — SIGNIFICANT CHANGE UP (ref 0–0.5)
EOSINOPHIL NFR BLD AUTO: 0.5 % — SIGNIFICANT CHANGE UP (ref 0–6)
EOSINOPHIL NFR BLD AUTO: 0.5 % — SIGNIFICANT CHANGE UP (ref 0–6)
GLUCOSE SERPL-MCNC: 97 MG/DL — SIGNIFICANT CHANGE UP (ref 70–99)
GLUCOSE SERPL-MCNC: 97 MG/DL — SIGNIFICANT CHANGE UP (ref 70–99)
HCT VFR BLD CALC: 28.5 % — LOW (ref 34.5–45)
HCT VFR BLD CALC: 28.5 % — LOW (ref 34.5–45)
HGB BLD-MCNC: 9.9 G/DL — LOW (ref 11.5–15.5)
HGB BLD-MCNC: 9.9 G/DL — LOW (ref 11.5–15.5)
IMM GRANULOCYTES NFR BLD AUTO: 2.5 % — HIGH (ref 0–0.9)
IMM GRANULOCYTES NFR BLD AUTO: 2.5 % — HIGH (ref 0–0.9)
LYMPHOCYTES # BLD AUTO: 1.31 K/UL — SIGNIFICANT CHANGE UP (ref 1–3.3)
LYMPHOCYTES # BLD AUTO: 1.31 K/UL — SIGNIFICANT CHANGE UP (ref 1–3.3)
LYMPHOCYTES # BLD AUTO: 30.3 % — SIGNIFICANT CHANGE UP (ref 13–44)
LYMPHOCYTES # BLD AUTO: 30.3 % — SIGNIFICANT CHANGE UP (ref 13–44)
MCHC RBC-ENTMCNC: 30.2 PG — SIGNIFICANT CHANGE UP (ref 27–34)
MCHC RBC-ENTMCNC: 30.2 PG — SIGNIFICANT CHANGE UP (ref 27–34)
MCHC RBC-ENTMCNC: 34.7 G/DL — SIGNIFICANT CHANGE UP (ref 32–36)
MCHC RBC-ENTMCNC: 34.7 G/DL — SIGNIFICANT CHANGE UP (ref 32–36)
MCV RBC AUTO: 86.9 FL — SIGNIFICANT CHANGE UP (ref 80–100)
MCV RBC AUTO: 86.9 FL — SIGNIFICANT CHANGE UP (ref 80–100)
MONOCYTES # BLD AUTO: 0.33 K/UL — SIGNIFICANT CHANGE UP (ref 0–0.9)
MONOCYTES # BLD AUTO: 0.33 K/UL — SIGNIFICANT CHANGE UP (ref 0–0.9)
MONOCYTES NFR BLD AUTO: 7.6 % — SIGNIFICANT CHANGE UP (ref 2–14)
MONOCYTES NFR BLD AUTO: 7.6 % — SIGNIFICANT CHANGE UP (ref 2–14)
NEUTROPHILS # BLD AUTO: 2.51 K/UL — SIGNIFICANT CHANGE UP (ref 1.8–7.4)
NEUTROPHILS # BLD AUTO: 2.51 K/UL — SIGNIFICANT CHANGE UP (ref 1.8–7.4)
NEUTROPHILS NFR BLD AUTO: 58.2 % — SIGNIFICANT CHANGE UP (ref 43–77)
NEUTROPHILS NFR BLD AUTO: 58.2 % — SIGNIFICANT CHANGE UP (ref 43–77)
NRBC # BLD: 0 /100 WBCS — SIGNIFICANT CHANGE UP (ref 0–0)
NRBC # BLD: 0 /100 WBCS — SIGNIFICANT CHANGE UP (ref 0–0)
PLATELET # BLD AUTO: 205 K/UL — SIGNIFICANT CHANGE UP (ref 150–400)
PLATELET # BLD AUTO: 205 K/UL — SIGNIFICANT CHANGE UP (ref 150–400)
POTASSIUM SERPL-MCNC: 3.5 MMOL/L — SIGNIFICANT CHANGE UP (ref 3.5–5.3)
POTASSIUM SERPL-MCNC: 3.5 MMOL/L — SIGNIFICANT CHANGE UP (ref 3.5–5.3)
POTASSIUM SERPL-SCNC: 3.5 MMOL/L — SIGNIFICANT CHANGE UP (ref 3.5–5.3)
POTASSIUM SERPL-SCNC: 3.5 MMOL/L — SIGNIFICANT CHANGE UP (ref 3.5–5.3)
PROT SERPL-MCNC: 7.8 G/DL — SIGNIFICANT CHANGE UP (ref 6–8.3)
PROT SERPL-MCNC: 7.8 G/DL — SIGNIFICANT CHANGE UP (ref 6–8.3)
RBC # BLD: 3.28 M/UL — LOW (ref 3.8–5.2)
RBC # BLD: 3.28 M/UL — LOW (ref 3.8–5.2)
RBC # FLD: 16 % — HIGH (ref 10.3–14.5)
RBC # FLD: 16 % — HIGH (ref 10.3–14.5)
SODIUM SERPL-SCNC: 144 MMOL/L — SIGNIFICANT CHANGE UP (ref 135–145)
SODIUM SERPL-SCNC: 144 MMOL/L — SIGNIFICANT CHANGE UP (ref 135–145)
WBC # BLD: 4.32 K/UL — SIGNIFICANT CHANGE UP (ref 3.8–10.5)
WBC # BLD: 4.32 K/UL — SIGNIFICANT CHANGE UP (ref 3.8–10.5)
WBC # FLD AUTO: 4.32 K/UL — SIGNIFICANT CHANGE UP (ref 3.8–10.5)
WBC # FLD AUTO: 4.32 K/UL — SIGNIFICANT CHANGE UP (ref 3.8–10.5)

## 2023-12-29 ENCOUNTER — LABORATORY RESULT (OUTPATIENT)
Age: 63
End: 2023-12-29

## 2023-12-29 DIAGNOSIS — Z51.11 ENCOUNTER FOR ANTINEOPLASTIC CHEMOTHERAPY: ICD-10-CM

## 2023-12-29 DIAGNOSIS — R11.2 NAUSEA WITH VOMITING, UNSPECIFIED: ICD-10-CM

## 2023-12-30 ENCOUNTER — NON-APPOINTMENT (OUTPATIENT)
Age: 63
End: 2023-12-30

## 2023-12-31 ENCOUNTER — NON-APPOINTMENT (OUTPATIENT)
Age: 63
End: 2023-12-31

## 2024-01-01 ENCOUNTER — NON-APPOINTMENT (OUTPATIENT)
Age: 64
End: 2024-01-01

## 2024-01-02 ENCOUNTER — LABORATORY RESULT (OUTPATIENT)
Age: 64
End: 2024-01-02

## 2024-01-03 ENCOUNTER — LABORATORY RESULT (OUTPATIENT)
Age: 64
End: 2024-01-03

## 2024-01-04 ENCOUNTER — RESULT REVIEW (OUTPATIENT)
Age: 64
End: 2024-01-04

## 2024-01-04 ENCOUNTER — APPOINTMENT (OUTPATIENT)
Dept: INFUSION THERAPY | Facility: HOSPITAL | Age: 64
End: 2024-01-04

## 2024-01-04 ENCOUNTER — NON-APPOINTMENT (OUTPATIENT)
Age: 64
End: 2024-01-04

## 2024-01-04 ENCOUNTER — APPOINTMENT (OUTPATIENT)
Dept: HEMATOLOGY ONCOLOGY | Facility: CLINIC | Age: 64
End: 2024-01-04

## 2024-01-04 ENCOUNTER — APPOINTMENT (OUTPATIENT)
Dept: HEMATOLOGY ONCOLOGY | Facility: CLINIC | Age: 64
End: 2024-01-04
Payer: COMMERCIAL

## 2024-01-04 VITALS
OXYGEN SATURATION: 99 % | RESPIRATION RATE: 16 BRPM | TEMPERATURE: 97.2 F | BODY MASS INDEX: 29.37 KG/M2 | WEIGHT: 155.43 LBS | HEART RATE: 58 BPM | SYSTOLIC BLOOD PRESSURE: 113 MMHG | DIASTOLIC BLOOD PRESSURE: 73 MMHG

## 2024-01-04 LAB
ALBUMIN SERPL ELPH-MCNC: 4.2 G/DL — SIGNIFICANT CHANGE UP (ref 3.3–5)
ALBUMIN SERPL ELPH-MCNC: 4.2 G/DL — SIGNIFICANT CHANGE UP (ref 3.3–5)
ALP SERPL-CCNC: 80 U/L — SIGNIFICANT CHANGE UP (ref 40–120)
ALP SERPL-CCNC: 80 U/L — SIGNIFICANT CHANGE UP (ref 40–120)
ALT FLD-CCNC: 12 U/L — SIGNIFICANT CHANGE UP (ref 10–45)
ALT FLD-CCNC: 12 U/L — SIGNIFICANT CHANGE UP (ref 10–45)
ANION GAP SERPL CALC-SCNC: 11 MMOL/L — SIGNIFICANT CHANGE UP (ref 5–17)
ANION GAP SERPL CALC-SCNC: 11 MMOL/L — SIGNIFICANT CHANGE UP (ref 5–17)
AST SERPL-CCNC: 23 U/L — SIGNIFICANT CHANGE UP (ref 10–40)
AST SERPL-CCNC: 23 U/L — SIGNIFICANT CHANGE UP (ref 10–40)
BASOPHILS # BLD AUTO: 0.03 K/UL — SIGNIFICANT CHANGE UP (ref 0–0.2)
BASOPHILS # BLD AUTO: 0.03 K/UL — SIGNIFICANT CHANGE UP (ref 0–0.2)
BASOPHILS NFR BLD AUTO: 0.5 % — SIGNIFICANT CHANGE UP (ref 0–2)
BASOPHILS NFR BLD AUTO: 0.5 % — SIGNIFICANT CHANGE UP (ref 0–2)
BILIRUB SERPL-MCNC: 0.2 MG/DL — SIGNIFICANT CHANGE UP (ref 0.2–1.2)
BILIRUB SERPL-MCNC: 0.2 MG/DL — SIGNIFICANT CHANGE UP (ref 0.2–1.2)
BUN SERPL-MCNC: 19 MG/DL — SIGNIFICANT CHANGE UP (ref 7–23)
BUN SERPL-MCNC: 19 MG/DL — SIGNIFICANT CHANGE UP (ref 7–23)
CALCIUM SERPL-MCNC: 9.4 MG/DL — SIGNIFICANT CHANGE UP (ref 8.4–10.5)
CALCIUM SERPL-MCNC: 9.4 MG/DL — SIGNIFICANT CHANGE UP (ref 8.4–10.5)
CHLORIDE SERPL-SCNC: 109 MMOL/L — HIGH (ref 96–108)
CHLORIDE SERPL-SCNC: 109 MMOL/L — HIGH (ref 96–108)
CO2 SERPL-SCNC: 23 MMOL/L — SIGNIFICANT CHANGE UP (ref 22–31)
CO2 SERPL-SCNC: 23 MMOL/L — SIGNIFICANT CHANGE UP (ref 22–31)
CREAT SERPL-MCNC: 1.36 MG/DL — HIGH (ref 0.5–1.3)
CREAT SERPL-MCNC: 1.36 MG/DL — HIGH (ref 0.5–1.3)
EGFR: 44 ML/MIN/1.73M2 — LOW
EGFR: 44 ML/MIN/1.73M2 — LOW
EOSINOPHIL # BLD AUTO: 0.02 K/UL — SIGNIFICANT CHANGE UP (ref 0–0.5)
EOSINOPHIL # BLD AUTO: 0.02 K/UL — SIGNIFICANT CHANGE UP (ref 0–0.5)
EOSINOPHIL NFR BLD AUTO: 0.4 % — SIGNIFICANT CHANGE UP (ref 0–6)
EOSINOPHIL NFR BLD AUTO: 0.4 % — SIGNIFICANT CHANGE UP (ref 0–6)
GLUCOSE SERPL-MCNC: 75 MG/DL — SIGNIFICANT CHANGE UP (ref 70–99)
GLUCOSE SERPL-MCNC: 75 MG/DL — SIGNIFICANT CHANGE UP (ref 70–99)
HCT VFR BLD CALC: 27.5 % — LOW (ref 34.5–45)
HCT VFR BLD CALC: 27.5 % — LOW (ref 34.5–45)
HGB BLD-MCNC: 9.4 G/DL — LOW (ref 11.5–15.5)
HGB BLD-MCNC: 9.4 G/DL — LOW (ref 11.5–15.5)
IMM GRANULOCYTES NFR BLD AUTO: 2.6 % — HIGH (ref 0–0.9)
IMM GRANULOCYTES NFR BLD AUTO: 2.6 % — HIGH (ref 0–0.9)
LYMPHOCYTES # BLD AUTO: 1.4 K/UL — SIGNIFICANT CHANGE UP (ref 1–3.3)
LYMPHOCYTES # BLD AUTO: 1.4 K/UL — SIGNIFICANT CHANGE UP (ref 1–3.3)
LYMPHOCYTES # BLD AUTO: 25.5 % — SIGNIFICANT CHANGE UP (ref 13–44)
LYMPHOCYTES # BLD AUTO: 25.5 % — SIGNIFICANT CHANGE UP (ref 13–44)
MCHC RBC-ENTMCNC: 30 PG — SIGNIFICANT CHANGE UP (ref 27–34)
MCHC RBC-ENTMCNC: 30 PG — SIGNIFICANT CHANGE UP (ref 27–34)
MCHC RBC-ENTMCNC: 34.2 G/DL — SIGNIFICANT CHANGE UP (ref 32–36)
MCHC RBC-ENTMCNC: 34.2 G/DL — SIGNIFICANT CHANGE UP (ref 32–36)
MCV RBC AUTO: 87.9 FL — SIGNIFICANT CHANGE UP (ref 80–100)
MCV RBC AUTO: 87.9 FL — SIGNIFICANT CHANGE UP (ref 80–100)
MONOCYTES # BLD AUTO: 0.39 K/UL — SIGNIFICANT CHANGE UP (ref 0–0.9)
MONOCYTES # BLD AUTO: 0.39 K/UL — SIGNIFICANT CHANGE UP (ref 0–0.9)
MONOCYTES NFR BLD AUTO: 7.1 % — SIGNIFICANT CHANGE UP (ref 2–14)
MONOCYTES NFR BLD AUTO: 7.1 % — SIGNIFICANT CHANGE UP (ref 2–14)
NEUTROPHILS # BLD AUTO: 3.51 K/UL — SIGNIFICANT CHANGE UP (ref 1.8–7.4)
NEUTROPHILS # BLD AUTO: 3.51 K/UL — SIGNIFICANT CHANGE UP (ref 1.8–7.4)
NEUTROPHILS NFR BLD AUTO: 63.9 % — SIGNIFICANT CHANGE UP (ref 43–77)
NEUTROPHILS NFR BLD AUTO: 63.9 % — SIGNIFICANT CHANGE UP (ref 43–77)
NRBC # BLD: 0 /100 WBCS — SIGNIFICANT CHANGE UP (ref 0–0)
NRBC # BLD: 0 /100 WBCS — SIGNIFICANT CHANGE UP (ref 0–0)
PLATELET # BLD AUTO: 220 K/UL — SIGNIFICANT CHANGE UP (ref 150–400)
PLATELET # BLD AUTO: 220 K/UL — SIGNIFICANT CHANGE UP (ref 150–400)
POTASSIUM SERPL-MCNC: 4 MMOL/L — SIGNIFICANT CHANGE UP (ref 3.5–5.3)
POTASSIUM SERPL-MCNC: 4 MMOL/L — SIGNIFICANT CHANGE UP (ref 3.5–5.3)
POTASSIUM SERPL-SCNC: 4 MMOL/L — SIGNIFICANT CHANGE UP (ref 3.5–5.3)
POTASSIUM SERPL-SCNC: 4 MMOL/L — SIGNIFICANT CHANGE UP (ref 3.5–5.3)
PROT SERPL-MCNC: 7.6 G/DL — SIGNIFICANT CHANGE UP (ref 6–8.3)
PROT SERPL-MCNC: 7.6 G/DL — SIGNIFICANT CHANGE UP (ref 6–8.3)
RBC # BLD: 3.13 M/UL — LOW (ref 3.8–5.2)
RBC # BLD: 3.13 M/UL — LOW (ref 3.8–5.2)
RBC # FLD: 16.6 % — HIGH (ref 10.3–14.5)
RBC # FLD: 16.6 % — HIGH (ref 10.3–14.5)
SODIUM SERPL-SCNC: 142 MMOL/L — SIGNIFICANT CHANGE UP (ref 135–145)
SODIUM SERPL-SCNC: 142 MMOL/L — SIGNIFICANT CHANGE UP (ref 135–145)
WBC # BLD: 5.49 K/UL — SIGNIFICANT CHANGE UP (ref 3.8–10.5)
WBC # BLD: 5.49 K/UL — SIGNIFICANT CHANGE UP (ref 3.8–10.5)
WBC # FLD AUTO: 5.49 K/UL — SIGNIFICANT CHANGE UP (ref 3.8–10.5)
WBC # FLD AUTO: 5.49 K/UL — SIGNIFICANT CHANGE UP (ref 3.8–10.5)

## 2024-01-04 PROCEDURE — 99214 OFFICE O/P EST MOD 30 MIN: CPT

## 2024-01-07 NOTE — REVIEW OF SYSTEMS
[Diarrhea: Grade 0] : Diarrhea: Grade 0 [Negative] : Allergic/Immunologic [Vision Problems] : vision problems [Loss of Hearing] : loss of hearing [de-identified] : memory loss (short term); recurrent seizures last in 9/2023 [de-identified] : increased stress due to diagnosis, separation from spouse since 7/2023 and spouse having sold their home; has not spoken with him since mid-9/2023

## 2024-01-07 NOTE — PHYSICAL EXAM
[Normal] : normal appearance, no rash, nodules, vesicles, ulcers, erythema [de-identified] : Right breast 3 cm mass palpated in upper outer quadrant of the breast (previously 8cm); + 2cm right axillary LN ; left breast with no palpable masses, skin dimpling or nipple inversion/discharge [de-identified] : Right axillary LAD palpated about 2cm [de-identified] : Right  shunt in place [de-identified] : Overwhelmed and stressed

## 2024-01-07 NOTE — ASSESSMENT
[Medication(s)] : Medication(s) [FreeTextEntry1] : 62 yo woman with history of subarachnoid hemorrhage in 2013 s/p craniotomy for evacuation and  shunt placement, recurrent DVTs requiring anticoagulation but needs warfarin due to interaction of DOACs with oxcabazepine (Trileptal), found in 10/2023 to have at least Stage IIIA triple negative breast cancer.  - case was discussed at [facility] with surgical oncology, radiation oncology, radiology and pathology reviewed - discussed with patient and her daughter that standard of care for the treatment of Stage II or III triple negative breast cancer is neoadjuvant therapy with taxol 80mg/m2 weekly and carbo AUC 1.5 for 12 weeks followed by Adriamycin 60mg/m2 and Cytoxan 600mg/m2 every 3 weeks for total of 4 treatments all concurrent with immunotherapy in form of pembrolizumab 200mg IV every 3 weeks.  - risk/benefits/side effects including but not limited to neuropathy, renal insufficiency, alopecia, cardiotoxicity, myelosuppression, fatigue, nausea, vomitiing discussed briefly with patient and her daughter - chemotherapy interactions with her antiseizure medications reviewed. - given her comorbid conditions opted to hold carboplatin - Taxol and pembrolizumab have no interactions with her antiseizure meds - Adriamycin may have reduced efficacy with oxCabazepine - Cytoxan and oxCabazepine can lead to increased toxicity of cytoxan in the form of severe myelosuppression, hyponatremia and increased risk of seizures, Cytoxan and Zonisamide can lead to renal insufficiency and Cytoxan and Levetiracetam can result in CNS and respiratory impairment.   - discussed at [facility] conference and with patient/her daughter that she will receive neoadjuvant Taxol 80mg/m2 weekly X 12 with concomitant pembrolizumab 200mg every 3 weeks for 4 treatments. Upon completion of this will have repeat imaging with Contrast Enhanced Mammogram and possible sonogram to evaluate status of her lymph nodes - post operatively, will determine role for additional therapy including Adriamycin and possible capecitabine with close monitoring of renal function along with post operative pembrolizumab - will also need adjuvant radiation therapy given the large size of tumor and axillary LN involvement - EKG 11/9/2023 - reviewed marked sinus bradycardia noted - compared to previous EKGs done w/ Dr. Ojeda and no clinically significant change  - Echo 11/9/23 - LVEF 65-70% - patient remains on warfarin due to potential interaction of DOACs with Oxcarbezepine  - today is cycle #6 out of 12 planned treatments of Taxol; tolerating without incident - will order Contrast enhanced Mammogram to be done at the completion of her 12 weeks of taxol therapy and consider surgery; will plan for adjuvant Adriamycin and possible capecitabine post-operatively - will proceed with adjuvant pembrolizumab post-operatively as well;  - will refer back for Dr. Lyn for surgical planning (Ideally in early March 2024) - Patient had the opportunity to have all their questions answered to their satisfaction - f/u in 1week for next treatment

## 2024-01-07 NOTE — HISTORY OF PRESENT ILLNESS
[Disease: _____________________] : Disease: [unfilled] [T: ___] : T[unfilled] [N: ___] : N[unfilled] [M: ___] : M[unfilled] [AJCC Stage: ____] : AJCC Stage: [unfilled] [90: Able to carry normal activity; minor signs or symptoms of disease.] : 90: Able to carry normal activity; minor signs or symptoms of disease.  [ECOG Performance Status: 1 - Restricted in physically strenuous activity but ambulatory and able to carry out work of a light or sedentary nature] : Performance Status: 1 - Restricted in physically strenuous activity but ambulatory and able to carry out work of a light or sedentary nature, e.g., light house work, office work [de-identified] : Seen as part of [facility] - accompanied by daughter; (Allie)  Patient with prior mammogram in  showing normal findings. She has history of CVA in , aneurysmal subarachnoid hemorrhage s/p clipping and  shunt placement, bifrontal encephalomalacia, epilepsy with recurrent seizures including in 2022, 2023 and more recently in 2023, on multiple antiseizure meds (Keppra (levetiracetam), Vimpat (lacosamide) , Trileptal (oxcarbazepine) and zonisamide.  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  She noted a palpable mass in the right breast on 10/5/23.  Diagnostic bilateral mammogram on 10/5/23 showed mammographic and sonographic findings corresponding to the palpable mass in the upper outer quadrant of the right breast, consistent with an underlying carcinoma. Abnormal lymph nodes in the right axilla consistent with involvement by tumor. Biopsy recommended. BI-RADS 5.  Right breast biopsy 10/9/23, 10:00 7cm Pathology - IDC, poorly differentiated, measuring 2.5mm ER negative, FL negative, HER-2 negative.  She was seen by Surgical Oncology (Dr. Roger Lyn); axillary LN biopsy 10/18/23 - positive for carcinoma identical to morphology of the right breast lesion  Due to  shunt, she was not a candidate for pre-operative breawst MRI and instead underwent contrast enhanced mammogram on 10/24/23 -  Biopsy-proven malignancy at the right 10:00 axis with associated enhancement spanning up to 6.1 cm.  - Newly noted subcentimeter mass at the 10:00, 5 cm from nipple location felt to correspond with a focal asymmetry or medial and inferior to the primary cancer favoring a satellite lesion.  Staging CT C/A/P 10/25/23 - right breast mass with right axillary lymphadenopathy.  - No definite evidence of distant metastatic disease. A cluster of several non-enlarged prevascular mediastinal lymph nodes is nonspecific.  - A subpleural 4 mm right middle lobe pulmonary nodule is likely benign.  Staging Bone Scan ordered and pending  Risk Stratification - ; 1st full term pregnancy at age 18, no OCPs, no HRT, menarche unknown exact age, menopause unknown exact age; + family history of breast cancer in mother, colon cancer in maternal aunt Genetic testing - [employer] 84 gene panel; negative [de-identified] : Triple negative [de-identified] : ER-OR-Her2- [de-identified] : 1/4/24 Patient returns today to rule out progression of breast cancer and to assess treatment toxicity. Patient w/ triple negative breast cancer and started on neoadjuvant therapy with Pembro + Taxol weekly x 12 and then to be re-evaluated - today is week #6 out of 12 Also followed by Dr. Ojeda for cardiology and coumadin monitoring - very closely followed; last INR 2.2 Tolerating therapy without incident; has not had any seizures while on treatment; last one was in 9/2023

## 2024-01-08 ENCOUNTER — NON-APPOINTMENT (OUTPATIENT)
Age: 64
End: 2024-01-08

## 2024-01-09 ENCOUNTER — LABORATORY RESULT (OUTPATIENT)
Age: 64
End: 2024-01-09

## 2024-01-11 ENCOUNTER — RESULT REVIEW (OUTPATIENT)
Age: 64
End: 2024-01-11

## 2024-01-11 ENCOUNTER — APPOINTMENT (OUTPATIENT)
Dept: INFUSION THERAPY | Facility: HOSPITAL | Age: 64
End: 2024-01-11

## 2024-01-11 ENCOUNTER — APPOINTMENT (OUTPATIENT)
Dept: HEMATOLOGY ONCOLOGY | Facility: CLINIC | Age: 64
End: 2024-01-11

## 2024-01-11 LAB
ALBUMIN SERPL ELPH-MCNC: 4.4 G/DL — SIGNIFICANT CHANGE UP (ref 3.3–5)
ALBUMIN SERPL ELPH-MCNC: 4.4 G/DL — SIGNIFICANT CHANGE UP (ref 3.3–5)
ALP SERPL-CCNC: 83 U/L — SIGNIFICANT CHANGE UP (ref 40–120)
ALP SERPL-CCNC: 83 U/L — SIGNIFICANT CHANGE UP (ref 40–120)
ALT FLD-CCNC: 15 U/L — SIGNIFICANT CHANGE UP (ref 10–45)
ALT FLD-CCNC: 15 U/L — SIGNIFICANT CHANGE UP (ref 10–45)
ANION GAP SERPL CALC-SCNC: 11 MMOL/L — SIGNIFICANT CHANGE UP (ref 5–17)
ANION GAP SERPL CALC-SCNC: 11 MMOL/L — SIGNIFICANT CHANGE UP (ref 5–17)
AST SERPL-CCNC: 22 U/L — SIGNIFICANT CHANGE UP (ref 10–40)
AST SERPL-CCNC: 22 U/L — SIGNIFICANT CHANGE UP (ref 10–40)
BASOPHILS # BLD AUTO: 0.02 K/UL — SIGNIFICANT CHANGE UP (ref 0–0.2)
BASOPHILS # BLD AUTO: 0.02 K/UL — SIGNIFICANT CHANGE UP (ref 0–0.2)
BASOPHILS NFR BLD AUTO: 0.4 % — SIGNIFICANT CHANGE UP (ref 0–2)
BASOPHILS NFR BLD AUTO: 0.4 % — SIGNIFICANT CHANGE UP (ref 0–2)
BILIRUB SERPL-MCNC: 0.2 MG/DL — SIGNIFICANT CHANGE UP (ref 0.2–1.2)
BILIRUB SERPL-MCNC: 0.2 MG/DL — SIGNIFICANT CHANGE UP (ref 0.2–1.2)
BUN SERPL-MCNC: 20 MG/DL — SIGNIFICANT CHANGE UP (ref 7–23)
BUN SERPL-MCNC: 20 MG/DL — SIGNIFICANT CHANGE UP (ref 7–23)
CALCIUM SERPL-MCNC: 9.6 MG/DL — SIGNIFICANT CHANGE UP (ref 8.4–10.5)
CALCIUM SERPL-MCNC: 9.6 MG/DL — SIGNIFICANT CHANGE UP (ref 8.4–10.5)
CHLORIDE SERPL-SCNC: 108 MMOL/L — SIGNIFICANT CHANGE UP (ref 96–108)
CHLORIDE SERPL-SCNC: 108 MMOL/L — SIGNIFICANT CHANGE UP (ref 96–108)
CO2 SERPL-SCNC: 24 MMOL/L — SIGNIFICANT CHANGE UP (ref 22–31)
CO2 SERPL-SCNC: 24 MMOL/L — SIGNIFICANT CHANGE UP (ref 22–31)
CREAT SERPL-MCNC: 1.39 MG/DL — HIGH (ref 0.5–1.3)
CREAT SERPL-MCNC: 1.39 MG/DL — HIGH (ref 0.5–1.3)
EGFR: 43 ML/MIN/1.73M2 — LOW
EGFR: 43 ML/MIN/1.73M2 — LOW
EOSINOPHIL # BLD AUTO: 0.03 K/UL — SIGNIFICANT CHANGE UP (ref 0–0.5)
EOSINOPHIL # BLD AUTO: 0.03 K/UL — SIGNIFICANT CHANGE UP (ref 0–0.5)
EOSINOPHIL NFR BLD AUTO: 0.6 % — SIGNIFICANT CHANGE UP (ref 0–6)
EOSINOPHIL NFR BLD AUTO: 0.6 % — SIGNIFICANT CHANGE UP (ref 0–6)
GLUCOSE SERPL-MCNC: 88 MG/DL — SIGNIFICANT CHANGE UP (ref 70–99)
GLUCOSE SERPL-MCNC: 88 MG/DL — SIGNIFICANT CHANGE UP (ref 70–99)
HCT VFR BLD CALC: 28.6 % — LOW (ref 34.5–45)
HCT VFR BLD CALC: 28.6 % — LOW (ref 34.5–45)
HGB BLD-MCNC: 9.6 G/DL — LOW (ref 11.5–15.5)
HGB BLD-MCNC: 9.6 G/DL — LOW (ref 11.5–15.5)
IMM GRANULOCYTES NFR BLD AUTO: 2.7 % — HIGH (ref 0–0.9)
IMM GRANULOCYTES NFR BLD AUTO: 2.7 % — HIGH (ref 0–0.9)
LYMPHOCYTES # BLD AUTO: 1.35 K/UL — SIGNIFICANT CHANGE UP (ref 1–3.3)
LYMPHOCYTES # BLD AUTO: 1.35 K/UL — SIGNIFICANT CHANGE UP (ref 1–3.3)
LYMPHOCYTES # BLD AUTO: 27.7 % — SIGNIFICANT CHANGE UP (ref 13–44)
LYMPHOCYTES # BLD AUTO: 27.7 % — SIGNIFICANT CHANGE UP (ref 13–44)
MCHC RBC-ENTMCNC: 30 PG — SIGNIFICANT CHANGE UP (ref 27–34)
MCHC RBC-ENTMCNC: 30 PG — SIGNIFICANT CHANGE UP (ref 27–34)
MCHC RBC-ENTMCNC: 33.6 G/DL — SIGNIFICANT CHANGE UP (ref 32–36)
MCHC RBC-ENTMCNC: 33.6 G/DL — SIGNIFICANT CHANGE UP (ref 32–36)
MCV RBC AUTO: 89.4 FL — SIGNIFICANT CHANGE UP (ref 80–100)
MCV RBC AUTO: 89.4 FL — SIGNIFICANT CHANGE UP (ref 80–100)
MONOCYTES # BLD AUTO: 0.38 K/UL — SIGNIFICANT CHANGE UP (ref 0–0.9)
MONOCYTES # BLD AUTO: 0.38 K/UL — SIGNIFICANT CHANGE UP (ref 0–0.9)
MONOCYTES NFR BLD AUTO: 7.8 % — SIGNIFICANT CHANGE UP (ref 2–14)
MONOCYTES NFR BLD AUTO: 7.8 % — SIGNIFICANT CHANGE UP (ref 2–14)
NEUTROPHILS # BLD AUTO: 2.97 K/UL — SIGNIFICANT CHANGE UP (ref 1.8–7.4)
NEUTROPHILS # BLD AUTO: 2.97 K/UL — SIGNIFICANT CHANGE UP (ref 1.8–7.4)
NEUTROPHILS NFR BLD AUTO: 60.8 % — SIGNIFICANT CHANGE UP (ref 43–77)
NEUTROPHILS NFR BLD AUTO: 60.8 % — SIGNIFICANT CHANGE UP (ref 43–77)
NRBC # BLD: 0 /100 WBCS — SIGNIFICANT CHANGE UP (ref 0–0)
NRBC # BLD: 0 /100 WBCS — SIGNIFICANT CHANGE UP (ref 0–0)
PLATELET # BLD AUTO: 211 K/UL — SIGNIFICANT CHANGE UP (ref 150–400)
PLATELET # BLD AUTO: 211 K/UL — SIGNIFICANT CHANGE UP (ref 150–400)
POTASSIUM SERPL-MCNC: 3.6 MMOL/L — SIGNIFICANT CHANGE UP (ref 3.5–5.3)
POTASSIUM SERPL-MCNC: 3.6 MMOL/L — SIGNIFICANT CHANGE UP (ref 3.5–5.3)
POTASSIUM SERPL-SCNC: 3.6 MMOL/L — SIGNIFICANT CHANGE UP (ref 3.5–5.3)
POTASSIUM SERPL-SCNC: 3.6 MMOL/L — SIGNIFICANT CHANGE UP (ref 3.5–5.3)
PROT SERPL-MCNC: 7.8 G/DL — SIGNIFICANT CHANGE UP (ref 6–8.3)
PROT SERPL-MCNC: 7.8 G/DL — SIGNIFICANT CHANGE UP (ref 6–8.3)
RBC # BLD: 3.2 M/UL — LOW (ref 3.8–5.2)
RBC # BLD: 3.2 M/UL — LOW (ref 3.8–5.2)
RBC # FLD: 16.7 % — HIGH (ref 10.3–14.5)
RBC # FLD: 16.7 % — HIGH (ref 10.3–14.5)
SODIUM SERPL-SCNC: 143 MMOL/L — SIGNIFICANT CHANGE UP (ref 135–145)
SODIUM SERPL-SCNC: 143 MMOL/L — SIGNIFICANT CHANGE UP (ref 135–145)
T4 FREE+ TSH PNL SERPL: 1.2 UIU/ML — SIGNIFICANT CHANGE UP (ref 0.27–4.2)
T4 FREE+ TSH PNL SERPL: 1.2 UIU/ML — SIGNIFICANT CHANGE UP (ref 0.27–4.2)
WBC # BLD: 4.88 K/UL — SIGNIFICANT CHANGE UP (ref 3.8–10.5)
WBC # BLD: 4.88 K/UL — SIGNIFICANT CHANGE UP (ref 3.8–10.5)
WBC # FLD AUTO: 4.88 K/UL — SIGNIFICANT CHANGE UP (ref 3.8–10.5)
WBC # FLD AUTO: 4.88 K/UL — SIGNIFICANT CHANGE UP (ref 3.8–10.5)

## 2024-01-16 ENCOUNTER — LABORATORY RESULT (OUTPATIENT)
Age: 64
End: 2024-01-16

## 2024-01-18 ENCOUNTER — RESULT REVIEW (OUTPATIENT)
Age: 64
End: 2024-01-18

## 2024-01-18 ENCOUNTER — APPOINTMENT (OUTPATIENT)
Dept: INFUSION THERAPY | Facility: HOSPITAL | Age: 64
End: 2024-01-18

## 2024-01-18 ENCOUNTER — APPOINTMENT (OUTPATIENT)
Dept: HEMATOLOGY ONCOLOGY | Facility: CLINIC | Age: 64
End: 2024-01-18

## 2024-01-18 LAB
ALBUMIN SERPL ELPH-MCNC: 4.3 G/DL — SIGNIFICANT CHANGE UP (ref 3.3–5)
ALP SERPL-CCNC: 78 U/L — SIGNIFICANT CHANGE UP (ref 40–120)
ALT FLD-CCNC: 12 U/L — SIGNIFICANT CHANGE UP (ref 10–45)
ANION GAP SERPL CALC-SCNC: 11 MMOL/L — SIGNIFICANT CHANGE UP (ref 5–17)
AST SERPL-CCNC: 21 U/L — SIGNIFICANT CHANGE UP (ref 10–40)
BASOPHILS # BLD AUTO: 0.04 K/UL — SIGNIFICANT CHANGE UP (ref 0–0.2)
BASOPHILS NFR BLD AUTO: 0.9 % — SIGNIFICANT CHANGE UP (ref 0–2)
BILIRUB SERPL-MCNC: 0.2 MG/DL — SIGNIFICANT CHANGE UP (ref 0.2–1.2)
BUN SERPL-MCNC: 18 MG/DL — SIGNIFICANT CHANGE UP (ref 7–23)
CALCIUM SERPL-MCNC: 9.4 MG/DL — SIGNIFICANT CHANGE UP (ref 8.4–10.5)
CHLORIDE SERPL-SCNC: 108 MMOL/L — SIGNIFICANT CHANGE UP (ref 96–108)
CO2 SERPL-SCNC: 23 MMOL/L — SIGNIFICANT CHANGE UP (ref 22–31)
CREAT SERPL-MCNC: 1.36 MG/DL — HIGH (ref 0.5–1.3)
EGFR: 44 ML/MIN/1.73M2 — LOW
EOSINOPHIL # BLD AUTO: 0.04 K/UL — SIGNIFICANT CHANGE UP (ref 0–0.5)
EOSINOPHIL NFR BLD AUTO: 0.9 % — SIGNIFICANT CHANGE UP (ref 0–6)
GLUCOSE SERPL-MCNC: 89 MG/DL — SIGNIFICANT CHANGE UP (ref 70–99)
HCT VFR BLD CALC: 26.3 % — LOW (ref 34.5–45)
HGB BLD-MCNC: 9 G/DL — LOW (ref 11.5–15.5)
IMM GRANULOCYTES NFR BLD AUTO: 3.3 % — HIGH (ref 0–0.9)
LYMPHOCYTES # BLD AUTO: 1.26 K/UL — SIGNIFICANT CHANGE UP (ref 1–3.3)
LYMPHOCYTES # BLD AUTO: 27.3 % — SIGNIFICANT CHANGE UP (ref 13–44)
MCHC RBC-ENTMCNC: 30.4 PG — SIGNIFICANT CHANGE UP (ref 27–34)
MCHC RBC-ENTMCNC: 34.2 G/DL — SIGNIFICANT CHANGE UP (ref 32–36)
MCV RBC AUTO: 88.9 FL — SIGNIFICANT CHANGE UP (ref 80–100)
MONOCYTES # BLD AUTO: 0.41 K/UL — SIGNIFICANT CHANGE UP (ref 0–0.9)
MONOCYTES NFR BLD AUTO: 8.9 % — SIGNIFICANT CHANGE UP (ref 2–14)
NEUTROPHILS # BLD AUTO: 2.71 K/UL — SIGNIFICANT CHANGE UP (ref 1.8–7.4)
NEUTROPHILS NFR BLD AUTO: 58.7 % — SIGNIFICANT CHANGE UP (ref 43–77)
NRBC # BLD: 0 /100 WBCS — SIGNIFICANT CHANGE UP (ref 0–0)
PLATELET # BLD AUTO: 213 K/UL — SIGNIFICANT CHANGE UP (ref 150–400)
POTASSIUM SERPL-MCNC: 3.4 MMOL/L — LOW (ref 3.5–5.3)
POTASSIUM SERPL-SCNC: 3.4 MMOL/L — LOW (ref 3.5–5.3)
PROT SERPL-MCNC: 7.6 G/DL — SIGNIFICANT CHANGE UP (ref 6–8.3)
RBC # BLD: 2.96 M/UL — LOW (ref 3.8–5.2)
RBC # FLD: 16.7 % — HIGH (ref 10.3–14.5)
SODIUM SERPL-SCNC: 142 MMOL/L — SIGNIFICANT CHANGE UP (ref 135–145)
WBC # BLD: 4.61 K/UL — SIGNIFICANT CHANGE UP (ref 3.8–10.5)
WBC # FLD AUTO: 4.61 K/UL — SIGNIFICANT CHANGE UP (ref 3.8–10.5)

## 2024-01-19 ENCOUNTER — LABORATORY RESULT (OUTPATIENT)
Age: 64
End: 2024-01-19

## 2024-01-19 LAB
CORTICOSTEROID BINDING GLOBULIN RESULT: 2.3 MG/DL — SIGNIFICANT CHANGE UP
CORTIS F/TOTAL MFR SERPL: 3.2 % — SIGNIFICANT CHANGE UP
CORTIS SERPL-MCNC: 5.2 UG/DL — SIGNIFICANT CHANGE UP
CORTISOL, FREE RESULT: 0.17 UG/DL — LOW

## 2024-01-20 ENCOUNTER — NON-APPOINTMENT (OUTPATIENT)
Age: 64
End: 2024-01-20

## 2024-01-23 ENCOUNTER — LABORATORY RESULT (OUTPATIENT)
Age: 64
End: 2024-01-23

## 2024-01-24 ENCOUNTER — LABORATORY RESULT (OUTPATIENT)
Age: 64
End: 2024-01-24

## 2024-01-25 ENCOUNTER — RESULT REVIEW (OUTPATIENT)
Age: 64
End: 2024-01-25

## 2024-01-25 ENCOUNTER — APPOINTMENT (OUTPATIENT)
Dept: INFUSION THERAPY | Facility: HOSPITAL | Age: 64
End: 2024-01-25

## 2024-01-25 ENCOUNTER — APPOINTMENT (OUTPATIENT)
Dept: HEMATOLOGY ONCOLOGY | Facility: CLINIC | Age: 64
End: 2024-01-25

## 2024-01-25 ENCOUNTER — APPOINTMENT (OUTPATIENT)
Dept: HEMATOLOGY ONCOLOGY | Facility: CLINIC | Age: 64
End: 2024-01-25
Payer: COMMERCIAL

## 2024-01-25 VITALS
OXYGEN SATURATION: 96 % | BODY MASS INDEX: 29.36 KG/M2 | WEIGHT: 155.49 LBS | HEIGHT: 61 IN | HEART RATE: 77 BPM | RESPIRATION RATE: 16 BRPM | SYSTOLIC BLOOD PRESSURE: 136 MMHG | TEMPERATURE: 98.6 F | DIASTOLIC BLOOD PRESSURE: 82 MMHG

## 2024-01-25 DIAGNOSIS — Z79.899 OTHER LONG TERM (CURRENT) DRUG THERAPY: ICD-10-CM

## 2024-01-25 LAB
ALBUMIN SERPL ELPH-MCNC: 4.5 G/DL — SIGNIFICANT CHANGE UP (ref 3.3–5)
ALP SERPL-CCNC: 76 U/L — SIGNIFICANT CHANGE UP (ref 40–120)
ALT FLD-CCNC: 13 U/L — SIGNIFICANT CHANGE UP (ref 10–45)
ANION GAP SERPL CALC-SCNC: 9 MMOL/L — SIGNIFICANT CHANGE UP (ref 5–17)
AST SERPL-CCNC: 21 U/L — SIGNIFICANT CHANGE UP (ref 10–40)
BASOPHILS # BLD AUTO: 0.05 K/UL — SIGNIFICANT CHANGE UP (ref 0–0.2)
BASOPHILS NFR BLD AUTO: 1 % — SIGNIFICANT CHANGE UP (ref 0–2)
BILIRUB SERPL-MCNC: 0.2 MG/DL — SIGNIFICANT CHANGE UP (ref 0.2–1.2)
BUN SERPL-MCNC: 14 MG/DL — SIGNIFICANT CHANGE UP (ref 7–23)
CALCIUM SERPL-MCNC: 9.6 MG/DL — SIGNIFICANT CHANGE UP (ref 8.4–10.5)
CHLORIDE SERPL-SCNC: 112 MMOL/L — HIGH (ref 96–108)
CO2 SERPL-SCNC: 22 MMOL/L — SIGNIFICANT CHANGE UP (ref 22–31)
CREAT SERPL-MCNC: 1.08 MG/DL — SIGNIFICANT CHANGE UP (ref 0.5–1.3)
DACRYOCYTES BLD QL SMEAR: SLIGHT — SIGNIFICANT CHANGE UP
EGFR: 58 ML/MIN/1.73M2 — LOW
ELLIPTOCYTES BLD QL SMEAR: SLIGHT — SIGNIFICANT CHANGE UP
EOSINOPHIL # BLD AUTO: 0 K/UL — SIGNIFICANT CHANGE UP (ref 0–0.5)
EOSINOPHIL NFR BLD AUTO: 0 % — SIGNIFICANT CHANGE UP (ref 0–6)
GLUCOSE SERPL-MCNC: 87 MG/DL — SIGNIFICANT CHANGE UP (ref 70–99)
HCT VFR BLD CALC: 26.8 % — LOW (ref 34.5–45)
HGB BLD-MCNC: 9.2 G/DL — LOW (ref 11.5–15.5)
LYMPHOCYTES # BLD AUTO: 1.32 K/UL — SIGNIFICANT CHANGE UP (ref 1–3.3)
LYMPHOCYTES # BLD AUTO: 29 % — SIGNIFICANT CHANGE UP (ref 13–44)
MCHC RBC-ENTMCNC: 30.7 PG — SIGNIFICANT CHANGE UP (ref 27–34)
MCHC RBC-ENTMCNC: 34.3 G/DL — SIGNIFICANT CHANGE UP (ref 32–36)
MCV RBC AUTO: 89.3 FL — SIGNIFICANT CHANGE UP (ref 80–100)
METAMYELOCYTES # FLD: 1 % — HIGH (ref 0–0)
MONOCYTES # BLD AUTO: 0.32 K/UL — SIGNIFICANT CHANGE UP (ref 0–0.9)
MONOCYTES NFR BLD AUTO: 7 % — SIGNIFICANT CHANGE UP (ref 2–14)
MYELOCYTES NFR BLD: 3 % — HIGH (ref 0–0)
NEUTROPHILS # BLD AUTO: 2.69 K/UL — SIGNIFICANT CHANGE UP (ref 1.8–7.4)
NEUTROPHILS NFR BLD AUTO: 59 % — SIGNIFICANT CHANGE UP (ref 43–77)
NRBC # BLD: 2 /100 WBCS — HIGH (ref 0–0)
NRBC # BLD: SIGNIFICANT CHANGE UP /100 WBCS (ref 0–0)
PLAT MORPH BLD: NORMAL — SIGNIFICANT CHANGE UP
PLATELET # BLD AUTO: 201 K/UL — SIGNIFICANT CHANGE UP (ref 150–400)
POIKILOCYTOSIS BLD QL AUTO: SLIGHT — SIGNIFICANT CHANGE UP
POLYCHROMASIA BLD QL SMEAR: SLIGHT — SIGNIFICANT CHANGE UP
POTASSIUM SERPL-MCNC: 4 MMOL/L — SIGNIFICANT CHANGE UP (ref 3.5–5.3)
POTASSIUM SERPL-SCNC: 4 MMOL/L — SIGNIFICANT CHANGE UP (ref 3.5–5.3)
PROT SERPL-MCNC: 7.7 G/DL — SIGNIFICANT CHANGE UP (ref 6–8.3)
RBC # BLD: 3 M/UL — LOW (ref 3.8–5.2)
RBC # FLD: 17.2 % — HIGH (ref 10.3–14.5)
RBC BLD AUTO: ABNORMAL
SCHISTOCYTES BLD QL AUTO: SLIGHT — SIGNIFICANT CHANGE UP
SODIUM SERPL-SCNC: 143 MMOL/L — SIGNIFICANT CHANGE UP (ref 135–145)
TARGETS BLD QL SMEAR: SLIGHT — SIGNIFICANT CHANGE UP
WBC # BLD: 4.56 K/UL — SIGNIFICANT CHANGE UP (ref 3.8–10.5)
WBC # FLD AUTO: 4.56 K/UL — SIGNIFICANT CHANGE UP (ref 3.8–10.5)

## 2024-01-25 PROCEDURE — 99214 OFFICE O/P EST MOD 30 MIN: CPT

## 2024-01-25 RX ORDER — GABAPENTIN 100 MG/1
100 CAPSULE ORAL
Qty: 30 | Refills: 0 | Status: ACTIVE | COMMUNITY
Start: 2024-01-25 | End: 1900-01-01

## 2024-01-25 NOTE — PHYSICAL EXAM
[Normal] : affect appropriate [de-identified] : vision loss left eye [de-identified] : Port present left chest wall with no signs of infection. [de-identified] : Right breast 3 cm mass palpated in upper outer quadrant of the breast (previously 8cm); + 2cm right axillary LN ; left breast with no palpable masses, skin dimpling or nipple inversion/discharge [de-identified] : Right axillary LAD palpated about 1 cm [de-identified] : Right  shunt in place

## 2024-01-25 NOTE — ASSESSMENT
[FreeTextEntry1] : 64 yo woman with history of subarachnoid hemorrhage in 2013 s/p craniotomy for evacuation and  shunt placement, recurrent DVTs requiring anticoagulation but needs warfarin due to interaction of DOACs with oxcabazepine (Trileptal), found in 10/2023 to have at least Stage IIIA triple negative breast cancer.  - [place] [facility] [place], radiation oncology, radiology and pathology reviewed - discussed with patient and her daughter that standard of care for the treatment of Stage II or III triple negative breast cancer is neoadjuvant therapy with taxol 80mg/m2 weekly and carbo AUC 1.5 for 12 weeks followed by Adriamycin 60mg/m2 and Cytoxan 600mg/m2 every 3 weeks for total of 4 treatments all concurrent with immunotherapy in form of pembrolizumab 200mg IV every 3 weeks.  - risk/benefits/side effects including but not limited to neuropathy, renal insufficiency, alopecia, cardiotoxicity, myelosuppression, fatigue, nausea, vomitiing discussed briefly with patient and her daughter - chemotherapy interactions with her antiseizure medications reviewed. - given her comorbid conditions opted to hold carboplatin - Taxol and pembrolizumab have no interactions with her antiseizure meds - Adriamycin may have reduced efficacy with oxCabazepine - Cytoxan and oxCabazepine can lead to increased toxicity of cytoxan in the form of severe myelosuppression, hyponatremia and increased risk of seizures, Cytoxan and Zonisamide can lead to renal insufficiency and Cytoxan and Levetiracetam can result in CNS and respiratory impairment.   - discussed at [facility] conference and with patient/her daughter that she will receive neoadjuvant Taxol 80mg/m2 weekly X 12 with concomitant pembrolizumab 200mg every 3 weeks for 4 treatments. Upon completion of this will have repeat imaging with Contrast Enhanced Mammogram and possible sonogram to evaluate status of her lymph nodes - post operatively, will determine role for additional therapy including Adriamycin and possible capecitabine with close monitoring of renal function along with post operative pembrolizumab - will also need adjuvant radiation therapy given the large size of tumor and axillary LN involvement - EKG 11/9/2023 - reviewed marked sinus bradycardia noted - compared to previous EKGs done w/ Dr. Ojeda and no clinically significant change  - Echo 11/9/23 - LVEF 65-70% - patient remains on warfarin due to potential interaction of DOACs with Oxcarbezepine  -CBC okay to proceed with cycle #9 out of 12 planned treatments of Taxol; tolerating well, except for minimally worsening neuropathy.  The patient is advised to use ice on hands and feet with taxol infusion.  Also, rx for gabapentin, 100 mg at bedtime given to patient.  She is advised to use caution when taking, can cause increased drowsiness.  No interaction with antiseizure meds except for possible CNS depression.  - Contrast enhanced Mammogram scheduled for 2/9/24.  Will plan for adjuvant Adriamycin and possible capecitabine post-operatively. - will proceed with adjuvant pembrolizumab post-operatively as well;  - Patient scheduled to see Dr. Lyn for surgical planning on 2/28/24. - Patient had the opportunity to have all their questions answered to their satisfaction - f/u in 1 week for next treatment [Medication(s)] : Medication(s)

## 2024-01-25 NOTE — HISTORY OF PRESENT ILLNESS
[Disease: _____________________] : Disease: [unfilled] [T: ___] : T[unfilled] [N: ___] : N[unfilled] [M: ___] : M[unfilled] [AJCC Stage: ____] : AJCC Stage: [unfilled] [de-identified] : Seen as part of [facility] - accompanied by daughter; (Allie)  Patient with prior mammogram in  showing normal findings. She has history of CVA in , aneurysmal subarachnoid hemorrhage s/p clipping and  shunt placement, bifrontal encephalomalacia, epilepsy with recurrent seizures including in 2022, 2023 and more recently in 2023, on multiple antiseizure meds (Keppra (levetiracetam), Vimpat (lacosamide) , Trileptal (oxcarbazepine) and zonisamide.  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  She noted a palpable mass in the right breast on 10/5/23.  Diagnostic bilateral mammogram on 10/5/23 showed mammographic and sonographic findings corresponding to the palpable mass in the upper outer quadrant of the right breast, consistent with an underlying carcinoma. Abnormal lymph nodes in the right axilla consistent with involvement by tumor. Biopsy recommended. BI-RADS 5.  Right breast biopsy 10/9/23, 10:00 7cm Pathology - IDC, poorly differentiated, measuring 2.5mm ER negative, VT negative, HER-2 negative.  She was seen by Surgical Oncology (Dr. Roger Lyn); axillary LN biopsy 10/18/23 - positive for carcinoma identical to morphology of the right breast lesion  Due to  shunt, she was not a candidate for pre-operative breawst MRI and instead underwent contrast enhanced mammogram on 10/24/23 -  Biopsy-proven malignancy at the right 10:00 axis with associated enhancement spanning up to 6.1 cm.  - Newly noted subcentimeter mass at the 10:00, 5 cm from nipple location felt to correspond with a focal asymmetry or medial and inferior to the primary cancer favoring a satellite lesion.  Staging CT C/A/P 10/25/23 - right breast mass with right axillary lymphadenopathy.  - No definite evidence of distant metastatic disease. A cluster of several non-enlarged prevascular mediastinal lymph nodes is nonspecific.  - A subpleural 4 mm right middle lobe pulmonary nodule is likely benign.  Staging Bone Scan ordered and pending  Risk Stratification - ; 1st full term pregnancy at age 18, no OCPs, no HRT, menarche unknown exact age, menopause unknown exact age; + family history of breast cancer in mother, colon cancer in maternal aunt Genetic testing - [employer] 84 gene panel; negative [de-identified] : Triple negative [de-identified] : ER-NV-Her2- [de-identified] : 1/25/24 Patient returns today to rule out progression of breast cancer and to assess treatment toxicity. Patient w/ triple negative breast cancer and started on neoadjuvant therapy with Pembro + Taxol weekly x 12 and then to be re-evaluated - today is week #9 out of 12.  She c/o slightly worsening CIPN of soles of feet and palms of hands.  She states she has mild numbness in fingertips. She denies any functional deficits.  She states this doesn't stop her doing any activities.  She c/o lose stools after eating, tolerable.  She denies any other complaints.  Also followed by Dr. Ojeda for cardiology and coumadin monitoring - very closely followed; last INR 2.71 Tolerating therapy without incident; has not had any seizures while on treatment; last one was in 9/2023  [90: Able to carry normal activity; minor signs or symptoms of disease.] : 90: Able to carry normal activity; minor signs or symptoms of disease.  [ECOG Performance Status: 1 - Restricted in physically strenuous activity but ambulatory and able to carry out work of a light or sedentary nature] : Performance Status: 1 - Restricted in physically strenuous activity but ambulatory and able to carry out work of a light or sedentary nature, e.g., light house work, office work

## 2024-01-25 NOTE — REVIEW OF SYSTEMS
[Vision Problems] : vision problems [Loss of Hearing] : loss of hearing [Diarrhea: Grade 0] : Diarrhea: Grade 0 [Negative] : Allergic/Immunologic [de-identified] : memory loss (short term); recurrent seizures last in 9/2023 [de-identified] : increased stress due to diagnosis, separation from spouse since 7/2023 and spouse having sold their home; has not spoken with him since mid-9/2023

## 2024-02-01 ENCOUNTER — RESULT REVIEW (OUTPATIENT)
Age: 64
End: 2024-02-01

## 2024-02-01 ENCOUNTER — APPOINTMENT (OUTPATIENT)
Dept: HEMATOLOGY ONCOLOGY | Facility: CLINIC | Age: 64
End: 2024-02-01

## 2024-02-01 ENCOUNTER — APPOINTMENT (OUTPATIENT)
Dept: INFUSION THERAPY | Facility: HOSPITAL | Age: 64
End: 2024-02-01

## 2024-02-01 LAB
ALBUMIN SERPL ELPH-MCNC: 4.3 G/DL — SIGNIFICANT CHANGE UP (ref 3.3–5)
ALP SERPL-CCNC: 69 U/L — SIGNIFICANT CHANGE UP (ref 40–120)
ALT FLD-CCNC: 10 U/L — SIGNIFICANT CHANGE UP (ref 10–45)
ANION GAP SERPL CALC-SCNC: 10 MMOL/L — SIGNIFICANT CHANGE UP (ref 5–17)
AST SERPL-CCNC: 22 U/L — SIGNIFICANT CHANGE UP (ref 10–40)
BASOPHILS # BLD AUTO: 0.04 K/UL — SIGNIFICANT CHANGE UP (ref 0–0.2)
BASOPHILS NFR BLD AUTO: 0.8 % — SIGNIFICANT CHANGE UP (ref 0–2)
BILIRUB SERPL-MCNC: 0.2 MG/DL — SIGNIFICANT CHANGE UP (ref 0.2–1.2)
BUN SERPL-MCNC: 19 MG/DL — SIGNIFICANT CHANGE UP (ref 7–23)
CALCIUM SERPL-MCNC: 9.4 MG/DL — SIGNIFICANT CHANGE UP (ref 8.4–10.5)
CHLORIDE SERPL-SCNC: 110 MMOL/L — HIGH (ref 96–108)
CO2 SERPL-SCNC: 23 MMOL/L — SIGNIFICANT CHANGE UP (ref 22–31)
CREAT SERPL-MCNC: 1.07 MG/DL — SIGNIFICANT CHANGE UP (ref 0.5–1.3)
EGFR: 58 ML/MIN/1.73M2 — LOW
EOSINOPHIL # BLD AUTO: 0.04 K/UL — SIGNIFICANT CHANGE UP (ref 0–0.5)
EOSINOPHIL NFR BLD AUTO: 0.8 % — SIGNIFICANT CHANGE UP (ref 0–6)
GLUCOSE SERPL-MCNC: 81 MG/DL — SIGNIFICANT CHANGE UP (ref 70–99)
HCT VFR BLD CALC: 27.4 % — LOW (ref 34.5–45)
HGB BLD-MCNC: 9.2 G/DL — LOW (ref 11.5–15.5)
IMM GRANULOCYTES NFR BLD AUTO: 2.5 % — HIGH (ref 0–0.9)
LYMPHOCYTES # BLD AUTO: 1.22 K/UL — SIGNIFICANT CHANGE UP (ref 1–3.3)
LYMPHOCYTES # BLD AUTO: 23.9 % — SIGNIFICANT CHANGE UP (ref 13–44)
MCHC RBC-ENTMCNC: 30.5 PG — SIGNIFICANT CHANGE UP (ref 27–34)
MCHC RBC-ENTMCNC: 33.6 G/DL — SIGNIFICANT CHANGE UP (ref 32–36)
MCV RBC AUTO: 90.7 FL — SIGNIFICANT CHANGE UP (ref 80–100)
MONOCYTES # BLD AUTO: 0.45 K/UL — SIGNIFICANT CHANGE UP (ref 0–0.9)
MONOCYTES NFR BLD AUTO: 8.8 % — SIGNIFICANT CHANGE UP (ref 2–14)
NEUTROPHILS # BLD AUTO: 3.22 K/UL — SIGNIFICANT CHANGE UP (ref 1.8–7.4)
NEUTROPHILS NFR BLD AUTO: 63.2 % — SIGNIFICANT CHANGE UP (ref 43–77)
NRBC # BLD: 0 /100 WBCS — SIGNIFICANT CHANGE UP (ref 0–0)
PLATELET # BLD AUTO: 225 K/UL — SIGNIFICANT CHANGE UP (ref 150–400)
POTASSIUM SERPL-MCNC: 3.6 MMOL/L — SIGNIFICANT CHANGE UP (ref 3.5–5.3)
POTASSIUM SERPL-SCNC: 3.6 MMOL/L — SIGNIFICANT CHANGE UP (ref 3.5–5.3)
PROT SERPL-MCNC: 7.4 G/DL — SIGNIFICANT CHANGE UP (ref 6–8.3)
RBC # BLD: 3.02 M/UL — LOW (ref 3.8–5.2)
RBC # FLD: 17.5 % — HIGH (ref 10.3–14.5)
SODIUM SERPL-SCNC: 143 MMOL/L — SIGNIFICANT CHANGE UP (ref 135–145)
WBC # BLD: 5.1 K/UL — SIGNIFICANT CHANGE UP (ref 3.8–10.5)
WBC # FLD AUTO: 5.1 K/UL — SIGNIFICANT CHANGE UP (ref 3.8–10.5)

## 2024-02-02 ENCOUNTER — NON-APPOINTMENT (OUTPATIENT)
Age: 64
End: 2024-02-02

## 2024-02-02 LAB
CORTIS AM PEAK SERPL-MCNC: 5.4 UG/DL — LOW (ref 6–18.4)
INR BLD: 1.64 RATIO — HIGH (ref 0.85–1.18)
PROTHROM AB SERPL-ACNC: 18.3 SEC — HIGH (ref 9.5–13)
T4 FREE+ TSH PNL SERPL: 1.64 UIU/ML — SIGNIFICANT CHANGE UP (ref 0.27–4.2)

## 2024-02-07 ENCOUNTER — LABORATORY RESULT (OUTPATIENT)
Age: 64
End: 2024-02-07

## 2024-02-08 ENCOUNTER — LABORATORY RESULT (OUTPATIENT)
Age: 64
End: 2024-02-08

## 2024-02-08 ENCOUNTER — RESULT REVIEW (OUTPATIENT)
Age: 64
End: 2024-02-08

## 2024-02-08 ENCOUNTER — APPOINTMENT (OUTPATIENT)
Dept: HEMATOLOGY ONCOLOGY | Facility: CLINIC | Age: 64
End: 2024-02-08

## 2024-02-08 ENCOUNTER — APPOINTMENT (OUTPATIENT)
Dept: INFUSION THERAPY | Facility: HOSPITAL | Age: 64
End: 2024-02-08

## 2024-02-08 LAB
ALBUMIN SERPL ELPH-MCNC: 4.3 G/DL — SIGNIFICANT CHANGE UP (ref 3.3–5)
ALP SERPL-CCNC: 75 U/L — SIGNIFICANT CHANGE UP (ref 40–120)
ALT FLD-CCNC: 9 U/L — LOW (ref 10–45)
ANION GAP SERPL CALC-SCNC: 12 MMOL/L — SIGNIFICANT CHANGE UP (ref 5–17)
AST SERPL-CCNC: 24 U/L — SIGNIFICANT CHANGE UP (ref 10–40)
BASOPHILS # BLD AUTO: 0.05 K/UL — SIGNIFICANT CHANGE UP (ref 0–0.2)
BASOPHILS NFR BLD AUTO: 0.9 % — SIGNIFICANT CHANGE UP (ref 0–2)
BILIRUB SERPL-MCNC: 0.2 MG/DL — SIGNIFICANT CHANGE UP (ref 0.2–1.2)
BUN SERPL-MCNC: 21 MG/DL — SIGNIFICANT CHANGE UP (ref 7–23)
CALCIUM SERPL-MCNC: 9.6 MG/DL — SIGNIFICANT CHANGE UP (ref 8.4–10.5)
CHLORIDE SERPL-SCNC: 110 MMOL/L — HIGH (ref 96–108)
CO2 SERPL-SCNC: 23 MMOL/L — SIGNIFICANT CHANGE UP (ref 22–31)
CREAT SERPL-MCNC: 1.38 MG/DL — HIGH (ref 0.5–1.3)
EGFR: 43 ML/MIN/1.73M2 — LOW
EOSINOPHIL # BLD AUTO: 0.04 K/UL — SIGNIFICANT CHANGE UP (ref 0–0.5)
EOSINOPHIL NFR BLD AUTO: 0.7 % — SIGNIFICANT CHANGE UP (ref 0–6)
GLUCOSE SERPL-MCNC: 88 MG/DL — SIGNIFICANT CHANGE UP (ref 70–99)
HCT VFR BLD CALC: 27.5 % — LOW (ref 34.5–45)
HGB BLD-MCNC: 9.1 G/DL — LOW (ref 11.5–15.5)
IMM GRANULOCYTES NFR BLD AUTO: 2.7 % — HIGH (ref 0–0.9)
LYMPHOCYTES # BLD AUTO: 1.57 K/UL — SIGNIFICANT CHANGE UP (ref 1–3.3)
LYMPHOCYTES # BLD AUTO: 28 % — SIGNIFICANT CHANGE UP (ref 13–44)
MCHC RBC-ENTMCNC: 30.5 PG — SIGNIFICANT CHANGE UP (ref 27–34)
MCHC RBC-ENTMCNC: 33.1 G/DL — SIGNIFICANT CHANGE UP (ref 32–36)
MCV RBC AUTO: 92.3 FL — SIGNIFICANT CHANGE UP (ref 80–100)
MONOCYTES # BLD AUTO: 0.42 K/UL — SIGNIFICANT CHANGE UP (ref 0–0.9)
MONOCYTES NFR BLD AUTO: 7.5 % — SIGNIFICANT CHANGE UP (ref 2–14)
NEUTROPHILS # BLD AUTO: 3.38 K/UL — SIGNIFICANT CHANGE UP (ref 1.8–7.4)
NEUTROPHILS NFR BLD AUTO: 60.2 % — SIGNIFICANT CHANGE UP (ref 43–77)
NRBC # BLD: 0 /100 WBCS — SIGNIFICANT CHANGE UP (ref 0–0)
PLATELET # BLD AUTO: 244 K/UL — SIGNIFICANT CHANGE UP (ref 150–400)
POTASSIUM SERPL-MCNC: 3.8 MMOL/L — SIGNIFICANT CHANGE UP (ref 3.5–5.3)
POTASSIUM SERPL-SCNC: 3.8 MMOL/L — SIGNIFICANT CHANGE UP (ref 3.5–5.3)
PROT SERPL-MCNC: 7.6 G/DL — SIGNIFICANT CHANGE UP (ref 6–8.3)
RBC # BLD: 2.98 M/UL — LOW (ref 3.8–5.2)
RBC # FLD: 17.4 % — HIGH (ref 10.3–14.5)
SODIUM SERPL-SCNC: 144 MMOL/L — SIGNIFICANT CHANGE UP (ref 135–145)
WBC # BLD: 5.61 K/UL — SIGNIFICANT CHANGE UP (ref 3.8–10.5)
WBC # FLD AUTO: 5.61 K/UL — SIGNIFICANT CHANGE UP (ref 3.8–10.5)

## 2024-02-09 ENCOUNTER — APPOINTMENT (OUTPATIENT)
Dept: ULTRASOUND IMAGING | Facility: IMAGING CENTER | Age: 64
End: 2024-02-09
Payer: COMMERCIAL

## 2024-02-09 ENCOUNTER — APPOINTMENT (OUTPATIENT)
Dept: MAMMOGRAPHY | Facility: IMAGING CENTER | Age: 64
End: 2024-02-09
Payer: COMMERCIAL

## 2024-02-09 ENCOUNTER — OUTPATIENT (OUTPATIENT)
Dept: OUTPATIENT SERVICES | Facility: HOSPITAL | Age: 64
LOS: 1 days | End: 2024-02-09
Payer: COMMERCIAL

## 2024-02-09 DIAGNOSIS — I72.9 ANEURYSM OF UNSPECIFIED SITE: Chronic | ICD-10-CM

## 2024-02-09 DIAGNOSIS — Z98.89 OTHER SPECIFIED POSTPROCEDURAL STATES: Chronic | ICD-10-CM

## 2024-02-09 DIAGNOSIS — Z95.5 PRESENCE OF CORONARY ANGIOPLASTY IMPLANT AND GRAFT: Chronic | ICD-10-CM

## 2024-02-09 DIAGNOSIS — Z43.4 ENCOUNTER FOR ATTENTION TO OTHER ARTIFICIAL OPENINGS OF DIGESTIVE TRACT: Chronic | ICD-10-CM

## 2024-02-09 DIAGNOSIS — Z98.2 PRESENCE OF CEREBROSPINAL FLUID DRAINAGE DEVICE: Chronic | ICD-10-CM

## 2024-02-09 DIAGNOSIS — Z00.8 ENCOUNTER FOR OTHER GENERAL EXAMINATION: ICD-10-CM

## 2024-02-09 PROCEDURE — G0279: CPT | Mod: 26

## 2024-02-09 PROCEDURE — 77066 DX MAMMO INCL CAD BI: CPT

## 2024-02-09 PROCEDURE — G0279: CPT

## 2024-02-09 PROCEDURE — 77066 DX MAMMO INCL CAD BI: CPT | Mod: 26

## 2024-02-15 ENCOUNTER — RESULT REVIEW (OUTPATIENT)
Age: 64
End: 2024-02-15

## 2024-02-15 ENCOUNTER — APPOINTMENT (OUTPATIENT)
Dept: HEMATOLOGY ONCOLOGY | Facility: CLINIC | Age: 64
End: 2024-02-15
Payer: COMMERCIAL

## 2024-02-15 ENCOUNTER — APPOINTMENT (OUTPATIENT)
Dept: INFUSION THERAPY | Facility: HOSPITAL | Age: 64
End: 2024-02-15

## 2024-02-15 ENCOUNTER — APPOINTMENT (OUTPATIENT)
Dept: HEMATOLOGY ONCOLOGY | Facility: CLINIC | Age: 64
End: 2024-02-15

## 2024-02-15 ENCOUNTER — LABORATORY RESULT (OUTPATIENT)
Age: 64
End: 2024-02-15

## 2024-02-15 VITALS
WEIGHT: 157.63 LBS | DIASTOLIC BLOOD PRESSURE: 72 MMHG | TEMPERATURE: 97.2 F | BODY MASS INDEX: 29.78 KG/M2 | OXYGEN SATURATION: 99 % | RESPIRATION RATE: 16 BRPM | SYSTOLIC BLOOD PRESSURE: 132 MMHG | HEART RATE: 60 BPM

## 2024-02-15 DIAGNOSIS — R79.89 OTHER SPECIFIED ABNORMAL FINDINGS OF BLOOD CHEMISTRY: ICD-10-CM

## 2024-02-15 LAB
ALBUMIN SERPL ELPH-MCNC: 4.3 G/DL — SIGNIFICANT CHANGE UP (ref 3.3–5)
ALP SERPL-CCNC: 74 U/L — SIGNIFICANT CHANGE UP (ref 40–120)
ALT FLD-CCNC: 13 U/L — SIGNIFICANT CHANGE UP (ref 10–45)
ANION GAP SERPL CALC-SCNC: 11 MMOL/L — SIGNIFICANT CHANGE UP (ref 5–17)
AST SERPL-CCNC: 22 U/L — SIGNIFICANT CHANGE UP (ref 10–40)
BASOPHILS # BLD AUTO: 0.05 K/UL — SIGNIFICANT CHANGE UP (ref 0–0.2)
BASOPHILS NFR BLD AUTO: 0.9 % — SIGNIFICANT CHANGE UP (ref 0–2)
BILIRUB SERPL-MCNC: 0.2 MG/DL — SIGNIFICANT CHANGE UP (ref 0.2–1.2)
BUN SERPL-MCNC: 14 MG/DL — SIGNIFICANT CHANGE UP (ref 7–23)
CALCIUM SERPL-MCNC: 9.3 MG/DL — SIGNIFICANT CHANGE UP (ref 8.4–10.5)
CHLORIDE SERPL-SCNC: 110 MMOL/L — HIGH (ref 96–108)
CO2 SERPL-SCNC: 22 MMOL/L — SIGNIFICANT CHANGE UP (ref 22–31)
CORTIS AM PEAK SERPL-MCNC: 6.1 UG/DL — SIGNIFICANT CHANGE UP (ref 6–18.4)
CREAT SERPL-MCNC: 1.06 MG/DL — SIGNIFICANT CHANGE UP (ref 0.5–1.3)
EGFR: 59 ML/MIN/1.73M2 — LOW
EOSINOPHIL # BLD AUTO: 0.05 K/UL — SIGNIFICANT CHANGE UP (ref 0–0.5)
EOSINOPHIL NFR BLD AUTO: 0.9 % — SIGNIFICANT CHANGE UP (ref 0–6)
GLUCOSE SERPL-MCNC: 84 MG/DL — SIGNIFICANT CHANGE UP (ref 70–99)
HCT VFR BLD CALC: 27.1 % — LOW (ref 34.5–45)
HGB BLD-MCNC: 9.1 G/DL — LOW (ref 11.5–15.5)
IMM GRANULOCYTES NFR BLD AUTO: 3 % — HIGH (ref 0–0.9)
INR BLD: 3.12 RATIO — HIGH (ref 0.85–1.18)
LYMPHOCYTES # BLD AUTO: 1.25 K/UL — SIGNIFICANT CHANGE UP (ref 1–3.3)
LYMPHOCYTES # BLD AUTO: 22.4 % — SIGNIFICANT CHANGE UP (ref 13–44)
MCHC RBC-ENTMCNC: 30.5 PG — SIGNIFICANT CHANGE UP (ref 27–34)
MCHC RBC-ENTMCNC: 33.6 G/DL — SIGNIFICANT CHANGE UP (ref 32–36)
MCV RBC AUTO: 90.9 FL — SIGNIFICANT CHANGE UP (ref 80–100)
MONOCYTES # BLD AUTO: 0.38 K/UL — SIGNIFICANT CHANGE UP (ref 0–0.9)
MONOCYTES NFR BLD AUTO: 6.8 % — SIGNIFICANT CHANGE UP (ref 2–14)
NEUTROPHILS # BLD AUTO: 3.68 K/UL — SIGNIFICANT CHANGE UP (ref 1.8–7.4)
NEUTROPHILS NFR BLD AUTO: 66 % — SIGNIFICANT CHANGE UP (ref 43–77)
NRBC # BLD: 0 /100 WBCS — SIGNIFICANT CHANGE UP (ref 0–0)
PLATELET # BLD AUTO: 239 K/UL — SIGNIFICANT CHANGE UP (ref 150–400)
POTASSIUM SERPL-MCNC: 3.4 MMOL/L — LOW (ref 3.5–5.3)
POTASSIUM SERPL-SCNC: 3.4 MMOL/L — LOW (ref 3.5–5.3)
PROT SERPL-MCNC: 7.6 G/DL — SIGNIFICANT CHANGE UP (ref 6–8.3)
PROTHROM AB SERPL-ACNC: 34.1 SEC — HIGH (ref 9.5–13)
RBC # BLD: 2.98 M/UL — LOW (ref 3.8–5.2)
RBC # FLD: 17.3 % — HIGH (ref 10.3–14.5)
SODIUM SERPL-SCNC: 143 MMOL/L — SIGNIFICANT CHANGE UP (ref 135–145)
WBC # BLD: 5.58 K/UL — SIGNIFICANT CHANGE UP (ref 3.8–10.5)
WBC # FLD AUTO: 5.58 K/UL — SIGNIFICANT CHANGE UP (ref 3.8–10.5)

## 2024-02-15 PROCEDURE — 99214 OFFICE O/P EST MOD 30 MIN: CPT

## 2024-02-16 ENCOUNTER — LABORATORY RESULT (OUTPATIENT)
Age: 64
End: 2024-02-16

## 2024-02-20 NOTE — REVIEW OF SYSTEMS
[Vision Problems] : vision problems [Loss of Hearing] : loss of hearing [Diarrhea: Grade 0] : Diarrhea: Grade 0 [Negative] : Allergic/Immunologic [Fatigue] : fatigue [de-identified] : memory loss (short term); recurrent seizures last in 9/2023 [de-identified] : increased stress due to diagnosis, separation from spouse since 7/2023 and spouse having sold their home; has not spoken with him since mid-9/2023

## 2024-02-20 NOTE — ASSESSMENT
[Medication(s)] : Medication(s) [FreeTextEntry1] : 62 yo woman with history of subarachnoid hemorrhage in 2013 s/p craniotomy for evacuation and  shunt placement, recurrent DVTs requiring anticoagulation but needs warfarin due to interaction of DOACs with oxcabazepine (Trileptal), found in 10/2023 to have at least Stage IIIA triple negative breast cancer.  - [place] [facility] [place], radiation oncology, radiology and pathology reviewed - discussed with patient and her daughter that standard of care for the treatment of Stage II or III triple negative breast cancer is neoadjuvant therapy with taxol 80mg/m2 weekly and carbo AUC 1.5 for 12 weeks followed by Adriamycin 60mg/m2 and Cytoxan 600mg/m2 every 3 weeks for total of 4 treatments all concurrent with immunotherapy in form of pembrolizumab 200mg IV every 3 weeks.  - risk/benefits/side effects including but not limited to neuropathy, renal insufficiency, alopecia, cardiotoxicity, myelosuppression, fatigue, nausea, vomitiing discussed briefly with patient and her daughter - chemotherapy interactions with her antiseizure medications reviewed. - given her comorbid conditions opted to hold carboplatin - Taxol and pembrolizumab have no interactions with her antiseizure meds - Adriamycin may have reduced efficacy with oxCabazepine - Cytoxan and oxCabazepine can lead to increased toxicity of cytoxan in the form of severe myelosuppression, hyponatremia and increased risk of seizures, Cytoxan and Zonisamide can lead to renal insufficiency and Cytoxan and Levetiracetam can result in CNS and respiratory impairment.   - discussed at [facility] conference and with patient/her daughter that she will receive neoadjuvant Taxol 80mg/m2 weekly X 12 with concomitant pembrolizumab 200mg every 3 weeks for 4 treatments. Upon completion of this will have repeat imaging with Contrast Enhanced Mammogram and possible sonogram to evaluate status of her lymph nodes - post operatively, will determine role for additional therapy including Adriamycin and possible capecitabine with close monitoring of renal function along with post operative pembrolizumab - will also need adjuvant radiation therapy given the large size of tumor and axillary LN involvement - EKG 11/9/2023 - reviewed marked sinus bradycardia noted - compared to previous EKGs done w/ Dr. Ojeda and no clinically significant change  - Echo 11/9/23 - LVEF 65-70% - patient remains on warfarin due to potential interaction of DOACs with Oxcarbezepine  2/15/2024 -CBC okay to proceed with cycle #12 out of 12 planned treatments of Taxol; tolerating well, except for minimally worsening neuropathy.  The patient is advised to use ice on hands and feet with taxol infusion.  Also, rx for gabapentin, 100 mg at bedtime given to patient.  She is advised to use caution when taking, can cause increased drowsiness.  No interaction with antiseizure meds except for possible CNS depression.  - Contrast enhanced Mammogram 2/9/24 - significant partial response to therapy noted -  Will plan for adjuvant Adriamycin and possible capecitabine post-operatively. - will proceed with adjuvant pembrolizumab post-operatively as well;  - Patient scheduled to see Dr. Lyn for surgical planning on 2/28/24. - Patient had the opportunity to have all their questions answered to their satisfaction - f/u in post-op- awaiting surgical date.

## 2024-02-20 NOTE — HISTORY OF PRESENT ILLNESS
[Disease: _____________________] : Disease: [unfilled] [T: ___] : T[unfilled] [N: ___] : N[unfilled] [M: ___] : M[unfilled] [AJCC Stage: ____] : AJCC Stage: [unfilled] [90: Able to carry normal activity; minor signs or symptoms of disease.] : 90: Able to carry normal activity; minor signs or symptoms of disease.  [ECOG Performance Status: 1 - Restricted in physically strenuous activity but ambulatory and able to carry out work of a light or sedentary nature] : Performance Status: 1 - Restricted in physically strenuous activity but ambulatory and able to carry out work of a light or sedentary nature, e.g., light house work, office work [de-identified] : Seen as part of [facility] - accompanied by daughter; (Allie)  Patient with prior mammogram in  showing normal findings. She has history of CVA in , aneurysmal subarachnoid hemorrhage s/p clipping and  shunt placement, bifrontal encephalomalacia, epilepsy with recurrent seizures including in 2022, 2023 and more recently in 2023, on multiple antiseizure meds (Keppra (levetiracetam), Vimpat (lacosamide) , Trileptal (oxcarbazepine) and zonisamide.  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  She noted a palpable mass in the right breast on 10/5/23.  Diagnostic bilateral mammogram on 10/5/23 showed mammographic and sonographic findings corresponding to the palpable mass in the upper outer quadrant of the right breast, consistent with an underlying carcinoma. Abnormal lymph nodes in the right axilla consistent with involvement by tumor. Biopsy recommended. BI-RADS 5.  Right breast biopsy 10/9/23, 10:00 7cm Pathology - IDC, poorly differentiated, measuring 2.5mm ER negative, SD negative, HER-2 negative.  She was seen by Surgical Oncology (Dr. Roger Lyn); axillary LN biopsy 10/18/23 - positive for carcinoma identical to morphology of the right breast lesion  Due to  shunt, she was not a candidate for pre-operative breawst MRI and instead underwent contrast enhanced mammogram on 10/24/23 -  Biopsy-proven malignancy at the right 10:00 axis with associated enhancement spanning up to 6.1 cm.  - Newly noted subcentimeter mass at the 10:00, 5 cm from nipple location felt to correspond with a focal asymmetry or medial and inferior to the primary cancer favoring a satellite lesion.  Staging CT C/A/P 10/25/23 - right breast mass with right axillary lymphadenopathy.  - No definite evidence of distant metastatic disease. A cluster of several non-enlarged prevascular mediastinal lymph nodes is nonspecific.  - A subpleural 4 mm right middle lobe pulmonary nodule is likely benign.  Staging Bone Scan ordered and pending  Risk Stratification - ; 1st full term pregnancy at age 18, no OCPs, no HRT, menarche unknown exact age, menopause unknown exact age; + family history of breast cancer in mother, colon cancer in maternal aunt Genetic testing - [employer] 84 gene panel; negative [de-identified] : Triple negative [de-identified] : ER-DC-Her2- [de-identified] : 2/15/2024 Patient returns today to rule out progression of breast cancer and to assess treatment toxicity. Patient w/ triple negative breast cancer and started on neoadjuvant therapy with Pembro + Taxol weekly x 12 and then to be re-evaluated - today is week #12 out of 12.  She c/o slightly worsening CIPN of soles of feet and palms of hands.  She states she has mild numbness in fingertips. She denies any functional deficits.  She states this doesn't stop her doing any activities.  She c/o lose stools after eating, tolerable.  She denies any other complaints.  Also followed by Dr. Ojeda for cardiology and coumadin monitoring - very closely followed;  Tolerating therapy without incident; has not had any seizures while on treatment; last one was in 9/2023  Post Treatment: MENDEZ 2/9/2024  Marked interval decrease in previous mammographic mass/asymmetry and enhancement associated with biopsy-proven IDC/DCIS in the upper outer right breast, previously spanning up to 6 cm in October 2023. Residual mild focal enhancement approximately 1.5 cm inferior to the two biopsy clips is suggestive of residual disease. Continued surgical/oncological management is recommended. Decreased size of right node also No evidence of malignancy in the left breast

## 2024-02-20 NOTE — PHYSICAL EXAM
[de-identified] : Right axillary LAD palpated about 1 cm [Normal] : no peripheral adenopathy appreciated [de-identified] : vision loss left eye [de-identified] : Port present left chest wall with no signs of infection. [de-identified] : Right breast 2 cm mass palpated in upper outer quadrant of the breast (previously 8cm); + 2cm right axillary LN (nolonger palpable) ; left breast with no palpable masses, skin dimpling or nipple inversion/discharge [de-identified] : Right  shunt in place

## 2024-02-21 ENCOUNTER — LABORATORY RESULT (OUTPATIENT)
Age: 64
End: 2024-02-21

## 2024-02-22 ENCOUNTER — RX RENEWAL (OUTPATIENT)
Age: 64
End: 2024-02-22

## 2024-02-22 ENCOUNTER — NON-APPOINTMENT (OUTPATIENT)
Age: 64
End: 2024-02-22

## 2024-02-25 ENCOUNTER — RX RENEWAL (OUTPATIENT)
Age: 64
End: 2024-02-25

## 2024-02-26 ENCOUNTER — RX RENEWAL (OUTPATIENT)
Age: 64
End: 2024-02-26

## 2024-02-26 RX ORDER — OMEPRAZOLE 20 MG/1
20 CAPSULE, DELAYED RELEASE ORAL DAILY
Qty: 30 | Refills: 2 | Status: ACTIVE | COMMUNITY
Start: 2023-11-21 | End: 1900-01-01

## 2024-02-28 ENCOUNTER — APPOINTMENT (OUTPATIENT)
Dept: INTERNAL MEDICINE | Facility: CLINIC | Age: 64
End: 2024-02-28
Payer: COMMERCIAL

## 2024-02-28 ENCOUNTER — NON-APPOINTMENT (OUTPATIENT)
Age: 64
End: 2024-02-28

## 2024-02-28 ENCOUNTER — OUTPATIENT (OUTPATIENT)
Dept: OUTPATIENT SERVICES | Facility: HOSPITAL | Age: 64
LOS: 1 days | End: 2024-02-28
Payer: COMMERCIAL

## 2024-02-28 ENCOUNTER — APPOINTMENT (OUTPATIENT)
Dept: ULTRASOUND IMAGING | Facility: IMAGING CENTER | Age: 64
End: 2024-02-28
Payer: COMMERCIAL

## 2024-02-28 VITALS
TEMPERATURE: 98.3 F | OXYGEN SATURATION: 99 % | HEART RATE: 70 BPM | HEIGHT: 61 IN | WEIGHT: 160 LBS | BODY MASS INDEX: 30.21 KG/M2 | SYSTOLIC BLOOD PRESSURE: 120 MMHG | DIASTOLIC BLOOD PRESSURE: 70 MMHG

## 2024-02-28 DIAGNOSIS — Z95.5 PRESENCE OF CORONARY ANGIOPLASTY IMPLANT AND GRAFT: Chronic | ICD-10-CM

## 2024-02-28 DIAGNOSIS — Z43.4 ENCOUNTER FOR ATTENTION TO OTHER ARTIFICIAL OPENINGS OF DIGESTIVE TRACT: Chronic | ICD-10-CM

## 2024-02-28 DIAGNOSIS — Z98.2 PRESENCE OF CEREBROSPINAL FLUID DRAINAGE DEVICE: Chronic | ICD-10-CM

## 2024-02-28 DIAGNOSIS — D64.9 ANEMIA, UNSPECIFIED: ICD-10-CM

## 2024-02-28 DIAGNOSIS — Z86.718 PERSONAL HISTORY OF OTHER VENOUS THROMBOSIS AND EMBOLISM: ICD-10-CM

## 2024-02-28 DIAGNOSIS — Z98.89 OTHER SPECIFIED POSTPROCEDURAL STATES: Chronic | ICD-10-CM

## 2024-02-28 DIAGNOSIS — M79.89 OTHER SPECIFIED SOFT TISSUE DISORDERS: ICD-10-CM

## 2024-02-28 LAB
ALBUMIN SERPL ELPH-MCNC: 4.4 G/DL
ALP BLD-CCNC: 79 U/L
ALT SERPL-CCNC: 11 U/L
ANION GAP SERPL CALC-SCNC: 12 MMOL/L
AST SERPL-CCNC: 15 U/L
BASOPHILS # BLD AUTO: 0.02 K/UL
BASOPHILS NFR BLD AUTO: 0.4 %
BILIRUB SERPL-MCNC: 0.3 MG/DL
BUN SERPL-MCNC: 14 MG/DL
CALCIUM SERPL-MCNC: 9.7 MG/DL
CHLORIDE SERPL-SCNC: 107 MMOL/L
CHOLEST SERPL-MCNC: 151 MG/DL
CK SERPL-CCNC: 151 U/L
CO2 SERPL-SCNC: 26 MMOL/L
CREAT SERPL-MCNC: 1.23 MG/DL
EGFR: 49 ML/MIN/1.73M2
EOSINOPHIL # BLD AUTO: 0.03 K/UL
EOSINOPHIL NFR BLD AUTO: 0.6 %
GLUCOSE SERPL-MCNC: 87 MG/DL
HCT VFR BLD CALC: 29.2 %
HDLC SERPL-MCNC: 55 MG/DL
HGB BLD-MCNC: 9.3 G/DL
IMM GRANULOCYTES NFR BLD AUTO: 0.4 %
INR PPP: 1.49 RATIO
LDLC SERPL CALC-MCNC: 73 MG/DL
LYMPHOCYTES # BLD AUTO: 1.08 K/UL
LYMPHOCYTES NFR BLD AUTO: 21.1 %
MAN DIFF?: NORMAL
MCHC RBC-ENTMCNC: 30.4 PG
MCHC RBC-ENTMCNC: 31.8 GM/DL
MCV RBC AUTO: 95.4 FL
MONOCYTES # BLD AUTO: 0.43 K/UL
MONOCYTES NFR BLD AUTO: 8.4 %
NEUTROPHILS # BLD AUTO: 3.55 K/UL
NEUTROPHILS NFR BLD AUTO: 69.1 %
NONHDLC SERPL-MCNC: 95 MG/DL
NT-PROBNP SERPL-MCNC: 54 PG/ML
PLATELET # BLD AUTO: 273 K/UL
POTASSIUM SERPL-SCNC: 3.3 MMOL/L
PROT SERPL-MCNC: 8 G/DL
PT BLD: 16.8 SEC
RBC # BLD: 3.06 M/UL
RBC # FLD: 17.4 %
SODIUM SERPL-SCNC: 145 MMOL/L
TRIGL SERPL-MCNC: 129 MG/DL
WBC # FLD AUTO: 5.13 K/UL

## 2024-02-28 PROCEDURE — 93970 EXTREMITY STUDY: CPT | Mod: 26

## 2024-02-28 PROCEDURE — 99214 OFFICE O/P EST MOD 30 MIN: CPT

## 2024-02-28 PROCEDURE — 93000 ELECTROCARDIOGRAM COMPLETE: CPT

## 2024-02-28 PROCEDURE — G2211 COMPLEX E/M VISIT ADD ON: CPT

## 2024-02-28 PROCEDURE — 93970 EXTREMITY STUDY: CPT

## 2024-02-28 NOTE — PHYSICAL EXAM
[No Acute Distress] : no acute distress [Well Nourished] : well nourished [Well Developed] : well developed [Well-Appearing] : well-appearing [Normal Sclera/Conjunctiva] : normal sclera/conjunctiva [Normal Outer Ear/Nose] : the outer ears and nose were normal in appearance [Normal Oropharynx] : the oropharynx was normal [No JVD] : no jugular venous distention [No Lymphadenopathy] : no lymphadenopathy [Supple] : supple [No Respiratory Distress] : no respiratory distress  [No Accessory Muscle Use] : no accessory muscle use [Clear to Auscultation] : lungs were clear to auscultation bilaterally [Normal Rate] : normal rate  [Normal S1, S2] : normal S1 and S2 [Regular Rhythm] : with a regular rhythm [No Murmur] : no murmur heard [No Carotid Bruits] : no carotid bruits [No Varicosities] : no varicosities [Pedal Pulses Present] : the pedal pulses are present [No Edema] : there was no peripheral edema [Soft] : abdomen soft [No Extremity Clubbing/Cyanosis] : no extremity clubbing/cyanosis [Non Tender] : non-tender [Non-distended] : non-distended [Normal Bowel Sounds] : normal bowel sounds [Normal Anterior Cervical Nodes] : no anterior cervical lymphadenopathy [No CVA Tenderness] : no CVA  tenderness [No Spinal Tenderness] : no spinal tenderness [No Joint Swelling] : no joint swelling [Grossly Normal Strength/Tone] : grossly normal strength/tone [Coordination Grossly Intact] : coordination grossly intact [No Rash] : no rash [No Focal Deficits] : no focal deficits [Normal Gait] : normal gait [Alert and Oriented x3] : oriented to person, place, and time [de-identified] : left leg swollen compared to right leg, 1+ pitting edema on the left and trace ankle edema on the right   [de-identified] : chronic venous changes on left leg

## 2024-02-28 NOTE — PHYSICAL EXAM
[No Acute Distress] : no acute distress [Well Nourished] : well nourished [Well Developed] : well developed [Well-Appearing] : well-appearing [Normal Sclera/Conjunctiva] : normal sclera/conjunctiva [Normal Outer Ear/Nose] : the outer ears and nose were normal in appearance [Normal Oropharynx] : the oropharynx was normal [No JVD] : no jugular venous distention [Supple] : supple [No Lymphadenopathy] : no lymphadenopathy [No Respiratory Distress] : no respiratory distress  [No Accessory Muscle Use] : no accessory muscle use [Clear to Auscultation] : lungs were clear to auscultation bilaterally [Normal Rate] : normal rate  [Regular Rhythm] : with a regular rhythm [Normal S1, S2] : normal S1 and S2 [No Carotid Bruits] : no carotid bruits [No Murmur] : no murmur heard [No Varicosities] : no varicosities [Pedal Pulses Present] : the pedal pulses are present [No Edema] : there was no peripheral edema [Soft] : abdomen soft [No Extremity Clubbing/Cyanosis] : no extremity clubbing/cyanosis [Non Tender] : non-tender [Non-distended] : non-distended [Normal Bowel Sounds] : normal bowel sounds [Normal Anterior Cervical Nodes] : no anterior cervical lymphadenopathy [No Spinal Tenderness] : no spinal tenderness [No CVA Tenderness] : no CVA  tenderness [Grossly Normal Strength/Tone] : grossly normal strength/tone [No Joint Swelling] : no joint swelling [No Rash] : no rash [Coordination Grossly Intact] : coordination grossly intact [No Focal Deficits] : no focal deficits [Normal Gait] : normal gait [Alert and Oriented x3] : oriented to person, place, and time [de-identified] : left leg swollen compared to right leg, 1+ pitting edema on the left and trace ankle edema on the right   [de-identified] : chronic venous changes on left leg

## 2024-02-28 NOTE — HEALTH RISK ASSESSMENT
[0] : 2) Feeling down, depressed, or hopeless: Not at all (0) [PHQ-2 Negative - No further assessment needed] : PHQ-2 Negative - No further assessment needed [Never] : Never [[id number]] : 0

## 2024-02-28 NOTE — HISTORY OF PRESENT ILLNESS
[Family Member] : family member [FreeTextEntry1] : 4 month f/u  [de-identified] : This is a 62 y/o female with a pmhx of Anemia, Brain Aneurysms, Seizures, HTN, HLD, CAD, Vitamin D deficiency and chronic DVTs on coumadin presents to the office today for 4 month follow up.  Pt had increased left LE swelling over the past few days despite restarting her HCTZ. Daughter says swelling has since slightly improved.  Denies chest pain, SOB, ARREDONDO, dizziness, diaphoresis, palpitations, orthopnea, syncope, n/v, headache.

## 2024-02-28 NOTE — HISTORY OF PRESENT ILLNESS
[Family Member] : family member [FreeTextEntry1] : 4 month f/u  [de-identified] : This is a 62 y/o female with a pmhx of Anemia, Brain Aneurysms, Seizures, HTN, HLD, CAD, Vitamin D deficiency and chronic DVTs on coumadin presents to the office today for 4 month follow up.  Pt had increased left LE swelling over the past few days despite restarting her HCTZ. Daughter says swelling has since slightly improved.  Denies chest pain, SOB, ARREDONDO, dizziness, diaphoresis, palpitations, orthopnea, syncope, n/v, headache.

## 2024-02-28 NOTE — HEALTH RISK ASSESSMENT
[0] : 2) Feeling down, depressed, or hopeless: Not at all (0) [PHQ-2 Negative - No further assessment needed] : PHQ-2 Negative - No further assessment needed [Never] : Never [[id number]] : 0

## 2024-02-29 ENCOUNTER — LABORATORY RESULT (OUTPATIENT)
Age: 64
End: 2024-02-29

## 2024-03-04 ENCOUNTER — LABORATORY RESULT (OUTPATIENT)
Age: 64
End: 2024-03-04

## 2024-03-06 ENCOUNTER — APPOINTMENT (OUTPATIENT)
Dept: SURGICAL ONCOLOGY | Facility: CLINIC | Age: 64
End: 2024-03-06
Payer: COMMERCIAL

## 2024-03-06 VITALS
DIASTOLIC BLOOD PRESSURE: 67 MMHG | SYSTOLIC BLOOD PRESSURE: 109 MMHG | HEART RATE: 74 BPM | WEIGHT: 160 LBS | OXYGEN SATURATION: 99 % | BODY MASS INDEX: 30.21 KG/M2 | HEIGHT: 61 IN

## 2024-03-06 PROCEDURE — 99215 OFFICE O/P EST HI 40 MIN: CPT

## 2024-03-07 NOTE — PHYSICAL EXAM
[Normal] : supple, no neck mass and thyroid not enlarged [Normal Supraclavicular Lymph Nodes] : normal supraclavicular lymph nodes [Normal Neck Lymph Nodes] : normal neck lymph nodes  [Normal Groin Lymph Nodes] : normal groin lymph nodes [Normal Axillary Lymph Nodes] : normal axillary lymph nodes [Normal] : oriented to person, place and time, with appropriate affect [de-identified] : Significantly decreased right breast mass 10:00, no adenopathy, no left breast masses

## 2024-03-07 NOTE — CONSULT LETTER
[Dear  ___] : Dear  [unfilled], [Consult Letter:] : I had the pleasure of evaluating your patient, [unfilled]. [Please see my note below.] : Please see my note below. [Sincerely,] : Sincerely, [FreeTextEntry3] : Roger Lyn MD FACS

## 2024-03-07 NOTE — ADDENDUM
[FreeTextEntry1] : I, Lata Lomax, acted solely as a scribe for Dr. Roger Lyn on this date 10/18/2023.

## 2024-03-07 NOTE — HISTORY OF PRESENT ILLNESS
[de-identified] : 63 year female presents for a follow up for triple negative right breast cancer. She completed neoadjuvant chemo 1 month ago and presents for surgical management recommendations. Contrast mammogram 2/9/24 showed significant radiologic response to treatment.   10/18/2023 USG-CNB right axillary lymph node- fragments of lymph node, positive for carcinoma.  Patient reported a palpable right breast mass x 1 week prompting imaging. USGB Right breast 10:00 N7 on 10/9/2023- invasive ductal carcinoma, poorly differentiated, ER/DC/HER2 NEGATIVE.  Bilateral diagnostic mammogram/sonogram 10/5/2023- 3.6 cm mass right breast (palp area) on mammogram corresponding to a 2.6 cm mass on US at 10:00 N7 with an adjacent and inseparable 1.1 cm nodule.  Abnormal right axillary lymph nodes.  Cystic structure right breast possible representing a ventricular shunt (BIRADS 5).  Pt is on Coumadin (INR 10/17/23 - 1.6, dose adjusted) Mother and maternal great aunt had breast cancer. Mother also had early stage colon cancer.

## 2024-03-07 NOTE — ASSESSMENT
[FreeTextEntry1] : T2 triple negative right breast cancer, likely metastatic to right axillary nodes  I had a long discussion with the pt and her daughter regarding her diagnosis, prognosis and all management options Pt. will likely require neoadjuvant chemo followed by surgery given her T2 triple negative tumor and probably metastasis Right axillary lymph node biopsy performed today F/U path Will present patient in [facility] Tumor board next week All questions answered Genetic testing performed today given strong family hx of cancer

## 2024-03-08 ENCOUNTER — NON-APPOINTMENT (OUTPATIENT)
Age: 64
End: 2024-03-08

## 2024-03-08 ENCOUNTER — RESULT REVIEW (OUTPATIENT)
Age: 64
End: 2024-03-08

## 2024-03-10 ENCOUNTER — TRANSCRIPTION ENCOUNTER (OUTPATIENT)
Age: 64
End: 2024-03-10

## 2024-03-11 ENCOUNTER — RESULT REVIEW (OUTPATIENT)
Age: 64
End: 2024-03-11

## 2024-03-11 ENCOUNTER — OUTPATIENT (OUTPATIENT)
Dept: OUTPATIENT SERVICES | Facility: HOSPITAL | Age: 64
LOS: 1 days | End: 2024-03-11
Payer: COMMERCIAL

## 2024-03-11 ENCOUNTER — APPOINTMENT (OUTPATIENT)
Dept: MAMMOGRAPHY | Facility: IMAGING CENTER | Age: 64
End: 2024-03-11
Payer: COMMERCIAL

## 2024-03-11 ENCOUNTER — APPOINTMENT (OUTPATIENT)
Dept: NUCLEAR MEDICINE | Facility: IMAGING CENTER | Age: 64
End: 2024-03-11

## 2024-03-11 ENCOUNTER — APPOINTMENT (OUTPATIENT)
Dept: SURGICAL ONCOLOGY | Facility: AMBULATORY SURGERY CENTER | Age: 64
End: 2024-03-11

## 2024-03-11 ENCOUNTER — TRANSCRIPTION ENCOUNTER (OUTPATIENT)
Age: 64
End: 2024-03-11

## 2024-03-11 ENCOUNTER — OUTPATIENT (OUTPATIENT)
Dept: OUTPATIENT SERVICES | Facility: HOSPITAL | Age: 64
LOS: 1 days | Discharge: HOME | End: 2024-03-11
Payer: MEDICARE

## 2024-03-11 VITALS
OXYGEN SATURATION: 100 % | HEART RATE: 66 BPM | WEIGHT: 162.04 LBS | RESPIRATION RATE: 16 BRPM | TEMPERATURE: 97 F | HEIGHT: 62 IN | DIASTOLIC BLOOD PRESSURE: 72 MMHG | SYSTOLIC BLOOD PRESSURE: 111 MMHG

## 2024-03-11 VITALS
SYSTOLIC BLOOD PRESSURE: 107 MMHG | OXYGEN SATURATION: 97 % | TEMPERATURE: 97 F | HEART RATE: 71 BPM | RESPIRATION RATE: 18 BRPM | DIASTOLIC BLOOD PRESSURE: 62 MMHG

## 2024-03-11 DIAGNOSIS — Z98.2 PRESENCE OF CEREBROSPINAL FLUID DRAINAGE DEVICE: Chronic | ICD-10-CM

## 2024-03-11 DIAGNOSIS — Z43.4 ENCOUNTER FOR ATTENTION TO OTHER ARTIFICIAL OPENINGS OF DIGESTIVE TRACT: Chronic | ICD-10-CM

## 2024-03-11 DIAGNOSIS — Z98.89 OTHER SPECIFIED POSTPROCEDURAL STATES: Chronic | ICD-10-CM

## 2024-03-11 DIAGNOSIS — I82.409 ACUTE EMBOLISM AND THROMBOSIS OF UNSPECIFIED DEEP VEINS OF UNSPECIFIED LOWER EXTREMITY: ICD-10-CM

## 2024-03-11 DIAGNOSIS — Z90.49 ACQUIRED ABSENCE OF OTHER SPECIFIED PARTS OF DIGESTIVE TRACT: Chronic | ICD-10-CM

## 2024-03-11 DIAGNOSIS — R56.9 UNSPECIFIED CONVULSIONS: ICD-10-CM

## 2024-03-11 DIAGNOSIS — I25.10 ATHEROSCLEROTIC HEART DISEASE OF NATIVE CORONARY ARTERY WITHOUT ANGINA PECTORIS: ICD-10-CM

## 2024-03-11 DIAGNOSIS — Z00.8 ENCOUNTER FOR OTHER GENERAL EXAMINATION: ICD-10-CM

## 2024-03-11 DIAGNOSIS — C50.911 MALIGNANT NEOPLASM OF UNSPECIFIED SITE OF RIGHT FEMALE BREAST: ICD-10-CM

## 2024-03-11 DIAGNOSIS — I10 ESSENTIAL (PRIMARY) HYPERTENSION: ICD-10-CM

## 2024-03-11 DIAGNOSIS — I72.9 ANEURYSM OF UNSPECIFIED SITE: Chronic | ICD-10-CM

## 2024-03-11 DIAGNOSIS — C50.919 MALIGNANT NEOPLASM OF UNSPECIFIED SITE OF UNSPECIFIED FEMALE BREAST: ICD-10-CM

## 2024-03-11 LAB
ANION GAP SERPL CALC-SCNC: 13 MMOL/L
BUN SERPL-MCNC: 15 MG/DL
CALCIUM SERPL-MCNC: 9.6 MG/DL
CHLORIDE SERPL-SCNC: 111 MMOL/L
CO2 SERPL-SCNC: 21 MMOL/L
CREAT SERPL-MCNC: 1.4 MG/DL
EGFR: 42 ML/MIN/1.73M2
GLUCOSE SERPL-MCNC: 87 MG/DL
INR BLD: 1.73 RATIO — HIGH (ref 0.85–1.18)
POTASSIUM SERPL-SCNC: 4.2 MMOL/L
PROTHROM AB SERPL-ACNC: 19.1 SEC — HIGH (ref 9.5–13)
SODIUM SERPL-SCNC: 144 MMOL/L

## 2024-03-11 PROCEDURE — 88305 TISSUE EXAM BY PATHOLOGIST: CPT | Mod: 26

## 2024-03-11 PROCEDURE — 19281 PERQ DEVICE BREAST 1ST IMAG: CPT

## 2024-03-11 PROCEDURE — 38792 RA TRACER ID OF SENTINL NODE: CPT | Mod: 59

## 2024-03-11 PROCEDURE — 19302 P-MASTECTOMY W/LN REMOVAL: CPT | Mod: RT

## 2024-03-11 PROCEDURE — 19281 PERQ DEVICE BREAST 1ST IMAG: CPT | Mod: RT

## 2024-03-11 PROCEDURE — 88331 PATH CONSLTJ SURG 1 BLK 1SPC: CPT | Mod: 26

## 2024-03-11 PROCEDURE — 76098 X-RAY EXAM SURGICAL SPECIMEN: CPT | Mod: 26

## 2024-03-11 PROCEDURE — 38900 IO MAP OF SENT LYMPH NODE: CPT

## 2024-03-11 PROCEDURE — 88307 TISSUE EXAM BY PATHOLOGIST: CPT | Mod: 26

## 2024-03-11 PROCEDURE — C1769: CPT

## 2024-03-11 PROCEDURE — 14001 TIS TRNFR TRUNK 10.1-30SQCM: CPT

## 2024-03-11 PROCEDURE — 76098 X-RAY EXAM SURGICAL SPECIMEN: CPT

## 2024-03-11 DEVICE — SURGICEL FIBRILLAR 2 X 4": Type: IMPLANTABLE DEVICE | Site: RIGHT | Status: FUNCTIONAL

## 2024-03-11 DEVICE — ARISTA 3GR: Type: IMPLANTABLE DEVICE | Site: RIGHT | Status: FUNCTIONAL

## 2024-03-11 RX ORDER — OXYCODONE HYDROCHLORIDE 5 MG/1
20 TABLET ORAL
Qty: 20 | Refills: 0
Start: 2024-03-11

## 2024-03-11 RX ORDER — DOXAZOSIN MESYLATE 4 MG
1 TABLET ORAL
Qty: 0 | Refills: 0 | DISCHARGE

## 2024-03-11 RX ORDER — WARFARIN SODIUM 2.5 MG/1
1 TABLET ORAL
Refills: 0 | DISCHARGE

## 2024-03-11 RX ORDER — LACOSAMIDE 50 MG/1
1 TABLET ORAL
Qty: 0 | Refills: 0 | DISCHARGE

## 2024-03-11 RX ORDER — PANTOPRAZOLE SODIUM 20 MG/1
1 TABLET, DELAYED RELEASE ORAL
Refills: 0 | DISCHARGE

## 2024-03-11 RX ORDER — ASPIRIN/CALCIUM CARB/MAGNESIUM 324 MG
1 TABLET ORAL
Qty: 0 | Refills: 0 | DISCHARGE

## 2024-03-11 RX ORDER — HYDROCHLOROTHIAZIDE 25 MG
1 TABLET ORAL
Qty: 0 | Refills: 0 | DISCHARGE

## 2024-03-11 RX ORDER — AMLODIPINE BESYLATE 2.5 MG/1
2 TABLET ORAL
Qty: 0 | Refills: 0 | DISCHARGE

## 2024-03-11 RX ORDER — AMLODIPINE BESYLATE 2.5 MG/1
1 TABLET ORAL
Refills: 0 | DISCHARGE

## 2024-03-11 RX ORDER — POTASSIUM BICARBONATE 978 MG/1
1 TABLET, EFFERVESCENT ORAL
Refills: 0 | DISCHARGE

## 2024-03-11 RX ORDER — LEVETIRACETAM 250 MG/1
1 TABLET, FILM COATED ORAL
Refills: 0 | DISCHARGE

## 2024-03-11 RX ORDER — ROSUVASTATIN CALCIUM 5 MG/1
1 TABLET ORAL
Qty: 0 | Refills: 0 | DISCHARGE

## 2024-03-11 RX ORDER — OLMESARTAN MEDOXOMIL 5 MG/1
1 TABLET, FILM COATED ORAL
Qty: 0 | Refills: 0 | DISCHARGE

## 2024-03-11 RX ORDER — LEVETIRACETAM 250 MG/1
2 TABLET, FILM COATED ORAL
Qty: 0 | Refills: 0 | DISCHARGE

## 2024-03-11 RX ORDER — ZONISAMIDE 100 MG
2 CAPSULE ORAL
Qty: 0 | Refills: 0 | DISCHARGE

## 2024-03-11 NOTE — H&P PST ADULT - PROBLEM SELECTOR PLAN 2
patient took her amlodipine, olmesartan HCTZ with sip of water patient took her amlodipine, olmesartan HCTZ am dose with sip of water

## 2024-03-11 NOTE — H&P PST ADULT - CARDIOVASCULAR
details… normal/regular rate and rhythm/S1 S2 present/no gallops/no rub/no murmur normal/regular rate and rhythm/S1 S2 present/no murmur

## 2024-03-11 NOTE — H&P PST ADULT - PROBLEM SELECTOR PLAN 3
patient took her levetiracetam and Zonisamide with sip of water patient took her levetiracetam and Zonisamide am dose with sip of water

## 2024-03-11 NOTE — H&P PST ADULT - NS MD HP INPLANTS MED DEV
CAD x stents, brain clips,   chunt  and IVC filter on the left leg/Vascular stents/Clips/Ventriculoperitoneal shunt CAD x stents, brain clips,   Shunt , Mediport left chest and IVC filter on the left leg/Vascular stents/Clips/Ventriculoperitoneal shunt

## 2024-03-11 NOTE — H&P PST ADULT - OPHTHALMOLOGIC COMMENTS
legally blind on the right eye since 2023 after cerebral aneurysm clipping in Dec 2013 legally blind on the right eye since 2013 after cerebral aneurysm clipping in Dec 2013

## 2024-03-11 NOTE — H&P PST ADULT - HISTORY OF PRESENT ILLNESS
Patient is his is a 62 y/o female with a pmhx of Anemia, Brain Aneurysm s/p brain clips, Seizures last episode in sep 2023, HTN, HLD, CAD, Vitamin D deficiency and chronic DVTs on coumadin , patient felt lump on right axilla, s/p mammogram and biopsy confirmed malignant carcinoma of right breast, patient completed 12 cycles chemo under dr MIRZA ,now patient is scheduled for right breast lumpectomy- wire loc right axillart sentinel node biopst possiblr right axillay node dissection   Pt had increased left LE swelling over the past few days despite restarting her HCTZ. Daughter says swelling has since slightly improved. Patient is his is a 62 y/o female with a pmhx of Anemia, Brain Aneurysm s/p aneurysms clipping [id number] and BVp shunt placement,  Seizures last episode in sep 2023, HTN, HLD, CAD x 1 stent , Vitamin D deficiency and chronic DVTs on coumadin and IVC filter placement in 2013 . Patient felt a lump on right axilla, s/p mammogram and biopsy confirmed malignant carcinoma of right breast  completed 12 cycles chemo  ,now patient is scheduled for right breast lumpectomy- wire loc right axillary sentinel node biopsy possible right axillary node dissection

## 2024-03-11 NOTE — H&P PST ADULT - HEIGHT IN CM
Lauro Das  10/3/2018    YOB: 1977          The patient was seen today. She is here regarding STD screening. Recently with new partner for past 1-2 months. Denies symptoms. Patient using condoms. LMP 2018. Her bowels are regular and she is voiding without difficulty. HPI:  Lauro Das is a 39 y.o. female      STD screening      Obstetric History       T0      L0     SAB1   TAB0   Ectopic0   Molar0   Multiple0   Live Births0       # Outcome Date GA Lbr David/2nd Weight Sex Delivery Anes PTL Lv   2 SAB            1   24w0d       FD          Past Medical History:   Diagnosis Date    Diabetes ([facility] [address]     Diabetes mellitus ([facility] [address]     Hypertension        Past Surgical History:   Procedure Laterality Date    CARDIAC CATHETERIZATION         Family History   Problem Relation Age of Onset    Diabetes Mother     High Blood Pressure Mother     Other Mother     Stroke Father     High Blood Pressure Father     Mental Illness Maternal Grandmother     Heart Disease Maternal Grandfather     High Blood Pressure Maternal Grandfather     High Blood Pressure Paternal Grandmother     Cancer Paternal Grandfather     High Blood Pressure Paternal Grandfather        Social History     Social History    Marital status:      Spouse name: N/A    Number of children: N/A    Years of education: N/A     Occupational History    Not on file.      Social History Main Topics    Smoking status: Never Smoker    Smokeless tobacco: Never Used    Alcohol use No    Drug use: No    Sexual activity: Yes     Partners: Male     Other Topics Concern    Not on file     Social History Narrative    No narrative on file         MEDICATIONS:  Current Outpatient Prescriptions   Medication Sig Dispense Refill    ranitidine (ZANTAC) 150 MG tablet take 1 tablet by mouth twice a day 60 tablet 3    hydrochlorothiazide (MICROZIDE) 12.5 MG capsule take 1 capsule by mouth once 157.48

## 2024-03-11 NOTE — H&P PST ADULT - NEUROLOGICAL COMMENTS
Hx  seizure last episode was sep 2023, follows neuro , hx brain aneurysm s/p aneurysm clipping,  shunt placement in 2013, hx TIA in 2013 during aneurysm clippings Hx  seizure last episode was sep 2023, follows neuro , hx brain aneurysm s/p aneurysm clipping,  shunt placement in 2013, hx TIA, CVA? in 2013 during aneurysm clippings

## 2024-03-11 NOTE — H&P PST ADULT - LAST CARDIAC ANGIOGRAM/IMAGING
2009--at [facility] for unstable angina--No stents at that time, Hx 1 stent LAD

## 2024-03-11 NOTE — H&P PST ADULT - PROBLEM SELECTOR PLAN 5
hx chronic DVT- s/p IVC in 2013 and on Coumadin     last dose of Coumadin was March 8th     STAT INR ordered

## 2024-03-11 NOTE — ASU DISCHARGE PLAN (ADULT/PEDIATRIC) - NS MD DC FALL RISK RISK
For information on Fall & Injury Prevention, visit: [email or web address] OR  [email or web address] OR  https://www.cdc.gov/steadi/patient.html

## 2024-03-11 NOTE — H&P PST ADULT - NSICDXPASTSURGICALHX_GEN_ALL_CORE_FT
PAST SURGICAL HISTORY:  Aneurysm see HPI  s/p cerebral aneurysm clipping in Dec 2013, after which pt was in 30 day induced coma, had  shunt,  tracheostomy and reversal, followed by rehab    H/O cerebral aneurysm repair clipping    H/O craniotomy     History of cranioplasty B/L 04/14    Presence of IVC filter     S/P cholecystectomy     S/P primary angioplasty with coronary stent     S/P  shunt

## 2024-03-11 NOTE — H&P PST ADULT - RESPIRATORY AND THORAX COMMENTS
pt was in 30 day induced coma, s/p aneurysm clippings, had  shunt, tracheostomy and reversal in Dec 2013

## 2024-03-11 NOTE — ASU DISCHARGE PLAN (ADULT/PEDIATRIC) - ASU DC SPECIAL INSTRUCTIONSFT
[facility]  CANCER  I  N  S  T  I  T  U  T E     Department of Surgery  Division of Surgical Oncology    Morteza Farris M.D., [email or web address].  Chief, Division of Surgical Oncology    Alex Singh M.D., F.A.C.S., F.A.S.IZZY.  Associate , Department of Surgery    Phong Prescott M.D., F.A.C.S.  Pranay Espino M.D., F.A.C.S.  Roger Lyn M.D., F.A.C.S.  Gian Mohamud M.D., F.A.C.S.  Pranya Will M.D., F.A.C.S.  Cheo Saldivar M.D., F.ANABELC.S.      Breast Biopsy/Lumpectomy Post-operative Instructions  1.	Supportive bra- Please bring a sports/athletic bra with you on the day of your surgery.  You will wear it home from the hospital.  You should wear the supportive bra continuously for 48 hours after the procedure, including wearing it to sleep.  Therefore, you may wear your regular bra.  You may remove the bra to shower.  The sports bra will provide support, decrease the amount of swelling at the biopsy site, and make your recovery more comfortable.    2.	Wound dressing- There is a dressing on the wound, which consists of 2 layers.  The outer layer is a clear plastic dressing (called Tegaderm) which is waterproof.  This should remain in place for 2 days post-operatively.  On the 2nd post-operative day, you should remove the clear plastic Tegaderm dressing.  Underneath the Tegaderm dressing, there are white surgical tapes (called steri strips) which are directly adherent to the skin.  These should remain on the skin, and will peel off naturally.  All of the stitches are “internal” and will dissolve naturally.    3.	Showering/Bathing- You may shower over the dressing the very next day after surgery.  Allow the water to run over the dressing, but don not scrub the area.  It is best not to sit in a bathtub or swimming pool for at least one week after surgery.    4.	Activity level- You may resume most normal daily activity as tolerated, but avoid strenuous activities such as aerobics, jogging, exercising or heavy lifting for at least 1 week after surgery.  You may return to work in 1-2 days after surgery.  You may drive as long as you are not taking any prescription pain medication.    5.	Pain Medication- You may take the prescribed medication, or you may take extra-strength Tylenol as needed.  Please don't take aspirin, Motrin, Advil or any other anti-inflammatory medications, as these medications may cause bleeding or bruising.    6.	Follow-up Appointment- Please call the office to schedule your post-operative appointment which should be approximately 10 days after your surgery. Office [phone]    7.	Bruising/Bleeding/Swelling- It is normal for there to be some bruising and swelling at the breast biopsy site, and there may be some staining of blood on to the dressing.  Some discomfort at the surgical site is expected.  If your symptoms seem excessive, or if you have any question or concerns, please call the office.      Anesthesia Precautions:  For the next 12 hours do not:   •	drive a car,  •	drink alcohol, beer, or wine,   •	make important personal or business decisions  Diet:   •	Progress diet slowly unless otherwise indicated    Physician Notification  •	Any Prescription issues  •	Bleeding that does not stop  •	Persistent nausea and vomiting  •	Pain not relieved by medications  •	Fever greater than 101®F  •	Inability to tolerate liquids or foods  •	Unable to urinate after 8 hours  Discharge and Disposition  •	Discharge to home/ group home/assisted living  •	Accompanied by Family/Spouse/ Parents/ Significant Other/ Friend/ and or Caregiver    Follow Up Care:  •	In the event that you develop a complication and you are unable to reach your own physician, you may contact:  [phone] or go to the nearest Emergency Room.   •	Please call your surgeon to schedule your follow up appointment [phone]

## 2024-03-11 NOTE — H&P PST ADULT - NSICDXPASTMEDICALHX_GEN_ALL_CORE_FT
PAST MEDICAL HISTORY:  Aneurysm     Brain aneurysm     Coronary artery disease     Coronary artery disease     Deep vein thrombosis (DVT)     Epilepsy     HTN (hypertension)     Hyperlipidemia     Hypertension     Hypokalemia     Intraductal carcinoma of breast     Seizure     Stroke      PAST MEDICAL HISTORY:  Aneurysm     Brain aneurysm     Coronary artery disease     Deep vein thrombosis (DVT)     Epilepsy     HTN (hypertension)     Hyperlipidemia     Hypertension     Hypokalemia     Intraductal carcinoma of breast     Seizure     Stroke     Transient ischemic attack (TIA)      PAST MEDICAL HISTORY:  Aneurysm     Brain aneurysm     Coronary artery disease     CVA (cerebrovascular accident)     Deep vein thrombosis (DVT)     Epilepsy     HTN (hypertension)     Hyperlipidemia     Hypertension     Hypokalemia     Intraductal carcinoma of breast     Seizure     Stroke     Transient ischemic attack (TIA)

## 2024-03-11 NOTE — ASU PREOPERATIVE ASSESSMENT, ADULT (IPARK ONLY) - FALL HARM RISK - RISK INTERVENTIONS

## 2024-03-11 NOTE — H&P PST ADULT - PROBLEM SELECTOR PLAN 1
Patient tentatively scheduled for right breast lumpectomy- wire loc right axillary sentinel node biopsy possible right axillary node dissection    Pre-op instructions provided.  Famotidine provided with instructions. Hibiclens provided with instructions and was signed by patient. Teach-back method was utilized to assess patient's understanding. Patient verbalized understanding. Patient tentatively scheduled for right breast lumpectomy- wire loc right axillary sentinel node biopsy possible right axillary node dissection    Pre-op instructions provided.  Famotidine provided with instructions. Hibiclens provided with instructions and was signed by patient. Teach-back method was utilized to assess patient's understanding. Patient verbalized understanding.    copy of CBC, CMP, PT/INR and EKG in the chart Patient tentatively scheduled for right breast lumpectomy- wire loc right axillary sentinel node biopsy possible right axillary node dissection    Pre-op instructions provided.  Famotidine provided with instructions. Hibiclens provided with instructions and was signed by patient. Teach-back method was utilized to assess patient's understanding. Patient verbalized understanding.    copy of CBC, CMP, PT/INR, medical note and EKG in the chart Patient tentatively scheduled for right breast lumpectomy- wire loc right axillary sentinel node biopsy possible right axillary node dissection    Pre-op instructions provided.  Famotidine provided with instructions. Hibiclens provided with instructions and was signed by patient. Teach-back method was utilized to assess patient's understanding. Patient verbalized understanding.    copy of CBC, CMP, PT/INR, medical note and EKG in the chart    Patient has been NPO since midnight 3/11/23

## 2024-03-11 NOTE — ASU DISCHARGE PLAN (ADULT/PEDIATRIC) - FOLLOW UP APPOINTMENTS
Endocrine triage  FNAB already done with benign cytology.   The scheduled first available endocrine appointment timeframe is acceptable.  E-consult may be an option for answer to specific question by the referring provider.  E-consults generally have a response within 3 days.  Swapna Freitas MD     [address] [facility] Medicine ([facility])

## 2024-03-13 ENCOUNTER — LABORATORY RESULT (OUTPATIENT)
Age: 64
End: 2024-03-13

## 2024-03-13 RX ORDER — OXYCODONE 5 MG/1
5 TABLET ORAL EVERY 6 HOURS
Qty: 20 | Refills: 0 | Status: ACTIVE | COMMUNITY
Start: 2024-03-13 | End: 1900-01-01

## 2024-03-15 PROBLEM — E87.6 HYPOKALEMIA: Chronic | Status: ACTIVE | Noted: 2024-03-11

## 2024-03-15 PROBLEM — D05.10 INTRADUCTAL CARCINOMA IN SITU OF UNSPECIFIED BREAST: Chronic | Status: ACTIVE | Noted: 2024-03-11

## 2024-03-15 PROBLEM — G45.9 TRANSIENT CEREBRAL ISCHEMIC ATTACK, UNSPECIFIED: Chronic | Status: ACTIVE | Noted: 2024-03-11

## 2024-03-15 PROBLEM — I63.9 CEREBRAL INFARCTION, UNSPECIFIED: Chronic | Status: ACTIVE | Noted: 2024-03-11

## 2024-03-16 ENCOUNTER — RX RENEWAL (OUTPATIENT)
Age: 64
End: 2024-03-16

## 2024-03-18 ENCOUNTER — NON-APPOINTMENT (OUTPATIENT)
Age: 64
End: 2024-03-18

## 2024-03-18 ENCOUNTER — APPOINTMENT (OUTPATIENT)
Dept: SURGICAL ONCOLOGY | Facility: CLINIC | Age: 64
End: 2024-03-18
Payer: COMMERCIAL

## 2024-03-18 VITALS
OXYGEN SATURATION: 98 % | HEIGHT: 61 IN | HEART RATE: 61 BPM | BODY MASS INDEX: 29.83 KG/M2 | RESPIRATION RATE: 16 BRPM | DIASTOLIC BLOOD PRESSURE: 72 MMHG | SYSTOLIC BLOOD PRESSURE: 122 MMHG | WEIGHT: 158 LBS

## 2024-03-18 PROCEDURE — 99024 POSTOP FOLLOW-UP VISIT: CPT

## 2024-03-18 NOTE — REASON FOR VISIT
Rx for nebulizer sent to pharmacy.   [Post-Op] : a post-op for [FreeTextEntry2] : status post right lumpectomy and ALND 3/11/2024

## 2024-03-18 NOTE — ASSESSMENT
[FreeTextEntry1] : Status post right lumpectomy and ALND 3/11/2024- Path pending Given low volume drain output, no visible swelling, and pt c/o discomfort at drain site, drain d'dara RTO next week to see Dr. Lyn and go over pathology. Med onc follow up 3/25. Rad onc follow up (e-mailed navigation to assist)

## 2024-03-18 NOTE — HISTORY OF PRESENT ILLNESS
[de-identified] : 64 y/o female presents for an initial postop visit, status post right lumpectomy and ALND 3/11/2024.  Accompanied by her daughter Allie.  Pathology is pending.  Drain output < 30 cc straw-colored fluid per day for the past 2 days.  Pain is improving and she denies any swelling, erythema or fevers.  MD Office

## 2024-03-18 NOTE — PHYSICAL EXAM
[de-identified] : right breast incision healing well with no evidence of infection, hematoma or seroma.  10 cc straw-colored output in appliance.  Drain discontinued

## 2024-03-19 ENCOUNTER — OUTPATIENT (OUTPATIENT)
Dept: OUTPATIENT SERVICES | Facility: HOSPITAL | Age: 64
LOS: 1 days | Discharge: HOME | End: 2024-03-19
Payer: COMMERCIAL

## 2024-03-19 ENCOUNTER — OUTPATIENT (OUTPATIENT)
Dept: OUTPATIENT SERVICES | Facility: HOSPITAL | Age: 64
LOS: 1 days | Discharge: HOME | End: 2024-03-19

## 2024-03-19 DIAGNOSIS — Z98.89 OTHER SPECIFIED POSTPROCEDURAL STATES: Chronic | ICD-10-CM

## 2024-03-19 DIAGNOSIS — I72.9 ANEURYSM OF UNSPECIFIED SITE: Chronic | ICD-10-CM

## 2024-03-19 DIAGNOSIS — Z95.5 PRESENCE OF CORONARY ANGIOPLASTY IMPLANT AND GRAFT: Chronic | ICD-10-CM

## 2024-03-19 DIAGNOSIS — Z90.49 ACQUIRED ABSENCE OF OTHER SPECIFIED PARTS OF DIGESTIVE TRACT: Chronic | ICD-10-CM

## 2024-03-19 DIAGNOSIS — C50.919 MALIGNANT NEOPLASM OF UNSPECIFIED SITE OF UNSPECIFIED FEMALE BREAST: ICD-10-CM

## 2024-03-19 DIAGNOSIS — Z98.2 PRESENCE OF CEREBROSPINAL FLUID DRAINAGE DEVICE: Chronic | ICD-10-CM

## 2024-03-19 LAB — SURGICAL PATHOLOGY STUDY: SIGNIFICANT CHANGE UP

## 2024-03-21 ENCOUNTER — LABORATORY RESULT (OUTPATIENT)
Age: 64
End: 2024-03-21

## 2024-03-25 ENCOUNTER — APPOINTMENT (OUTPATIENT)
Dept: HEMATOLOGY ONCOLOGY | Facility: CLINIC | Age: 64
End: 2024-03-25
Payer: COMMERCIAL

## 2024-03-25 VITALS
HEART RATE: 53 BPM | WEIGHT: 160.06 LBS | TEMPERATURE: 97.9 F | RESPIRATION RATE: 16 BRPM | OXYGEN SATURATION: 98 % | BODY MASS INDEX: 30.24 KG/M2 | DIASTOLIC BLOOD PRESSURE: 74 MMHG | SYSTOLIC BLOOD PRESSURE: 124 MMHG

## 2024-03-25 PROCEDURE — G2211 COMPLEX E/M VISIT ADD ON: CPT

## 2024-03-25 PROCEDURE — 99215 OFFICE O/P EST HI 40 MIN: CPT

## 2024-03-27 ENCOUNTER — APPOINTMENT (OUTPATIENT)
Dept: SURGICAL ONCOLOGY | Facility: CLINIC | Age: 64
End: 2024-03-27
Payer: COMMERCIAL

## 2024-03-27 ENCOUNTER — LABORATORY RESULT (OUTPATIENT)
Age: 64
End: 2024-03-27

## 2024-03-27 PROCEDURE — 99024 POSTOP FOLLOW-UP VISIT: CPT

## 2024-03-30 ENCOUNTER — RESULT REVIEW (OUTPATIENT)
Age: 64
End: 2024-03-30

## 2024-03-30 ENCOUNTER — APPOINTMENT (OUTPATIENT)
Dept: INFUSION THERAPY | Facility: HOSPITAL | Age: 64
End: 2024-03-30

## 2024-03-30 LAB
ALBUMIN SERPL ELPH-MCNC: 4.1 G/DL — SIGNIFICANT CHANGE UP (ref 3.3–5)
ALP SERPL-CCNC: 78 U/L — SIGNIFICANT CHANGE UP (ref 40–120)
ALT FLD-CCNC: 12 U/L — SIGNIFICANT CHANGE UP (ref 10–45)
ANION GAP SERPL CALC-SCNC: 13 MMOL/L — SIGNIFICANT CHANGE UP (ref 5–17)
AST SERPL-CCNC: 16 U/L — SIGNIFICANT CHANGE UP (ref 10–40)
BILIRUB SERPL-MCNC: 0.2 MG/DL — SIGNIFICANT CHANGE UP (ref 0.2–1.2)
BUN SERPL-MCNC: 13 MG/DL — SIGNIFICANT CHANGE UP (ref 7–23)
CALCIUM SERPL-MCNC: 9.3 MG/DL — SIGNIFICANT CHANGE UP (ref 8.4–10.5)
CHLORIDE SERPL-SCNC: 112 MMOL/L — HIGH (ref 96–108)
CO2 SERPL-SCNC: 20 MMOL/L — LOW (ref 22–31)
CORTIS AM PEAK SERPL-MCNC: 6.4 UG/DL — SIGNIFICANT CHANGE UP (ref 6–18.4)
CREAT SERPL-MCNC: 1.1 MG/DL — SIGNIFICANT CHANGE UP (ref 0.5–1.3)
EGFR: 56 ML/MIN/1.73M2 — LOW
GLUCOSE SERPL-MCNC: 84 MG/DL — SIGNIFICANT CHANGE UP (ref 70–99)
POTASSIUM SERPL-MCNC: 3.6 MMOL/L — SIGNIFICANT CHANGE UP (ref 3.5–5.3)
POTASSIUM SERPL-SCNC: 3.6 MMOL/L — SIGNIFICANT CHANGE UP (ref 3.5–5.3)
PROT SERPL-MCNC: 7.8 G/DL — SIGNIFICANT CHANGE UP (ref 6–8.3)
SODIUM SERPL-SCNC: 144 MMOL/L — SIGNIFICANT CHANGE UP (ref 135–145)
T4 FREE+ TSH PNL SERPL: 1.8 UIU/ML — SIGNIFICANT CHANGE UP (ref 0.27–4.2)

## 2024-03-30 NOTE — REVIEW OF SYSTEMS
[Fatigue] : fatigue [Vision Problems] : vision problems [Diarrhea: Grade 0] : Diarrhea: Grade 0 [Loss of Hearing] : loss of hearing [Negative] : Allergic/Immunologic [de-identified] : memory loss (short term); recurrent seizures last in 9/2023 (prior to initiation of chemotherapy) [de-identified] : increased stress due to diagnosis, separation from spouse since 7/2023 and spouse having sold their home; She had not spoken to him since mid-9/2023; in 3/2024, he has tried to contact her again

## 2024-03-30 NOTE — PHYSICAL EXAM
[Normal] : supple, no neck mass and thyroid not enlarged [Normal Neck Lymph Nodes] : normal neck lymph nodes  [Normal Supraclavicular Lymph Nodes] : normal supraclavicular lymph nodes [Normal Groin Lymph Nodes] : normal groin lymph nodes [Normal Axillary Lymph Nodes] : normal axillary lymph nodes [Normal] : grossly intact [de-identified] : Right breast incision healing well, no infection

## 2024-03-30 NOTE — ASSESSMENT
[FreeTextEntry1] : T2 triple negative right breast cancer, metastatic to right axillary nodes  S/P neoadjuvant chemo followed by right breast lumpectomy/AND 3/11/24 Path - T2 residual IDCA, 1/52 positive nodes, multifocal LVI in separate margins concerning for residual microscopic disease  I had a long discussion with the pt. and her daughter re pathologic findings Case discussed with Dr. Valencia and multiple breast surgeons in the division Everyone agrees the pt. needs additional surgery to clear the margin and assess the extent of residual disease and that a mastectomy is probable the best option however, the pt. wants to proceed with re-excision lumpectomy If there is little to no residual disease with negative margins, then no further surgery needed, however if there is extensive residual disease and/or positive margins, would then proceed with mastectomy +/- reconstruction All surgical and reconstructive options discussed All questions answered

## 2024-03-30 NOTE — PHYSICAL EXAM
[Normal] : affect appropriate [de-identified] : vision loss left eye [de-identified] : Port present left chest wall with no signs of infection. [de-identified] : Right breast 2 cm mass palpated in upper outer quadrant of the breast (previously 8cm); + 2cm right axillary LN (nolonger palpable) ; left breast with no palpable masses, skin dimpling or nipple inversion/discharge [de-identified] : Right  shunt in place

## 2024-03-30 NOTE — HISTORY OF PRESENT ILLNESS
[de-identified] : 63 year female presents for a follow up for triple negative right breast cancer. She completed neoadjuvant chemo as per Dr. Valencia of medical oncology. S/p right lumpectomy and ALND 3/11/2024  Final pathology (3/11/24): 1.  Breast, right upper outer quadrant, lumpectomy: -   Invasive ductal carcinoma, poorly differentiated (at least 2.4 cm with multiple adjacent satellitefoci, the largest 0.6 cm) -   Extensive lymphovascular invasion involving majority of the sections submitted 2.  Lymph nodes, right axillary sentinel, sentinel node biopsies: -   Metastatic carcinoma in 1 of 2 lymph nodes (1/2)(1.5 mm) 3.  Breast, right superior margin, margin resection:-   Unremarkable breast tissue 4.  Breast, right medial margin, margin resection:-   Unremarkable breast tissue 5.  Breast, right inferior margin, margin resection:-   Unremarkable breast tissue 6.  Breast, right lateral margin, margin resection:-   Focal lymphovascular invasion (< 1.0 mm from edge of unoriented specimen) 7.  Breast, right deep margin, margin resection:-   Unremarkable skeletal muscle 8.  Breast, right anterior margin, margin resection:-   Single lymphovascular invasion focus (6.0 mm from edge of unoriented specimen) 9.  Lymph nodes, right axilla, axillary dissection: -   No tumor present in 50 lymph nodes (0/50)   Contrast mammogram 2/9/24 showed significant radiologic response to treatment.  10/18/2023 USG-[facility] right axillary lymph node- fragments of lymph node, positive for carcinoma.  Patient reported a palpable right breast mass x 1 week prompting imaging. USGB Right breast 10:00 N7 on 10/9/2023- invasive ductal carcinoma, poorly differentiated, ER/IN/HER2 NEGATIVE.  Bilateral diagnostic mammogram/sonogram 10/5/2023- 3.6 cm mass right breast (palp area) on mammogram corresponding to a 2.6 cm mass on US at 10:00 N7 with an adjacent and inseparable 1.1 cm nodule.  Abnormal right axillary lymph nodes.  Cystic structure right breast possible representing a ventricular shunt (BIRADS 5).  Pt is on Coumadin (INR 10/17/23 - 1.6, dose adjusted) Mother and maternal great aunt had breast cancer. Mother also had early stage colon cancer.

## 2024-03-30 NOTE — HISTORY OF PRESENT ILLNESS
[Disease: _____________________] : Disease: [unfilled] [T: ___] : T[unfilled] [N: ___] : N[unfilled] [M: ___] : M[unfilled] [AJCC Stage: ____] : AJCC Stage: [unfilled] [90: Able to carry normal activity; minor signs or symptoms of disease.] : 90: Able to carry normal activity; minor signs or symptoms of disease.  [ECOG Performance Status: 1 - Restricted in physically strenuous activity but ambulatory and able to carry out work of a light or sedentary nature] : Performance Status: 1 - Restricted in physically strenuous activity but ambulatory and able to carry out work of a light or sedentary nature, e.g., light house work, office work [de-identified] : Seen as part of [facility] - accompanied by daughter; (Allie)  Patient with prior mammogram in  showing normal findings. She has history of CVA in , aneurysmal subarachnoid hemorrhage s/p clipping and  shunt placement, bifrontal encephalomalacia, epilepsy with recurrent seizures including in 2022, 2023 and more recently in 2023, on multiple antiseizure meds (Keppra (levetiracetam), Vimpat (lacosamide) , Trileptal (oxcarbazepine) and zonisamide.  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  She noted a palpable mass in the right breast on 10/5/23.  Diagnostic bilateral mammogram on 10/5/23 showed mammographic and sonographic findings corresponding to the palpable mass in the upper outer quadrant of the right breast, consistent with an underlying carcinoma. Abnormal lymph nodes in the right axilla consistent with involvement by tumor. Biopsy recommended. BI-RADS 5.  Right breast biopsy 10/9/23, 10:00 7cm Pathology - IDC, poorly differentiated, measuring 2.5mm ER negative, NM negative, HER-2 negative.  She was seen by Surgical Oncology (Dr. Roger Lyn); axillary LN biopsy 10/18/23 - positive for carcinoma identical to morphology of the right breast lesion  Due to  shunt, she was not a candidate for pre-operative breast MRI and instead underwent contrast enhanced mammogram on 10/24/23 -  Biopsy-proven malignancy at the right 10:00 axis with associated enhancement spanning up to 6.1 cm.  - Newly noted subcentimeter mass at the 10:00, 5 cm from nipple location felt to correspond with a focal asymmetry or medial and inferior to the primary cancer favoring a satellite lesion.  Staging CT C/A/P 10/25/23 - right breast mass with right axillary lymphadenopathy.  - No definite evidence of distant metastatic disease. A cluster of several non-enlarged prevascular mediastinal lymph nodes is nonspecific.  - A subpleural 4 mm right middle lobe pulmonary nodule is likely benign.  Staging Bone Scan 11/10/23 - JAKE  Risk Stratification - ; 1st full term pregnancy at age 18, no OCPs, no HRT, menarche unknown exact age, menopause unknown exact age; + family history of breast cancer in mother, colon cancer in maternal aunt Genetic testing - [employer] 84 gene panel; negative [de-identified] : Triple negative [de-identified] : ER-WV-Her2- [de-identified] : 3/25/24 Patient returns today to rule out progression of breast cancer and to assess treatment toxicity. Patient w/ triple negative breast cancer and started on neoadjuvant therapy with Pembro + Taxol weekly x 12 overall tolerated well;   Post Treatment:contrast enhanced 2/9/2024  Marked interval decrease in previous mammographic mass/asymmetry and enhancement associated with biopsy-proven IDC/DCIS in the upper outer right breast, previously spanning up to 6 cm in October 2023. Residual mild focal enhancement approximately 1.5 cm inferior to the two biopsy clips is suggestive of residual disease.  on 3/11/24 - Underwent lumpectomy/SLN - final path with 2.4cm poorly differentiated IDC with multiple satellite foci; extensive LVI involving right lateral resection margin and right anterior margin; 1/2 SLN +, additional ALND done with additional 50 negative LN (total 1/52).

## 2024-03-30 NOTE — ASSESSMENT
[Medication(s)] : Medication(s) [FreeTextEntry1] : 62 yo woman with history of subarachnoid hemorrhage in 2013 s/p craniotomy for evacuation and  shunt placement, recurrent DVTs requiring anticoagulation but needs warfarin due to interaction of DOACs with oxcabazepine (Trileptal), found in 10/2023 to have at least Stage IIIA triple negative breast cancer.  - case was discussed at [facility] [place] at time of presentation with surgical oncology, radiation oncology, radiology and pathology reviewed - discussed with patient and her daughter that standard of care for the treatment of Stage II or III triple negative breast cancer is neoadjuvant therapy with taxol 80mg/m2 weekly and carbo AUC 1.5 for 12 weeks followed by Adriamycin 60mg/m2 and Cytoxan 600mg/m2 every 3 weeks for total of 4 treatments all concurrent with immunotherapy in form of pembrolizumab 200mg IV every 3 weeks.  - risk/benefits/side effects including but not limited to neuropathy, renal insufficiency, alopecia, cardiotoxicity, myelosuppression, fatigue, nausea, vomitiing discussed briefly with patient and her daughter - chemotherapy interactions with her antiseizure medications reviewed. - given her comorbid conditions opted to hold carboplatin - Taxol and pembrolizumab have no interactions with her antiseizure meds - Adriamycin may have reduced efficacy with oxCabazepine - Cytoxan and oxCabazepine can lead to increased toxicity of cytoxan in the form of severe myelosuppression, hyponatremia and increased risk of seizures, Cytoxan and Zonisamide can lead to renal insufficiency and Cytoxan and Levetiracetam can result in CNS and respiratory impairment; therefore Cytoxan is contraindicated in her case and Adrimaycin may not be particularly beneficial  - discussed at [facility] conference and with patient/her daughter that she will receive neoadjuvant Taxol 80mg/m2 weekly X 12 with concomitant pembrolizumab 200mg every 3 weeks for 4 treatments. Upon completion of this will have repeat imaging with Contrast Enhanced Mammogram and possible sonogram to evaluate status of her lymph nodes - patient remains on warfarin due to potential interaction of DOACs with Oxcarbezepine  - presents now for post-operative discussion - case discussed with Dr. Lyn and plan to return to OR for additional surgery; patient opting for re-excision lumpectomy and if margins still positive, would consider mastectomy  - plan to continue pembrolizumab 200mg every 3 weeks for total of 17 cycles - discussed option of sequential adjuvant therapy with Adriamycin 60mg/m2 X4 cycles; will arrange for this after completion of surgery; Again concern for Adriamycin not having reduced efficacy with oxCabazepine antiseizure med - patient also qualifies for adjuvant capecitabine 1500mg 7 days on/7 days off; reviewed interaction of capecitabine with patients antiseizure meds; only concern is increase in nephrotoxicity with zonisamide and this can be monitored; more concerning is the interaction of capecitabine with warfarin as it can cause wild fluctuations in the INR and be concern for increased toxicity from capecitabine  - Patient and her daughterhad the opportunity to have all their questions answered to their satisfaction - f/u in 1 month

## 2024-03-31 ENCOUNTER — RX RENEWAL (OUTPATIENT)
Age: 64
End: 2024-03-31

## 2024-03-31 LAB
25(OH)D3 SERPL-MCNC: 34 NG/ML
25(OH)D3 SERPL-MCNC: 34.2 NG/ML
ALBUMIN MFR SERPL ELPH: 53.5 %
ALBUMIN SERPL ELPH-MCNC: 4.7 G/DL
ALBUMIN SERPL ELPH-MCNC: 4.7 G/DL
ALBUMIN SERPL-MCNC: 4.3 G/DL
ALBUMIN/GLOB SERPL: 1.2 RATIO
ALDOSTERONE SERUM: 23.1 NG/DL
ALDOSTERONE SERUM: 36.9 NG/DL
ALP BLD-CCNC: 123 U/L
ALP BLD-CCNC: 140 U/L
ALPHA1 GLOB MFR SERPL ELPH: 4.9 %
ALPHA1 GLOB SERPL ELPH-MCNC: 0.4 G/DL
ALPHA2 GLOB MFR SERPL ELPH: 10.8 %
ALPHA2 GLOB SERPL ELPH-MCNC: 0.9 G/DL
ALT SERPL-CCNC: 35 U/L
ALT SERPL-CCNC: 8 U/L
ANION GAP SERPL CALC-SCNC: 10 MMOL/L
ANION GAP SERPL CALC-SCNC: 10 MMOL/L
ANION GAP SERPL CALC-SCNC: 11 MMOL/L
ANION GAP SERPL CALC-SCNC: 11 MMOL/L
ANION GAP SERPL CALC-SCNC: 14 MMOL/L
APPEARANCE: ABNORMAL
APPEARANCE: CLEAR
APPEARANCE: CLEAR
AST SERPL-CCNC: 16 U/L
AST SERPL-CCNC: 31 U/L
B-GLOBULIN MFR SERPL ELPH: 10.7 %
B-GLOBULIN SERPL ELPH-MCNC: 0.9 G/DL
BACTERIA UR CULT: NORMAL
BACTERIA: NEGATIVE
BACTERIA: NEGATIVE
BACTERIA: NEGATIVE /HPF
BASOPHILS # BLD AUTO: 0.01 K/UL
BASOPHILS # BLD AUTO: 0.02 K/UL
BASOPHILS # BLD AUTO: 0.03 K/UL
BASOPHILS NFR BLD AUTO: 0.2 %
BASOPHILS NFR BLD AUTO: 0.4 %
BASOPHILS NFR BLD AUTO: 0.7 %
BILIRUB DIRECT SERPL-MCNC: 0.1 MG/DL
BILIRUB DIRECT SERPL-MCNC: 0.1 MG/DL
BILIRUB INDIRECT SERPL-MCNC: 0.1 MG/DL
BILIRUB INDIRECT SERPL-MCNC: 0.1 MG/DL
BILIRUB SERPL-MCNC: 0.2 MG/DL
BILIRUB SERPL-MCNC: <0.2 MG/DL
BILIRUBIN URINE: NEGATIVE
BLOOD URINE: ABNORMAL
BLOOD URINE: NEGATIVE
BLOOD URINE: NEGATIVE
BUN SERPL-MCNC: 16 MG/DL
BUN SERPL-MCNC: 16 MG/DL
BUN SERPL-MCNC: 20 MG/DL
BUN SERPL-MCNC: 23 MG/DL
BUN SERPL-MCNC: 28 MG/DL
CALCIUM SERPL-MCNC: 10.2 MG/DL
CALCIUM SERPL-MCNC: 9.5 MG/DL
CALCIUM SERPL-MCNC: 9.6 MG/DL
CALCIUM SERPL-MCNC: 9.8 MG/DL
CALCIUM SERPL-MCNC: 9.9 MG/DL
CAST: 3 /LPF
CHLORIDE SERPL-SCNC: 107 MMOL/L
CHLORIDE SERPL-SCNC: 107 MMOL/L
CHLORIDE SERPL-SCNC: 108 MMOL/L
CHLORIDE SERPL-SCNC: 111 MMOL/L
CHLORIDE SERPL-SCNC: 113 MMOL/L
CHOLEST SERPL-MCNC: 145 MG/DL
CHOLEST SERPL-MCNC: 244 MG/DL
CK SERPL-CCNC: 47 U/L
CK SERPL-CCNC: 83 U/L
CO2 SERPL-SCNC: 23 MMOL/L
CO2 SERPL-SCNC: 24 MMOL/L
CO2 SERPL-SCNC: 25 MMOL/L
COLOR: NORMAL
COLOR: YELLOW
COLOR: YELLOW
CORTICOSTEROID BIND GLOBULIN: 1.5 MG/DL
CORTIS SERPL-MCNC: 6.7 UG/DL
CORTISOL, FREE: 0.54 UG/DL
CREAT SERPL-MCNC: 0.93 MG/DL
CREAT SERPL-MCNC: 0.97 MG/DL
CREAT SERPL-MCNC: 1.12 MG/DL
CREAT SERPL-MCNC: 1.14 MG/DL
CREAT SERPL-MCNC: 1.55 MG/DL
EGFR: 38 ML/MIN/1.73M2
EGFR: 54 ML/MIN/1.73M2
EGFR: 56 ML/MIN/1.73M2
EGFR: 66 ML/MIN/1.73M2
EGFR: 69 ML/MIN/1.73M2
EOSINOPHIL # BLD AUTO: 0 K/UL
EOSINOPHIL # BLD AUTO: 0.03 K/UL
EOSINOPHIL # BLD AUTO: 0.05 K/UL
EOSINOPHIL NFR BLD AUTO: 0 %
EOSINOPHIL NFR BLD AUTO: 0.7 %
EOSINOPHIL NFR BLD AUTO: 0.9 %
EOSINOPHIL NFR BLD AUTO: 1 %
EOSINOPHIL NFR BLD AUTO: 1.1 %
EPITHELIAL CELLS: 2 /HPF
ESTIMATED AVERAGE GLUCOSE: 108 MG/DL
ESTIMATED AVERAGE GLUCOSE: 117 MG/DL
FERRITIN SERPL-MCNC: 54 NG/ML
FERRITIN SERPL-MCNC: 69 NG/ML
FERRITIN SERPL-MCNC: 84 NG/ML
FOLATE SERPL-MCNC: 10.2 NG/ML
FOLATE SERPL-MCNC: 7.9 NG/ML
GAMMA GLOB FLD ELPH-MCNC: 1.6 G/DL
GAMMA GLOB MFR SERPL ELPH: 20.1 %
GLUCOSE QUALITATIVE U: NEGATIVE
GLUCOSE QUALITATIVE U: NEGATIVE
GLUCOSE QUALITATIVE U: NEGATIVE MG/DL
GLUCOSE SERPL-MCNC: 84 MG/DL
GLUCOSE SERPL-MCNC: 84 MG/DL
GLUCOSE SERPL-MCNC: 86 MG/DL
GLUCOSE SERPL-MCNC: 89 MG/DL
GLUCOSE SERPL-MCNC: 98 MG/DL
HBA1C MFR BLD HPLC: 5.4 %
HBA1C MFR BLD HPLC: 5.7 %
HCT VFR BLD CALC: 31.9 %
HCT VFR BLD CALC: 35.4 %
HCT VFR BLD CALC: 36.1 %
HCT VFR BLD CALC: 37.5 %
HCT VFR BLD CALC: 37.9 %
HDLC SERPL-MCNC: 56 MG/DL
HDLC SERPL-MCNC: 68 MG/DL
HGB BLD-MCNC: 10 G/DL
HGB BLD-MCNC: 11.4 G/DL
HGB BLD-MCNC: 11.6 G/DL
HGB BLD-MCNC: 11.7 G/DL
HGB BLD-MCNC: 12.1 G/DL
HYALINE CASTS: 10 /LPF
HYALINE CASTS: 5 /LPF
IMM GRANULOCYTES NFR BLD AUTO: 0 %
IMM GRANULOCYTES NFR BLD AUTO: 0.2 %
IMM GRANULOCYTES NFR BLD AUTO: 0.3 %
INR PPP: 1.04 RATIO
INR PPP: 1.35
INR PPP: 1.35 RATIO
INR PPP: 1.4
INR PPP: 1.41 RATIO
INR PPP: 1.43 RATIO
INR PPP: 1.44 RATIO
INR PPP: 1.49 RATIO
INR PPP: 1.52 RATIO
INR PPP: 1.6 RATIO
INR PPP: 1.63 RATIO
INR PPP: 1.64 RATIO
INR PPP: 1.65 RATIO
INR PPP: 1.73
INR PPP: 1.89 RATIO
INR PPP: 1.89 RATIO
INR PPP: 2.02 RATIO
INR PPP: 2.06 RATIO
INR PPP: 2.1 RATIO
INR PPP: 2.15 RATIO
INR PPP: 2.26 RATIO
INR PPP: 2.27 RATIO
INR PPP: 2.42 RATIO
INR PPP: 2.42 RATIO
INR PPP: 2.46 RATIO
INR PPP: 2.47 RATIO
INR PPP: 2.5 RATIO
INR PPP: 2.51 RATIO
INR PPP: 2.62 RATIO
INR PPP: 3.16 RATIO
INR PPP: 3.18 RATIO
INR PPP: 3.37 RATIO
INR PPP: 3.57 RATIO
INR PPP: 3.79 RATIO
INR PPP: 4.55 RATIO
INR PPP: 4.58 RATIO
INTERPRETATION SERPL IEP-IMP: NORMAL
IRON SATN MFR SERPL: 19 %
IRON SATN MFR SERPL: 22 %
IRON SATN MFR SERPL: 24 %
IRON SERPL-MCNC: 53 UG/DL
IRON SERPL-MCNC: 59 UG/DL
IRON SERPL-MCNC: 64 UG/DL
KETONES URINE: NEGATIVE
KETONES URINE: NEGATIVE
KETONES URINE: NEGATIVE MG/DL
LDLC SERPL CALC-MCNC: 144 MG/DL
LDLC SERPL CALC-MCNC: 47 MG/DL
LEUKOCYTE ESTERASE URINE: ABNORMAL
LYMPHOCYTES # BLD AUTO: 0.66 K/UL
LYMPHOCYTES # BLD AUTO: 1.34 K/UL
LYMPHOCYTES # BLD AUTO: 1.34 K/UL
LYMPHOCYTES # BLD AUTO: 1.49 K/UL
LYMPHOCYTES # BLD AUTO: 1.77 K/UL
LYMPHOCYTES NFR BLD AUTO: 10.4 %
LYMPHOCYTES NFR BLD AUTO: 25.2 %
LYMPHOCYTES NFR BLD AUTO: 29.3 %
LYMPHOCYTES NFR BLD AUTO: 33.3 %
LYMPHOCYTES NFR BLD AUTO: 35.4 %
M PROTEIN SPEC IFE-MCNC: NORMAL
MAGNESIUM SERPL-MCNC: 2.3 MG/DL
MAGNESIUM SERPL-MCNC: 2.4 MG/DL
MAN DIFF?: NORMAL
MCHC RBC-ENTMCNC: 28.2 PG
MCHC RBC-ENTMCNC: 28.3 PG
MCHC RBC-ENTMCNC: 28.6 PG
MCHC RBC-ENTMCNC: 28.9 PG
MCHC RBC-ENTMCNC: 28.9 PG
MCHC RBC-ENTMCNC: 30.9 GM/DL
MCHC RBC-ENTMCNC: 31.3 GM/DL
MCHC RBC-ENTMCNC: 31.9 GM/DL
MCHC RBC-ENTMCNC: 32.2 GM/DL
MCHC RBC-ENTMCNC: 32.4 GM/DL
MCV RBC AUTO: 87.8 FL
MCV RBC AUTO: 89.1 FL
MCV RBC AUTO: 90.7 FL
MCV RBC AUTO: 91.1 FL
MCV RBC AUTO: 91.2 FL
METANEPHRINE, PL: 20.5 PG/ML
MICROSCOPIC-UA: NORMAL
MONOCYTES # BLD AUTO: 0.25 K/UL
MONOCYTES # BLD AUTO: 0.32 K/UL
MONOCYTES # BLD AUTO: 0.34 K/UL
MONOCYTES # BLD AUTO: 0.35 K/UL
MONOCYTES # BLD AUTO: 0.48 K/UL
MONOCYTES NFR BLD AUTO: 5.5 %
MONOCYTES NFR BLD AUTO: 6.4 %
MONOCYTES NFR BLD AUTO: 6.6 %
MONOCYTES NFR BLD AUTO: 7.6 %
MONOCYTES NFR BLD AUTO: 7.6 %
NEUTROPHILS # BLD AUTO: 2.57 K/UL
NEUTROPHILS # BLD AUTO: 2.83 K/UL
NEUTROPHILS # BLD AUTO: 2.92 K/UL
NEUTROPHILS # BLD AUTO: 3.55 K/UL
NEUTROPHILS # BLD AUTO: 5.18 K/UL
NEUTROPHILS NFR BLD AUTO: 56.6 %
NEUTROPHILS NFR BLD AUTO: 57.4 %
NEUTROPHILS NFR BLD AUTO: 63.8 %
NEUTROPHILS NFR BLD AUTO: 66.7 %
NEUTROPHILS NFR BLD AUTO: 81.5 %
NITRITE URINE: NEGATIVE
NONHDLC SERPL-MCNC: 177 MG/DL
NONHDLC SERPL-MCNC: 89 MG/DL
NORMETANEPHRINE, PL: 235.8 PG/ML
PFCX: 8 %
PH URINE: 5.5
PH URINE: 6
PH URINE: 7
PLATELET # BLD AUTO: 211 K/UL
PLATELET # BLD AUTO: 222 K/UL
PLATELET # BLD AUTO: 227 K/UL
PLATELET # BLD AUTO: 231 K/UL
PLATELET # BLD AUTO: 258 K/UL
POTASSIUM SERPL-SCNC: 3.2 MMOL/L
POTASSIUM SERPL-SCNC: 3.7 MMOL/L
POTASSIUM SERPL-SCNC: 3.7 MMOL/L
POTASSIUM SERPL-SCNC: 3.8 MMOL/L
POTASSIUM SERPL-SCNC: 4.3 MMOL/L
PROT SERPL-MCNC: 8 G/DL
PROT SERPL-MCNC: 8 G/DL
PROT SERPL-MCNC: 8.7 G/DL
PROT SERPL-MCNC: 8.9 G/DL
PROTEIN URINE: ABNORMAL
PROTEIN URINE: NEGATIVE MG/DL
PROTEIN URINE: NORMAL
PT BLD: 11.7 SEC
PT BLD: 14.6
PT BLD: 15.3 SEC
PT BLD: 15.9 SEC
PT BLD: 16.4 SEC
PT BLD: 16.6 SEC
PT BLD: 16.6 SEC
PT BLD: 16.9 SEC
PT BLD: 17 SEC
PT BLD: 17.8 SEC
PT BLD: 19.1 SEC
PT BLD: 19.2 SEC
PT BLD: 19.3 SEC
PT BLD: 19.4 SEC
PT BLD: 22.2 SEC
PT BLD: 22.3 SEC
PT BLD: 24 SEC
PT BLD: 24.3 SEC
PT BLD: 24.5 SEC
PT BLD: 24.9 SEC
PT BLD: 25.1 SEC
PT BLD: 26.8 SEC
PT BLD: 27.5 SEC
PT BLD: 28.6 SEC
PT BLD: 28.6 SEC
PT BLD: 29 SEC
PT BLD: 29.2 SEC
PT BLD: 29.4 SEC
PT BLD: 30.7 SEC
PT BLD: 37.1 SEC
PT BLD: 37.6 SEC
PT BLD: 39.9 SEC
PT BLD: 42.7 SEC
PT BLD: 44.6 SEC
PT BLD: 54.4 SEC
PT BLD: 54.5 SEC
RAPID RVP RESULT: DETECTED
RBC # BLD: 3.5 M/UL
RBC # BLD: 4.03 M/UL
RBC # BLD: 4.05 M/UL
RBC # BLD: 4.11 M/UL
RBC # BLD: 4.18 M/UL
RBC # FLD: 15.9 %
RBC # FLD: 15.9 %
RBC # FLD: 16.1 %
RBC # FLD: 16.2 %
RBC # FLD: 17.5 %
RED BLOOD CELLS URINE: 2 /HPF
RED BLOOD CELLS URINE: 2 /HPF
RED BLOOD CELLS URINE: 3 /HPF
RENIN ACTIVITY, PLASMA: 7.2 NG/ML/HR
RENIN PLASMA: 10.9 PG/ML
RENIN PLASMA: 75.6 PG/ML
SARS-COV-2 RNA PNL RESP NAA+PROBE: DETECTED
SODIUM SERPL-SCNC: 142 MMOL/L
SODIUM SERPL-SCNC: 143 MMOL/L
SODIUM SERPL-SCNC: 144 MMOL/L
SODIUM SERPL-SCNC: 146 MMOL/L
SODIUM SERPL-SCNC: 148 MMOL/L
SPECIFIC GRAVITY URINE: 1.01
SPECIFIC GRAVITY URINE: 1.02
SPECIFIC GRAVITY URINE: 1.02
SQUAMOUS EPITHELIAL CELLS: 1 /HPF
SQUAMOUS EPITHELIAL CELLS: 2 /HPF
T4 FREE SERPL-MCNC: 0.8 NG/DL
T4 FREE SERPL-MCNC: 0.8 NG/DL
T4 FREE SERPL-MCNC: 1 NG/DL
TIBC SERPL-MCNC: 246 UG/DL
TIBC SERPL-MCNC: 274 UG/DL
TIBC SERPL-MCNC: 284 UG/DL
TRANSFERRIN SERPL-MCNC: 209 MG/DL
TRIGL SERPL-MCNC: 164 MG/DL
TRIGL SERPL-MCNC: 211 MG/DL
TSH SERPL-ACNC: 0.91 UIU/ML
TSH SERPL-ACNC: 1.57 UIU/ML
TSH SERPL-ACNC: 1.72 UIU/ML
UIBC SERPL-MCNC: 187 UG/DL
UIBC SERPL-MCNC: 221 UG/DL
UIBC SERPL-MCNC: 221 UG/DL
URINE COMMENTS: NORMAL
UROBILINOGEN URINE: 0.2 MG/DL
UROBILINOGEN URINE: NORMAL
UROBILINOGEN URINE: NORMAL
VIT B12 SERPL-MCNC: 411 PG/ML
VIT B12 SERPL-MCNC: 441 PG/ML
VIT B12 SERPL-MCNC: 652 PG/ML
WBC # FLD AUTO: 4.48 K/UL
WBC # FLD AUTO: 4.57 K/UL
WBC # FLD AUTO: 5 K/UL
WBC # FLD AUTO: 5.32 K/UL
WBC # FLD AUTO: 6.35 K/UL
WHITE BLOOD CELLS URINE: 26 /HPF
WHITE BLOOD CELLS URINE: 4 /HPF
WHITE BLOOD CELLS URINE: 55 /HPF

## 2024-04-01 DIAGNOSIS — Z51.11 ENCOUNTER FOR ANTINEOPLASTIC CHEMOTHERAPY: ICD-10-CM

## 2024-04-03 ENCOUNTER — LABORATORY RESULT (OUTPATIENT)
Age: 64
End: 2024-04-03

## 2024-04-03 ENCOUNTER — APPOINTMENT (OUTPATIENT)
Dept: RADIATION ONCOLOGY | Facility: CLINIC | Age: 64
End: 2024-04-03

## 2024-04-03 NOTE — HISTORY OF PRESENT ILLNESS
[FreeTextEntry1] : Ms. Denis is a 63-year-old female with clinical stage IIIA T3N1M0 triple negative poorly differentiated invasive ductal carcinoma of the right breast. She completed neoadjuvant chemotherapy and she is  s/p right lumpectomy and ALND 3/11/2024. The patient presents today for to discuss adjuvant radiotherapy treatments.  The patient was seen for initial consultation by Dr Larios on 10/26/2024. When last seen, the patient was advised to return for treatment after completion of neoadjuvant chemotherapy followed by surgery.  She noticed a right breast mass while showering in September 2023. She went to her PMD who ordered a mammogram. A diagnostic mammogram on 10/5/23 showed an irregular mass in the upper outer right breast measuring up to 3.6 cm and an abnormal appearing right axillary lymph node. There were no associated calcifications and no concerning findings in the left breast. Breast ultrasound showed an irregular hypoechoic mass in the right breast at 10:00 7 cm FN measuring up to 2.6 cm. An adjacent hypoechoic solid nodule anterior and superior to the index lesion measuring 1.1 cm was also noted. Abnormal right axillary lymph nodes were seen, one measuring 2 cm and the second measuring 5.4 mm.  Ultrasound guided biopsy of the right breast mass on 10/9/23 showed invasive ductal carcinoma, poorly differentiated, measuring at least 14 mm. IHC showed ER 0%, NV 0%, and HER2 0.  Core biopsy of the right axillary lymph node on 10/18/23 was positive for carcinoma measuring at least 1.25 mm.  Contrast enhanced mammography on 10/24 23 showed enhancement in the upper outer right breast measuring up to 6.1 cm, including a 0.7 cm focal asymmetry medial and inferior to the dominant mass, consistent with a satellite lesion. Right breast ultrasound showed the biopsy proven malignancy and a sub centimeter mass at 10:00 5 cm FN corresponding to the focal asymmetry seen on mammography.  CT CAP on 10/25/23 showed a 3.8 cm right breast mass and right axillary lymphadenopathy measuring up to 2.2 cm. A cluster of nonspecific sub centimeter perivascular lymph nodes were notes, measuring up to 1.2 cm. A subpleural 4 mm right middle lobe pulmonary nodule appeared to be benign.  Staging Bone Scan 11/10/23 - JAKE  On 12/2023 The patient initiated on neoadjuvant therapy with Pembro + Taxol weekly x 12 overall tolerated well.  On 2/9/2024 Post Treatment: contrast enhanced showed Marked interval decrease in previous mammographic mass/asymmetry and enhancement associated with biopsy-proven IDC/DCIS in the upper outer right breast, previously spanning up to 6 cm in October 2023. Residual mild focal enhancement approximately 1.5 cm inferior to the two biopsy clips is suggestive of residual disease.  On 3/11/24 - Underwent lumpectomy/SLN - final path with 2.4cm poorly differentiated IDC with multiple satellite foci; extensive LVI involving right lateral resection margin and right anterior margin; 1/2 SLN +, additional ALND done with additional 50 negative LN (total 1/52).

## 2024-04-03 NOTE — HISTORY OF PRESENT ILLNESS
[FreeTextEntry1] : Ms. Denis is a 63-year-old female with clinical stage IIIA T3N1M0 triple negative poorly differentiated invasive ductal carcinoma of the right breast. She completed neoadjuvant chemotherapy and she is  s/p right lumpectomy and ALND 3/11/2024. The patient presents today for to discuss adjuvant radiotherapy treatments.  The patient was seen for initial consultation by Dr Larios on 10/26/2024. When last seen, the patient was advised to return for treatment after completion of neoadjuvant chemotherapy followed by surgery.  She noticed a right breast mass while showering in September 2023. She went to her PMD who ordered a mammogram. A diagnostic mammogram on 10/5/23 showed an irregular mass in the upper outer right breast measuring up to 3.6 cm and an abnormal appearing right axillary lymph node. There were no associated calcifications and no concerning findings in the left breast. Breast ultrasound showed an irregular hypoechoic mass in the right breast at 10:00 7 cm FN measuring up to 2.6 cm. An adjacent hypoechoic solid nodule anterior and superior to the index lesion measuring 1.1 cm was also noted. Abnormal right axillary lymph nodes were seen, one measuring 2 cm and the second measuring 5.4 mm.  Ultrasound guided biopsy of the right breast mass on 10/9/23 showed invasive ductal carcinoma, poorly differentiated, measuring at least 14 mm. IHC showed ER 0%, IN 0%, and HER2 0.  Core biopsy of the right axillary lymph node on 10/18/23 was positive for carcinoma measuring at least 1.25 mm.  Contrast enhanced mammography on 10/24 23 showed enhancement in the upper outer right breast measuring up to 6.1 cm, including a 0.7 cm focal asymmetry medial and inferior to the dominant mass, consistent with a satellite lesion. Right breast ultrasound showed the biopsy proven malignancy and a sub centimeter mass at 10:00 5 cm FN corresponding to the focal asymmetry seen on mammography.  CT CAP on 10/25/23 showed a 3.8 cm right breast mass and right axillary lymphadenopathy measuring up to 2.2 cm. A cluster of nonspecific sub centimeter perivascular lymph nodes were notes, measuring up to 1.2 cm. A subpleural 4 mm right middle lobe pulmonary nodule appeared to be benign.  Staging Bone Scan 11/10/23 - JAKE  On 12/2023 The patient initiated on neoadjuvant therapy with Pembro + Taxol weekly x 12 overall tolerated well.  On 2/9/2024 Post Treatment: contrast enhanced showed Marked interval decrease in previous mammographic mass/asymmetry and enhancement associated with biopsy-proven IDC/DCIS in the upper outer right breast, previously spanning up to 6 cm in October 2023. Residual mild focal enhancement approximately 1.5 cm inferior to the two biopsy clips is suggestive of residual disease.  On 3/11/24 - Underwent lumpectomy/SLN - final path with 2.4cm poorly differentiated IDC with multiple satellite foci; extensive LVI involving right lateral resection margin and right anterior margin; 1/2 SLN +, additional ALND done with additional 50 negative LN (total 1/52).

## 2024-04-09 ENCOUNTER — APPOINTMENT (OUTPATIENT)
Dept: PHYSICAL MEDICINE AND REHAB | Facility: CLINIC | Age: 64
End: 2024-04-09
Payer: COMMERCIAL

## 2024-04-09 PROCEDURE — 99205 OFFICE O/P NEW HI 60 MIN: CPT

## 2024-04-09 NOTE — HISTORY OF PRESENT ILLNESS
[FreeTextEntry1] : Jeimy Denis is a 63 year old female pmh triple negative R breast ca s/p neoadjuvant chemo (12 weeks), R lumpectomy and ALND 3/11/24.  1/52 LN positive.  On Keytruda (planned until September),   margins concerning for residual microscopic disease. planning for possible mastectomy vs re excision lumpectomy, case to be reviewed by tumor board.   other pmh: epilepsy on 3 anti epilepsy, CAD s/p stent, htn, brain aneurysm s/p clipping (2013)( limited vision R eye, lower half field cut and decreased peripheral vision), dvt on coumadin.   has L chest mediport  She has neuropathy s/p chemo, improving tingling feet more than hands.  Denies falls.  States she is fearful of falls but mostly due to epilepsy in which she does not have advanced warning.  She also has impaired vision R eye.   Lives with daughter in apartment with elevator.   States she is estranged from her .

## 2024-04-09 NOTE — ASSESSMENT
[FreeTextEntry1] : RUE us ordered to r/o dvt given mild difference between RUE and LUE, history of dvt (on coumadin) start breast PT program for R axillary and RUE rom limitation compression garments for RUE, lymphedema therapy follow up plan for surgery per Dr. Lyn HCP form filled out with patient and her daughter, to be scanned in.  Patient appointed her daughter has HCP. follow up in 4 weeks I spent a total of 60 minutes on this encounter including documentation, face to face time, care coordination and reviewing prior records.

## 2024-04-09 NOTE — PHYSICAL EXAM
[FreeTextEntry1] : GEN: AAOx3, NAD  PSYCH: Normal mood and affect. Responds appropriately to commands. EYES: Sclerae Anicteric. No discharge.  RESP: Breathing unlabored. CV: Radial pulses 2+ and equal. No varicosities noted. EXT: No C/C/E.  RUE at 10 cm below olecranon: 25 cm, at 10 cm above olecranon  31.5 cm  LUE at 10 cm below olecranon 25cm, at 10 cm above olecranon 30 cm   SKIN: No lesions noted. Incision well healed R breast,  no erythema or swelling  LYMPH: No supraclavicular, anterior or posterior cervical lymphadenopathy appreciated. GAIT: Non antalgic, Normal reciprocating heel to toe. STRENGTH: 5/5 bilateral upper extremities  SENSATION: Grossly intact to light touch bilateral upper extremities except for posterior R arm numbness INSP: no visible swelling appreciated CERVICAL ROM: Flexion, extension, side-bending, rotation, oblique extension all full and pain free.   SHOULDER ROM: Full and pain free on the left, limited to 90 degrees without pain, 135 degrees (with pain/ tightness reported) PALP: There is no tenderness over the midline spinous processes, paravertebral muscles, trapezius, levator scapulae or + ttp R shoulder .  + mild r axillary tenderness appreciated SPECIAL:  no axillary cording on exam

## 2024-04-11 ENCOUNTER — LABORATORY RESULT (OUTPATIENT)
Age: 64
End: 2024-04-11

## 2024-04-17 ENCOUNTER — LABORATORY RESULT (OUTPATIENT)
Age: 64
End: 2024-04-17

## 2024-04-18 RX ORDER — WARFARIN 5 MG/1
5 TABLET ORAL
Qty: 90 | Refills: 1 | Status: ACTIVE | COMMUNITY
Start: 2019-05-06 | End: 1900-01-01

## 2024-04-20 ENCOUNTER — APPOINTMENT (OUTPATIENT)
Dept: INFUSION THERAPY | Facility: HOSPITAL | Age: 64
End: 2024-04-20

## 2024-04-20 ENCOUNTER — RESULT REVIEW (OUTPATIENT)
Age: 64
End: 2024-04-20

## 2024-04-20 LAB
BASOPHILS # BLD AUTO: 0.02 K/UL — SIGNIFICANT CHANGE UP (ref 0–0.2)
BASOPHILS NFR BLD AUTO: 0.5 % — SIGNIFICANT CHANGE UP (ref 0–2)
CORTIS AM PEAK SERPL-MCNC: 11.8 UG/DL — SIGNIFICANT CHANGE UP (ref 6–18.4)
EOSINOPHIL # BLD AUTO: 0.08 K/UL — SIGNIFICANT CHANGE UP (ref 0–0.5)
EOSINOPHIL NFR BLD AUTO: 2 % — SIGNIFICANT CHANGE UP (ref 0–6)
HCT VFR BLD CALC: 29.8 % — LOW (ref 34.5–45)
HGB BLD-MCNC: 9.8 G/DL — LOW (ref 11.5–15.5)
IMM GRANULOCYTES NFR BLD AUTO: 0 % — SIGNIFICANT CHANGE UP (ref 0–0.9)
LYMPHOCYTES # BLD AUTO: 1.19 K/UL — SIGNIFICANT CHANGE UP (ref 1–3.3)
LYMPHOCYTES # BLD AUTO: 30 % — SIGNIFICANT CHANGE UP (ref 13–44)
MCHC RBC-ENTMCNC: 29.4 PG — SIGNIFICANT CHANGE UP (ref 27–34)
MCHC RBC-ENTMCNC: 32.9 G/DL — SIGNIFICANT CHANGE UP (ref 32–36)
MCV RBC AUTO: 89.5 FL — SIGNIFICANT CHANGE UP (ref 80–100)
MONOCYTES # BLD AUTO: 0.33 K/UL — SIGNIFICANT CHANGE UP (ref 0–0.9)
MONOCYTES NFR BLD AUTO: 8.3 % — SIGNIFICANT CHANGE UP (ref 2–14)
NEUTROPHILS # BLD AUTO: 2.35 K/UL — SIGNIFICANT CHANGE UP (ref 1.8–7.4)
NEUTROPHILS NFR BLD AUTO: 59.2 % — SIGNIFICANT CHANGE UP (ref 43–77)
NRBC # BLD: 0 /100 WBCS — SIGNIFICANT CHANGE UP (ref 0–0)
PLATELET # BLD AUTO: 244 K/UL — SIGNIFICANT CHANGE UP (ref 150–400)
RBC # BLD: 3.33 M/UL — LOW (ref 3.8–5.2)
RBC # FLD: 15.4 % — HIGH (ref 10.3–14.5)
T4 FREE+ TSH PNL SERPL: 1.63 UIU/ML — SIGNIFICANT CHANGE UP (ref 0.27–4.2)
WBC # BLD: 3.97 K/UL — SIGNIFICANT CHANGE UP (ref 3.8–10.5)
WBC # FLD AUTO: 3.97 K/UL — SIGNIFICANT CHANGE UP (ref 3.8–10.5)

## 2024-04-21 LAB
ALBUMIN SERPL ELPH-MCNC: 4.3 G/DL — SIGNIFICANT CHANGE UP (ref 3.3–5)
ALP SERPL-CCNC: 78 U/L — SIGNIFICANT CHANGE UP (ref 40–120)
ALT FLD-CCNC: 10 U/L — SIGNIFICANT CHANGE UP (ref 10–45)
ANION GAP SERPL CALC-SCNC: 13 MMOL/L — SIGNIFICANT CHANGE UP (ref 5–17)
AST SERPL-CCNC: 19 U/L — SIGNIFICANT CHANGE UP (ref 10–40)
BILIRUB SERPL-MCNC: 0.2 MG/DL — SIGNIFICANT CHANGE UP (ref 0.2–1.2)
BUN SERPL-MCNC: 16 MG/DL — SIGNIFICANT CHANGE UP (ref 7–23)
CALCIUM SERPL-MCNC: 9.5 MG/DL — SIGNIFICANT CHANGE UP (ref 8.4–10.5)
CHLORIDE SERPL-SCNC: 107 MMOL/L — SIGNIFICANT CHANGE UP (ref 96–108)
CO2 SERPL-SCNC: 22 MMOL/L — SIGNIFICANT CHANGE UP (ref 22–31)
CREAT SERPL-MCNC: 1.22 MG/DL — SIGNIFICANT CHANGE UP (ref 0.5–1.3)
EGFR: 50 ML/MIN/1.73M2 — LOW
GLUCOSE SERPL-MCNC: 88 MG/DL — SIGNIFICANT CHANGE UP (ref 70–99)
POTASSIUM SERPL-MCNC: 3.3 MMOL/L — LOW (ref 3.5–5.3)
POTASSIUM SERPL-SCNC: 3.3 MMOL/L — LOW (ref 3.5–5.3)
PROT SERPL-MCNC: 8.2 G/DL — SIGNIFICANT CHANGE UP (ref 6–8.3)
SODIUM SERPL-SCNC: 142 MMOL/L — SIGNIFICANT CHANGE UP (ref 135–145)

## 2024-04-24 ENCOUNTER — LABORATORY RESULT (OUTPATIENT)
Age: 64
End: 2024-04-24

## 2024-04-26 ENCOUNTER — APPOINTMENT (OUTPATIENT)
Dept: HEMATOLOGY ONCOLOGY | Facility: CLINIC | Age: 64
End: 2024-04-26
Payer: COMMERCIAL

## 2024-04-26 VITALS
TEMPERATURE: 97.9 F | HEART RATE: 53 BPM | BODY MASS INDEX: 30.41 KG/M2 | WEIGHT: 160.93 LBS | SYSTOLIC BLOOD PRESSURE: 147 MMHG | DIASTOLIC BLOOD PRESSURE: 84 MMHG | OXYGEN SATURATION: 99 % | RESPIRATION RATE: 16 BRPM

## 2024-04-26 DIAGNOSIS — Z79.899 OTHER LONG TERM (CURRENT) DRUG THERAPY: ICD-10-CM

## 2024-04-26 PROCEDURE — 99214 OFFICE O/P EST MOD 30 MIN: CPT

## 2024-04-26 PROCEDURE — G2211 COMPLEX E/M VISIT ADD ON: CPT

## 2024-04-29 ENCOUNTER — LABORATORY RESULT (OUTPATIENT)
Age: 64
End: 2024-04-29

## 2024-04-30 ENCOUNTER — APPOINTMENT (OUTPATIENT)
Dept: INTERNAL MEDICINE | Facility: CLINIC | Age: 64
End: 2024-04-30
Payer: COMMERCIAL

## 2024-04-30 ENCOUNTER — NON-APPOINTMENT (OUTPATIENT)
Age: 64
End: 2024-04-30

## 2024-04-30 VITALS
OXYGEN SATURATION: 99 % | SYSTOLIC BLOOD PRESSURE: 122 MMHG | TEMPERATURE: 98.1 F | HEART RATE: 59 BPM | BODY MASS INDEX: 30.02 KG/M2 | HEIGHT: 61 IN | WEIGHT: 159 LBS | DIASTOLIC BLOOD PRESSURE: 70 MMHG

## 2024-04-30 DIAGNOSIS — G40.909 EPILEPSY, UNSPECIFIED, NOT INTRACTABLE, W/OUT STATUS EPILEPTICUS: ICD-10-CM

## 2024-04-30 DIAGNOSIS — Z86.73 PERSONAL HISTORY OF TRANSIENT ISCHEMIC ATTACK (TIA), AND CEREBRAL INFARCTION W/OUT RESIDUAL DEFICITS: ICD-10-CM

## 2024-04-30 DIAGNOSIS — I82.409 ACUTE EMBOLISM AND THROMBOSIS OF UNSPECIFIED DEEP VEINS OF UNSPECIFIED LOWER EXTREMITY: ICD-10-CM

## 2024-04-30 DIAGNOSIS — E78.5 HYPERLIPIDEMIA, UNSPECIFIED: ICD-10-CM

## 2024-04-30 DIAGNOSIS — I25.10 ATHEROSCLEROTIC HEART DISEASE OF NATIVE CORONARY ARTERY W/OUT ANGINA PECTORIS: ICD-10-CM

## 2024-04-30 DIAGNOSIS — R56.9 UNSPECIFIED CONVULSIONS: ICD-10-CM

## 2024-04-30 DIAGNOSIS — I10 ESSENTIAL (PRIMARY) HYPERTENSION: ICD-10-CM

## 2024-04-30 PROCEDURE — 93000 ELECTROCARDIOGRAM COMPLETE: CPT

## 2024-04-30 PROCEDURE — 99496 TRANSJ CARE MGMT HIGH F2F 7D: CPT

## 2024-04-30 PROCEDURE — 99214 OFFICE O/P EST MOD 30 MIN: CPT

## 2024-05-01 ENCOUNTER — NON-APPOINTMENT (OUTPATIENT)
Age: 64
End: 2024-05-01

## 2024-05-02 DIAGNOSIS — C50.411 MALIGNANT NEOPLASM OF UPPER-OUTER QUADRANT OF RIGHT FEMALE BREAST: ICD-10-CM

## 2024-05-02 PROBLEM — Z79.899 HIGH RISK MEDICATION USE: Status: ACTIVE | Noted: 2023-11-09

## 2024-05-02 NOTE — REVIEW OF SYSTEMS
[Fatigue] : fatigue [Vision Problems] : vision problems [Loss of Hearing] : loss of hearing [Diarrhea: Grade 0] : Diarrhea: Grade 0 [Negative] : Allergic/Immunologic [de-identified] : memory loss (short term); recurrent seizures last in 4/2024  [de-identified] : increased stress due to diagnosis, separation from spouse since 7/2023 and spouse having sold their home; She had not spoken to him since mid-9/2023; in 3/2024, he has tried to contact her again

## 2024-05-02 NOTE — HISTORY OF PRESENT ILLNESS
[Disease: _____________________] : Disease: [unfilled] [T: ___] : T[unfilled] [N: ___] : N[unfilled] [M: ___] : M[unfilled] [AJCC Stage: ____] : AJCC Stage: [unfilled] [90: Able to carry normal activity; minor signs or symptoms of disease.] : 90: Able to carry normal activity; minor signs or symptoms of disease.  [ECOG Performance Status: 1 - Restricted in physically strenuous activity but ambulatory and able to carry out work of a light or sedentary nature] : Performance Status: 1 - Restricted in physically strenuous activity but ambulatory and able to carry out work of a light or sedentary nature, e.g., light house work, office work [de-identified] : Seen as part of [facility] - accompanied by daughter; (Allie)  Patient with prior mammogram in  showing normal findings. She has history of CVA in , aneurysmal subarachnoid hemorrhage s/p clipping and  shunt placement, bifrontal encephalomalacia, epilepsy with recurrent seizures including in 2022, 2023 and more recently in 2023, on multiple antiseizure meds (Keppra (levetiracetam), Vimpat (lacosamide) , Trileptal (oxcarbazepine) and zonisamide.  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  She noted a palpable mass in the right breast on 10/5/23.  Diagnostic bilateral mammogram on 10/5/23 showed mammographic and sonographic findings corresponding to the palpable mass in the upper outer quadrant of the right breast, consistent with an underlying carcinoma. Abnormal lymph nodes in the right axilla consistent with involvement by tumor. Biopsy recommended. BI-RADS 5.  Right breast biopsy 10/9/23, 10:00 7cm Pathology - IDC, poorly differentiated, measuring 2.5mm ER negative, [place] negative, HER-2 negative.  She was seen by Surgical Oncology (Dr. Roger Lyn); axillary LN biopsy 10/18/23 - positive for carcinoma identical to morphology of the right breast lesion  Due to  shunt, she was not a candidate for pre-operative breast MRI and instead underwent contrast enhanced mammogram on 10/24/23 -  Biopsy-proven malignancy at the right 10:00 axis with associated enhancement spanning up to 6.1 cm.  - Newly noted subcentimeter mass at the 10:00, 5 cm from nipple location felt to correspond with a focal asymmetry or medial and inferior to the primary cancer favoring a satellite lesion.  Staging CT C/A/P 10/25/23 - right breast mass with right axillary lymphadenopathy.  - No definite evidence of distant metastatic disease. A cluster of several non-enlarged prevascular mediastinal lymph nodes is nonspecific.  - A subpleural 4 mm right middle lobe pulmonary nodule is likely benign.  Staging Bone Scan 11/10/23 - JAKE  Risk Stratification - ; 1st full term pregnancy at age 18, no OCPs, no HRT, menarche unknown exact age, menopause unknown exact age; + family history of breast cancer in mother, colon cancer in maternal aunt Genetic testing - [employer] 84 gene panel; negative [de-identified] : Triple negative [de-identified] : ER-PA-Her2- [de-identified] : 4/30/2024 Patient returns today to rule out progression of breast cancer and to assess treatment toxicity. Patient w/ triple negative breast cancer and started on neoadjuvant therapy with Pembro + Taxol weekly x 12 overall tolerated well;  on 3/11/24 - Underwent lumpectomy/SLN - final path with 2.4cm poorly differentiated IDC with multiple satellite foci; extensive LVI involving right lateral resection margin and right anterior margin; 1/2 SLN +, additional ALND done with additional 50 negative LN (total 1/52). Patient is currently receiving adjuvant keytruda. She notes that she experienced a seizure last week, did not lose consicousness, withnessed by her daughter and was seen in ED and discharged. She was scheduled to see Dr. Larios, rad onc, but the appointment was cancelled for an unknown reason.

## 2024-05-02 NOTE — PHYSICAL EXAM
[Normal] : affect appropriate [Fully active, able to carry on all pre-disease performance without restriction] : Status 0 - Fully active, able to carry on all pre-disease performance without restriction [de-identified] : vision loss left eye [de-identified] : Port present left chest wall with no signs of infection. [de-identified] : s/p right lumpectomy, surgical scars noted and well healed; No palpable masses b/l, no chest wall changes, no skin or nipple changes noted; no axillary adenopathy [de-identified] : Right  shunt in place

## 2024-05-02 NOTE — ASSESSMENT
[Medication(s)] : Medication(s) [FreeTextEntry1] : 64 yo woman with history of subarachnoid hemorrhage in 2013 s/p craniotomy for evacuation and  shunt placement, recurrent DVTs requiring anticoagulation but needs warfarin due to interaction of DOACs with oxcabazepine (Trileptal), found in 10/2023 to have at least Stage IIIA triple negative breast cancer.  - case was discussed at [facility] [place] at time of presentation with surgical oncology, radiation oncology, radiology and pathology reviewed - discussed with patient and her daughter that standard of care for the treatment of Stage II or III triple negative breast cancer is neoadjuvant therapy with taxol 80mg/m2 weekly and carbo AUC 1.5 for 12 weeks followed by Adriamycin 60mg/m2 and Cytoxan 600mg/m2 every 3 weeks for total of 4 treatments all concurrent with immunotherapy in form of pembrolizumab 200mg IV every 3 weeks.  - risk/benefits/side effects including but not limited to neuropathy, renal insufficiency, alopecia, cardiotoxicity, myelosuppression, fatigue, nausea, vomitiing discussed briefly with patient and her daughter - chemotherapy interactions with her antiseizure medications reviewed. - given her comorbid conditions opted to hold carboplatin - Taxol and pembrolizumab have no interactions with her antiseizure meds - Adriamycin may have reduced efficacy with oxCabazepine - Cytoxan and oxCabazepine can lead to increased toxicity of cytoxan in the form of severe myelosuppression, hyponatremia and increased risk of seizures, Cytoxan and Zonisamide can lead to renal insufficiency and Cytoxan and Levetiracetam can result in CNS and respiratory impairment; therefore Cytoxan is contraindicated in her case and Adrimaycin may not be particularly beneficial - discussed at [facility] conference and with patient/her daughter that she will receive neoadjuvant Taxol 80mg/m2 weekly X 12 with concomitant pembrolizumab 200mg every 3 weeks for 4 treatments. Upon completion of this will have repeat imaging with Contrast Enhanced Mammogram and possible sonogram to evaluate status of her lymph nodes - patient remains on warfarin due to potential interaction of DOACs with Oxcarbezepine  4/30/2024 - presents now for post-operative discussion - case discussed with Dr. Lyn and no further surgery is planned despite positive margins.  - plan to continue pembrolizumab 200mg every 3 weeks for total of 17 cycles - discussed option of sequential adjuvant therapy with Adriamycin 60mg/m2 X4 cycles; will arrange for this after completion of surgery; Again concern for Adriamycin not having reduced efficacy with oxCabazepine antiseizure med - patient also qualifies for adjuvant capecitabine 1500mg 7 days on/7 days off; reviewed interaction of capecitabine with patients antiseizure meds; only concern is increase in nephrotoxicity with zonisamide and this can be monitored; more concerning is the interaction of capecitabine with warfarin as it can cause wild fluctuations in the INR and be concern for increased toxicity from capecitabine - Jeimy is interested in adjuvant capecitabine and understands she would need to be changed to Lovenox temporarily.  This will be discussed with Dr. lal prior to any changes; - Radiation Therapy Consult indicated - requested assistance from breast navigation  - Patient and her daughter had the opportunity to have all their questions answered to their satisfaction - f/u in 1 month

## 2024-05-04 NOTE — ED ADULT NURSE NOTE - NURSING NEURO LEVEL OF CONSCIOUSNESS
IP resident informed this RN that IP attending will come to evaluate pt, then pt will be discharged. Pt updated by resident on plan.   lethargic

## 2024-05-05 LAB
INR PPP: 1.17 RATIO
INR PPP: 3.68 RATIO
PT BLD: 13.2 SEC
PT BLD: 40 SEC

## 2024-05-08 ENCOUNTER — APPOINTMENT (OUTPATIENT)
Dept: RADIATION ONCOLOGY | Facility: CLINIC | Age: 64
End: 2024-05-08
Payer: COMMERCIAL

## 2024-05-08 ENCOUNTER — LABORATORY RESULT (OUTPATIENT)
Age: 64
End: 2024-05-08

## 2024-05-08 ENCOUNTER — NON-APPOINTMENT (OUTPATIENT)
Age: 64
End: 2024-05-08

## 2024-05-08 VITALS
TEMPERATURE: 97.16 F | WEIGHT: 159 LBS | RESPIRATION RATE: 17 BRPM | BODY MASS INDEX: 30.02 KG/M2 | DIASTOLIC BLOOD PRESSURE: 78 MMHG | HEART RATE: 62 BPM | OXYGEN SATURATION: 97 % | SYSTOLIC BLOOD PRESSURE: 137 MMHG | HEIGHT: 61 IN

## 2024-05-08 DIAGNOSIS — C50.911 MALIGNANT NEOPLASM OF UNSPECIFIED SITE OF RIGHT FEMALE BREAST: ICD-10-CM

## 2024-05-08 PROCEDURE — 77290 THER RAD SIMULAJ FIELD CPLX: CPT | Mod: 26

## 2024-05-08 PROCEDURE — 99214 OFFICE O/P EST MOD 30 MIN: CPT | Mod: 25

## 2024-05-08 PROCEDURE — 77263 THER RADIOLOGY TX PLNG CPLX: CPT

## 2024-05-08 PROCEDURE — 77333 RADIATION TREATMENT AID(S): CPT | Mod: 26

## 2024-05-09 NOTE — HISTORY OF PRESENT ILLNESS
[Family Member] : family member [FreeTextEntry8] : 63 year old female presents after having a witnessed seizure.  She was taken to [facility] via EMS.  She had negative work-up, keppra dosage was increased to 1000 mg bid and she was discharged home.  Since her hospital discharge she has been doing just fine.  Currently she denies any chest pain, shortness of breath or cough.

## 2024-05-09 NOTE — PLAN
[FreeTextEntry1] : Seizure disorder - keppra was increased to 1000 mg bid.  Advised neurology f/u. Chronic CVA/TIA - on coumadin/ASA, f/u with neurology DVT - on coumadin Breast CA - f/u with oncology CAD - on statin/ASA, EKG reveals SR

## 2024-05-10 ENCOUNTER — RESULT REVIEW (OUTPATIENT)
Age: 64
End: 2024-05-10

## 2024-05-10 ENCOUNTER — APPOINTMENT (OUTPATIENT)
Dept: INFUSION THERAPY | Facility: HOSPITAL | Age: 64
End: 2024-05-10

## 2024-05-10 ENCOUNTER — APPOINTMENT (OUTPATIENT)
Dept: PHYSICAL MEDICINE AND REHAB | Facility: CLINIC | Age: 64
End: 2024-05-10
Payer: COMMERCIAL

## 2024-05-10 LAB
HCT VFR BLD CALC: 31.6 % — LOW (ref 34.5–45)
HGB BLD-MCNC: 10.1 G/DL — LOW (ref 11.5–15.5)
MCHC RBC-ENTMCNC: 28.4 PG — SIGNIFICANT CHANGE UP (ref 27–34)
MCHC RBC-ENTMCNC: 32 GM/DL — SIGNIFICANT CHANGE UP (ref 32–36)
MCV RBC AUTO: 88.8 FL — SIGNIFICANT CHANGE UP (ref 80–100)
PLATELET # BLD AUTO: 235 K/UL — SIGNIFICANT CHANGE UP (ref 150–400)
RBC # BLD: 3.56 M/UL — LOW (ref 3.8–5.2)
RBC # FLD: 15.7 % — HIGH (ref 10.3–14.5)
WBC # BLD: 4.63 K/UL — SIGNIFICANT CHANGE UP (ref 3.8–10.5)
WBC # FLD AUTO: 4.63 K/UL — SIGNIFICANT CHANGE UP (ref 3.8–10.5)

## 2024-05-10 PROCEDURE — 99214 OFFICE O/P EST MOD 30 MIN: CPT

## 2024-05-10 NOTE — HISTORY OF PRESENT ILLNESS
[FreeTextEntry1] : Jeimy Denis is a 63 year old female pmh triple negative R breast ca s/p neoadjuvant chemo (12 weeks), R lumpectomy and ALND 3/11/24. 1/52 LN positive. On Keytruda (planned until September), margins concerning for residual microscopic disease. planning for possible mastectomy vs re excision lumpectomy, case to be reviewed by tumor board.  At last visit she was referred for US given measurement difference in upper extremities, and history of DVT however on coumadon, and was to start breast PT program and compression, lymphedema therapy.  She had one session of breast PT yesterday and reports improvement already.   other pmh: epilepsy on 3 anti epilepsy, CAD s/p stent, htn, brain aneurysm s/p clipping (2013)( limited vision R eye, lower half field cut and decreased peripheral vision), dvt on coumadin.  has L chest mediport  She has neuropathy s/p chemo, improving tingling feet more than hands. Denies falls. States she is fearful of falls but mostly due to epilepsy in which she does not have advanced warning. She also has impaired vision R eye.  Lives with daughter in apartment with elevator. States she is estranged from her .

## 2024-05-10 NOTE — PHYSICAL EXAM
[FreeTextEntry1] : GEN: AAOx3, NAD PSYCH: Normal mood and affect. Responds appropriately to commands. EYES: Sclerae Anicteric. No discharge. RESP: Breathing unlabored. CV: Radial pulses 2+ and equal. No varicosities noted. EXT: No C/C/E. RUE at 10 cm below olecranon: 23 cm, at 10 cm above olecranon 30 cm LUE at 10 cm below olecranon 22.5 cm, at 10 cm above olecranon 29.5 cm SKIN: No lesions noted. Incision well healed R breast, no erythema or swelling LYMPH: No supraclavicular, anterior or posterior cervical lymphadenopathy appreciated. GAIT: Non antalgic, Normal reciprocating heel to toe. STRENGTH: 5/5 bilateral upper extremities SENSATION: Grossly intact to light touch bilateral upper extremities except for R axillary numbness and posterior R arm numbness INSP: no visible swelling appreciated CERVICAL ROM: Flexion, extension, side-bending, rotation, oblique extension all full and pain free. SHOULDER ROM: Full and pain free on the left, RUE: limited to 100 degrees without pain, 160 degrees (with pain/ tightness reported) PALP: There is no tenderness over the midline spinous processes, paravertebral muscles, trapezius, levator scapulae or +mild ttp R shoulder. + mild r axillary tenderness appreciated SPECIAL: no axillary cording on exam.

## 2024-05-10 NOTE — ASSESSMENT
[FreeTextEntry1] : accompanied with her daughter today. no significant measurement difference in upper extremities, can hold off on ultrasound, patient already on coumadin reports improvement with breast PT continue breast PT program also starting RT follow up in 6 weeks I spent a total of 30 minutes on this encounter including documentation, face to face time, care coordination and reviewing prior records.

## 2024-05-11 LAB
ALBUMIN SERPL ELPH-MCNC: 4.4 G/DL — SIGNIFICANT CHANGE UP (ref 3.3–5)
ALP SERPL-CCNC: 83 U/L — SIGNIFICANT CHANGE UP (ref 40–120)
ALT FLD-CCNC: 10 U/L — SIGNIFICANT CHANGE UP (ref 10–45)
ANION GAP SERPL CALC-SCNC: 14 MMOL/L — SIGNIFICANT CHANGE UP (ref 5–17)
AST SERPL-CCNC: 20 U/L — SIGNIFICANT CHANGE UP (ref 10–40)
BILIRUB SERPL-MCNC: <0.2 MG/DL — SIGNIFICANT CHANGE UP (ref 0.2–1.2)
BUN SERPL-MCNC: 16 MG/DL — SIGNIFICANT CHANGE UP (ref 7–23)
CALCIUM SERPL-MCNC: 9.4 MG/DL — SIGNIFICANT CHANGE UP (ref 8.4–10.5)
CHLORIDE SERPL-SCNC: 109 MMOL/L — HIGH (ref 96–108)
CO2 SERPL-SCNC: 22 MMOL/L — SIGNIFICANT CHANGE UP (ref 22–31)
CREAT SERPL-MCNC: 1.1 MG/DL — SIGNIFICANT CHANGE UP (ref 0.5–1.3)
EGFR: 56 ML/MIN/1.73M2 — LOW
GLUCOSE SERPL-MCNC: 81 MG/DL — SIGNIFICANT CHANGE UP (ref 70–99)
POTASSIUM SERPL-MCNC: 3.6 MMOL/L — SIGNIFICANT CHANGE UP (ref 3.5–5.3)
POTASSIUM SERPL-SCNC: 3.6 MMOL/L — SIGNIFICANT CHANGE UP (ref 3.5–5.3)
PROT SERPL-MCNC: 8.5 G/DL — HIGH (ref 6–8.3)
SODIUM SERPL-SCNC: 145 MMOL/L — SIGNIFICANT CHANGE UP (ref 135–145)

## 2024-05-15 ENCOUNTER — LABORATORY RESULT (OUTPATIENT)
Age: 64
End: 2024-05-15

## 2024-05-16 PROBLEM — C50.911 INVASIVE DUCTAL CARCINOMA OF BREAST, FEMALE, RIGHT: Status: ACTIVE | Noted: 2023-10-18

## 2024-05-16 PROCEDURE — 77295 3-D RADIOTHERAPY PLAN: CPT | Mod: 26

## 2024-05-16 PROCEDURE — 77334 RADIATION TREATMENT AID(S): CPT | Mod: 26

## 2024-05-16 PROCEDURE — 77300 RADIATION THERAPY DOSE PLAN: CPT | Mod: 26

## 2024-05-16 NOTE — LETTER CLOSING
[Consult Closing:] : Thank you for allowing me to participate in the care of this patient.  If you have any questions, please do not hesitate to contact me. [Sincerely yours,] : Sincerely yours, [FreeTextEntry3] : Roxanna Larios MD

## 2024-05-16 NOTE — REVIEW OF SYSTEMS
[Negative] : Allergic/Immunologic [Confused] : no confusion [FreeTextEntry3] : right side vision loss s/p stroke

## 2024-05-16 NOTE — PHYSICAL EXAM
[Sclera] : the sclera and conjunctiva were normal [Outer Ear] : the ears and nose were normal in appearance [Nondistended] : nondistended [Supraclavicular Lymph Nodes Enlarged Bilaterally] : supraclavicular [Axillary Lymph Nodes Enlarged Bilaterally] : axillary [Motor Tone] : muscle strength and tone were normal [] : no respiratory distress [Heart Rate And Rhythm] : heart rate and rhythm were normal [No UE Edema] : there is no upper extremity edema [Abdomen Soft] : soft [Normal] : no focal deficits [de-identified] : Right lumpectomy and axillary scars are well healed, no erythema

## 2024-05-16 NOTE — HISTORY OF PRESENT ILLNESS
[FreeTextEntry1] : Ms. Denis is a 62 yo female with clinical stage IIIA T3N1M0 triple negative poorly differentiated invasive ductal carcinoma of the right breast evaluated at [facility] on 10/26/23. At that time, the preliminary plan was for neoadjuvant chemotherapy followed by surgery, then radiation therapy. She returns today for re-evaluation.   She noticed a right breast mass while showering in September 2023. A diagnostic mammogram on 10/5/23 showed an irregular mass in the upper outer right breast measuring up to 3.6 cm and an abnormal appearing right axillary lymph node. There were no associated calcifications and no concerning findings in the left breast. Breast ultrasound showed an irregular hypoechoic mass in the right breast at 10:00 7 cm FN measuring up to 2.6 cm. An adjacent hypoechoic solid nodule anterior and superior to the index lesion measuring 1.1 cm was also noted. Abnormal right axillary lymph nodes were seen, one measuring 2 cm and the second measuring 5.4 mm.  Ultrasound guided biopsy of the right breast mass on 10/9/23 showed invasive ductal carcinoma, poorly differentiated, measuring at least 14 mm. IHC showed ER 0%, AR 0%, and HER2 0.  Core biopsy of the right axillary lymph node on 10/18/23 was positive for carcinoma measuring at least 1.25 mm.  Contrast enhanced mammography on 10/24 23 showed enhancement in the upper outer right breast measuring up to 6.1 cm, including a 0.7 cm focal asymmetry medial and inferior to the dominant mass, consistent with a satellite lesion. Right breast ultrasound showed the biopsy proven malignancy and a subcentimeter mass at 10:00 5 cm FN corresponding to the focal asymmetry seen on mammography.  CT CAP on 10/25/23 showed a 3.8 cm right breast mass and right axillary lymphadenopathy measuring up to 2.2 cm. A cluster of nonspecific subcentimeter prevascular lymph nodes were noted, measuring up to 1.2 cm. A subpleural 4 mm right middle lobe pulmonary nodule appeared to be benign. A bone scan on 11/10/23 showed no evidence of osseous metastatic disease.   Genetic testing on 10/18/23 with [employer] Multi-Cancer Panel and Breast Cancer STAT Panel showed no deleterious mutations.   She received neoadjuvant chemotherapy with AC/T with pembrolizumab.   Contrast enhanced mammography on 2/9/24 showed marked decrease in the previously visualized mass/asymmetry and enhancement in the upper outer right breast with mild residual focal enhancement 1.5 cm inferior to biopsy clips.  She underwent right breast lumpectomy and sentinel lymph node biopsy on 3/11/24. Pathology showed invasive ductal carcinoma, poorly differentiated, measuring at least 2.4 cm with multiple satellite foci, and extensive lymphovascular invasion. Margins were negative. Shaved margins were all benign. The lateral and anterior margin contained focal areas of lymphovascular invasion. Right axillary sentinel lymph node biopsy yielded two lymph nodes, one of which contained metastatic carcinoma measuring 9.0 mm without extranodal extension. Right axillary lymph node dissection contained 50 negative lymph nodes.   She has been feeling generally well. She reports symptoms of neuropathy, mainly tingling, in her feet and hands. She has been recuperating from surgery uneventfully. She will be starting physical therapy for upper extremity range of motion and lymphedema surveillance.

## 2024-05-17 LAB
CORTICOSTEROID BINDING GLOBULIN RESULT: 1.9 MG/DL — SIGNIFICANT CHANGE UP
CORTIS F/TOTAL MFR SERPL: 5 % — SIGNIFICANT CHANGE UP
CORTIS SERPL-MCNC: 6.5 UG/DL — SIGNIFICANT CHANGE UP
CORTISOL, FREE RESULT: 0.32 UG/DL — SIGNIFICANT CHANGE UP

## 2024-05-20 ENCOUNTER — OUTPATIENT (OUTPATIENT)
Dept: OUTPATIENT SERVICES | Facility: HOSPITAL | Age: 64
LOS: 1 days | Discharge: HOME | End: 2024-05-20

## 2024-05-20 DIAGNOSIS — Z98.89 UNDEFINED: Chronic | ICD-10-CM

## 2024-05-20 DIAGNOSIS — C50.919 MALIGNANT NEOPLASM OF UNSPECIFIED SITE OF UNSPECIFIED FEMALE BREAST: ICD-10-CM

## 2024-05-20 DIAGNOSIS — Z95.5 PRESENCE OF CORONARY ANGIOPLASTY IMPLANT AND GRAFT: Chronic | ICD-10-CM

## 2024-05-20 DIAGNOSIS — I72.9 ANEURYSM OF UNSPECIFIED SITE: Chronic | ICD-10-CM

## 2024-05-21 ENCOUNTER — NON-APPOINTMENT (OUTPATIENT)
Age: 64
End: 2024-05-21

## 2024-05-21 ENCOUNTER — LABORATORY RESULT (OUTPATIENT)
Age: 64
End: 2024-05-21

## 2024-05-21 LAB
INR PPP: 1.3
INR PPP: 1.6
INR PPP: 1.64
INR PPP: 2.1
INR PPP: 2.37
INR PPP: 2.4
INR PPP: 3.1
PT BLD: 13.7
PT BLD: 16.7
PT BLD: 24.6
PT BLD: 28
PT BLD: 28.8

## 2024-05-22 ENCOUNTER — APPOINTMENT (OUTPATIENT)
Dept: HEMATOLOGY ONCOLOGY | Facility: CLINIC | Age: 64
End: 2024-05-22

## 2024-05-22 PROCEDURE — 77280 THER RAD SIMULAJ FIELD SMPL: CPT | Mod: 26

## 2024-05-23 PROCEDURE — 77427 RADIATION TX MANAGEMENT X5: CPT

## 2024-05-24 ENCOUNTER — NON-APPOINTMENT (OUTPATIENT)
Age: 64
End: 2024-05-24

## 2024-05-24 ENCOUNTER — APPOINTMENT (OUTPATIENT)
Dept: CARDIOLOGY | Facility: CLINIC | Age: 64
End: 2024-05-24
Payer: COMMERCIAL

## 2024-05-24 VITALS
DIASTOLIC BLOOD PRESSURE: 79 MMHG | HEIGHT: 61 IN | OXYGEN SATURATION: 96 % | HEART RATE: 51 BPM | BODY MASS INDEX: 29.83 KG/M2 | SYSTOLIC BLOOD PRESSURE: 144 MMHG | WEIGHT: 158 LBS

## 2024-05-24 DIAGNOSIS — Z78.9 OTHER SPECIFIED HEALTH STATUS: ICD-10-CM

## 2024-05-24 DIAGNOSIS — Z86.79 PERSONAL HISTORY OF OTHER DISEASES OF THE CIRCULATORY SYSTEM: ICD-10-CM

## 2024-05-24 DIAGNOSIS — Z86.718 PERSONAL HISTORY OF OTHER VENOUS THROMBOSIS AND EMBOLISM: ICD-10-CM

## 2024-05-24 DIAGNOSIS — Z80.3 FAMILY HISTORY OF MALIGNANT NEOPLASM OF BREAST: ICD-10-CM

## 2024-05-24 PROCEDURE — 99214 OFFICE O/P EST MOD 30 MIN: CPT

## 2024-05-24 PROCEDURE — G2211 COMPLEX E/M VISIT ADD ON: CPT

## 2024-05-24 PROCEDURE — 77387C: CUSTOM

## 2024-05-24 NOTE — ED ADULT NURSE NOTE - ASSIGNED BED LOCATION
[facility]  Obstetrics and Gynecology  [address], [address]  [place] [address]  Phone: [phone]  Fax: [phone]  Follow Up Time:    496W

## 2024-05-28 PROCEDURE — 77387C: CUSTOM

## 2024-05-28 NOTE — ASSESSMENT
[FreeTextEntry1] : Assessment: 1. History of DVT in 2014 has been on Coumadin since 2. History of Brain Aneurysm s/p Hemicraniectomy and Clipping 3. History of Seizures on Keppra 4. H/o multiple ulcerations to the L shin area s/p debridement last in 2021 - healed 5. History of Bradycardia  6. Presence of IVC filter 7. L Femoral Vein DVT noted in 2/2023  Plan: 1. Repeat LE venous duplex to assess for DVT for surveillance. 2. Since she is on Oxcarbazepine, there is a drug interaction with this medication and a DOAC such as Eliquis.     There us some data to suggest that Oxcarbazepine can lower the efficacy of Eliquis.     Coumadin is the preferred agent.  3. Per Heme, Capecitabine may be started in the future, with temporary transition to Lovenox while she is taking this agent due to drug interaction with Coumadin. Her daughter present during office visit is aware/ 4. IVC filter to remain in place, was placed 10 years ago. 5. Continue excellent care with Dr. Ojeda.  6. Follow-up in 12 months. Call with any questions. Will call with LE venous duplex results.      I, Dr. Ravinder [place], personally performed the evaluation and management (E/M) services for this established patient who presents today.  That E/M includes conducting the examination, assessing all new/exacerbated conditions, and establishing a new plan of care.  Today, my ACP, Eleuterio Rios, was here to observe my evaluation and management services for this new problem/exacerbated condition to be followed going forward.

## 2024-05-28 NOTE — PHYSICAL EXAM
[Respiration, Rhythm And Depth] : normal respiratory rhythm and effort [Auscultation Breath Sounds / Voice Sounds] : lungs were clear to auscultation bilaterally [Heart Sounds] : normal S1 and S2 [Bowel Sounds] : normal bowel sounds [Abdomen Soft] : soft [Abdomen Tenderness] : non-tender [Abnormal Walk] : normal gait [] : no ischemic changes [Oriented To Time, Place, And Person] : oriented to person, place, and time [FreeTextEntry1] : see above

## 2024-05-28 NOTE — HISTORY OF PRESENT ILLNESS
[FreeTextEntry1] : 5/24  Since last visit patient reports diagnosis of R breast CA s/p R breast lumpectomy and LN biopsy in 3/2024, s/p subsequent chemotherapy and has initiated radiation therapy.   Denies C/P or SOB. Reports L more than R LE edema.  Reports home SBP 120s-130s.  Remains on Coumadin, last INR 2.0.   Per Heme, Capecitabine may be started in the future, with temporary transition to Lovenox while she is taking this agent due to drug interaction with Coumadin.   LE venous duplex 2/2024: 1.  No evidence of deep venous thrombosis in either lower extremity. 2. Previous LEFT femoral DVT, resolved  Medications: Norvasc 10 mg daily ASA 81 mg daily Coumadin Doxazosin 4 mg QD HCTZ 25 mg QD Keppra Olmesartan 40 mg QD  Crestor 40 mg QD Zonisamide Lacosamide Protonix Oxcarbazepine   4/25  Patient was admitted 1/24-2/8 for bradycardia and N/V. Hydrated for ROBINA. EGD normal.  Labetalol and Clonidine were discontinued.    CT Abd/Pelvis in 2/2023 showed: no acute pathology. Chronic IVC filter.  Patient reports IVC filter was placed in 2013.  Last LE venous duplex 2/1 showed: acute occlusive DVT in the left femoral vein in the mid and distal thigh.  Reports home SBP is 120s/130s.   Denies C/P or SOB. No LE pain or edema.    Medications: Norvasc 10 mg daily ASA 81 mg daily Coumadin Doxazosin 4 mg QD HCTZ 25 mg QD Keppra Olmesartan 40 mg QD  Crestor 40 mg QD Zonisamide Lacosamide Protonix Oxcarbazepine  1/24/23  61 y/o F w/ PMHx of HTN, HLD, CAD s/p prior PCI, Brain Aneurysm s/p Hemicraniectomy and Clipping in 2013, Legally Blind (field cut in R eye), History of Seizures on Keppra), history of  DVT reports being noted since 2014 at [facility] and was placed on Coumadin, developed multiple ulceration to the L shin area s/p debridement last in 2021. Last hospitalization was in 5/2022 for syncope, seizure and rhabdomyolysis.  Reports home SBP 130s. Initial SBP 90s by electronic cuff by medical assistant in office. Repeat manual BP 10/5/70 bilaterally.    Medications: Norvasc 10 mg daily ASA 81 mg daily Coumadin Clonidine 0.1 mg TID Doxazosin 4 mg QD HCTZ 25 mg QD Labetalol 200 mg BID Keppra Olmesartan 20 mg QD  Crestor 20 mg QD Zosoziamide

## 2024-05-28 NOTE — REVIEW OF SYSTEMS
[Negative] : Heme/Lymph [FreeTextEntry5] : see HPI [FreeTextEntry9] : see HPI [de-identified] : see HPI

## 2024-05-29 ENCOUNTER — APPOINTMENT (OUTPATIENT)
Dept: INTERNAL MEDICINE | Facility: CLINIC | Age: 64
End: 2024-05-29

## 2024-05-29 ENCOUNTER — APPOINTMENT (OUTPATIENT)
Dept: HEMATOLOGY ONCOLOGY | Facility: CLINIC | Age: 64
End: 2024-05-29

## 2024-05-29 ENCOUNTER — NON-APPOINTMENT (OUTPATIENT)
Age: 64
End: 2024-05-29

## 2024-05-29 ENCOUNTER — LABORATORY RESULT (OUTPATIENT)
Age: 64
End: 2024-05-29

## 2024-05-29 PROCEDURE — 77387C: CUSTOM

## 2024-05-30 ENCOUNTER — LABORATORY RESULT (OUTPATIENT)
Age: 64
End: 2024-05-30

## 2024-05-30 PROCEDURE — 77387C: CUSTOM

## 2024-05-30 NOTE — HISTORY OF PRESENT ILLNESS
[FreeTextEntry1] : Ms. Denis is a 63-year-old female with clinical stage IIIA T3N1M0 triple negative poorly differentiated invasive ductal carcinoma of the right breast. She received neoadjuvant chemotherapy followed by lumpectomy showing [id number] residual disease. Pathology is remarkable for extensive lymphovascular invasion and axillary lymph node dissection showing 50 negative lymph nodes. She is receiving adjuvant radiation therapy and presents for a routine on-treatment visit.   She is feeling generally well. She has been undergoing PT. She noticed redness of the breast during the past week. She is using Aquaphor on the skin.

## 2024-05-30 NOTE — REVIEW OF SYSTEMS
[Alopecia: Grade 0] : Alopecia: Grade 0 [Pruritus: Grade 0] : Pruritus: Grade 0 [Skin Atrophy: Grade 0] : Skin Atrophy: Grade 0 [Skin Hyperpigmentation: Grade 1 - Hyperpigmentation covering <10% BSA; no psychosocial impact] : Skin Hyperpigmentation: Grade 1 - Hyperpigmentation covering <10% BSA; no psychosocial impact [Skin Induration: Grade 0] : Skin Induration: Grade 0 [Dermatitis Radiation: Grade 1 - Faint erythema or dry desquamation] : Dermatitis Radiation: Grade 1 - Faint erythema or dry desquamation [Skin Hyperpigmentation: Grade 0] : Skin Hyperpigmentation: Grade 0 [Dermatitis Radiation: Grade 0] : Dermatitis Radiation: Grade 0

## 2024-05-30 NOTE — PHYSICAL EXAM
[Normal] : well developed, well nourished, in no acute distress [de-identified] : Right lumpectomy and axillary scars are well healed, mild erythema and inflammation around incision site.

## 2024-05-30 NOTE — DISEASE MANAGEMENT
[Clinical] : TNM Stage: c [IIIA] : IIIA [TTNM] : 3 [NTNM] : 1 [MTNM] : 0 [de-identified] : 1068cGy [de-identified] : 8697lBh [de-identified] : R breast

## 2024-05-31 PROCEDURE — 77427 RADIATION TX MANAGEMENT X5: CPT

## 2024-05-31 PROCEDURE — 77387C: CUSTOM

## 2024-06-01 ENCOUNTER — RESULT REVIEW (OUTPATIENT)
Age: 64
End: 2024-06-01

## 2024-06-01 ENCOUNTER — APPOINTMENT (OUTPATIENT)
Dept: INFUSION THERAPY | Facility: HOSPITAL | Age: 64
End: 2024-06-01

## 2024-06-01 LAB
ALBUMIN SERPL ELPH-MCNC: 4.2 G/DL — SIGNIFICANT CHANGE UP (ref 3.3–5)
ALP SERPL-CCNC: 88 U/L — SIGNIFICANT CHANGE UP (ref 40–120)
ALT FLD-CCNC: 11 U/L — SIGNIFICANT CHANGE UP (ref 10–45)
ANION GAP SERPL CALC-SCNC: 12 MMOL/L — SIGNIFICANT CHANGE UP (ref 5–17)
AST SERPL-CCNC: 15 U/L — SIGNIFICANT CHANGE UP (ref 10–40)
BASOPHILS # BLD AUTO: 0.01 K/UL — SIGNIFICANT CHANGE UP (ref 0–0.2)
BASOPHILS NFR BLD AUTO: 0.2 % — SIGNIFICANT CHANGE UP (ref 0–2)
BILIRUB SERPL-MCNC: <0.2 MG/DL — SIGNIFICANT CHANGE UP (ref 0.2–1.2)
BUN SERPL-MCNC: 19 MG/DL — SIGNIFICANT CHANGE UP (ref 7–23)
CALCIUM SERPL-MCNC: 9 MG/DL — SIGNIFICANT CHANGE UP (ref 8.4–10.5)
CHLORIDE SERPL-SCNC: 113 MMOL/L — HIGH (ref 96–108)
CO2 SERPL-SCNC: 19 MMOL/L — LOW (ref 22–31)
CORTIS AM PEAK SERPL-MCNC: 7.1 UG/DL — SIGNIFICANT CHANGE UP (ref 6–18.4)
CREAT SERPL-MCNC: 1.13 MG/DL — SIGNIFICANT CHANGE UP (ref 0.5–1.3)
EGFR: 55 ML/MIN/1.73M2 — LOW
EOSINOPHIL # BLD AUTO: 0.08 K/UL — SIGNIFICANT CHANGE UP (ref 0–0.5)
EOSINOPHIL NFR BLD AUTO: 1.8 % — SIGNIFICANT CHANGE UP (ref 0–6)
GLUCOSE SERPL-MCNC: 61 MG/DL — LOW (ref 70–99)
HCT VFR BLD CALC: 31.1 % — LOW (ref 34.5–45)
HGB BLD-MCNC: 10.1 G/DL — LOW (ref 11.5–15.5)
IMM GRANULOCYTES NFR BLD AUTO: 0.5 % — SIGNIFICANT CHANGE UP (ref 0–0.9)
LYMPHOCYTES # BLD AUTO: 0.85 K/UL — LOW (ref 1–3.3)
LYMPHOCYTES # BLD AUTO: 19.5 % — SIGNIFICANT CHANGE UP (ref 13–44)
MCHC RBC-ENTMCNC: 28.5 PG — SIGNIFICANT CHANGE UP (ref 27–34)
MCHC RBC-ENTMCNC: 32.5 G/DL — SIGNIFICANT CHANGE UP (ref 32–36)
MCV RBC AUTO: 87.9 FL — SIGNIFICANT CHANGE UP (ref 80–100)
MONOCYTES # BLD AUTO: 0.34 K/UL — SIGNIFICANT CHANGE UP (ref 0–0.9)
MONOCYTES NFR BLD AUTO: 7.8 % — SIGNIFICANT CHANGE UP (ref 2–14)
NEUTROPHILS # BLD AUTO: 3.05 K/UL — SIGNIFICANT CHANGE UP (ref 1.8–7.4)
NEUTROPHILS NFR BLD AUTO: 70.2 % — SIGNIFICANT CHANGE UP (ref 43–77)
NRBC # BLD: 0 /100 WBCS — SIGNIFICANT CHANGE UP (ref 0–0)
PLATELET # BLD AUTO: 242 K/UL — SIGNIFICANT CHANGE UP (ref 150–400)
POTASSIUM SERPL-MCNC: 4.5 MMOL/L — SIGNIFICANT CHANGE UP (ref 3.5–5.3)
POTASSIUM SERPL-SCNC: 4.5 MMOL/L — SIGNIFICANT CHANGE UP (ref 3.5–5.3)
PROT SERPL-MCNC: 7.7 G/DL — SIGNIFICANT CHANGE UP (ref 6–8.3)
RBC # BLD: 3.54 M/UL — LOW (ref 3.8–5.2)
RBC # FLD: 16.2 % — HIGH (ref 10.3–14.5)
SODIUM SERPL-SCNC: 144 MMOL/L — SIGNIFICANT CHANGE UP (ref 135–145)
T4 FREE+ TSH PNL SERPL: 1.47 UIU/ML — SIGNIFICANT CHANGE UP (ref 0.27–4.2)
WBC # BLD: 4.35 K/UL — SIGNIFICANT CHANGE UP (ref 3.8–10.5)
WBC # FLD AUTO: 4.35 K/UL — SIGNIFICANT CHANGE UP (ref 3.8–10.5)

## 2024-06-03 DIAGNOSIS — Z51.11 ENCOUNTER FOR ANTINEOPLASTIC CHEMOTHERAPY: ICD-10-CM

## 2024-06-03 PROCEDURE — 77387C: CUSTOM

## 2024-06-04 ENCOUNTER — LABORATORY RESULT (OUTPATIENT)
Age: 64
End: 2024-06-04

## 2024-06-04 PROCEDURE — 77387C: CUSTOM

## 2024-06-04 RX ORDER — OLMESARTAN MEDOXOMIL 40 MG/1
40 TABLET, FILM COATED ORAL
Qty: 90 | Refills: 3 | Status: ACTIVE | COMMUNITY
Start: 2021-01-27 | End: 1900-01-01

## 2024-06-04 RX ORDER — HYDROCHLOROTHIAZIDE 25 MG/1
25 TABLET ORAL
Qty: 90 | Refills: 3 | Status: ACTIVE | COMMUNITY
Start: 2019-05-06 | End: 1900-01-01

## 2024-06-05 ENCOUNTER — NON-APPOINTMENT (OUTPATIENT)
Age: 64
End: 2024-06-05

## 2024-06-05 ENCOUNTER — APPOINTMENT (OUTPATIENT)
Dept: HEMATOLOGY ONCOLOGY | Facility: CLINIC | Age: 64
End: 2024-06-05

## 2024-06-05 ENCOUNTER — APPOINTMENT (OUTPATIENT)
Dept: PHYSICAL MEDICINE AND REHAB | Facility: CLINIC | Age: 64
End: 2024-06-05
Payer: COMMERCIAL

## 2024-06-05 PROCEDURE — 99214 OFFICE O/P EST MOD 30 MIN: CPT | Mod: 25

## 2024-06-05 PROCEDURE — 77387C: CUSTOM

## 2024-06-05 NOTE — ASSESSMENT
[FreeTextEntry1] : follow up in July to restart breast PT for R shoulder, R breast after RT completes (July) patient has likely neuropathic pain, tolerable, does not wish to take medications follow up in 4-6 weeks. I spent a total of  30 minutes on this encounter including documentation, face to face time, care coordination and reviewing prior records.

## 2024-06-05 NOTE — DISEASE MANAGEMENT
[Clinical] : TNM Stage: c [IIIA] : IIIA [TTNM] : 3 [NTNM] : 1 [MTNM] : 0 [de-identified] : 8503cGy [de-identified] : 8604nEf [de-identified] : R breast

## 2024-06-05 NOTE — ED ADULT TRIAGE NOTE - BSA (M2)
Physical Therapy                 Therapy Communication Note    Patient Name: Markie Nobles  MRN: [id number]  Today's Date: 6/5/2024     Discipline: Physical Therapy    Missed Visit Reason: Missed Visit Reason: Other (Comment) (Pts wife confirmed that she decided to take pt home with hospice; will DC PT (MD/RN informed). she confirmed that she would like from hospice (if possible): hospital bed, Tabitha lift, recliner wheelchair with cushion (head rest, elevating legrest).)    Missed Time: Attempt    Comment: 10:18am     1.87

## 2024-06-05 NOTE — HISTORY OF PRESENT ILLNESS
[FreeTextEntry1] : Jeimy Denis is a 63 year old female pmh triple negative R breast ca s/p neoadjuvant chemo (12 weeks), R lumpectomy and ALND 3/11/24. 1/52 LN positive. On Keytruda (planned until September), margins concerning for residual microscopic disease.   She has been attending breast PT program, had some redness at incision, PT held for now.    mild medial aspect of incision hyperpigmentation   attending radiation therapy, has 8 more days of treatment.   Seeing oncology today.   in process of getting compression sleeve.     receiving immunotherapy, has L chest port  since starting RT, has been having shooting R breast pain a few times a day, states it is tolerable, 6/10, lasts a few seconds.     Takes tylenol prn.  planning to change from coumadin to lovenox when starting chemo.   seizure history: Dr. Houser neurology

## 2024-06-05 NOTE — HISTORY OF PRESENT ILLNESS
[FreeTextEntry1] : Ms. Denis is a 63-year-old female with clinical stage IIIA T3N1M0 triple negative poorly differentiated invasive ductal carcinoma of the right breast. She received neoadjuvant chemotherapy followed by lumpectomy showing [id number] residual disease. Pathology is remarkable for extensive lymphovascular invasion and axillary lymph node dissection showing 50 negative lymph nodes. She is receiving adjuvant radiation therapy and presents for a routine on-treatment visit.   She is feeling generally well. She has been undergoing PT and met with Dr. Hensley today. She will take a pause on PT for now until she finishes RT. She noticed redness of the breast last week, but she notes less this week. She is using Aquaphor on the skin. Her energy level is good.

## 2024-06-05 NOTE — PHYSICAL EXAM
[Normal] : well developed, well nourished, in no acute distress [de-identified] : Right lumpectomy and axillary scars are well healed, mild erythema and hyperpigmentation around incision site.

## 2024-06-05 NOTE — PHYSICAL EXAM
[FreeTextEntry1] : GEN: AAOx3, NAD PSYCH: Normal mood and affect. Responds appropriately to commands. EYES: Sclerae Anicteric. No discharge. RESP: Breathing unlabored. CV: Radial pulses 2+ and equal. No varicosities noted. EXT: No C/C/E. RUE at 10 cm below olecranon: 24 cm, at 10 cm above olecranon 29.5 cm LUE at 10 cm below olecranon 24 cm, at 10 cm above olecranon 28.5 cm SKIN: No lesions noted. Incision well healed R breast, no erythema or swelling LYMPH: No supraclavicular, anterior or posterior cervical lymphadenopathy appreciated. GAIT: Non antalgic, Normal reciprocating heel to toe. STRENGTH: 5/5 bilateral upper extremities SENSATION: Grossly intact to light touch bilateral upper extremities except for R axillary numbness and posterior R arm numbness INSP: no visible swelling appreciated CERVICAL ROM: Flexion, extension, side-bending, rotation, oblique extension all full and pain free. SHOULDER ROM: Full and pain free on the left, RUE: limited to 135 degrees without pain  PALP: There is no tenderness over the midline spinous processes, paravertebral muscles, trapezius, levator scapulae or +mild ttp R shoulder. + mild r axillary tenderness appreciated SPECIAL: no axillary cording on exam.

## 2024-06-06 ENCOUNTER — LABORATORY RESULT (OUTPATIENT)
Age: 64
End: 2024-06-06

## 2024-06-06 PROCEDURE — 77387C: CUSTOM

## 2024-06-07 ENCOUNTER — LABORATORY RESULT (OUTPATIENT)
Age: 64
End: 2024-06-07

## 2024-06-07 PROCEDURE — 77387C: CUSTOM

## 2024-06-07 PROCEDURE — 77427 RADIATION TX MANAGEMENT X5: CPT

## 2024-06-09 LAB
INR PPP: 2.08 RATIO
PT BLD: 23 SEC

## 2024-06-10 PROCEDURE — 77387C: CUSTOM

## 2024-06-11 ENCOUNTER — LABORATORY RESULT (OUTPATIENT)
Age: 64
End: 2024-06-11

## 2024-06-11 PROCEDURE — 77387C: CUSTOM

## 2024-06-12 ENCOUNTER — LABORATORY RESULT (OUTPATIENT)
Age: 64
End: 2024-06-12

## 2024-06-12 ENCOUNTER — NON-APPOINTMENT (OUTPATIENT)
Age: 64
End: 2024-06-12

## 2024-06-12 ENCOUNTER — APPOINTMENT (OUTPATIENT)
Dept: HEMATOLOGY ONCOLOGY | Facility: CLINIC | Age: 64
End: 2024-06-12

## 2024-06-12 PROCEDURE — 77387C: CUSTOM

## 2024-06-12 NOTE — HISTORY OF PRESENT ILLNESS
[FreeTextEntry1] : Ms. Denis is a 63-year-old female with clinical stage IIIA T3N1M0 triple negative poorly differentiated invasive ductal carcinoma of the right breast. She received neoadjuvant chemotherapy followed by lumpectomy showing [id number] residual disease. Pathology is remarkable for extensive lymphovascular invasion and axillary lymph node dissection showing 50 negative lymph nodes. She is receiving adjuvant radiation therapy and presents for a routine on-treatment visit.   She is feeling generally well. She has occasional brief pains in the right breast. The skin is a little darker. She noted an intermittent dry non-productive cough for the past 1-2 weeks. She has no other symptoms, no fever, congestions, runny nose. Otherwise, she has been feeling fine.

## 2024-06-12 NOTE — DISEASE MANAGEMENT
[Clinical] : TNM Stage: c [IIIA] : IIIA [TTNM] : 3 [NTNM] : 1 [MTNM] : 0 [de-identified] : cGy [de-identified] : 6088wJp [de-identified] : R breast

## 2024-06-12 NOTE — PHYSICAL EXAM
[Normal] : well developed, well nourished, in no acute distress [de-identified] : Right lumpectomy and axillary scars are well healed, mild to moderate erythema and hyperpigmentation

## 2024-06-13 PROCEDURE — 77387B: CUSTOM | Mod: 26

## 2024-06-14 ENCOUNTER — LABORATORY RESULT (OUTPATIENT)
Age: 64
End: 2024-06-14

## 2024-06-14 RX ORDER — ROSUVASTATIN CALCIUM 40 MG/1
40 TABLET, FILM COATED ORAL
Qty: 90 | Refills: 1 | Status: ACTIVE | COMMUNITY
Start: 2020-10-16 | End: 1900-01-01

## 2024-06-14 RX ORDER — WARFARIN 7.5 MG/1
7.5 TABLET ORAL
Qty: 90 | Refills: 1 | Status: ACTIVE | COMMUNITY
Start: 2022-12-29 | End: 1900-01-01

## 2024-06-14 RX ORDER — POTASSIUM CHLORIDE 1500 MG/1
20 TABLET, FILM COATED, EXTENDED RELEASE ORAL
Qty: 30 | Refills: 3 | Status: ACTIVE | COMMUNITY
Start: 2023-02-14 | End: 1900-01-01

## 2024-06-14 RX ORDER — AMLODIPINE BESYLATE 10 MG/1
10 TABLET ORAL
Qty: 180 | Refills: 3 | Status: ACTIVE | COMMUNITY
Start: 2024-02-25 | End: 1900-01-01

## 2024-06-16 ENCOUNTER — LABORATORY RESULT (OUTPATIENT)
Age: 64
End: 2024-06-16

## 2024-06-17 ENCOUNTER — NON-APPOINTMENT (OUTPATIENT)
Age: 64
End: 2024-06-17

## 2024-06-17 ENCOUNTER — LABORATORY RESULT (OUTPATIENT)
Age: 64
End: 2024-06-17

## 2024-06-19 ENCOUNTER — LABORATORY RESULT (OUTPATIENT)
Age: 64
End: 2024-06-19

## 2024-06-19 ENCOUNTER — NON-APPOINTMENT (OUTPATIENT)
Age: 64
End: 2024-06-19

## 2024-06-20 ENCOUNTER — NON-APPOINTMENT (OUTPATIENT)
Age: 64
End: 2024-06-20

## 2024-06-22 ENCOUNTER — RESULT REVIEW (OUTPATIENT)
Age: 64
End: 2024-06-22

## 2024-06-22 ENCOUNTER — APPOINTMENT (OUTPATIENT)
Dept: INFUSION THERAPY | Facility: HOSPITAL | Age: 64
End: 2024-06-22

## 2024-06-22 LAB
ALBUMIN SERPL ELPH-MCNC: 4.4 G/DL — SIGNIFICANT CHANGE UP (ref 3.3–5)
ALP SERPL-CCNC: 91 U/L — SIGNIFICANT CHANGE UP (ref 40–120)
ALT FLD-CCNC: 14 U/L — SIGNIFICANT CHANGE UP (ref 10–45)
ANION GAP SERPL CALC-SCNC: 12 MMOL/L — SIGNIFICANT CHANGE UP (ref 5–17)
AST SERPL-CCNC: 18 U/L — SIGNIFICANT CHANGE UP (ref 10–40)
BASOPHILS # BLD AUTO: 0.02 K/UL — SIGNIFICANT CHANGE UP (ref 0–0.2)
BASOPHILS NFR BLD AUTO: 0.5 % — SIGNIFICANT CHANGE UP (ref 0–2)
BILIRUB SERPL-MCNC: <0.2 MG/DL — SIGNIFICANT CHANGE UP (ref 0.2–1.2)
BUN SERPL-MCNC: 20 MG/DL — SIGNIFICANT CHANGE UP (ref 7–23)
CALCIUM SERPL-MCNC: 9.8 MG/DL — SIGNIFICANT CHANGE UP (ref 8.4–10.5)
CHLORIDE SERPL-SCNC: 111 MMOL/L — HIGH (ref 96–108)
CO2 SERPL-SCNC: 21 MMOL/L — LOW (ref 22–31)
CORTIS AM PEAK SERPL-MCNC: 9.2 UG/DL — SIGNIFICANT CHANGE UP (ref 6–18.4)
CREAT SERPL-MCNC: 1.34 MG/DL — HIGH (ref 0.5–1.3)
EGFR: 45 ML/MIN/1.73M2 — LOW
EOSINOPHIL # BLD AUTO: 0.07 K/UL — SIGNIFICANT CHANGE UP (ref 0–0.5)
EOSINOPHIL NFR BLD AUTO: 1.7 % — SIGNIFICANT CHANGE UP (ref 0–6)
GLUCOSE SERPL-MCNC: 70 MG/DL — SIGNIFICANT CHANGE UP (ref 70–99)
HCT VFR BLD CALC: 31.5 % — LOW (ref 34.5–45)
HGB BLD-MCNC: 10.6 G/DL — LOW (ref 11.5–15.5)
IMM GRANULOCYTES NFR BLD AUTO: 0.5 % — SIGNIFICANT CHANGE UP (ref 0–0.9)
LYMPHOCYTES # BLD AUTO: 0.67 K/UL — LOW (ref 1–3.3)
LYMPHOCYTES # BLD AUTO: 16.2 % — SIGNIFICANT CHANGE UP (ref 13–44)
MCHC RBC-ENTMCNC: 29 PG — SIGNIFICANT CHANGE UP (ref 27–34)
MCHC RBC-ENTMCNC: 33.7 G/DL — SIGNIFICANT CHANGE UP (ref 32–36)
MCV RBC AUTO: 86.1 FL — SIGNIFICANT CHANGE UP (ref 80–100)
MONOCYTES # BLD AUTO: 0.43 K/UL — SIGNIFICANT CHANGE UP (ref 0–0.9)
MONOCYTES NFR BLD AUTO: 10.4 % — SIGNIFICANT CHANGE UP (ref 2–14)
NEUTROPHILS # BLD AUTO: 2.92 K/UL — SIGNIFICANT CHANGE UP (ref 1.8–7.4)
NEUTROPHILS NFR BLD AUTO: 70.7 % — SIGNIFICANT CHANGE UP (ref 43–77)
NRBC # BLD: 0 /100 WBCS — SIGNIFICANT CHANGE UP (ref 0–0)
PLATELET # BLD AUTO: 201 K/UL — SIGNIFICANT CHANGE UP (ref 150–400)
POTASSIUM SERPL-MCNC: 3.8 MMOL/L — SIGNIFICANT CHANGE UP (ref 3.5–5.3)
POTASSIUM SERPL-SCNC: 3.8 MMOL/L — SIGNIFICANT CHANGE UP (ref 3.5–5.3)
PROT SERPL-MCNC: 8.3 G/DL — SIGNIFICANT CHANGE UP (ref 6–8.3)
RBC # BLD: 3.66 M/UL — LOW (ref 3.8–5.2)
RBC # FLD: 16.9 % — HIGH (ref 10.3–14.5)
SODIUM SERPL-SCNC: 144 MMOL/L — SIGNIFICANT CHANGE UP (ref 135–145)
T4 FREE+ TSH PNL SERPL: 2.56 UIU/ML — SIGNIFICANT CHANGE UP (ref 0.27–4.2)
WBC # BLD: 4.13 K/UL — SIGNIFICANT CHANGE UP (ref 3.8–10.5)
WBC # FLD AUTO: 4.13 K/UL — SIGNIFICANT CHANGE UP (ref 3.8–10.5)

## 2024-06-26 ENCOUNTER — LABORATORY RESULT (OUTPATIENT)
Age: 64
End: 2024-06-26

## 2024-06-29 ENCOUNTER — LABORATORY RESULT (OUTPATIENT)
Age: 64
End: 2024-06-29

## 2024-07-03 ENCOUNTER — LABORATORY RESULT (OUTPATIENT)
Age: 64
End: 2024-07-03

## 2024-07-10 ENCOUNTER — LABORATORY RESULT (OUTPATIENT)
Age: 64
End: 2024-07-10

## 2024-07-12 ENCOUNTER — RESULT REVIEW (OUTPATIENT)
Age: 64
End: 2024-07-12

## 2024-07-12 ENCOUNTER — APPOINTMENT (OUTPATIENT)
Dept: HEMATOLOGY ONCOLOGY | Facility: CLINIC | Age: 64
End: 2024-07-12
Payer: COMMERCIAL

## 2024-07-12 ENCOUNTER — APPOINTMENT (OUTPATIENT)
Dept: INFUSION THERAPY | Facility: HOSPITAL | Age: 64
End: 2024-07-12

## 2024-07-12 VITALS
TEMPERATURE: 97.3 F | OXYGEN SATURATION: 99 % | DIASTOLIC BLOOD PRESSURE: 70 MMHG | HEART RATE: 62 BPM | RESPIRATION RATE: 16 BRPM | SYSTOLIC BLOOD PRESSURE: 118 MMHG | WEIGHT: 159.17 LBS | BODY MASS INDEX: 30.08 KG/M2

## 2024-07-12 DIAGNOSIS — Z79.899 OTHER LONG TERM (CURRENT) DRUG THERAPY: ICD-10-CM

## 2024-07-12 DIAGNOSIS — Z79.01 LONG TERM (CURRENT) USE OF ANTICOAGULANTS: ICD-10-CM

## 2024-07-12 LAB
ALBUMIN SERPL ELPH-MCNC: 4.6 G/DL — SIGNIFICANT CHANGE UP (ref 3.3–5)
ALP SERPL-CCNC: 86 U/L — SIGNIFICANT CHANGE UP (ref 40–120)
ALT FLD-CCNC: 9 U/L — LOW (ref 10–45)
ANION GAP SERPL CALC-SCNC: 12 MMOL/L — SIGNIFICANT CHANGE UP (ref 5–17)
AST SERPL-CCNC: 21 U/L — SIGNIFICANT CHANGE UP (ref 10–40)
BASOPHILS # BLD AUTO: 0.02 K/UL — SIGNIFICANT CHANGE UP (ref 0–0.2)
BASOPHILS NFR BLD AUTO: 0.5 % — SIGNIFICANT CHANGE UP (ref 0–2)
BILIRUB SERPL-MCNC: 0.2 MG/DL — SIGNIFICANT CHANGE UP (ref 0.2–1.2)
BUN SERPL-MCNC: 17 MG/DL — SIGNIFICANT CHANGE UP (ref 7–23)
CALCIUM SERPL-MCNC: 9.6 MG/DL — SIGNIFICANT CHANGE UP (ref 8.4–10.5)
CHLORIDE SERPL-SCNC: 108 MMOL/L — SIGNIFICANT CHANGE UP (ref 96–108)
CO2 SERPL-SCNC: 23 MMOL/L — SIGNIFICANT CHANGE UP (ref 22–31)
CREAT SERPL-MCNC: 1.28 MG/DL — SIGNIFICANT CHANGE UP (ref 0.5–1.3)
EGFR: 47 ML/MIN/1.73M2 — LOW
EOSINOPHIL # BLD AUTO: 0.04 K/UL — SIGNIFICANT CHANGE UP (ref 0–0.5)
EOSINOPHIL NFR BLD AUTO: 1 % — SIGNIFICANT CHANGE UP (ref 0–6)
GLUCOSE SERPL-MCNC: 91 MG/DL — SIGNIFICANT CHANGE UP (ref 70–99)
HCT VFR BLD CALC: 31.9 % — LOW (ref 34.5–45)
HGB BLD-MCNC: 10.5 G/DL — LOW (ref 11.5–15.5)
IMM GRANULOCYTES NFR BLD AUTO: 0.2 % — SIGNIFICANT CHANGE UP (ref 0–0.9)
LYMPHOCYTES # BLD AUTO: 0.91 K/UL — LOW (ref 1–3.3)
LYMPHOCYTES # BLD AUTO: 21.9 % — SIGNIFICANT CHANGE UP (ref 13–44)
MCHC RBC-ENTMCNC: 27.9 PG — SIGNIFICANT CHANGE UP (ref 27–34)
MCHC RBC-ENTMCNC: 32.9 G/DL — SIGNIFICANT CHANGE UP (ref 32–36)
MCV RBC AUTO: 84.6 FL — SIGNIFICANT CHANGE UP (ref 80–100)
MONOCYTES # BLD AUTO: 0.4 K/UL — SIGNIFICANT CHANGE UP (ref 0–0.9)
MONOCYTES NFR BLD AUTO: 9.6 % — SIGNIFICANT CHANGE UP (ref 2–14)
NEUTROPHILS # BLD AUTO: 2.77 K/UL — SIGNIFICANT CHANGE UP (ref 1.8–7.4)
NEUTROPHILS NFR BLD AUTO: 66.8 % — SIGNIFICANT CHANGE UP (ref 43–77)
NRBC # BLD: 0 /100 WBCS — SIGNIFICANT CHANGE UP (ref 0–0)
PLATELET # BLD AUTO: 203 K/UL — SIGNIFICANT CHANGE UP (ref 150–400)
POTASSIUM SERPL-MCNC: 3.4 MMOL/L — LOW (ref 3.5–5.3)
POTASSIUM SERPL-SCNC: 3.4 MMOL/L — LOW (ref 3.5–5.3)
PROT SERPL-MCNC: 8.4 G/DL — HIGH (ref 6–8.3)
RBC # BLD: 3.77 M/UL — LOW (ref 3.8–5.2)
RBC # FLD: 17.6 % — HIGH (ref 10.3–14.5)
SODIUM SERPL-SCNC: 143 MMOL/L — SIGNIFICANT CHANGE UP (ref 135–145)
WBC # BLD: 4.15 K/UL — SIGNIFICANT CHANGE UP (ref 3.8–10.5)
WBC # FLD AUTO: 4.15 K/UL — SIGNIFICANT CHANGE UP (ref 3.8–10.5)

## 2024-07-12 PROCEDURE — G2211 COMPLEX E/M VISIT ADD ON: CPT

## 2024-07-12 PROCEDURE — 99215 OFFICE O/P EST HI 40 MIN: CPT

## 2024-07-12 RX ORDER — CAPECITABINE 500 MG/1
500 TABLET ORAL
Qty: 84 | Refills: 5 | Status: ACTIVE | COMMUNITY
Start: 2024-07-12 | End: 1900-01-01

## 2024-07-12 RX ORDER — ENOXAPARIN SODIUM 100 MG/ML
100 INJECTION, SOLUTION SUBCUTANEOUS DAILY
Qty: 3 | Refills: 5 | Status: ACTIVE | COMMUNITY
Start: 2024-07-12 | End: 1900-01-01

## 2024-07-13 LAB
CORTIS AM PEAK SERPL-MCNC: 5.7 UG/DL — LOW (ref 6–18.4)
T4 FREE+ TSH PNL SERPL: 2.19 UIU/ML — SIGNIFICANT CHANGE UP (ref 0.27–4.2)

## 2024-07-17 ENCOUNTER — LABORATORY RESULT (OUTPATIENT)
Age: 64
End: 2024-07-17

## 2024-07-22 ENCOUNTER — APPOINTMENT (OUTPATIENT)
Dept: RADIATION ONCOLOGY | Facility: CLINIC | Age: 64
End: 2024-07-22
Payer: COMMERCIAL

## 2024-07-22 VITALS
HEIGHT: 61 IN | DIASTOLIC BLOOD PRESSURE: 68 MMHG | OXYGEN SATURATION: 100 % | WEIGHT: 159.17 LBS | HEART RATE: 60 BPM | TEMPERATURE: 97.3 F | RESPIRATION RATE: 16 BRPM | BODY MASS INDEX: 30.05 KG/M2 | SYSTOLIC BLOOD PRESSURE: 115 MMHG

## 2024-07-22 PROCEDURE — 99024 POSTOP FOLLOW-UP VISIT: CPT

## 2024-07-23 NOTE — REVIEW OF SYSTEMS
[Alopecia: Grade 0] : Alopecia: Grade 0 [Pruritus: Grade 0] : Pruritus: Grade 0 [Skin Atrophy: Grade 0] : Skin Atrophy: Grade 0 [Skin Hyperpigmentation: Grade 1 - Hyperpigmentation covering <10% BSA; no psychosocial impact] : Skin Hyperpigmentation: Grade 1 - Hyperpigmentation covering <10% BSA; no psychosocial impact [Skin Induration: Grade 0] : Skin Induration: Grade 0 [Dermatitis Radiation: Grade 0] : Dermatitis Radiation: Grade 0 [Negative] : Allergic/Immunologic [Skin Rash] : no skin rash [Skin Wound] : no skin wound [de-identified] : Hypergimentation og the right breast

## 2024-07-23 NOTE — REVIEW OF SYSTEMS
[Alopecia: Grade 0] : Alopecia: Grade 0 [Pruritus: Grade 0] : Pruritus: Grade 0 [Skin Atrophy: Grade 0] : Skin Atrophy: Grade 0 [Skin Hyperpigmentation: Grade 1 - Hyperpigmentation covering <10% BSA; no psychosocial impact] : Skin Hyperpigmentation: Grade 1 - Hyperpigmentation covering <10% BSA; no psychosocial impact [Skin Induration: Grade 0] : Skin Induration: Grade 0 [Dermatitis Radiation: Grade 0] : Dermatitis Radiation: Grade 0 [Negative] : Allergic/Immunologic [Skin Rash] : no skin rash [Skin Wound] : no skin wound [de-identified] : Hypergimentation og the right breast

## 2024-07-23 NOTE — PHYSICAL EXAM
[General Appearance - Alert] : alert [General Appearance - In No Acute Distress] : in no acute distress [] : no respiratory distress [Respiration, Rhythm And Depth] : normal respiratory rhythm and effort [Exaggerated Use Of Accessory Muscles For Inspiration] : no accessory muscle use [Heart Rate And Rhythm] : heart rate and rhythm were normal [Arterial Pulses Normal] : the arterial pulses were normal [Abdomen Soft] : soft [Nondistended] : nondistended [Musculoskeletal - Swelling] : no joint swelling [Nail Clubbing] : no clubbing  or cyanosis of the fingernails [No UE Edema] : there is no upper extremity edema [Normal] : no palpable adenopathy [Supraclavicular Lymph Nodes Enlarged Bilaterally] : supraclavicular [Axillary Lymph Nodes Enlarged Bilaterally] : axillary [de-identified] : Right lumpectomy and axillary scars are well healed, no erythema, moderate residual hyperpigmentation.

## 2024-07-23 NOTE — HISTORY OF PRESENT ILLNESS
[FreeTextEntry1] : Ms. Denis is a 63-year-old female with clinical stage IIIA T3N1M0 triple negative poorly differentiated invasive ductal carcinoma of the right breast. She received neoadjuvant chemotherapy followed by lumpectomy showing [id number] residual disease. Pathology was remarkable for extensive lymphovascular invasion and axillary lymph node dissection showing 50 negative lymph nodes. She completed a course of adjuvant RT to the right breast, a total dose of 4800 cGy from 5/23/24 - 6/13/24.  She comes today for a post-treatment evaluation.   The patient reports feeling generally well. She is involved in her usual activities. She had about two weeks of notable fatigue after treatment, and it is now improved. She has a good appetite and denies dysphagia. She has had occasional dry cough, no shortness of breath, not requiring any specific intervention. She developed grade 1 radiation dermatitis. There has been some improvement in the skin since completing radiation therapy. The skin remains dark and dry. She continues regular skin care.  She continues on immunotherapy with pembrolizumab q3 weeks.

## 2024-07-23 NOTE — HISTORY OF PRESENT ILLNESS
[FreeTextEntry1] : Ms. Denis is a 63-year-old female with clinical stage IIIA T3N1M0 triple negative poorly differentiated invasive ductal carcinoma of the right breast. She received neoadjuvant chemotherapy followed by lumpectomy showing [id number] residual disease. Pathology was remarkable for extensive lymphovascular invasion and axillary lymph node dissection showing 50 negative lymph nodes. She completed a course of adjuvant RT to the right breast, a total dose of 4800 cGy from 5/23/24 - 6/13/24.  She comes today for a post-treatment evaluation.   The patient reports feeling generally well. She is involved in her usual activities. She had about two weeks of notable fatigue after treatment, and it is now improved. She has a good appetite and denies dysphagia. She has had occasional dry cough, no shortness of breath, not requiring any specific intervention. She developed grade 1 radiation dermatitis. There has been some improvement in the skin since completing radiation therapy. The skin remains dark and dry. She continues regular skin care.  She continues on immunotherapy with pembrolizumab q3 weeks.

## 2024-07-23 NOTE — PHYSICAL EXAM
[General Appearance - Alert] : alert [General Appearance - In No Acute Distress] : in no acute distress [] : no respiratory distress [Respiration, Rhythm And Depth] : normal respiratory rhythm and effort [Exaggerated Use Of Accessory Muscles For Inspiration] : no accessory muscle use [Heart Rate And Rhythm] : heart rate and rhythm were normal [Arterial Pulses Normal] : the arterial pulses were normal [Abdomen Soft] : soft [Nondistended] : nondistended [Musculoskeletal - Swelling] : no joint swelling [Nail Clubbing] : no clubbing  or cyanosis of the fingernails [No UE Edema] : there is no upper extremity edema [Normal] : no palpable adenopathy [Supraclavicular Lymph Nodes Enlarged Bilaterally] : supraclavicular [Axillary Lymph Nodes Enlarged Bilaterally] : axillary [de-identified] : Right lumpectomy and axillary scars are well healed, no erythema, moderate residual hyperpigmentation.

## 2024-07-24 ENCOUNTER — LABORATORY RESULT (OUTPATIENT)
Age: 64
End: 2024-07-24

## 2024-07-26 ENCOUNTER — OUTPATIENT (OUTPATIENT)
Dept: OUTPATIENT SERVICES | Facility: HOSPITAL | Age: 64
LOS: 1 days | Discharge: HOME | End: 2024-07-26

## 2024-07-26 DIAGNOSIS — Z95.5 PRESENCE OF CORONARY ANGIOPLASTY IMPLANT AND GRAFT: Chronic | ICD-10-CM

## 2024-07-26 DIAGNOSIS — Z98.2 PRESENCE OF CEREBROSPINAL FLUID DRAINAGE DEVICE: Chronic | ICD-10-CM

## 2024-07-26 DIAGNOSIS — C50.919 MALIGNANT NEOPLASM OF UNSPECIFIED SITE OF UNSPECIFIED FEMALE BREAST: ICD-10-CM

## 2024-07-26 DIAGNOSIS — Z98.89 OTHER SPECIFIED POSTPROCEDURAL STATES: Chronic | ICD-10-CM

## 2024-07-26 DIAGNOSIS — Z90.49 ACQUIRED ABSENCE OF OTHER SPECIFIED PARTS OF DIGESTIVE TRACT: Chronic | ICD-10-CM

## 2024-07-26 DIAGNOSIS — I72.9 ANEURYSM OF UNSPECIFIED SITE: Chronic | ICD-10-CM

## 2024-07-31 ENCOUNTER — LABORATORY RESULT (OUTPATIENT)
Age: 64
End: 2024-07-31

## 2024-08-02 ENCOUNTER — RESULT REVIEW (OUTPATIENT)
Age: 64
End: 2024-08-02

## 2024-08-02 ENCOUNTER — APPOINTMENT (OUTPATIENT)
Dept: HEMATOLOGY ONCOLOGY | Facility: CLINIC | Age: 64
End: 2024-08-02
Payer: COMMERCIAL

## 2024-08-02 ENCOUNTER — APPOINTMENT (OUTPATIENT)
Dept: INFUSION THERAPY | Facility: HOSPITAL | Age: 64
End: 2024-08-02

## 2024-08-02 VITALS
WEIGHT: 158.73 LBS | RESPIRATION RATE: 15 BRPM | SYSTOLIC BLOOD PRESSURE: 120 MMHG | OXYGEN SATURATION: 100 % | HEART RATE: 55 BPM | BODY MASS INDEX: 29.99 KG/M2 | TEMPERATURE: 98.4 F | DIASTOLIC BLOOD PRESSURE: 77 MMHG

## 2024-08-02 DIAGNOSIS — C50.911 MALIGNANT NEOPLASM OF UNSPECIFIED SITE OF RIGHT FEMALE BREAST: ICD-10-CM

## 2024-08-02 DIAGNOSIS — Z79.899 OTHER LONG TERM (CURRENT) DRUG THERAPY: ICD-10-CM

## 2024-08-02 LAB
ALBUMIN SERPL ELPH-MCNC: 4.3 G/DL — SIGNIFICANT CHANGE UP (ref 3.3–5)
ALP SERPL-CCNC: 78 U/L — SIGNIFICANT CHANGE UP (ref 40–120)
ALT FLD-CCNC: 9 U/L — LOW (ref 10–45)
ANION GAP SERPL CALC-SCNC: 12 MMOL/L — SIGNIFICANT CHANGE UP (ref 5–17)
AST SERPL-CCNC: 18 U/L — SIGNIFICANT CHANGE UP (ref 10–40)
BASOPHILS # BLD AUTO: 0.02 K/UL — SIGNIFICANT CHANGE UP (ref 0–0.2)
BASOPHILS NFR BLD AUTO: 0.5 % — SIGNIFICANT CHANGE UP (ref 0–2)
BILIRUB SERPL-MCNC: 0.2 MG/DL — SIGNIFICANT CHANGE UP (ref 0.2–1.2)
BUN SERPL-MCNC: 22 MG/DL — SIGNIFICANT CHANGE UP (ref 7–23)
CALCIUM SERPL-MCNC: 9.9 MG/DL — SIGNIFICANT CHANGE UP (ref 8.4–10.5)
CHLORIDE SERPL-SCNC: 109 MMOL/L — HIGH (ref 96–108)
CO2 SERPL-SCNC: 24 MMOL/L — SIGNIFICANT CHANGE UP (ref 22–31)
CREAT SERPL-MCNC: 1.32 MG/DL — HIGH (ref 0.5–1.3)
EGFR: 45 ML/MIN/1.73M2 — LOW
EOSINOPHIL # BLD AUTO: 0.03 K/UL — SIGNIFICANT CHANGE UP (ref 0–0.5)
EOSINOPHIL NFR BLD AUTO: 0.7 % — SIGNIFICANT CHANGE UP (ref 0–6)
GLUCOSE SERPL-MCNC: 90 MG/DL — SIGNIFICANT CHANGE UP (ref 70–99)
HCT VFR BLD CALC: 31 % — LOW (ref 34.5–45)
HGB BLD-MCNC: 10.3 G/DL — LOW (ref 11.5–15.5)
IMM GRANULOCYTES NFR BLD AUTO: 0.2 % — SIGNIFICANT CHANGE UP (ref 0–0.9)
LYMPHOCYTES # BLD AUTO: 1.04 K/UL — SIGNIFICANT CHANGE UP (ref 1–3.3)
LYMPHOCYTES # BLD AUTO: 24.7 % — SIGNIFICANT CHANGE UP (ref 13–44)
MCHC RBC-ENTMCNC: 28.8 PG — SIGNIFICANT CHANGE UP (ref 27–34)
MCHC RBC-ENTMCNC: 33.2 G/DL — SIGNIFICANT CHANGE UP (ref 32–36)
MCV RBC AUTO: 86.6 FL — SIGNIFICANT CHANGE UP (ref 80–100)
MONOCYTES # BLD AUTO: 0.38 K/UL — SIGNIFICANT CHANGE UP (ref 0–0.9)
MONOCYTES NFR BLD AUTO: 9 % — SIGNIFICANT CHANGE UP (ref 2–14)
NEUTROPHILS # BLD AUTO: 2.73 K/UL — SIGNIFICANT CHANGE UP (ref 1.8–7.4)
NEUTROPHILS NFR BLD AUTO: 64.9 % — SIGNIFICANT CHANGE UP (ref 43–77)
NRBC # BLD: 0 /100 WBCS — SIGNIFICANT CHANGE UP (ref 0–0)
PLATELET # BLD AUTO: 185 K/UL — SIGNIFICANT CHANGE UP (ref 150–400)
POTASSIUM SERPL-MCNC: 3.7 MMOL/L — SIGNIFICANT CHANGE UP (ref 3.5–5.3)
POTASSIUM SERPL-SCNC: 3.7 MMOL/L — SIGNIFICANT CHANGE UP (ref 3.5–5.3)
PROT SERPL-MCNC: 8.3 G/DL — SIGNIFICANT CHANGE UP (ref 6–8.3)
RBC # BLD: 3.58 M/UL — LOW (ref 3.8–5.2)
RBC # FLD: 17.2 % — HIGH (ref 10.3–14.5)
SODIUM SERPL-SCNC: 144 MMOL/L — SIGNIFICANT CHANGE UP (ref 135–145)
WBC # BLD: 4.21 K/UL — SIGNIFICANT CHANGE UP (ref 3.8–10.5)
WBC # FLD AUTO: 4.21 K/UL — SIGNIFICANT CHANGE UP (ref 3.8–10.5)

## 2024-08-02 PROCEDURE — G2211 COMPLEX E/M VISIT ADD ON: CPT

## 2024-08-02 PROCEDURE — 99214 OFFICE O/P EST MOD 30 MIN: CPT

## 2024-08-03 LAB
CORTIS AM PEAK SERPL-MCNC: 6.2 UG/DL — SIGNIFICANT CHANGE UP (ref 6–18.4)
T4 FREE+ TSH PNL SERPL: 2.32 UIU/ML — SIGNIFICANT CHANGE UP (ref 0.27–4.2)

## 2024-08-04 NOTE — ASSESSMENT
[FreeTextEntry1] : 62 yo woman with history of subarachnoid hemorrhage in 2013 s/p craniotomy for evacuation and  shunt placement, recurrent DVTs requiring anticoagulation but needs warfarin due to interaction of DOACs with oxcabazepine (Trileptal), found in 10/2023 to have at least Stage IIIA triple negative breast cancer.  - [place] [facility] [place] with surgical oncology, radiation oncology, radiology and pathology reviewed - treated with neoadjuvant Taxol 80mg/m2 X12 weeks with concurrent pembrolizumab; Adriamycin/Cytoxan held due to concern for interactions with antiseizure meds. - proceeded to surgery 3/11/2024 - completed adjuvant RT with Dr. Larios given residual disease (margins negative) and 1+ LN; 4005 cGy in 15 Fx (5/23/24-6/13/24) -Will continue with pembrolizumab 200mg IV every 3 weeks (Dose today) to complete 17 treatments. - Patient to begin adjuvant capecitabine 1500mg 7 days on/7 days off; reviewed interaction of capecitabine with patients antiseizure meds previous visit (7/12/24); only concern is increase in nephrotoxicity with zonisamide and this can be monitored; more concerning is the interaction of capecitabine with warfarin as it can cause wild fluctuations in the INR and be concern for increased toxicity from capecitabine, therefore discussed switching to lovenox. -Reviewed again with patient stopping Warfarin and starting Lovenox 1.5mg/kg (100mg) daily dose, which tiki already has at home, during therapy with Capecitabine 1500mg; case discussed previously with Dr. Ojeda who agrees with plan.   -Reviewed the most common side effects of capecitabine including but not limited to skin rashes (hand and foot syndrome), diarrhea/constipation, fatigue, low blood counts. - Patient had the opportunity to have all their questions answered to their satisfaction - RTC in 3 weeks.

## 2024-08-04 NOTE — REVIEW OF SYSTEMS
[Fatigue] : fatigue [Negative] : Heme/Lymph [Fever] : no fever [Chills] : no chills [Night Sweats] : no night sweats [Recent Change In Weight] : ~T no recent weight change [Eye Pain] : no eye pain [Diarrhea: Grade 0] : Diarrhea: Grade 0

## 2024-08-04 NOTE — HISTORY OF PRESENT ILLNESS
[Disease: _____________________] : Disease: [unfilled] [T: ___] : T[unfilled] [N: ___] : N[unfilled] [M: ___] : M[unfilled] [AJCC Stage: ____] : AJCC Stage: [unfilled] [90: Able to carry normal activity; minor signs or symptoms of disease.] : 90: Able to carry normal activity; minor signs or symptoms of disease.  [de-identified] : [facility] - accompanied by daughter; (Allie)  Patient with prior mammogram in  showing normal findings. She has history of CVA in , aneurysmal subarachnoid hemorrhage s/p clipping and  shunt placement, bifrontal encephalomalacia, epilepsy with recurrent seizures including in 2022, 2023 and more recently in 2023, on multiple antiseizure meds (Keppra (levetiracetam), Vimpat (lacosamide) , Trileptal (oxcarbazepine) and zonisamide. 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  She noted a palpable mass in the right breast on 10/5/23.  Diagnostic bilateral mammogram on 10/5/23 showed mammographic and sonographic findings corresponding to the palpable mass in the upper outer quadrant of the right breast, consistent with an underlying carcinoma. Abnormal lymph nodes in the right axilla consistent with involvement by tumor. Biopsy recommended. BI-RADS 5.  Right breast biopsy 10/9/23, 10:00 7cm Pathology - IDC, poorly differentiated, measuring 2.5mm ER negative, MA negative, HER-2 negative.  She was seen by Surgical Oncology (Dr. Roger Lyn); axillary LN biopsy 10/18/23 - positive for carcinoma identical to morphology of the right breast lesion  Due to  shunt, she was not a candidate for pre-operative breast MRI and instead underwent contrast enhanced mammogram on 10/24/23 - Biopsy-proven malignancy at the right 10:00 axis with associated enhancement spanning up to 6.1 cm. - Newly noted subcentimeter mass at the 10:00, 5 cm from nipple location felt to correspond with a focal asymmetry or medial and inferior to the primary cancer favoring a satellite lesion.  Staging CT C/A/P 10/25/23 - right breast mass with right axillary lymphadenopathy. - No definite evidence of distant metastatic disease. A cluster of several non-enlarged prevascular mediastinal lymph nodes is nonspecific. - A subpleural 4 mm right middle lobe pulmonary nodule is likely benign.  Staging Bone Scan 11/10/23 - [facility]  Risk Stratification - ; 1st full term pregnancy at age 18, no OCPs, no HRT, menarche unknown exact age, menopause unknown exact age; + family history of breast cancer in mother, colon cancer in maternal aunt Genetic testing - [employer] 84 gene panel; negative.  [de-identified] : Triple Negative [de-identified] : [id number]-Her2- [de-identified] : Patient returns today to rule out progression of breast cancer and to assess treatment toxicity. Patient w/ triple negative breast cancer and started on neoadjuvant therapy with Pembro + Taxol weekly x 12 overall tolerated well; On 3/11/24 - Underwent lumpectomy/SLN - final path with 2.4cm poorly differentiated IDC with multiple satellite foci; extensive LVI involving right lateral resection margin and right anterior margin; 1/2 SLN +, additional ALND done with additional 50 negative LN (total 1/52). Completed RT 4005 cGy in June 2024. Patient is currently receiving adjuvant keytruda. She is tolerating well with no complaints.  She denies any sob, cp, n/v, diarrhea, rash. Denied seizure activities in the past several month; most recent one in May 2024 witnessed by daughter and no LOC;  She denies recent falls and injuries. She has lab blood draw at home once every week to monitor INR.

## 2024-08-04 NOTE — PHYSICAL EXAM
[Restricted in physically strenuous activity but ambulatory and able to carry out work of a light or sedentary nature] : Status 1- Restricted in physically strenuous activity but ambulatory and able to carry out work of a light or sedentary nature, e.g., light house work, office work [Normal] : affect appropriate [de-identified] : vision loss left eye [de-identified] : Port present left chest wall with no signs of infection. [de-identified] : Right breast well healed incisions; post RT radiation skin changes; left breast with no discrete palpable masses, no skin dimpling or nipple inversion/discharge, no palpable axillary nodes. [de-identified] : Right  shunt in place

## 2024-08-05 DIAGNOSIS — Z51.11 ENCOUNTER FOR ANTINEOPLASTIC CHEMOTHERAPY: ICD-10-CM

## 2024-08-07 ENCOUNTER — LABORATORY RESULT (OUTPATIENT)
Age: 64
End: 2024-08-07

## 2024-08-14 ENCOUNTER — LABORATORY RESULT (OUTPATIENT)
Age: 64
End: 2024-08-14

## 2024-08-21 ENCOUNTER — LABORATORY RESULT (OUTPATIENT)
Age: 64
End: 2024-08-21

## 2024-08-23 ENCOUNTER — RESULT REVIEW (OUTPATIENT)
Age: 64
End: 2024-08-23

## 2024-08-23 ENCOUNTER — APPOINTMENT (OUTPATIENT)
Dept: HEMATOLOGY ONCOLOGY | Facility: CLINIC | Age: 64
End: 2024-08-23
Payer: COMMERCIAL

## 2024-08-23 ENCOUNTER — NON-APPOINTMENT (OUTPATIENT)
Age: 64
End: 2024-08-23

## 2024-08-23 ENCOUNTER — APPOINTMENT (OUTPATIENT)
Dept: INFUSION THERAPY | Facility: HOSPITAL | Age: 64
End: 2024-08-23

## 2024-08-23 VITALS
WEIGHT: 158.73 LBS | RESPIRATION RATE: 16 BRPM | SYSTOLIC BLOOD PRESSURE: 133 MMHG | BODY MASS INDEX: 29.99 KG/M2 | HEART RATE: 78 BPM | TEMPERATURE: 97.2 F | OXYGEN SATURATION: 97 % | DIASTOLIC BLOOD PRESSURE: 71 MMHG

## 2024-08-23 DIAGNOSIS — Z79.899 OTHER LONG TERM (CURRENT) DRUG THERAPY: ICD-10-CM

## 2024-08-23 DIAGNOSIS — C50.911 MALIGNANT NEOPLASM OF UNSPECIFIED SITE OF RIGHT FEMALE BREAST: ICD-10-CM

## 2024-08-23 LAB
ALBUMIN SERPL ELPH-MCNC: 4.2 G/DL — SIGNIFICANT CHANGE UP (ref 3.3–5)
ALP SERPL-CCNC: 79 U/L — SIGNIFICANT CHANGE UP (ref 40–120)
ALT FLD-CCNC: 10 U/L — SIGNIFICANT CHANGE UP (ref 10–45)
ANION GAP SERPL CALC-SCNC: 12 MMOL/L — SIGNIFICANT CHANGE UP (ref 5–17)
AST SERPL-CCNC: 22 U/L — SIGNIFICANT CHANGE UP (ref 10–40)
BASOPHILS # BLD AUTO: 0.02 K/UL — SIGNIFICANT CHANGE UP (ref 0–0.2)
BASOPHILS NFR BLD AUTO: 0.5 % — SIGNIFICANT CHANGE UP (ref 0–2)
BILIRUB SERPL-MCNC: 0.2 MG/DL — SIGNIFICANT CHANGE UP (ref 0.2–1.2)
BUN SERPL-MCNC: 20 MG/DL — SIGNIFICANT CHANGE UP (ref 7–23)
CALCIUM SERPL-MCNC: 9.6 MG/DL — SIGNIFICANT CHANGE UP (ref 8.4–10.5)
CHLORIDE SERPL-SCNC: 113 MMOL/L — HIGH (ref 96–108)
CO2 SERPL-SCNC: 21 MMOL/L — LOW (ref 22–31)
CREAT SERPL-MCNC: 1.4 MG/DL — HIGH (ref 0.5–1.3)
EGFR: 42 ML/MIN/1.73M2 — LOW
EOSINOPHIL # BLD AUTO: 0.03 K/UL — SIGNIFICANT CHANGE UP (ref 0–0.5)
EOSINOPHIL NFR BLD AUTO: 0.7 % — SIGNIFICANT CHANGE UP (ref 0–6)
GLUCOSE SERPL-MCNC: 90 MG/DL — SIGNIFICANT CHANGE UP (ref 70–99)
HCT VFR BLD CALC: 28.8 % — LOW (ref 34.5–45)
HGB BLD-MCNC: 9.4 G/DL — LOW (ref 11.5–15.5)
IMM GRANULOCYTES NFR BLD AUTO: 0.2 % — SIGNIFICANT CHANGE UP (ref 0–0.9)
LYMPHOCYTES # BLD AUTO: 0.95 K/UL — LOW (ref 1–3.3)
LYMPHOCYTES # BLD AUTO: 23.3 % — SIGNIFICANT CHANGE UP (ref 13–44)
MCHC RBC-ENTMCNC: 28.1 PG — SIGNIFICANT CHANGE UP (ref 27–34)
MCHC RBC-ENTMCNC: 32.6 G/DL — SIGNIFICANT CHANGE UP (ref 32–36)
MCV RBC AUTO: 86 FL — SIGNIFICANT CHANGE UP (ref 80–100)
MONOCYTES # BLD AUTO: 0.34 K/UL — SIGNIFICANT CHANGE UP (ref 0–0.9)
MONOCYTES NFR BLD AUTO: 8.3 % — SIGNIFICANT CHANGE UP (ref 2–14)
NEUTROPHILS # BLD AUTO: 2.73 K/UL — SIGNIFICANT CHANGE UP (ref 1.8–7.4)
NEUTROPHILS NFR BLD AUTO: 67 % — SIGNIFICANT CHANGE UP (ref 43–77)
NRBC # BLD: 0 /100 WBCS — SIGNIFICANT CHANGE UP (ref 0–0)
PLATELET # BLD AUTO: 182 K/UL — SIGNIFICANT CHANGE UP (ref 150–400)
POTASSIUM SERPL-MCNC: 3.6 MMOL/L — SIGNIFICANT CHANGE UP (ref 3.5–5.3)
POTASSIUM SERPL-SCNC: 3.6 MMOL/L — SIGNIFICANT CHANGE UP (ref 3.5–5.3)
PROT SERPL-MCNC: 8.1 G/DL — SIGNIFICANT CHANGE UP (ref 6–8.3)
RBC # BLD: 3.35 M/UL — LOW (ref 3.8–5.2)
RBC # FLD: 17.6 % — HIGH (ref 10.3–14.5)
SODIUM SERPL-SCNC: 146 MMOL/L — HIGH (ref 135–145)
WBC # BLD: 4.08 K/UL — SIGNIFICANT CHANGE UP (ref 3.8–10.5)
WBC # FLD AUTO: 4.08 K/UL — SIGNIFICANT CHANGE UP (ref 3.8–10.5)

## 2024-08-23 PROCEDURE — G2211 COMPLEX E/M VISIT ADD ON: CPT

## 2024-08-23 PROCEDURE — 99214 OFFICE O/P EST MOD 30 MIN: CPT

## 2024-08-23 NOTE — HISTORY OF PRESENT ILLNESS
[Disease: _____________________] : Disease: [unfilled] [T: ___] : T[unfilled] [N: ___] : N[unfilled] [M: ___] : M[unfilled] [AJCC Stage: ____] : AJCC Stage: [unfilled] [90: Able to carry normal activity; minor signs or symptoms of disease.] : 90: Able to carry normal activity; minor signs or symptoms of disease.  [de-identified] : Seen initially as [facility] - accompanied by daughter; (Allie)  Patient with prior mammogram in  showing normal findings. She has history of CVA in , aneurysmal subarachnoid hemorrhage s/p clipping and  shunt placement, bifrontal encephalomalacia, epilepsy with recurrent seizures including in 2022, 2023 and more recently in 2023, on multiple antiseizure meds (Keppra (levetiracetam), Vimpat (lacosamide) , Trileptal (oxcarbazepine) and zonisamide. 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  She noted a palpable mass in the right breast on 10/5/23.  Diagnostic bilateral mammogram on 10/5/23 showed mammographic and sonographic findings corresponding to the palpable mass in the upper outer quadrant of the right breast, consistent with an underlying carcinoma. Abnormal lymph nodes in the right axilla consistent with involvement by tumor. Biopsy recommended. BI-RADS 5.  Right breast biopsy 10/9/23, 10:00 7cm Pathology - IDC, poorly differentiated, measuring 2.5mm ER negative, NM negative, HER-2 negative.  She was seen by Surgical Oncology (Dr. Roger Lyn); axillary LN biopsy 10/18/23 - positive for carcinoma identical to morphology of the right breast lesion  Due to  shunt, she was not a candidate for pre-operative breast MRI and instead underwent contrast enhanced mammogram on 10/24/23 - Biopsy-proven malignancy at the right 10:00 axis with associated enhancement spanning up to 6.1 cm. - Newly noted subcentimeter mass at the 10:00, 5 cm from nipple location felt to correspond with a focal asymmetry or medial and inferior to the primary cancer favoring a satellite lesion.  Staging CT C/A/P 10/25/23 - right breast mass with right axillary lymphadenopathy. - No definite evidence of distant metastatic disease. A cluster of several non-enlarged prevascular mediastinal lymph nodes is nonspecific. - A subpleural 4 mm right middle lobe pulmonary nodule is likely benign.  Staging Bone Scan 11/10/23 - [facility]  Risk Stratification - ; 1st full term pregnancy at age 18, no OCPs, no HRT, menarche unknown exact age, menopause unknown exact age; + family history of breast cancer in mother, colon cancer in maternal aunt Genetic testing - [employer] 84 gene panel; negative.  [de-identified] : Triple Negative [de-identified] : [id number]-Her2- [de-identified] : 8/23/2024 Patient returns today to rule out progression of breast cancer and to assess treatment toxicity. Patient w/ triple negative breast cancer and started on neoadjuvant therapy with Pembro + Taxol weekly x 12 overall tolerated well; On 3/11/24 - Underwent lumpectomy/SLN - final path with 2.4cm poorly differentiated IDC with multiple satellite foci; extensive LVI involving right lateral resection margin and right anterior margin; 1/2 SLN +, additional ALND done with additional 50 negative LN (total 1/52). Completed RT 4005 cGy in June 2024. Patient is currently receiving adjuvant keytruda. HAS NOT STARTED CAPECITABINE adjuvantly as previously advised. She is still on warfarin and has not received the okay from the cardiology office to transition to Lovenox. She is tolerating well with no complaints.  She denies any sob, cp, n/v, diarrhea, rash. Denied seizure activities in the past several month; most recent one in May 2024 witnessed by daughter and no LOC;  She denies recent falls and injuries. She has lab blood draw at home once every week to monitor INR.

## 2024-08-23 NOTE — REVIEW OF SYSTEMS
[Fatigue] : fatigue [Diarrhea: Grade 0] : Diarrhea: Grade 0 [Negative] : Heme/Lymph [Fever] : no fever [Chills] : no chills [Night Sweats] : no night sweats [Recent Change In Weight] : ~T no recent weight change [Eye Pain] : no eye pain

## 2024-08-23 NOTE — HISTORY OF PRESENT ILLNESS
[Disease: _____________________] : Disease: [unfilled] [T: ___] : T[unfilled] [N: ___] : N[unfilled] [M: ___] : M[unfilled] [AJCC Stage: ____] : AJCC Stage: [unfilled] [90: Able to carry normal activity; minor signs or symptoms of disease.] : 90: Able to carry normal activity; minor signs or symptoms of disease.  [de-identified] : Seen initially as [facility] of [facility] - accompanied by daughter; (Allie)  Patient with prior mammogram in  showing normal findings. She has history of CVA in , aneurysmal subarachnoid hemorrhage s/p clipping and  shunt placement, bifrontal encephalomalacia, epilepsy with recurrent seizures including in 2022, 2023 and more recently in 2023, on multiple antiseizure meds (Keppra (levetiracetam), Vimpat (lacosamide) , Trileptal (oxcarbazepine) and zonisamide. 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  She noted a palpable mass in the right breast on 10/5/23.  Diagnostic bilateral mammogram on 10/5/23 showed mammographic and sonographic findings corresponding to the palpable mass in the upper outer quadrant of the right breast, consistent with an underlying carcinoma. Abnormal lymph nodes in the right axilla consistent with involvement by tumor. Biopsy recommended. BI-RADS 5.  Right breast biopsy 10/9/23, 10:00 7cm Pathology - IDC, poorly differentiated, measuring 2.5mm ER negative, ID negative, HER-2 negative.  She was seen by Surgical Oncology (Dr. Roger Lyn); axillary LN biopsy 10/18/23 - positive for carcinoma identical to morphology of the right breast lesion  Due to  shunt, she was not a candidate for pre-operative breast MRI and instead underwent contrast enhanced mammogram on 10/24/23 - Biopsy-proven malignancy at the right 10:00 axis with associated enhancement spanning up to 6.1 cm. - Newly noted subcentimeter mass at the 10:00, 5 cm from nipple location felt to correspond with a focal asymmetry or medial and inferior to the primary cancer favoring a satellite lesion.  Staging CT C/A/P 10/25/23 - right breast mass with right axillary lymphadenopathy. - No definite evidence of distant metastatic disease. A cluster of several non-enlarged prevascular mediastinal lymph nodes is nonspecific. - A subpleural 4 mm right middle lobe pulmonary nodule is likely benign.  Staging Bone Scan 11/10/23 - [facility]  Risk Stratification - ; 1st full term pregnancy at age 18, no OCPs, no HRT, menarche unknown exact age, menopause unknown exact age; + family history of breast cancer in mother, colon cancer in maternal aunt Genetic testing - [employer]ae 84 gene panel; negative.  [de-identified] : Triple Negative [de-identified] : [id number]-Her2- [de-identified] : 8/23/2024 Patient returns today to rule out progression of breast cancer and to assess treatment toxicity. Patient w/ triple negative breast cancer and started on neoadjuvant therapy with Pembro + Taxol weekly x 12 overall tolerated well; On 3/11/24 - Underwent lumpectomy/SLN - final path with 2.4cm poorly differentiated IDC with multiple satellite foci; extensive LVI involving right lateral resection margin and right anterior margin; 1/2 SLN +, additional ALND done with additional 50 negative LN (total 1/52). Completed RT 4005 cGy in June 2024. Patient is currently receiving adjuvant keytruda. HAS NOT STARTED CAPECITABINE adjuvantly as previously advised. She is still on warfarin and has not received the okay from the cardiology office to transition to Lovenox. She is tolerating well with no complaints.  She denies any sob, cp, n/v, diarrhea, rash. Denied seizure activities in the past several month; most recent one in May 2024 witnessed by daughter and no LOC;  She denies recent falls and injuries. She has lab blood draw at home once every week to monitor INR.

## 2024-08-23 NOTE — PHYSICAL EXAM
[Restricted in physically strenuous activity but ambulatory and able to carry out work of a light or sedentary nature] : Status 1- Restricted in physically strenuous activity but ambulatory and able to carry out work of a light or sedentary nature, e.g., light house work, office work [Normal] : affect appropriate [de-identified] : vision loss left eye [de-identified] : Port present left chest wall with no signs of infection. [de-identified] : (deferred 8/23/2024) Right breast well healed incisions; post RT radiation skin changes; left breast with no discrete palpable masses, no skin dimpling or nipple inversion/discharge, no palpable axillary nodes. [de-identified] : Right  shunt in place

## 2024-08-23 NOTE — END OF VISIT
[Time Spent: ___ minutes] : I have spent [unfilled] minutes of time on the encounter which excludes teaching and separately reported services. [FreeTextEntry3] : Patient being seen as per physician's primary plan of care.

## 2024-08-23 NOTE — ASSESSMENT
[FreeTextEntry1] : 62 yo woman with history of subarachnoid hemorrhage in 2013 s/p craniotomy for evacuation and  shunt placement, recurrent DVTs requiring anticoagulation but needs warfarin due to interaction of DOACs with oxcabazepine (Trileptal), found in 10/2023 to have at least Stage IIIA triple negative breast cancer.  - case was discussed at [facility] at time of presentation with surgical oncology, radiation oncology, radiology and pathology reviewed - treated with neoadjuvant Taxol 80mg/m2 X12 weeks with concurrent pembrolizumab; Adriamycin/Cytoxan held due to concern for interactions with antiseizure meds. - proceeded to surgery 3/11/2024 - completed adjuvant RT with Dr. Larios given residual disease (margins negative) and 1+ LN; 4005 cGy in 15 Fx (5/23/24-6/13/24) -Will continue with pembrolizumab 200mg IV every 3 weeks (Dose today) to complete 17 treatments. - Patient to begin adjuvant capecitabine 1500mg 7 days on/7 days off; reviewed interaction of capecitabine with patients antiseizure meds previous visit (7/12/24); only concern is increase in nephrotoxicity with zonisamide and this can be monitored; more concerning is the interaction of capecitabine with warfarin as it can cause wild fluctuations in the INR and be concern for increased toxicity from capecitabine, therefore discussed switching to lovenox - HAS NOT STARTED*****  -Reviewed again with patient stopping Warfarin and starting Lovenox 1.5mg/kg (100mg) daily dose, which michelet already has at home, during therapy with Capecitabine 1500mg; case discussed previously with Dr. Blevins who agrees with plan.  --- EMAILED DR. BLEVINS TO REQUEST HIS TEAM CALL PATIENT TO GIVE HER VERBAL OKAY TO SWITCH TO LOVENOX -Reviewed the most common side effects of capecitabine including but not limited to skin rashes (hand and foot syndrome), diarrhea/constipation, fatigue, low blood counts. - Patient had the opportunity to have all their questions answered to their satisfaction - RTC in 3 weeks.

## 2024-08-23 NOTE — ASSESSMENT
[FreeTextEntry1] : 62 yo woman with history of subarachnoid hemorrhage in 2013 s/p craniotomy for evacuation and  shunt placement, recurrent DVTs requiring anticoagulation but needs warfarin due to interaction of DOACs with oxcabazepine (Trileptal), found in 10/2023 to have at least Stage IIIA triple negative breast cancer.  - case was discussed at [facility] at time of presentation with surgical oncology, radiation oncology, radiology and pathology reviewed - treated with neoadjuvant Taxol 80mg/m2 X12 weeks with concurrent pembrolizumab; Adriamycin/Cytoxan held due to concern for interactions with antiseizure meds. - proceeded to surgery 3/11/2024 - completed adjuvant RT with Dr. Larios given residual disease (margins negative) and 1+ LN; 4005 cGy in 15 Fx (5/23/24-6/13/24) -Will continue with pembrolizumab 200mg IV every 3 weeks (Dose today) to complete 17 treatments. - Patient to begin adjuvant capecitabine 1500mg 7 days on/7 days off; reviewed interaction of capecitabine with patients antiseizure meds previous visit (7/12/24); only concern is increase in nephrotoxicity with zonisamide and this can be monitored; more concerning is the interaction of capecitabine with warfarin as it can cause wild fluctuations in the INR and be concern for increased toxicity from capecitabine, therefore discussed switching to lovenox - HAS NOT STARTED*****  -Reviewed again with patient stopping Warfarin and starting Lovenox 1.5mg/kg (100mg) daily dose, which michelet already has at home, during therapy with Capecitabine 1500mg; case discussed previously with Dr. Blevins who agrees with plan.  --- EMAILED DR. BLEVINS TO REQUEST HIS TEAM CALL PATIENT TO GIVE HER VERBAL OKAY TO SWITCH TO LOVENOX -Reviewed the most common side effects of capecitabine including but not limited to skin rashes (hand and foot syndrome), diarrhea/constipation, fatigue, low blood counts. - Patient had the opportunity to have all their questions answered to their satisfaction - RTC in 3 weeks.

## 2024-08-23 NOTE — PHYSICAL EXAM
[Restricted in physically strenuous activity but ambulatory and able to carry out work of a light or sedentary nature] : Status 1- Restricted in physically strenuous activity but ambulatory and able to carry out work of a light or sedentary nature, e.g., light house work, office work [Normal] : affect appropriate [de-identified] : vision loss left eye [de-identified] : Port present left chest wall with no signs of infection. [de-identified] : (deferred 8/23/2024) Right breast well healed incisions; post RT radiation skin changes; left breast with no discrete palpable masses, no skin dimpling or nipple inversion/discharge, no palpable axillary nodes. [de-identified] : Right  shunt in place

## 2024-08-24 LAB
CORTIS AM PEAK SERPL-MCNC: 6.2 UG/DL — SIGNIFICANT CHANGE UP (ref 6–18.4)
T4 FREE+ TSH PNL SERPL: 1.88 UIU/ML — SIGNIFICANT CHANGE UP (ref 0.27–4.2)

## 2024-08-28 ENCOUNTER — LABORATORY RESULT (OUTPATIENT)
Age: 64
End: 2024-08-28

## 2024-08-29 ENCOUNTER — LABORATORY RESULT (OUTPATIENT)
Age: 64
End: 2024-08-29

## 2024-09-13 ENCOUNTER — RESULT REVIEW (OUTPATIENT)
Age: 64
End: 2024-09-13

## 2024-09-13 ENCOUNTER — APPOINTMENT (OUTPATIENT)
Dept: INFUSION THERAPY | Facility: HOSPITAL | Age: 64
End: 2024-09-13

## 2024-09-13 LAB
ALBUMIN SERPL ELPH-MCNC: 4.1 G/DL — SIGNIFICANT CHANGE UP (ref 3.3–5)
ALP SERPL-CCNC: 70 U/L — SIGNIFICANT CHANGE UP (ref 40–120)
ALT FLD-CCNC: 19 U/L — SIGNIFICANT CHANGE UP (ref 10–45)
ANION GAP SERPL CALC-SCNC: 10 MMOL/L — SIGNIFICANT CHANGE UP (ref 5–17)
AST SERPL-CCNC: 29 U/L — SIGNIFICANT CHANGE UP (ref 10–40)
BASOPHILS # BLD AUTO: 0.01 K/UL — SIGNIFICANT CHANGE UP (ref 0–0.2)
BASOPHILS NFR BLD AUTO: 0.3 % — SIGNIFICANT CHANGE UP (ref 0–2)
BILIRUB SERPL-MCNC: 0.3 MG/DL — SIGNIFICANT CHANGE UP (ref 0.2–1.2)
BUN SERPL-MCNC: 14 MG/DL — SIGNIFICANT CHANGE UP (ref 7–23)
CALCIUM SERPL-MCNC: 9.7 MG/DL — SIGNIFICANT CHANGE UP (ref 8.4–10.5)
CHLORIDE SERPL-SCNC: 114 MMOL/L — HIGH (ref 96–108)
CO2 SERPL-SCNC: 20 MMOL/L — LOW (ref 22–31)
CREAT SERPL-MCNC: 1.16 MG/DL — SIGNIFICANT CHANGE UP (ref 0.5–1.3)
EGFR: 53 ML/MIN/1.73M2 — LOW
EOSINOPHIL # BLD AUTO: 0.02 K/UL — SIGNIFICANT CHANGE UP (ref 0–0.5)
EOSINOPHIL NFR BLD AUTO: 0.6 % — SIGNIFICANT CHANGE UP (ref 0–6)
GLUCOSE SERPL-MCNC: 87 MG/DL — SIGNIFICANT CHANGE UP (ref 70–99)
HCT VFR BLD CALC: 29.2 % — LOW (ref 34.5–45)
HGB BLD-MCNC: 9.5 G/DL — LOW (ref 11.5–15.5)
IMM GRANULOCYTES NFR BLD AUTO: 0.3 % — SIGNIFICANT CHANGE UP (ref 0–0.9)
LYMPHOCYTES # BLD AUTO: 1.02 K/UL — SIGNIFICANT CHANGE UP (ref 1–3.3)
LYMPHOCYTES # BLD AUTO: 29.2 % — SIGNIFICANT CHANGE UP (ref 13–44)
MCHC RBC-ENTMCNC: 29.2 PG — SIGNIFICANT CHANGE UP (ref 27–34)
MCHC RBC-ENTMCNC: 32.5 G/DL — SIGNIFICANT CHANGE UP (ref 32–36)
MCV RBC AUTO: 89.8 FL — SIGNIFICANT CHANGE UP (ref 80–100)
MONOCYTES # BLD AUTO: 0.32 K/UL — SIGNIFICANT CHANGE UP (ref 0–0.9)
MONOCYTES NFR BLD AUTO: 9.2 % — SIGNIFICANT CHANGE UP (ref 2–14)
NEUTROPHILS # BLD AUTO: 2.11 K/UL — SIGNIFICANT CHANGE UP (ref 1.8–7.4)
NEUTROPHILS NFR BLD AUTO: 60.4 % — SIGNIFICANT CHANGE UP (ref 43–77)
NRBC # BLD: 0 /100 WBCS — SIGNIFICANT CHANGE UP (ref 0–0)
PLATELET # BLD AUTO: 203 K/UL — SIGNIFICANT CHANGE UP (ref 150–400)
POTASSIUM SERPL-MCNC: 3.9 MMOL/L — SIGNIFICANT CHANGE UP (ref 3.5–5.3)
POTASSIUM SERPL-SCNC: 3.9 MMOL/L — SIGNIFICANT CHANGE UP (ref 3.5–5.3)
PROT SERPL-MCNC: 8.1 G/DL — SIGNIFICANT CHANGE UP (ref 6–8.3)
RBC # BLD: 3.25 M/UL — LOW (ref 3.8–5.2)
RBC # FLD: 17.4 % — HIGH (ref 10.3–14.5)
SODIUM SERPL-SCNC: 144 MMOL/L — SIGNIFICANT CHANGE UP (ref 135–145)
WBC # BLD: 3.49 K/UL — LOW (ref 3.8–10.5)
WBC # FLD AUTO: 3.49 K/UL — LOW (ref 3.8–10.5)

## 2024-09-14 LAB
CORTIS AM PEAK SERPL-MCNC: 8.2 UG/DL — SIGNIFICANT CHANGE UP (ref 6–18.4)
T4 FREE+ TSH PNL SERPL: 2.32 UIU/ML — SIGNIFICANT CHANGE UP (ref 0.27–4.2)

## 2024-09-20 ENCOUNTER — RESULT REVIEW (OUTPATIENT)
Age: 64
End: 2024-09-20

## 2024-09-20 ENCOUNTER — APPOINTMENT (OUTPATIENT)
Dept: HEMATOLOGY ONCOLOGY | Facility: CLINIC | Age: 64
End: 2024-09-20
Payer: COMMERCIAL

## 2024-09-20 VITALS
BODY MASS INDEX: 29.99 KG/M2 | SYSTOLIC BLOOD PRESSURE: 124 MMHG | OXYGEN SATURATION: 97 % | RESPIRATION RATE: 16 BRPM | TEMPERATURE: 97 F | HEART RATE: 60 BPM | DIASTOLIC BLOOD PRESSURE: 57 MMHG | WEIGHT: 158.73 LBS

## 2024-09-20 DIAGNOSIS — Z79.899 OTHER LONG TERM (CURRENT) DRUG THERAPY: ICD-10-CM

## 2024-09-20 DIAGNOSIS — C50.911 MALIGNANT NEOPLASM OF UNSPECIFIED SITE OF RIGHT FEMALE BREAST: ICD-10-CM

## 2024-09-20 LAB
ALBUMIN SERPL ELPH-MCNC: 4.4 G/DL
ALP BLD-CCNC: 77 U/L
ALT SERPL-CCNC: 15 U/L
ANION GAP SERPL CALC-SCNC: 12 MMOL/L
AST SERPL-CCNC: 20 U/L
BASOPHILS # BLD AUTO: 0.01 K/UL — SIGNIFICANT CHANGE UP (ref 0–0.2)
BASOPHILS NFR BLD AUTO: 0.3 % — SIGNIFICANT CHANGE UP (ref 0–2)
BILIRUB SERPL-MCNC: 0.2 MG/DL
BUN SERPL-MCNC: 17 MG/DL
CALCIUM SERPL-MCNC: 9.7 MG/DL
CHLORIDE SERPL-SCNC: 112 MMOL/L
CO2 SERPL-SCNC: 20 MMOL/L
CREAT SERPL-MCNC: 1.28 MG/DL
EGFR: 47 ML/MIN/1.73M2
EOSINOPHIL # BLD AUTO: 0.02 K/UL — SIGNIFICANT CHANGE UP (ref 0–0.5)
EOSINOPHIL NFR BLD AUTO: 0.6 % — SIGNIFICANT CHANGE UP (ref 0–6)
GLUCOSE SERPL-MCNC: 88 MG/DL
HCT VFR BLD CALC: 31.6 % — LOW (ref 34.5–45)
HGB BLD-MCNC: 10.4 G/DL — LOW (ref 11.5–15.5)
IMM GRANULOCYTES NFR BLD AUTO: 0.9 % — SIGNIFICANT CHANGE UP (ref 0–0.9)
LYMPHOCYTES # BLD AUTO: 1.02 K/UL — SIGNIFICANT CHANGE UP (ref 1–3.3)
LYMPHOCYTES # BLD AUTO: 31.1 % — SIGNIFICANT CHANGE UP (ref 13–44)
MCHC RBC-ENTMCNC: 29.6 PG — SIGNIFICANT CHANGE UP (ref 27–34)
MCHC RBC-ENTMCNC: 32.9 G/DL — SIGNIFICANT CHANGE UP (ref 32–36)
MCV RBC AUTO: 90 FL — SIGNIFICANT CHANGE UP (ref 80–100)
MONOCYTES # BLD AUTO: 0.19 K/UL — SIGNIFICANT CHANGE UP (ref 0–0.9)
MONOCYTES NFR BLD AUTO: 5.8 % — SIGNIFICANT CHANGE UP (ref 2–14)
NEUTROPHILS # BLD AUTO: 2.01 K/UL — SIGNIFICANT CHANGE UP (ref 1.8–7.4)
NEUTROPHILS NFR BLD AUTO: 61.3 % — SIGNIFICANT CHANGE UP (ref 43–77)
NRBC # BLD: 0 /100 WBCS — SIGNIFICANT CHANGE UP (ref 0–0)
PLATELET # BLD AUTO: 181 K/UL — SIGNIFICANT CHANGE UP (ref 150–400)
POTASSIUM SERPL-SCNC: 4.6 MMOL/L
PROT SERPL-MCNC: 8.1 G/DL
RBC # BLD: 3.51 M/UL — LOW (ref 3.8–5.2)
RBC # FLD: 17.3 % — HIGH (ref 10.3–14.5)
SODIUM SERPL-SCNC: 144 MMOL/L
WBC # BLD: 3.28 K/UL — LOW (ref 3.8–10.5)
WBC # FLD AUTO: 3.28 K/UL — LOW (ref 3.8–10.5)

## 2024-09-20 PROCEDURE — 99214 OFFICE O/P EST MOD 30 MIN: CPT

## 2024-09-20 PROCEDURE — G2211 COMPLEX E/M VISIT ADD ON: CPT

## 2024-09-20 NOTE — HISTORY OF PRESENT ILLNESS
[Disease: _____________________] : Disease: [unfilled] [T: ___] : T[unfilled] [N: ___] : N[unfilled] [M: ___] : M[unfilled] [AJCC Stage: ____] : AJCC Stage: [unfilled] [90: Able to carry normal activity; minor signs or symptoms of disease.] : 90: Able to carry normal activity; minor signs or symptoms of disease.  [de-identified] : Seen initially as part of [facility] - accompanied by daughter; (Allie)  Patient with prior mammogram in  showing normal findings. She has history of CVA in , aneurysmal subarachnoid hemorrhage s/p clipping and  shunt placement, bifrontal encephalomalacia, epilepsy with recurrent seizures including in 2022, 2023 and more recently in 2023, on multiple antiseizure meds (Keppra (levetiracetam), Vimpat (lacosamide) , Trileptal (oxcarbazepine) and zonisamide. 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  She noted a palpable mass in the right breast on 10/5/23.  Diagnostic bilateral mammogram on 10/5/23 showed mammographic and sonographic findings corresponding to the palpable mass in the upper outer quadrant of the right breast, consistent with an underlying carcinoma. Abnormal lymph nodes in the right axilla consistent with involvement by tumor. Biopsy recommended. BI-RADS 5.  Right breast biopsy 10/9/23, 10:00 7cm Pathology - IDC, poorly differentiated, measuring 2.5mm ER negative, MT negative, HER-2 negative.  She was seen by Surgical Oncology (Dr. Roger Lyn); axillary LN biopsy 10/18/23 - positive for carcinoma identical to morphology of the right breast lesion  Due to  shunt, she was not a candidate for pre-operative breast MRI and instead underwent contrast enhanced mammogram on 10/24/23 - Biopsy-proven malignancy at the right 10:00 axis with associated enhancement spanning up to 6.1 cm. - Newly noted subcentimeter mass at the 10:00, 5 cm from nipple location felt to correspond with a focal asymmetry or medial and inferior to the primary cancer favoring a satellite lesion.  Staging CT C/A/P 10/25/23 - right breast mass with right axillary lymphadenopathy. - No definite evidence of distant metastatic disease. A cluster of several non-enlarged prevascular mediastinal lymph nodes is nonspecific. - A subpleural 4 mm right middle lobe pulmonary nodule is likely benign.  Staging Bone Scan 11/10/23 - JAKE  Risk Stratification - ; 1st full term pregnancy at age 18, no OCPs, no HRT, menarche unknown exact age, menopause unknown exact age; + family history of breast cancer in mother, colon cancer in maternal aunt Genetic testing - [employer] 84 gene panel; negative.  [de-identified] : Triple Negative [de-identified] : [id number]-[id number]- [de-identified] : 9/20/2024 Patient returns today to rule out progression of breast cancer and to assess treatment toxicity. Patient w/ triple negative breast cancer and started on neoadjuvant therapy with Pembro + Taxol weekly x 12 overall tolerated well; On 3/11/24 - Underwent lumpectomy/SLN - final path with 2.4cm poorly differentiated IDC with multiple satellite foci; extensive LVI involving right lateral resection margin and right anterior margin; 1/2 SLN +, additional ALND done with additional 50 negative LN (total 1/52). Completed RT 4005 cGy in June 2024. Patient is currently receiving adjuvant keytruda. Her  had a major stroke and is hospitalized in [place] She took a few days of capecitabine but stopped when her  was hospitized - she is off of Coumadin and now on Lovenox  She denies any sob, cp, n/v, diarrhea, rash.

## 2024-09-20 NOTE — ASSESSMENT
[FreeTextEntry1] : 62 yo woman with history of subarachnoid hemorrhage in 2013 s/p craniotomy for evacuation and  shunt placement, recurrent DVTs requiring anticoagulation but needs warfarin due to interaction of DOACs with oxcabazepine (Trileptal), found in 10/2023 to have at least Stage IIIA triple negative breast cancer.  - case was discussed at [facility] at time of presentation with surgical oncology, radiation oncology, radiology and pathology reviewed - treated with neoadjuvant Taxol 80mg/m2 X12 weeks with concurrent pembrolizumab; Adriamycin/Cytoxan held due to concern for interactions with antiseizure meds. - proceeded to surgery 3/11/2024 - completed adjuvant RT with Dr. Larios given residual disease (margins negative) and 1+ LN; 4005 cGy in 15 Fx (5/23/24-6/13/24) -Will continue with pembrolizumab 200mg IV every 3 weeks  to complete 17 treatments. - concern for local recurrence of the skin --> recommend biopsy of lesion - CT scans c/a/p w/ contrast  - Patient to begin adjuvant capecitabine 1500mg 7 days on/7 days off;- has not taken consistently - Patient had the opportunity to have all their questions answered to their satisfaction - RTC in 3 weeks.

## 2024-09-20 NOTE — ASSESSMENT
[FreeTextEntry1] : 64 yo woman with history of subarachnoid hemorrhage in 2013 s/p craniotomy for evacuation and  shunt placement, recurrent DVTs requiring anticoagulation but needs warfarin due to interaction of DOACs with oxcabazepine (Trileptal), found in 10/2023 to have at least Stage IIIA triple negative breast cancer.  - case was discussed at [facility] [place] at time of presentation with surgical oncology, radiation oncology, radiology and pathology reviewed - treated with neoadjuvant Taxol 80mg/m2 X12 weeks with concurrent pembrolizumab; Adriamycin/Cytoxan held due to concern for interactions with antiseizure meds. - proceeded to surgery 3/11/2024 - completed adjuvant RT with Dr. Larios given residual disease (margins negative) and 1+ LN; 4005 cGy in 15 Fx (5/23/24-6/13/24) -Will continue with pembrolizumab 200mg IV every 3 weeks  to complete 17 treatments. - concern for local recurrence of the skin --> recommend biopsy of lesion - CT scans c/a/p w/ contrast  - Patient to begin adjuvant capecitabine 1500mg 7 days on/7 days off;- has not taken consistently - Patient had the opportunity to have all their questions answered to their satisfaction - RTC in 3 weeks.

## 2024-09-20 NOTE — PHYSICAL EXAM
[Restricted in physically strenuous activity but ambulatory and able to carry out work of a light or sedentary nature] : Status 1- Restricted in physically strenuous activity but ambulatory and able to carry out work of a light or sedentary nature, e.g., light house work, office work [Normal] : affect appropriate [de-identified] : vision loss left eye [de-identified] : Port present left chest wall with no signs of infection. [de-identified] : Right breast well healed incisions; post RT radiation skin changes; three distinct nodular lesions noted: 2 laterally and superiorally (see photos) left breast with no discrete palpable masses, no skin dimpling or nipple inversion/discharge, no palpable axillary nodes. [de-identified] : Right  shunt in place

## 2024-09-20 NOTE — END OF VISIT
[Time Spent: ___ minutes] : I have spent [unfilled] minutes of time on the encounter which excludes teaching and separately reported services. [FreeTextEntry3] : Patient being seen as per physician's primary plan of care. Dr. Valencia was present during examination and advised on plan of care.

## 2024-09-20 NOTE — PHYSICAL EXAM
[Restricted in physically strenuous activity but ambulatory and able to carry out work of a light or sedentary nature] : Status 1- Restricted in physically strenuous activity but ambulatory and able to carry out work of a light or sedentary nature, e.g., light house work, office work [Normal] : affect appropriate [de-identified] : vision loss left eye [de-identified] : Port present left chest wall with no signs of infection. [de-identified] : Right breast well healed incisions; post RT radiation skin changes; three distinct nodular lesions noted: 2 laterally and superiorally (see photos) left breast with no discrete palpable masses, no skin dimpling or nipple inversion/discharge, no palpable axillary nodes. [de-identified] : Right  shunt in place

## 2024-09-20 NOTE — HISTORY OF PRESENT ILLNESS
[Disease: _____________________] : Disease: [unfilled] [T: ___] : T[unfilled] [N: ___] : N[unfilled] [M: ___] : M[unfilled] [AJCC Stage: ____] : AJCC Stage: [unfilled] [90: Able to carry normal activity; minor signs or symptoms of disease.] : 90: Able to carry normal activity; minor signs or symptoms of disease.  [de-identified] : [facility] - accompanied by daughter; (Allie)  Patient with prior mammogram in  showing normal findings. She has history of CVA in , aneurysmal subarachnoid hemorrhage s/p clipping and  shunt placement, bifrontal encephalomalacia, epilepsy with recurrent seizures including in 2022, 2023 and more recently in 2023, on multiple antiseizure meds (Keppra (levetiracetam), Vimpat (lacosamide) , Trileptal (oxcarbazepine) and zonisamide. 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  She noted a palpable mass in the right breast on 10/5/23.  Diagnostic bilateral mammogram on 10/5/23 showed mammographic and sonographic findings corresponding to the palpable mass in the upper outer quadrant of the right breast, consistent with an underlying carcinoma. Abnormal lymph nodes in the right axilla consistent with involvement by tumor. Biopsy recommended. BI-RADS 5.  Right breast biopsy 10/9/23, 10:00 7cm Pathology - IDC, poorly differentiated, measuring 2.5mm ER negative, MN negative, HER-2 negative.  She was seen by Surgical Oncology (Dr. Roger Lyn); axillary LN biopsy 10/18/23 - positive for carcinoma identical to morphology of the right breast lesion  Due to  shunt, she was not a candidate for pre-operative breast MRI and instead underwent contrast enhanced mammogram on 10/24/23 - Biopsy-proven malignancy at the right 10:00 axis with associated enhancement spanning up to 6.1 cm. - Newly noted subcentimeter mass at the 10:00, 5 cm from nipple location felt to correspond with a focal asymmetry or medial and inferior to the primary cancer favoring a satellite lesion.  Staging CT C/A/P 10/25/23 - right breast mass with right axillary lymphadenopathy. - No definite evidence of distant metastatic disease. A cluster of several non-enlarged prevascular mediastinal lymph nodes is nonspecific. - A subpleural 4 mm right middle lobe pulmonary nodule is likely benign.  Staging Bone Scan 11/10/23 - JAKE  Risk Stratification - ; 1st full term pregnancy at age 18, no OCPs, no HRT, menarche unknown exact age, menopause unknown exact age; + family history of breast cancer in mother, colon cancer in maternal aunt Genetic testing - [employer] 84 gene panel; negative.  [de-identified] : Triple Negative [de-identified] : [id number]- [de-identified] : 9/20/2024 Patient returns today to rule out progression of breast cancer and to assess treatment toxicity. Patient w/ triple negative breast cancer and started on neoadjuvant therapy with Pembro + Taxol weekly x 12 overall tolerated well; On 3/11/24 - Underwent lumpectomy/SLN - final path with 2.4cm poorly differentiated IDC with multiple satellite foci; extensive LVI involving right lateral resection margin and right anterior margin; 1/2 SLN +, additional ALND done with additional 50 negative LN (total 1/52). Completed RT 4005 cGy in June 2024. Patient is currently receiving adjuvant keytruda. Her  had a major stroke and is hospitalized in [place] She took a few days of capecitabine but stopped when her  was hospitized - she is off of Coumadin and now on Lovenox  She denies any sob, cp, n/v, diarrhea, rash.

## 2024-09-23 ENCOUNTER — NON-APPOINTMENT (OUTPATIENT)
Age: 64
End: 2024-09-23

## 2024-09-24 ENCOUNTER — APPOINTMENT (OUTPATIENT)
Dept: CT IMAGING | Facility: IMAGING CENTER | Age: 64
End: 2024-09-24

## 2024-09-24 ENCOUNTER — RESULT REVIEW (OUTPATIENT)
Age: 64
End: 2024-09-24

## 2024-09-24 ENCOUNTER — OUTPATIENT (OUTPATIENT)
Dept: OUTPATIENT SERVICES | Facility: HOSPITAL | Age: 64
LOS: 1 days | Discharge: HOME | End: 2024-09-24

## 2024-09-24 DIAGNOSIS — Z98.2 PRESENCE OF CEREBROSPINAL FLUID DRAINAGE DEVICE: Chronic | ICD-10-CM

## 2024-09-24 DIAGNOSIS — Z95.5 PRESENCE OF CORONARY ANGIOPLASTY IMPLANT AND GRAFT: Chronic | ICD-10-CM

## 2024-09-24 DIAGNOSIS — Z98.89 OTHER SPECIFIED POSTPROCEDURAL STATES: Chronic | ICD-10-CM

## 2024-09-24 DIAGNOSIS — C50.919 MALIGNANT NEOPLASM OF UNSPECIFIED SITE OF UNSPECIFIED FEMALE BREAST: ICD-10-CM

## 2024-09-24 DIAGNOSIS — I72.9 ANEURYSM OF UNSPECIFIED SITE: Chronic | ICD-10-CM

## 2024-09-24 DIAGNOSIS — Z90.49 ACQUIRED ABSENCE OF OTHER SPECIFIED PARTS OF DIGESTIVE TRACT: Chronic | ICD-10-CM

## 2024-09-30 ENCOUNTER — APPOINTMENT (OUTPATIENT)
Dept: CT IMAGING | Facility: CLINIC | Age: 64
End: 2024-09-30
Payer: COMMERCIAL

## 2024-09-30 PROCEDURE — 74177 CT ABD & PELVIS W/CONTRAST: CPT

## 2024-09-30 PROCEDURE — 71260 CT THORAX DX C+: CPT

## 2024-10-04 ENCOUNTER — APPOINTMENT (OUTPATIENT)
Dept: HEMATOLOGY ONCOLOGY | Facility: CLINIC | Age: 64
End: 2024-10-04

## 2024-10-09 ENCOUNTER — APPOINTMENT (OUTPATIENT)
Dept: SURGICAL ONCOLOGY | Facility: CLINIC | Age: 64
End: 2024-10-09

## 2024-10-22 ENCOUNTER — APPOINTMENT (OUTPATIENT)
Dept: HEMATOLOGY ONCOLOGY | Facility: CLINIC | Age: 64
End: 2024-10-22
Payer: COMMERCIAL

## 2024-10-22 ENCOUNTER — RESULT REVIEW (OUTPATIENT)
Age: 64
End: 2024-10-22

## 2024-10-22 VITALS
WEIGHT: 154.32 LBS | DIASTOLIC BLOOD PRESSURE: 82 MMHG | OXYGEN SATURATION: 97 % | RESPIRATION RATE: 16 BRPM | TEMPERATURE: 97.2 F | SYSTOLIC BLOOD PRESSURE: 172 MMHG | HEART RATE: 51 BPM | BODY MASS INDEX: 29.16 KG/M2

## 2024-10-22 DIAGNOSIS — C50.911 MALIGNANT NEOPLASM OF UNSPECIFIED SITE OF RIGHT FEMALE BREAST: ICD-10-CM

## 2024-10-22 DIAGNOSIS — Z79.899 OTHER LONG TERM (CURRENT) DRUG THERAPY: ICD-10-CM

## 2024-10-22 DIAGNOSIS — R79.89 OTHER SPECIFIED ABNORMAL FINDINGS OF BLOOD CHEMISTRY: ICD-10-CM

## 2024-10-22 LAB
BASOPHILS # BLD AUTO: 0.01 K/UL — SIGNIFICANT CHANGE UP (ref 0–0.2)
BASOPHILS NFR BLD AUTO: 0.3 % — SIGNIFICANT CHANGE UP (ref 0–2)
EOSINOPHIL # BLD AUTO: 0.04 K/UL — SIGNIFICANT CHANGE UP (ref 0–0.5)
EOSINOPHIL NFR BLD AUTO: 1 % — SIGNIFICANT CHANGE UP (ref 0–6)
HCT VFR BLD CALC: 32.7 % — LOW (ref 34.5–45)
HGB BLD-MCNC: 10.6 G/DL — LOW (ref 11.5–15.5)
IMM GRANULOCYTES NFR BLD AUTO: 0.3 % — SIGNIFICANT CHANGE UP (ref 0–0.9)
LYMPHOCYTES # BLD AUTO: 0.95 K/UL — LOW (ref 1–3.3)
LYMPHOCYTES # BLD AUTO: 24.5 % — SIGNIFICANT CHANGE UP (ref 13–44)
MCHC RBC-ENTMCNC: 30.2 PG — SIGNIFICANT CHANGE UP (ref 27–34)
MCHC RBC-ENTMCNC: 32.4 G/DL — SIGNIFICANT CHANGE UP (ref 32–36)
MCV RBC AUTO: 93.2 FL — SIGNIFICANT CHANGE UP (ref 80–100)
MONOCYTES # BLD AUTO: 0.3 K/UL — SIGNIFICANT CHANGE UP (ref 0–0.9)
MONOCYTES NFR BLD AUTO: 7.8 % — SIGNIFICANT CHANGE UP (ref 2–14)
NEUTROPHILS # BLD AUTO: 2.56 K/UL — SIGNIFICANT CHANGE UP (ref 1.8–7.4)
NEUTROPHILS NFR BLD AUTO: 66.1 % — SIGNIFICANT CHANGE UP (ref 43–77)
NRBC # BLD: 0 /100 WBCS — SIGNIFICANT CHANGE UP (ref 0–0)
PLATELET # BLD AUTO: 185 K/UL — SIGNIFICANT CHANGE UP (ref 150–400)
RBC # BLD: 3.51 M/UL — LOW (ref 3.8–5.2)
RBC # FLD: 15.5 % — HIGH (ref 10.3–14.5)
WBC # BLD: 3.87 K/UL — SIGNIFICANT CHANGE UP (ref 3.8–10.5)
WBC # FLD AUTO: 3.87 K/UL — SIGNIFICANT CHANGE UP (ref 3.8–10.5)

## 2024-10-22 PROCEDURE — G2211 COMPLEX E/M VISIT ADD ON: CPT

## 2024-10-22 PROCEDURE — 99214 OFFICE O/P EST MOD 30 MIN: CPT

## 2024-10-23 LAB
ALBUMIN SERPL ELPH-MCNC: 4.3 G/DL
ALP BLD-CCNC: 88 U/L
ALT SERPL-CCNC: 7 U/L
ANION GAP SERPL CALC-SCNC: 11 MMOL/L
AST SERPL-CCNC: 12 U/L
BILIRUB SERPL-MCNC: 0.2 MG/DL
BUN SERPL-MCNC: 17 MG/DL
CALCIUM SERPL-MCNC: 9.3 MG/DL
CHLORIDE SERPL-SCNC: 114 MMOL/L
CO2 SERPL-SCNC: 23 MMOL/L
CREAT SERPL-MCNC: 1.27 MG/DL
EGFR: 47 ML/MIN/1.73M2
GLUCOSE SERPL-MCNC: 98 MG/DL
POTASSIUM SERPL-SCNC: 4.4 MMOL/L
PROT SERPL-MCNC: 7.6 G/DL
SODIUM SERPL-SCNC: 147 MMOL/L
TSH SERPL-ACNC: 1.75 UIU/ML

## 2024-11-10 NOTE — PHYSICAL THERAPY INITIAL EVALUATION ADULT - ADDITIONAL COMMENTS
<-- Click to add NO significant Past Surgical History Patient lives in pvt house with spouse, 3 steps to enter. Patient ambulated without AD independent. uses rollator for long distance amb.  pt owns rollator, bed side pt will require 3:1 commode as patient confined to a single level/single room w/o a bathroom and pt maintains decrease standing tolerance. at home. Patient lives in pvt house with spouse, 3 steps to enter. Patient ambulated without AD independent. uses rollator for long distance amb.  pt owns rollator, bed sidecommode at home. Patient lives in pvt house with spouse, 3 steps to enter. Patient ambulated without AD independent. uses rollator for long distance amb.  pt owns rollator, bed sidecommode at home. Per pt. spouse available to assist as needed. Patient lives in pvt house with spouse, 3 steps to enter. Patient ambulated without AD independent. uses rollator for long distance amb.  pt owns rollator, bed sidecommode at home. Per pt. spouse available to assist as needed. R eye vison impairment.

## 2024-11-12 ENCOUNTER — RESULT REVIEW (OUTPATIENT)
Age: 64
End: 2024-11-12

## 2024-11-12 ENCOUNTER — APPOINTMENT (OUTPATIENT)
Dept: HEMATOLOGY ONCOLOGY | Facility: CLINIC | Age: 64
End: 2024-11-12
Payer: COMMERCIAL

## 2024-11-12 ENCOUNTER — APPOINTMENT (OUTPATIENT)
Dept: INFUSION THERAPY | Facility: HOSPITAL | Age: 64
End: 2024-11-12

## 2024-11-12 VITALS
DIASTOLIC BLOOD PRESSURE: 80 MMHG | SYSTOLIC BLOOD PRESSURE: 184 MMHG | BODY MASS INDEX: 28.95 KG/M2 | HEART RATE: 60 BPM | RESPIRATION RATE: 16 BRPM | WEIGHT: 153.22 LBS | TEMPERATURE: 96.8 F | OXYGEN SATURATION: 98 %

## 2024-11-12 DIAGNOSIS — N64.59 OTHER SIGNS AND SYMPTOMS IN BREAST: ICD-10-CM

## 2024-11-12 DIAGNOSIS — Z79.899 OTHER LONG TERM (CURRENT) DRUG THERAPY: ICD-10-CM

## 2024-11-12 LAB
ALBUMIN SERPL ELPH-MCNC: 4.2 G/DL — SIGNIFICANT CHANGE UP (ref 3.3–5)
ALP SERPL-CCNC: 90 U/L — SIGNIFICANT CHANGE UP (ref 40–120)
ALT FLD-CCNC: 9 U/L — LOW (ref 10–45)
ANION GAP SERPL CALC-SCNC: 14 MMOL/L — SIGNIFICANT CHANGE UP (ref 5–17)
AST SERPL-CCNC: 20 U/L — SIGNIFICANT CHANGE UP (ref 10–40)
BASOPHILS # BLD AUTO: 0.02 K/UL — SIGNIFICANT CHANGE UP (ref 0–0.2)
BASOPHILS NFR BLD AUTO: 0.6 % — SIGNIFICANT CHANGE UP (ref 0–2)
BILIRUB SERPL-MCNC: 0.2 MG/DL — SIGNIFICANT CHANGE UP (ref 0.2–1.2)
BUN SERPL-MCNC: 20 MG/DL — SIGNIFICANT CHANGE UP (ref 7–23)
CALCIUM SERPL-MCNC: 9.9 MG/DL — SIGNIFICANT CHANGE UP (ref 8.4–10.5)
CHLORIDE SERPL-SCNC: 111 MMOL/L — HIGH (ref 96–108)
CO2 SERPL-SCNC: 20 MMOL/L — LOW (ref 22–31)
CREAT SERPL-MCNC: 1.16 MG/DL — SIGNIFICANT CHANGE UP (ref 0.5–1.3)
EGFR: 53 ML/MIN/1.73M2 — LOW
EOSINOPHIL # BLD AUTO: 0.04 K/UL — SIGNIFICANT CHANGE UP (ref 0–0.5)
EOSINOPHIL NFR BLD AUTO: 1.2 % — SIGNIFICANT CHANGE UP (ref 0–6)
GLUCOSE SERPL-MCNC: 79 MG/DL — SIGNIFICANT CHANGE UP (ref 70–99)
HCT VFR BLD CALC: 32.8 % — LOW (ref 34.5–45)
HGB BLD-MCNC: 10.6 G/DL — LOW (ref 11.5–15.5)
IMM GRANULOCYTES NFR BLD AUTO: 0.3 % — SIGNIFICANT CHANGE UP (ref 0–0.9)
LYMPHOCYTES # BLD AUTO: 1.1 K/UL — SIGNIFICANT CHANGE UP (ref 1–3.3)
LYMPHOCYTES # BLD AUTO: 31.7 % — SIGNIFICANT CHANGE UP (ref 13–44)
MCHC RBC-ENTMCNC: 29.5 PG — SIGNIFICANT CHANGE UP (ref 27–34)
MCHC RBC-ENTMCNC: 32.3 G/DL — SIGNIFICANT CHANGE UP (ref 32–36)
MCV RBC AUTO: 91.4 FL — SIGNIFICANT CHANGE UP (ref 80–100)
MONOCYTES # BLD AUTO: 0.31 K/UL — SIGNIFICANT CHANGE UP (ref 0–0.9)
MONOCYTES NFR BLD AUTO: 8.9 % — SIGNIFICANT CHANGE UP (ref 2–14)
NEUTROPHILS # BLD AUTO: 1.99 K/UL — SIGNIFICANT CHANGE UP (ref 1.8–7.4)
NEUTROPHILS NFR BLD AUTO: 57.3 % — SIGNIFICANT CHANGE UP (ref 43–77)
NRBC # BLD: 0 /100 WBCS — SIGNIFICANT CHANGE UP (ref 0–0)
PLATELET # BLD AUTO: 210 K/UL — SIGNIFICANT CHANGE UP (ref 150–400)
POTASSIUM SERPL-MCNC: 3.7 MMOL/L — SIGNIFICANT CHANGE UP (ref 3.5–5.3)
POTASSIUM SERPL-SCNC: 3.7 MMOL/L — SIGNIFICANT CHANGE UP (ref 3.5–5.3)
PROT SERPL-MCNC: 8.2 G/DL — SIGNIFICANT CHANGE UP (ref 6–8.3)
RBC # BLD: 3.59 M/UL — LOW (ref 3.8–5.2)
RBC # FLD: 14.7 % — HIGH (ref 10.3–14.5)
SODIUM SERPL-SCNC: 146 MMOL/L — HIGH (ref 135–145)
WBC # BLD: 3.47 K/UL — LOW (ref 3.8–10.5)
WBC # FLD AUTO: 3.47 K/UL — LOW (ref 3.8–10.5)

## 2024-11-12 PROCEDURE — G2211 COMPLEX E/M VISIT ADD ON: CPT

## 2024-11-12 PROCEDURE — 99214 OFFICE O/P EST MOD 30 MIN: CPT

## 2024-11-13 DIAGNOSIS — Z51.11 ENCOUNTER FOR ANTINEOPLASTIC CHEMOTHERAPY: ICD-10-CM

## 2024-11-13 LAB
CORTIS AM PEAK SERPL-MCNC: 7 UG/DL — SIGNIFICANT CHANGE UP (ref 6–18.4)
T4 FREE+ TSH PNL SERPL: 1.98 UIU/ML — SIGNIFICANT CHANGE UP (ref 0.27–4.2)

## 2024-11-19 ENCOUNTER — APPOINTMENT (OUTPATIENT)
Dept: INTERNAL MEDICINE | Facility: CLINIC | Age: 64
End: 2024-11-19

## 2024-11-20 ENCOUNTER — APPOINTMENT (OUTPATIENT)
Dept: SURGICAL ONCOLOGY | Facility: CLINIC | Age: 64
End: 2024-11-20

## 2024-11-20 VITALS
SYSTOLIC BLOOD PRESSURE: 115 MMHG | DIASTOLIC BLOOD PRESSURE: 63 MMHG | WEIGHT: 153 LBS | HEIGHT: 61 IN | HEART RATE: 59 BPM | BODY MASS INDEX: 28.89 KG/M2 | OXYGEN SATURATION: 98 %

## 2024-11-20 DIAGNOSIS — C50.911 MALIGNANT NEOPLASM OF UNSPECIFIED SITE OF RIGHT FEMALE BREAST: ICD-10-CM

## 2024-11-20 PROCEDURE — 99215 OFFICE O/P EST HI 40 MIN: CPT | Mod: 25

## 2024-11-20 PROCEDURE — 19101 BIOPSY OF BREAST OPEN: CPT

## 2024-11-20 PROCEDURE — 11600 EXC TR-EXT MAL+MARG 0.5 CM/<: CPT

## 2024-11-27 LAB — CORE LAB BIOPSY: NORMAL

## 2024-12-04 ENCOUNTER — OUTPATIENT (OUTPATIENT)
Dept: OUTPATIENT SERVICES | Facility: HOSPITAL | Age: 64
LOS: 1 days | Discharge: HOME | End: 2024-12-04

## 2024-12-04 DIAGNOSIS — Z98.89 OTHER SPECIFIED POSTPROCEDURAL STATES: Chronic | ICD-10-CM

## 2024-12-04 DIAGNOSIS — I72.9 ANEURYSM OF UNSPECIFIED SITE: Chronic | ICD-10-CM

## 2024-12-04 DIAGNOSIS — Z98.2 PRESENCE OF CEREBROSPINAL FLUID DRAINAGE DEVICE: Chronic | ICD-10-CM

## 2024-12-04 DIAGNOSIS — C50.919 MALIGNANT NEOPLASM OF UNSPECIFIED SITE OF UNSPECIFIED FEMALE BREAST: ICD-10-CM

## 2024-12-04 DIAGNOSIS — Z90.49 ACQUIRED ABSENCE OF OTHER SPECIFIED PARTS OF DIGESTIVE TRACT: Chronic | ICD-10-CM

## 2024-12-04 DIAGNOSIS — Z95.5 PRESENCE OF CORONARY ANGIOPLASTY IMPLANT AND GRAFT: Chronic | ICD-10-CM

## 2024-12-12 ENCOUNTER — APPOINTMENT (OUTPATIENT)
Dept: HEMATOLOGY ONCOLOGY | Facility: CLINIC | Age: 64
End: 2024-12-12
Payer: COMMERCIAL

## 2024-12-12 ENCOUNTER — RESULT REVIEW (OUTPATIENT)
Age: 64
End: 2024-12-12

## 2024-12-12 VITALS
WEIGHT: 154.32 LBS | RESPIRATION RATE: 16 BRPM | SYSTOLIC BLOOD PRESSURE: 133 MMHG | BODY MASS INDEX: 29.16 KG/M2 | OXYGEN SATURATION: 98 % | TEMPERATURE: 97 F | HEART RATE: 64 BPM | DIASTOLIC BLOOD PRESSURE: 76 MMHG

## 2024-12-12 DIAGNOSIS — C50.911 MALIGNANT NEOPLASM OF UNSPECIFIED SITE OF RIGHT FEMALE BREAST: ICD-10-CM

## 2024-12-12 DIAGNOSIS — C79.2 SECONDARY MALIGNANT NEOPLASM OF SKIN: ICD-10-CM

## 2024-12-12 DIAGNOSIS — Z79.899 OTHER LONG TERM (CURRENT) DRUG THERAPY: ICD-10-CM

## 2024-12-12 LAB
ALBUMIN SERPL ELPH-MCNC: 4.4 G/DL
ALP BLD-CCNC: 104 U/L
ALT SERPL-CCNC: 9 U/L
ANION GAP SERPL CALC-SCNC: 16 MMOL/L
AST SERPL-CCNC: 11 U/L
BASOPHILS # BLD AUTO: 0.01 K/UL — SIGNIFICANT CHANGE UP (ref 0–0.2)
BASOPHILS NFR BLD AUTO: 0.2 % — SIGNIFICANT CHANGE UP (ref 0–2)
BILIRUB SERPL-MCNC: 0.2 MG/DL
BUN SERPL-MCNC: 16 MG/DL
CALCIUM SERPL-MCNC: 10.1 MG/DL
CANCER AG27-29 SERPL-ACNC: 46.6 U/ML
CEA SERPL-MCNC: 1.6 NG/ML
CHLORIDE SERPL-SCNC: 111 MMOL/L
CO2 SERPL-SCNC: 20 MMOL/L
CREAT SERPL-MCNC: 1.31 MG/DL
EGFR: 46 ML/MIN/1.73M2
EOSINOPHIL # BLD AUTO: 0.04 K/UL — SIGNIFICANT CHANGE UP (ref 0–0.5)
EOSINOPHIL NFR BLD AUTO: 0.8 % — SIGNIFICANT CHANGE UP (ref 0–6)
GLUCOSE SERPL-MCNC: 88 MG/DL
HCT VFR BLD CALC: 35.1 % — SIGNIFICANT CHANGE UP (ref 34.5–45)
HGB BLD-MCNC: 11.7 G/DL — SIGNIFICANT CHANGE UP (ref 11.5–15.5)
IMM GRANULOCYTES NFR BLD AUTO: 0.2 % — SIGNIFICANT CHANGE UP (ref 0–0.9)
LYMPHOCYTES # BLD AUTO: 1.35 K/UL — SIGNIFICANT CHANGE UP (ref 1–3.3)
LYMPHOCYTES # BLD AUTO: 26.5 % — SIGNIFICANT CHANGE UP (ref 13–44)
MCHC RBC-ENTMCNC: 30.4 PG — SIGNIFICANT CHANGE UP (ref 27–34)
MCHC RBC-ENTMCNC: 33.3 G/DL — SIGNIFICANT CHANGE UP (ref 32–36)
MCV RBC AUTO: 91.2 FL — SIGNIFICANT CHANGE UP (ref 80–100)
MONOCYTES # BLD AUTO: 0.37 K/UL — SIGNIFICANT CHANGE UP (ref 0–0.9)
MONOCYTES NFR BLD AUTO: 7.3 % — SIGNIFICANT CHANGE UP (ref 2–14)
NEUTROPHILS # BLD AUTO: 3.31 K/UL — SIGNIFICANT CHANGE UP (ref 1.8–7.4)
NEUTROPHILS NFR BLD AUTO: 65 % — SIGNIFICANT CHANGE UP (ref 43–77)
NRBC # BLD: 0 /100 WBCS — SIGNIFICANT CHANGE UP (ref 0–0)
NRBC BLD-RTO: 0 /100 WBCS — SIGNIFICANT CHANGE UP (ref 0–0)
PLATELET # BLD AUTO: 275 K/UL — SIGNIFICANT CHANGE UP (ref 150–400)
POTASSIUM SERPL-SCNC: 5.1 MMOL/L
PROT SERPL-MCNC: 8.4 G/DL
RBC # BLD: 3.85 M/UL — SIGNIFICANT CHANGE UP (ref 3.8–5.2)
RBC # FLD: 14.6 % — HIGH (ref 10.3–14.5)
SODIUM SERPL-SCNC: 147 MMOL/L
TSH SERPL-ACNC: 2.43 UIU/ML
WBC # BLD: 5.09 K/UL — SIGNIFICANT CHANGE UP (ref 3.8–10.5)
WBC # FLD AUTO: 5.09 K/UL — SIGNIFICANT CHANGE UP (ref 3.8–10.5)

## 2024-12-12 PROCEDURE — G2211 COMPLEX E/M VISIT ADD ON: CPT

## 2024-12-12 PROCEDURE — 99215 OFFICE O/P EST HI 40 MIN: CPT

## 2024-12-26 ENCOUNTER — RESULT REVIEW (OUTPATIENT)
Age: 64
End: 2024-12-26

## 2024-12-26 ENCOUNTER — APPOINTMENT (OUTPATIENT)
Dept: INFUSION THERAPY | Facility: HOSPITAL | Age: 64
End: 2024-12-26

## 2024-12-26 ENCOUNTER — APPOINTMENT (OUTPATIENT)
Dept: WOUND CARE | Facility: HOSPITAL | Age: 64
End: 2024-12-26

## 2024-12-26 ENCOUNTER — APPOINTMENT (OUTPATIENT)
Dept: HEMATOLOGY ONCOLOGY | Facility: CLINIC | Age: 64
End: 2024-12-26
Payer: COMMERCIAL

## 2024-12-26 VITALS
HEIGHT: 59.45 IN | DIASTOLIC BLOOD PRESSURE: 81 MMHG | OXYGEN SATURATION: 99 % | SYSTOLIC BLOOD PRESSURE: 147 MMHG | BODY MASS INDEX: 30.57 KG/M2 | HEART RATE: 55 BPM | RESPIRATION RATE: 15 BRPM | WEIGHT: 153.66 LBS | TEMPERATURE: 97 F

## 2024-12-26 VITALS
SYSTOLIC BLOOD PRESSURE: 138 MMHG | DIASTOLIC BLOOD PRESSURE: 77 MMHG | RESPIRATION RATE: 16 BRPM | WEIGHT: 153 LBS | BODY MASS INDEX: 30.44 KG/M2 | OXYGEN SATURATION: 99 % | TEMPERATURE: 98 F | HEART RATE: 60 BPM | HEIGHT: 59.45 IN

## 2024-12-26 DIAGNOSIS — R82.90 UNSPECIFIED ABNORMAL FINDINGS IN URINE: ICD-10-CM

## 2024-12-26 DIAGNOSIS — I25.10 ATHEROSCLEROTIC HEART DISEASE OF NATIVE CORONARY ARTERY W/OUT ANGINA PECTORIS: ICD-10-CM

## 2024-12-26 DIAGNOSIS — Z86.73 PERSONAL HISTORY OF TRANSIENT ISCHEMIC ATTACK (TIA), AND CEREBRAL INFARCTION W/OUT RESIDUAL DEFICITS: ICD-10-CM

## 2024-12-26 DIAGNOSIS — C50.911 MALIGNANT NEOPLASM OF UNSPECIFIED SITE OF RIGHT FEMALE BREAST: ICD-10-CM

## 2024-12-26 DIAGNOSIS — C79.2 SECONDARY MALIGNANT NEOPLASM OF SKIN: ICD-10-CM

## 2024-12-26 DIAGNOSIS — I87.2 VENOUS INSUFFICIENCY (CHRONIC) (PERIPHERAL): ICD-10-CM

## 2024-12-26 DIAGNOSIS — Z79.899 OTHER LONG TERM (CURRENT) DRUG THERAPY: ICD-10-CM

## 2024-12-26 DIAGNOSIS — M79.89 OTHER SPECIFIED SOFT TISSUE DISORDERS: ICD-10-CM

## 2024-12-26 DIAGNOSIS — I82.409 ACUTE EMBOLISM AND THROMBOSIS OF UNSPECIFIED DEEP VEINS OF UNSPECIFIED LOWER EXTREMITY: ICD-10-CM

## 2024-12-26 LAB
ALBUMIN SERPL ELPH-MCNC: 4.3 G/DL — SIGNIFICANT CHANGE UP (ref 3.3–5)
ALP SERPL-CCNC: 116 U/L — SIGNIFICANT CHANGE UP (ref 40–120)
ALT FLD-CCNC: 8 U/L — LOW (ref 10–45)
ANION GAP SERPL CALC-SCNC: 11 MMOL/L — SIGNIFICANT CHANGE UP (ref 5–17)
AST SERPL-CCNC: 18 U/L — SIGNIFICANT CHANGE UP (ref 10–40)
BASOPHILS # BLD AUTO: 0.04 K/UL — SIGNIFICANT CHANGE UP (ref 0–0.2)
BASOPHILS NFR BLD AUTO: 0.7 % — SIGNIFICANT CHANGE UP (ref 0–2)
BILIRUB SERPL-MCNC: 0.3 MG/DL — SIGNIFICANT CHANGE UP (ref 0.2–1.2)
BUN SERPL-MCNC: 14 MG/DL — SIGNIFICANT CHANGE UP (ref 7–23)
CALCIUM SERPL-MCNC: 9.5 MG/DL — SIGNIFICANT CHANGE UP (ref 8.4–10.5)
CEA SERPL-MCNC: 2 NG/ML — SIGNIFICANT CHANGE UP (ref 0–3.8)
CHLORIDE SERPL-SCNC: 113 MMOL/L — HIGH (ref 96–108)
CO2 SERPL-SCNC: 20 MMOL/L — LOW (ref 22–31)
CREAT SERPL-MCNC: 1.16 MG/DL — SIGNIFICANT CHANGE UP (ref 0.5–1.3)
EGFR: 53 ML/MIN/1.73M2 — LOW
EOSINOPHIL # BLD AUTO: 0.04 K/UL — SIGNIFICANT CHANGE UP (ref 0–0.5)
EOSINOPHIL NFR BLD AUTO: 0.7 % — SIGNIFICANT CHANGE UP (ref 0–6)
GLUCOSE SERPL-MCNC: 92 MG/DL — SIGNIFICANT CHANGE UP (ref 70–99)
HCT VFR BLD CALC: 35 % — SIGNIFICANT CHANGE UP (ref 34.5–45)
HGB BLD-MCNC: 11.7 G/DL — SIGNIFICANT CHANGE UP (ref 11.5–15.5)
IMM GRANULOCYTES NFR BLD AUTO: 0.5 % — SIGNIFICANT CHANGE UP (ref 0–0.9)
LYMPHOCYTES # BLD AUTO: 1.23 K/UL — SIGNIFICANT CHANGE UP (ref 1–3.3)
LYMPHOCYTES # BLD AUTO: 21.4 % — SIGNIFICANT CHANGE UP (ref 13–44)
MCHC RBC-ENTMCNC: 30.2 PG — SIGNIFICANT CHANGE UP (ref 27–34)
MCHC RBC-ENTMCNC: 33.4 G/DL — SIGNIFICANT CHANGE UP (ref 32–36)
MCV RBC AUTO: 90.2 FL — SIGNIFICANT CHANGE UP (ref 80–100)
MONOCYTES # BLD AUTO: 0.38 K/UL — SIGNIFICANT CHANGE UP (ref 0–0.9)
MONOCYTES NFR BLD AUTO: 6.6 % — SIGNIFICANT CHANGE UP (ref 2–14)
NEUTROPHILS # BLD AUTO: 4.02 K/UL — SIGNIFICANT CHANGE UP (ref 1.8–7.4)
NEUTROPHILS NFR BLD AUTO: 70.1 % — SIGNIFICANT CHANGE UP (ref 43–77)
NRBC # BLD: 0 /100 WBCS — SIGNIFICANT CHANGE UP (ref 0–0)
NRBC BLD-RTO: 0 /100 WBCS — SIGNIFICANT CHANGE UP (ref 0–0)
PLATELET # BLD AUTO: 192 K/UL — SIGNIFICANT CHANGE UP (ref 150–400)
POTASSIUM SERPL-MCNC: 3.6 MMOL/L — SIGNIFICANT CHANGE UP (ref 3.5–5.3)
POTASSIUM SERPL-SCNC: 3.6 MMOL/L — SIGNIFICANT CHANGE UP (ref 3.5–5.3)
PROT SERPL-MCNC: 8.4 G/DL — HIGH (ref 6–8.3)
RBC # BLD: 3.88 M/UL — SIGNIFICANT CHANGE UP (ref 3.8–5.2)
RBC # FLD: 15.9 % — HIGH (ref 10.3–14.5)
SODIUM SERPL-SCNC: 144 MMOL/L — SIGNIFICANT CHANGE UP (ref 135–145)
WBC # BLD: 5.74 K/UL — SIGNIFICANT CHANGE UP (ref 3.8–10.5)
WBC # FLD AUTO: 5.74 K/UL — SIGNIFICANT CHANGE UP (ref 3.8–10.5)

## 2024-12-26 PROCEDURE — G2211 COMPLEX E/M VISIT ADD ON: CPT

## 2024-12-26 PROCEDURE — 99215 OFFICE O/P EST HI 40 MIN: CPT

## 2024-12-26 PROCEDURE — 11042 DBRDMT SUBQ TIS 1ST 20SQCM/<: CPT

## 2024-12-26 PROCEDURE — 99244 OFF/OP CNSLTJ NEW/EST MOD 40: CPT | Mod: 25

## 2024-12-26 RX ORDER — ENOXAPARIN SODIUM 100 MG/ML
100 INJECTION SUBCUTANEOUS
Refills: 0 | Status: ACTIVE | COMMUNITY

## 2024-12-26 RX ORDER — PANTOPRAZOLE SODIUM 40 MG/1
40 GRANULE, DELAYED RELEASE ORAL
Refills: 0 | Status: ACTIVE | COMMUNITY

## 2024-12-26 RX ORDER — LEVETIRACETAM 750 MG/1
750 TABLET, FILM COATED ORAL
Refills: 0 | Status: ACTIVE | COMMUNITY

## 2024-12-26 RX ORDER — MUPIROCIN 20 MG/G
2 OINTMENT TOPICAL
Qty: 1 | Refills: 1 | Status: ACTIVE | COMMUNITY
Start: 2024-12-26 | End: 1900-01-01

## 2024-12-27 ENCOUNTER — NON-APPOINTMENT (OUTPATIENT)
Age: 64
End: 2024-12-27

## 2024-12-27 LAB — CANCER AG27-29 SERPL-ACNC: 52.4 U/ML — HIGH (ref 0–38.6)

## 2025-01-06 RX ORDER — ONDANSETRON 8 MG/1
8 TABLET ORAL EVERY 8 HOURS
Qty: 20 | Refills: 1 | Status: ACTIVE | COMMUNITY
Start: 2025-01-06 | End: 1900-01-01

## 2025-01-06 RX ORDER — LIDOCAINE AND PRILOCAINE 25; 25 MG/G; MG/G
2.5-2.5 CREAM TOPICAL
Qty: 20 | Refills: 2 | Status: ACTIVE | COMMUNITY
Start: 2025-01-06 | End: 1900-01-01

## 2025-01-06 RX ORDER — OMEPRAZOLE 20 MG/1
20 CAPSULE, DELAYED RELEASE ORAL DAILY
Qty: 30 | Refills: 5 | Status: ACTIVE | COMMUNITY
Start: 2025-01-06 | End: 1900-01-01

## 2025-01-09 ENCOUNTER — RESULT REVIEW (OUTPATIENT)
Age: 65
End: 2025-01-09

## 2025-01-09 ENCOUNTER — APPOINTMENT (OUTPATIENT)
Dept: INFUSION THERAPY | Facility: HOSPITAL | Age: 65
End: 2025-01-09

## 2025-01-09 ENCOUNTER — APPOINTMENT (OUTPATIENT)
Dept: HEMATOLOGY ONCOLOGY | Facility: CLINIC | Age: 65
End: 2025-01-09

## 2025-01-09 ENCOUNTER — NON-APPOINTMENT (OUTPATIENT)
Age: 65
End: 2025-01-09

## 2025-01-09 DIAGNOSIS — C50.911 MALIGNANT NEOPLASM OF UNSPECIFIED SITE OF RIGHT FEMALE BREAST: ICD-10-CM

## 2025-01-09 LAB
ALBUMIN SERPL ELPH-MCNC: 4.2 G/DL — SIGNIFICANT CHANGE UP (ref 3.3–5)
ALP SERPL-CCNC: 108 U/L — SIGNIFICANT CHANGE UP (ref 40–120)
ALT FLD-CCNC: 5 U/L — LOW (ref 10–45)
ANION GAP SERPL CALC-SCNC: 15 MMOL/L — SIGNIFICANT CHANGE UP (ref 5–17)
AST SERPL-CCNC: 20 U/L — SIGNIFICANT CHANGE UP (ref 10–40)
BASOPHILS # BLD AUTO: 0.04 K/UL — SIGNIFICANT CHANGE UP (ref 0–0.2)
BASOPHILS NFR BLD AUTO: 0.7 % — SIGNIFICANT CHANGE UP (ref 0–2)
BILIRUB SERPL-MCNC: 0.3 MG/DL — SIGNIFICANT CHANGE UP (ref 0.2–1.2)
BUN SERPL-MCNC: 14 MG/DL — SIGNIFICANT CHANGE UP (ref 7–23)
CALCIUM SERPL-MCNC: 9.6 MG/DL — SIGNIFICANT CHANGE UP (ref 8.4–10.5)
CHLORIDE SERPL-SCNC: 111 MMOL/L — HIGH (ref 96–108)
CO2 SERPL-SCNC: 19 MMOL/L — LOW (ref 22–31)
CREAT SERPL-MCNC: 1.24 MG/DL — SIGNIFICANT CHANGE UP (ref 0.5–1.3)
EGFR: 49 ML/MIN/1.73M2 — LOW
EOSINOPHIL # BLD AUTO: 0.08 K/UL — SIGNIFICANT CHANGE UP (ref 0–0.5)
EOSINOPHIL NFR BLD AUTO: 1.4 % — SIGNIFICANT CHANGE UP (ref 0–6)
GLUCOSE SERPL-MCNC: 107 MG/DL — HIGH (ref 70–99)
HCT VFR BLD CALC: 35.4 % — SIGNIFICANT CHANGE UP (ref 34.5–45)
HGB BLD-MCNC: 11.9 G/DL — SIGNIFICANT CHANGE UP (ref 11.5–15.5)
IMM GRANULOCYTES NFR BLD AUTO: 0.3 % — SIGNIFICANT CHANGE UP (ref 0–0.9)
LYMPHOCYTES # BLD AUTO: 1.67 K/UL — SIGNIFICANT CHANGE UP (ref 1–3.3)
LYMPHOCYTES # BLD AUTO: 29.1 % — SIGNIFICANT CHANGE UP (ref 13–44)
MCHC RBC-ENTMCNC: 29.5 PG — SIGNIFICANT CHANGE UP (ref 27–34)
MCHC RBC-ENTMCNC: 33.6 G/DL — SIGNIFICANT CHANGE UP (ref 32–36)
MCV RBC AUTO: 87.8 FL — SIGNIFICANT CHANGE UP (ref 80–100)
MONOCYTES # BLD AUTO: 0.46 K/UL — SIGNIFICANT CHANGE UP (ref 0–0.9)
MONOCYTES NFR BLD AUTO: 8 % — SIGNIFICANT CHANGE UP (ref 2–14)
NEUTROPHILS # BLD AUTO: 3.46 K/UL — SIGNIFICANT CHANGE UP (ref 1.8–7.4)
NEUTROPHILS NFR BLD AUTO: 60.5 % — SIGNIFICANT CHANGE UP (ref 43–77)
NRBC # BLD: 0 /100 WBCS — SIGNIFICANT CHANGE UP (ref 0–0)
NRBC BLD-RTO: 0 /100 WBCS — SIGNIFICANT CHANGE UP (ref 0–0)
PLATELET # BLD AUTO: 240 K/UL — SIGNIFICANT CHANGE UP (ref 150–400)
POTASSIUM SERPL-MCNC: 3.2 MMOL/L — LOW (ref 3.5–5.3)
POTASSIUM SERPL-SCNC: 3.2 MMOL/L — LOW (ref 3.5–5.3)
PROT SERPL-MCNC: 8.1 G/DL — SIGNIFICANT CHANGE UP (ref 6–8.3)
RBC # BLD: 4.03 M/UL — SIGNIFICANT CHANGE UP (ref 3.8–5.2)
RBC # FLD: 15.6 % — HIGH (ref 10.3–14.5)
SODIUM SERPL-SCNC: 145 MMOL/L — SIGNIFICANT CHANGE UP (ref 135–145)
WBC # BLD: 5.73 K/UL — SIGNIFICANT CHANGE UP (ref 3.8–10.5)
WBC # FLD AUTO: 5.73 K/UL — SIGNIFICANT CHANGE UP (ref 3.8–10.5)

## 2025-01-10 ENCOUNTER — APPOINTMENT (OUTPATIENT)
Dept: HEMATOLOGY ONCOLOGY | Facility: CLINIC | Age: 65
End: 2025-01-10

## 2025-01-10 DIAGNOSIS — R11.2 NAUSEA WITH VOMITING, UNSPECIFIED: ICD-10-CM

## 2025-01-10 DIAGNOSIS — Z51.11 ENCOUNTER FOR ANTINEOPLASTIC CHEMOTHERAPY: ICD-10-CM

## 2025-01-10 DIAGNOSIS — Z51.89 ENCOUNTER FOR OTHER SPECIFIED AFTERCARE: ICD-10-CM

## 2025-01-10 LAB
CANCER AG27-29 SERPL-ACNC: 54.2 U/ML — HIGH (ref 0–38.6)
CEA SERPL-MCNC: 1.6 NG/ML — SIGNIFICANT CHANGE UP (ref 0–3.8)

## 2025-01-15 LAB
ANNOTATION COMMENT IMP: SIGNIFICANT CHANGE UP
FULL GENE SEQUENCE RESULT: SIGNIFICANT CHANGE UP
METHOD:: SIGNIFICANT CHANGE UP
MOL DX INTERP BLD/T QL: SIGNIFICANT CHANGE UP
REVIEWED BY: SIGNIFICANT CHANGE UP
TA REPEAT RESULT: ABNORMAL
TEST PERFORMANCE INFO SPEC: SIGNIFICANT CHANGE UP
UGT1A1 GENE MUT ANL BLD/T: SIGNIFICANT CHANGE UP

## 2025-01-17 ENCOUNTER — APPOINTMENT (OUTPATIENT)
Dept: NUCLEAR MEDICINE | Facility: CLINIC | Age: 65
End: 2025-01-17
Payer: COMMERCIAL

## 2025-01-17 PROCEDURE — A9552: CPT

## 2025-01-17 PROCEDURE — 78815 PET IMAGE W/CT SKULL-THIGH: CPT | Mod: PI

## 2025-01-23 ENCOUNTER — RESULT REVIEW (OUTPATIENT)
Age: 65
End: 2025-01-23

## 2025-01-23 ENCOUNTER — APPOINTMENT (OUTPATIENT)
Dept: INFUSION THERAPY | Facility: HOSPITAL | Age: 65
End: 2025-01-23

## 2025-01-23 ENCOUNTER — NON-APPOINTMENT (OUTPATIENT)
Age: 65
End: 2025-01-23

## 2025-01-23 ENCOUNTER — APPOINTMENT (OUTPATIENT)
Dept: HEMATOLOGY ONCOLOGY | Facility: CLINIC | Age: 65
End: 2025-01-23
Payer: COMMERCIAL

## 2025-01-23 VITALS
DIASTOLIC BLOOD PRESSURE: 71 MMHG | WEIGHT: 150.99 LBS | OXYGEN SATURATION: 98 % | HEART RATE: 72 BPM | RESPIRATION RATE: 16 BRPM | TEMPERATURE: 97 F | SYSTOLIC BLOOD PRESSURE: 123 MMHG | BODY MASS INDEX: 30.04 KG/M2

## 2025-01-23 DIAGNOSIS — Z79.899 OTHER LONG TERM (CURRENT) DRUG THERAPY: ICD-10-CM

## 2025-01-23 DIAGNOSIS — R79.89 OTHER SPECIFIED ABNORMAL FINDINGS OF BLOOD CHEMISTRY: ICD-10-CM

## 2025-01-23 DIAGNOSIS — R74.8 ABNORMAL LEVELS OF OTHER SERUM ENZYMES: ICD-10-CM

## 2025-01-23 LAB
ALBUMIN SERPL ELPH-MCNC: 4.3 G/DL — SIGNIFICANT CHANGE UP (ref 3.3–5)
ALP SERPL-CCNC: 167 U/L — HIGH (ref 40–120)
ALT FLD-CCNC: 7 U/L — LOW (ref 10–45)
ANION GAP SERPL CALC-SCNC: 13 MMOL/L — SIGNIFICANT CHANGE UP (ref 5–17)
AST SERPL-CCNC: 13 U/L — SIGNIFICANT CHANGE UP (ref 10–40)
BASOPHILS # BLD AUTO: 0.04 K/UL — SIGNIFICANT CHANGE UP (ref 0–0.2)
BASOPHILS # BLD AUTO: 0.04 K/UL — SIGNIFICANT CHANGE UP (ref 0–0.2)
BASOPHILS NFR BLD AUTO: 0.4 % — SIGNIFICANT CHANGE UP (ref 0–2)
BASOPHILS NFR BLD AUTO: 0.5 % — SIGNIFICANT CHANGE UP (ref 0–2)
BILIRUB SERPL-MCNC: <0.2 MG/DL — SIGNIFICANT CHANGE UP (ref 0.2–1.2)
BUN SERPL-MCNC: 15 MG/DL — SIGNIFICANT CHANGE UP (ref 7–23)
CALCIUM SERPL-MCNC: 9.5 MG/DL — SIGNIFICANT CHANGE UP (ref 8.4–10.5)
CHLORIDE SERPL-SCNC: 110 MMOL/L — HIGH (ref 96–108)
CO2 SERPL-SCNC: 21 MMOL/L — LOW (ref 22–31)
CREAT SERPL-MCNC: 1.17 MG/DL — SIGNIFICANT CHANGE UP (ref 0.5–1.3)
EGFR: 52 ML/MIN/1.73M2 — LOW
EOSINOPHIL # BLD AUTO: 0.03 K/UL — SIGNIFICANT CHANGE UP (ref 0–0.5)
EOSINOPHIL # BLD AUTO: 0.05 K/UL — SIGNIFICANT CHANGE UP (ref 0–0.5)
EOSINOPHIL NFR BLD AUTO: 0.3 % — SIGNIFICANT CHANGE UP (ref 0–6)
EOSINOPHIL NFR BLD AUTO: 0.6 % — SIGNIFICANT CHANGE UP (ref 0–6)
GLUCOSE SERPL-MCNC: 101 MG/DL — HIGH (ref 70–99)
HCT VFR BLD CALC: 32.5 % — LOW (ref 34.5–45)
HCT VFR BLD CALC: 32.5 % — LOW (ref 34.5–45)
HGB BLD-MCNC: 10.8 G/DL — LOW (ref 11.5–15.5)
HGB BLD-MCNC: 10.9 G/DL — LOW (ref 11.5–15.5)
IMM GRANULOCYTES NFR BLD AUTO: 0.6 % — SIGNIFICANT CHANGE UP (ref 0–0.9)
IMM GRANULOCYTES NFR BLD AUTO: 0.6 % — SIGNIFICANT CHANGE UP (ref 0–0.9)
LYMPHOCYTES # BLD AUTO: 1.33 K/UL — SIGNIFICANT CHANGE UP (ref 1–3.3)
LYMPHOCYTES # BLD AUTO: 1.4 K/UL — SIGNIFICANT CHANGE UP (ref 1–3.3)
LYMPHOCYTES # BLD AUTO: 13.4 % — SIGNIFICANT CHANGE UP (ref 13–44)
LYMPHOCYTES # BLD AUTO: 16.3 % — SIGNIFICANT CHANGE UP (ref 13–44)
MCHC RBC-ENTMCNC: 29.4 PG — SIGNIFICANT CHANGE UP (ref 27–34)
MCHC RBC-ENTMCNC: 29.8 PG — SIGNIFICANT CHANGE UP (ref 27–34)
MCHC RBC-ENTMCNC: 33.2 G/DL — SIGNIFICANT CHANGE UP (ref 32–36)
MCHC RBC-ENTMCNC: 33.5 G/DL — SIGNIFICANT CHANGE UP (ref 32–36)
MCV RBC AUTO: 88.6 FL — SIGNIFICANT CHANGE UP (ref 80–100)
MCV RBC AUTO: 88.8 FL — SIGNIFICANT CHANGE UP (ref 80–100)
MONOCYTES # BLD AUTO: 0.33 K/UL — SIGNIFICANT CHANGE UP (ref 0–0.9)
MONOCYTES # BLD AUTO: 0.49 K/UL — SIGNIFICANT CHANGE UP (ref 0–0.9)
MONOCYTES NFR BLD AUTO: 3.8 % — SIGNIFICANT CHANGE UP (ref 2–14)
MONOCYTES NFR BLD AUTO: 4.9 % — SIGNIFICANT CHANGE UP (ref 2–14)
NEUTROPHILS # BLD AUTO: 6.74 K/UL — SIGNIFICANT CHANGE UP (ref 1.8–7.4)
NEUTROPHILS # BLD AUTO: 7.97 K/UL — HIGH (ref 1.8–7.4)
NEUTROPHILS NFR BLD AUTO: 78.2 % — HIGH (ref 43–77)
NEUTROPHILS NFR BLD AUTO: 80.4 % — HIGH (ref 43–77)
NRBC # BLD: 0 /100 WBCS — SIGNIFICANT CHANGE UP (ref 0–0)
NRBC # BLD: 0 /100 WBCS — SIGNIFICANT CHANGE UP (ref 0–0)
NRBC BLD-RTO: 0 /100 WBCS — SIGNIFICANT CHANGE UP (ref 0–0)
NRBC BLD-RTO: 0 /100 WBCS — SIGNIFICANT CHANGE UP (ref 0–0)
PLATELET # BLD AUTO: 230 K/UL — SIGNIFICANT CHANGE UP (ref 150–400)
PLATELET # BLD AUTO: SIGNIFICANT CHANGE UP K/UL (ref 150–400)
POTASSIUM SERPL-MCNC: 3.7 MMOL/L — SIGNIFICANT CHANGE UP (ref 3.5–5.3)
POTASSIUM SERPL-SCNC: 3.7 MMOL/L — SIGNIFICANT CHANGE UP (ref 3.5–5.3)
PROT SERPL-MCNC: 8 G/DL — SIGNIFICANT CHANGE UP (ref 6–8.3)
RBC # BLD: 3.66 M/UL — LOW (ref 3.8–5.2)
RBC # BLD: 3.67 M/UL — LOW (ref 3.8–5.2)
RBC # FLD: 16.4 % — HIGH (ref 10.3–14.5)
RBC # FLD: 16.4 % — HIGH (ref 10.3–14.5)
SODIUM SERPL-SCNC: 144 MMOL/L — SIGNIFICANT CHANGE UP (ref 135–145)
WBC # BLD: 8.61 K/UL — SIGNIFICANT CHANGE UP (ref 3.8–10.5)
WBC # BLD: 9.92 K/UL — SIGNIFICANT CHANGE UP (ref 3.8–10.5)
WBC # FLD AUTO: 8.61 K/UL — SIGNIFICANT CHANGE UP (ref 3.8–10.5)
WBC # FLD AUTO: 9.92 K/UL — SIGNIFICANT CHANGE UP (ref 3.8–10.5)

## 2025-01-23 PROCEDURE — 99214 OFFICE O/P EST MOD 30 MIN: CPT

## 2025-01-23 PROCEDURE — G2211 COMPLEX E/M VISIT ADD ON: CPT

## 2025-01-24 LAB
CANCER AG27-29 SERPL-ACNC: 59.8 U/ML — HIGH (ref 0–38.6)
CEA SERPL-MCNC: 2 NG/ML — SIGNIFICANT CHANGE UP (ref 0–3.8)

## 2025-01-28 ENCOUNTER — NON-APPOINTMENT (OUTPATIENT)
Age: 65
End: 2025-01-28

## 2025-01-30 ENCOUNTER — APPOINTMENT (OUTPATIENT)
Dept: WOUND CARE | Facility: HOSPITAL | Age: 65
End: 2025-01-30
Payer: COMMERCIAL

## 2025-01-30 VITALS
DIASTOLIC BLOOD PRESSURE: 84 MMHG | HEART RATE: 59 BPM | RESPIRATION RATE: 16 BRPM | TEMPERATURE: 97.1 F | SYSTOLIC BLOOD PRESSURE: 156 MMHG | OXYGEN SATURATION: 96 %

## 2025-01-30 DIAGNOSIS — C50.911 MALIGNANT NEOPLASM OF UNSPECIFIED SITE OF RIGHT FEMALE BREAST: ICD-10-CM

## 2025-01-30 DIAGNOSIS — C79.2 SECONDARY MALIGNANT NEOPLASM OF SKIN: ICD-10-CM

## 2025-01-30 DIAGNOSIS — Z86.718 PERSONAL HISTORY OF OTHER VENOUS THROMBOSIS AND EMBOLISM: ICD-10-CM

## 2025-01-30 DIAGNOSIS — Z79.69 LONG TERM (CURRENT) USE OF OTHER IMMUNOMODULATORS AND IMMUNOSUPPRESSANTS: ICD-10-CM

## 2025-01-30 PROCEDURE — 99214 OFFICE O/P EST MOD 30 MIN: CPT

## 2025-02-03 ENCOUNTER — OUTPATIENT (OUTPATIENT)
Dept: OUTPATIENT SERVICES | Facility: HOSPITAL | Age: 65
LOS: 1 days | Discharge: HOME | End: 2025-02-03

## 2025-02-03 DIAGNOSIS — Z98.89 OTHER SPECIFIED POSTPROCEDURAL STATES: Chronic | ICD-10-CM

## 2025-02-03 DIAGNOSIS — Z90.49 ACQUIRED ABSENCE OF OTHER SPECIFIED PARTS OF DIGESTIVE TRACT: Chronic | ICD-10-CM

## 2025-02-03 DIAGNOSIS — I72.9 ANEURYSM OF UNSPECIFIED SITE: Chronic | ICD-10-CM

## 2025-02-03 DIAGNOSIS — Z95.5 PRESENCE OF CORONARY ANGIOPLASTY IMPLANT AND GRAFT: Chronic | ICD-10-CM

## 2025-02-03 DIAGNOSIS — Z98.2 PRESENCE OF CEREBROSPINAL FLUID DRAINAGE DEVICE: Chronic | ICD-10-CM

## 2025-02-06 ENCOUNTER — APPOINTMENT (OUTPATIENT)
Dept: INFUSION THERAPY | Facility: HOSPITAL | Age: 65
End: 2025-02-06

## 2025-02-06 ENCOUNTER — RESULT REVIEW (OUTPATIENT)
Age: 65
End: 2025-02-06

## 2025-02-06 ENCOUNTER — APPOINTMENT (OUTPATIENT)
Dept: HEMATOLOGY ONCOLOGY | Facility: CLINIC | Age: 65
End: 2025-02-06

## 2025-02-06 LAB
ALBUMIN SERPL ELPH-MCNC: 4 G/DL — SIGNIFICANT CHANGE UP (ref 3.3–5)
ALP SERPL-CCNC: 165 U/L — HIGH (ref 40–120)
ALT FLD-CCNC: 7 U/L — LOW (ref 10–45)
ANION GAP SERPL CALC-SCNC: 11 MMOL/L — SIGNIFICANT CHANGE UP (ref 5–17)
AST SERPL-CCNC: 17 U/L — SIGNIFICANT CHANGE UP (ref 10–40)
BASOPHILS # BLD AUTO: 0.05 K/UL — SIGNIFICANT CHANGE UP (ref 0–0.2)
BASOPHILS NFR BLD AUTO: 0.5 % — SIGNIFICANT CHANGE UP (ref 0–2)
BILIRUB SERPL-MCNC: 0.2 MG/DL — SIGNIFICANT CHANGE UP (ref 0.2–1.2)
BUN SERPL-MCNC: 15 MG/DL — SIGNIFICANT CHANGE UP (ref 7–23)
CALCIUM SERPL-MCNC: 9.5 MG/DL — SIGNIFICANT CHANGE UP (ref 8.4–10.5)
CANCER AG27-29 SERPL-ACNC: 57.3 U/ML — HIGH (ref 0–38.6)
CEA SERPL-MCNC: 2.1 NG/ML — SIGNIFICANT CHANGE UP (ref 0–3.8)
CHLORIDE SERPL-SCNC: 115 MMOL/L — HIGH (ref 96–108)
CO2 SERPL-SCNC: 22 MMOL/L — SIGNIFICANT CHANGE UP (ref 22–31)
CREAT SERPL-MCNC: 1.13 MG/DL — SIGNIFICANT CHANGE UP (ref 0.5–1.3)
EGFR: 54 ML/MIN/1.73M2 — LOW
EGFR: 54 ML/MIN/1.73M2 — LOW
EOSINOPHIL # BLD AUTO: 0.07 K/UL — SIGNIFICANT CHANGE UP (ref 0–0.5)
EOSINOPHIL NFR BLD AUTO: 0.7 % — SIGNIFICANT CHANGE UP (ref 0–6)
GLUCOSE SERPL-MCNC: 100 MG/DL — HIGH (ref 70–99)
HCT VFR BLD CALC: 32.3 % — LOW (ref 34.5–45)
HGB BLD-MCNC: 10.8 G/DL — LOW (ref 11.5–15.5)
IMM GRANULOCYTES NFR BLD AUTO: 0.5 % — SIGNIFICANT CHANGE UP (ref 0–0.9)
LYMPHOCYTES # BLD AUTO: 1.21 K/UL — SIGNIFICANT CHANGE UP (ref 1–3.3)
LYMPHOCYTES # BLD AUTO: 12.3 % — LOW (ref 13–44)
MCHC RBC-ENTMCNC: 29.9 PG — SIGNIFICANT CHANGE UP (ref 27–34)
MCHC RBC-ENTMCNC: 33.4 G/DL — SIGNIFICANT CHANGE UP (ref 32–36)
MCV RBC AUTO: 89.5 FL — SIGNIFICANT CHANGE UP (ref 80–100)
MONOCYTES # BLD AUTO: 0.4 K/UL — SIGNIFICANT CHANGE UP (ref 0–0.9)
MONOCYTES NFR BLD AUTO: 4.1 % — SIGNIFICANT CHANGE UP (ref 2–14)
NEUTROPHILS # BLD AUTO: 8.07 K/UL — HIGH (ref 1.8–7.4)
NEUTROPHILS NFR BLD AUTO: 81.9 % — HIGH (ref 43–77)
NRBC # BLD: 0 /100 WBCS — SIGNIFICANT CHANGE UP (ref 0–0)
NRBC BLD-RTO: 0 /100 WBCS — SIGNIFICANT CHANGE UP (ref 0–0)
PLATELET # BLD AUTO: 252 K/UL — SIGNIFICANT CHANGE UP (ref 150–400)
POTASSIUM SERPL-MCNC: 3.6 MMOL/L — SIGNIFICANT CHANGE UP (ref 3.5–5.3)
POTASSIUM SERPL-SCNC: 3.6 MMOL/L — SIGNIFICANT CHANGE UP (ref 3.5–5.3)
PROT SERPL-MCNC: 7.6 G/DL — SIGNIFICANT CHANGE UP (ref 6–8.3)
RBC # BLD: 3.61 M/UL — LOW (ref 3.8–5.2)
RBC # FLD: 17.8 % — HIGH (ref 10.3–14.5)
SODIUM SERPL-SCNC: 147 MMOL/L — HIGH (ref 135–145)
WBC # BLD: 9.85 K/UL — SIGNIFICANT CHANGE UP (ref 3.8–10.5)
WBC # FLD AUTO: 9.85 K/UL — SIGNIFICANT CHANGE UP (ref 3.8–10.5)

## 2025-02-07 DIAGNOSIS — Z51.11 ENCOUNTER FOR ANTINEOPLASTIC CHEMOTHERAPY: ICD-10-CM

## 2025-02-07 DIAGNOSIS — Z51.89 ENCOUNTER FOR OTHER SPECIFIED AFTERCARE: ICD-10-CM

## 2025-02-07 DIAGNOSIS — R11.2 NAUSEA WITH VOMITING, UNSPECIFIED: ICD-10-CM

## 2025-02-12 NOTE — H&P PST ADULT - NSWEIGHTCALCTOOLDRUG_GEN_A_CORE
Exercising to Lose Weight  Getting regular exercise is important for everyone. It is especially important if you are overweight. Being overweight increases your risk of heart disease, stroke, diabetes, high blood pressure, and several types of cancer. Exercising, and reducing the calories you consume, can help you lose weight and improve fitness and health.  Exercise can be moderate or vigorous intensity. To lose weight, most people need to do a certain amount of moderate or vigorous-intensity exercise each week.  How can exercise affect me?  You lose weight when you exercise enough to burn more calories than you eat. Exercise also reduces body fat and builds muscle. The more muscle you have, the more calories you burn. Exercise also:  Improves mood.  Reduces stress and tension.  Improves your overall fitness, flexibility, and endurance.  Increases bone strength.  Moderate-intensity exercise    Moderate-intensity exercise is any activity that gets you moving enough to burn at least three times more energy (calories) than if you were sitting.  Examples of moderate exercise include:  Walking a mile in 15 minutes.  Doing light yard work.  Biking at an easy pace.  Most people should get at least 150 minutes of moderate-intensity exercise a week to maintain their body weight.  Vigorous-intensity exercise  Vigorous-intensity exercise is any activity that gets you moving enough to burn at least six times more calories than if you were sitting. When you exercise at this intensity, you should be working hard enough that you are not able to carry on a conversation.  Examples of vigorous exercise include:  Running.  Playing a team sport, such as football, basketball, and soccer.  Jumping rope.  Most people should get at least 75 minutes a week of vigorous exercise to maintain their body weight.  What actions can I take to lose weight?  The amount of exercise you need to lose weight depends on:  Your age.  The type of  exercise.  Any health conditions you have.  Your overall physical ability.  Talk to your health care provider about how much exercise you need and what types of activities are safe for you.  Nutrition    Make changes to your diet as told by your health care provider or diet and nutrition specialist (dietitian). This may include:  Eating fewer calories.  Eating more protein.  Eating less unhealthy fats.  Eating a diet that includes fresh fruits and vegetables, whole grains, low-fat dairy products, and lean protein.  Avoiding foods with added fat, salt, and sugar.  Drink plenty of water while you exercise to prevent dehydration or heat stroke.  Activity  Choose an activity that you enjoy and set realistic goals. Your health care provider can help you make an exercise plan that works for you.  Exercise at a moderate or vigorous intensity most days of the week.  The intensity of exercise may vary from person to person. You can tell how intense a workout is for you by paying attention to your breathing and heartbeat. Most people will notice their breathing and heartbeat get faster with more intense exercise.  Do resistance training twice each week, such as:  Push-ups.  Sit-ups.  Lifting weights.  Using resistance bands.  Getting short amounts of exercise can be just as helpful as long, structured periods of exercise. If you have trouble finding time to exercise, try doing these things as part of your daily routine:  Get up, stretch, and walk around every 30 minutes throughout the day.  Go for a walk during your lunch break.  Park your car farther away from your destination.  If you take public transportation, get off one stop early and walk the rest of the way.  Make phone calls while standing up and walking around.  Take the stairs instead of elevators or escalators.  Wear comfortable clothes and shoes with good support.  Do not exercise so much that you hurt yourself, feel dizzy, or get very short of breath.  Where to   used find more information  [place] Department of Health and Human Services: www.hhs.gov  Centers for Disease Control and Prevention: www.cdc.gov  Contact a health care provider:  Before starting a new exercise program.  If you have questions or concerns about your weight.  If you have a medical problem that keeps you from exercising.  Get help right away if:  You have any of the following while exercising:  Injury.  Dizziness.  Difficulty breathing or shortness of breath that does not go away when you stop exercising.  Chest pain.  Rapid heartbeat.  These symptoms may represent a serious problem that is an emergency. Do not wait to see if the symptoms will go away. Get medical help right away. Call your local emergency services (911 in the [place] Do not drive yourself to the hospital.  Summary  Getting regular exercise is especially important if you are overweight.  Being overweight increases your risk of heart disease, stroke, diabetes, high blood pressure, and several types of cancer.  Losing weight happens when you burn more calories than you eat.  Reducing the amount of calories you eat, and getting regular moderate or vigorous exercise each week, helps you lose weight.  This information is not intended to replace advice given to you by your health care provider. Make sure you discuss any questions you have with your health care provider.  Document Revised: 02/13/2022 Document Reviewed: 02/13/2022  Elsevier Patient Education © 2024 [employer]

## 2025-02-19 DIAGNOSIS — R79.89 OTHER SPECIFIED ABNORMAL FINDINGS OF BLOOD CHEMISTRY: ICD-10-CM

## 2025-02-19 NOTE — ED ADULT TRIAGE NOTE - HEART RATE (BEATS/MIN)
Signs Of Vitality Reviewed: Yes    IMPORTANT EVENTS THIS SHIFT:     IMPORTANT EVENTS COMING UP/GOALS (PLEASE INCLUDE WHITE BOARD AND DISCHARGE BOARD UPDATES):      Pending placement: Aditya   PATIENT SPECIAL NEEDS/ACCOMMODATIONS:    - Fall precautions  - Hip precautions  - 1x GB/WLK  - CPAP at night          48

## 2025-02-20 ENCOUNTER — RESULT REVIEW (OUTPATIENT)
Age: 65
End: 2025-02-20

## 2025-02-20 ENCOUNTER — APPOINTMENT (OUTPATIENT)
Dept: HEMATOLOGY ONCOLOGY | Facility: CLINIC | Age: 65
End: 2025-02-20
Payer: COMMERCIAL

## 2025-02-20 ENCOUNTER — APPOINTMENT (OUTPATIENT)
Dept: INFUSION THERAPY | Facility: HOSPITAL | Age: 65
End: 2025-02-20

## 2025-02-20 VITALS
SYSTOLIC BLOOD PRESSURE: 160 MMHG | DIASTOLIC BLOOD PRESSURE: 90 MMHG | WEIGHT: 153.42 LBS | TEMPERATURE: 97.3 F | HEART RATE: 62 BPM | BODY MASS INDEX: 30.52 KG/M2 | OXYGEN SATURATION: 97 % | RESPIRATION RATE: 16 BRPM

## 2025-02-20 DIAGNOSIS — C79.2 SECONDARY MALIGNANT NEOPLASM OF SKIN: ICD-10-CM

## 2025-02-20 DIAGNOSIS — Z79.899 OTHER LONG TERM (CURRENT) DRUG THERAPY: ICD-10-CM

## 2025-02-20 DIAGNOSIS — C50.911 MALIGNANT NEOPLASM OF UNSPECIFIED SITE OF RIGHT FEMALE BREAST: ICD-10-CM

## 2025-02-20 LAB
ALBUMIN SERPL ELPH-MCNC: 3.9 G/DL — SIGNIFICANT CHANGE UP (ref 3.3–5)
ALP SERPL-CCNC: 168 U/L — HIGH (ref 40–120)
ALT FLD-CCNC: 10 U/L — SIGNIFICANT CHANGE UP (ref 10–45)
ANION GAP SERPL CALC-SCNC: 8 MMOL/L — SIGNIFICANT CHANGE UP (ref 5–17)
AST SERPL-CCNC: 17 U/L — SIGNIFICANT CHANGE UP (ref 10–40)
BASOPHILS # BLD AUTO: 0.06 K/UL — SIGNIFICANT CHANGE UP (ref 0–0.2)
BASOPHILS NFR BLD AUTO: 0.7 % — SIGNIFICANT CHANGE UP (ref 0–2)
BILIRUB SERPL-MCNC: <0.2 MG/DL — SIGNIFICANT CHANGE UP (ref 0.2–1.2)
BUN SERPL-MCNC: 14 MG/DL — SIGNIFICANT CHANGE UP (ref 7–23)
CALCIUM SERPL-MCNC: 9.2 MG/DL — SIGNIFICANT CHANGE UP (ref 8.4–10.5)
CHLORIDE SERPL-SCNC: 112 MMOL/L — HIGH (ref 96–108)
CO2 SERPL-SCNC: 24 MMOL/L — SIGNIFICANT CHANGE UP (ref 22–31)
CREAT SERPL-MCNC: 0.95 MG/DL — SIGNIFICANT CHANGE UP (ref 0.5–1.3)
EGFR: 67 ML/MIN/1.73M2 — SIGNIFICANT CHANGE UP
EGFR: 67 ML/MIN/1.73M2 — SIGNIFICANT CHANGE UP
EOSINOPHIL # BLD AUTO: 0.09 K/UL — SIGNIFICANT CHANGE UP (ref 0–0.5)
EOSINOPHIL NFR BLD AUTO: 1.1 % — SIGNIFICANT CHANGE UP (ref 0–6)
GLUCOSE SERPL-MCNC: 91 MG/DL — SIGNIFICANT CHANGE UP (ref 70–99)
HCT VFR BLD CALC: 32.7 % — LOW (ref 34.5–45)
HGB BLD-MCNC: 10.8 G/DL — LOW (ref 11.5–15.5)
IMM GRANULOCYTES NFR BLD AUTO: 0.4 % — SIGNIFICANT CHANGE UP (ref 0–0.9)
LYMPHOCYTES # BLD AUTO: 1.28 K/UL — SIGNIFICANT CHANGE UP (ref 1–3.3)
LYMPHOCYTES # BLD AUTO: 15.4 % — SIGNIFICANT CHANGE UP (ref 13–44)
MCHC RBC-ENTMCNC: 30.3 PG — SIGNIFICANT CHANGE UP (ref 27–34)
MCHC RBC-ENTMCNC: 33 G/DL — SIGNIFICANT CHANGE UP (ref 32–36)
MCV RBC AUTO: 91.6 FL — SIGNIFICANT CHANGE UP (ref 80–100)
MONOCYTES # BLD AUTO: 0.37 K/UL — SIGNIFICANT CHANGE UP (ref 0–0.9)
MONOCYTES NFR BLD AUTO: 4.4 % — SIGNIFICANT CHANGE UP (ref 2–14)
NEUTROPHILS # BLD AUTO: 6.5 K/UL — SIGNIFICANT CHANGE UP (ref 1.8–7.4)
NEUTROPHILS NFR BLD AUTO: 78 % — HIGH (ref 43–77)
NRBC BLD AUTO-RTO: 0 /100 WBCS — SIGNIFICANT CHANGE UP (ref 0–0)
PLATELET # BLD AUTO: 265 K/UL — SIGNIFICANT CHANGE UP (ref 150–400)
POTASSIUM SERPL-MCNC: 3.8 MMOL/L — SIGNIFICANT CHANGE UP (ref 3.5–5.3)
POTASSIUM SERPL-SCNC: 3.8 MMOL/L — SIGNIFICANT CHANGE UP (ref 3.5–5.3)
PROT SERPL-MCNC: 7.4 G/DL — SIGNIFICANT CHANGE UP (ref 6–8.3)
RBC # BLD: 3.57 M/UL — LOW (ref 3.8–5.2)
RBC # FLD: 18.8 % — HIGH (ref 10.3–14.5)
SODIUM SERPL-SCNC: 144 MMOL/L — SIGNIFICANT CHANGE UP (ref 135–145)
WBC # BLD: 8.33 K/UL — SIGNIFICANT CHANGE UP (ref 3.8–10.5)
WBC # FLD AUTO: 8.33 K/UL — SIGNIFICANT CHANGE UP (ref 3.8–10.5)

## 2025-02-20 PROCEDURE — G2211 COMPLEX E/M VISIT ADD ON: CPT

## 2025-02-20 PROCEDURE — 99214 OFFICE O/P EST MOD 30 MIN: CPT

## 2025-02-21 LAB
CANCER AG27-29 SERPL-ACNC: 55.5 U/ML — HIGH (ref 0–38.6)
CEA SERPL-MCNC: 2.2 NG/ML — SIGNIFICANT CHANGE UP (ref 0–3.8)

## 2025-02-27 ENCOUNTER — APPOINTMENT (OUTPATIENT)
Dept: WOUND CARE | Facility: HOSPITAL | Age: 65
End: 2025-02-27

## 2025-03-06 ENCOUNTER — NON-APPOINTMENT (OUTPATIENT)
Age: 65
End: 2025-03-06

## 2025-03-06 ENCOUNTER — APPOINTMENT (OUTPATIENT)
Dept: INFUSION THERAPY | Facility: HOSPITAL | Age: 65
End: 2025-03-06

## 2025-03-06 ENCOUNTER — RESULT REVIEW (OUTPATIENT)
Age: 65
End: 2025-03-06

## 2025-03-06 ENCOUNTER — APPOINTMENT (OUTPATIENT)
Dept: HEMATOLOGY ONCOLOGY | Facility: CLINIC | Age: 65
End: 2025-03-06
Payer: COMMERCIAL

## 2025-03-06 ENCOUNTER — APPOINTMENT (OUTPATIENT)
Dept: HEMATOLOGY ONCOLOGY | Facility: CLINIC | Age: 65
End: 2025-03-06

## 2025-03-06 VITALS
RESPIRATION RATE: 16 BRPM | OXYGEN SATURATION: 99 % | WEIGHT: 156.53 LBS | BODY MASS INDEX: 31.14 KG/M2 | HEART RATE: 70 BPM | SYSTOLIC BLOOD PRESSURE: 143 MMHG | TEMPERATURE: 97.5 F | DIASTOLIC BLOOD PRESSURE: 81 MMHG

## 2025-03-06 DIAGNOSIS — C50.911 MALIGNANT NEOPLASM OF UNSPECIFIED SITE OF RIGHT FEMALE BREAST: ICD-10-CM

## 2025-03-06 DIAGNOSIS — C79.2 SECONDARY MALIGNANT NEOPLASM OF SKIN: ICD-10-CM

## 2025-03-06 LAB
ALBUMIN SERPL ELPH-MCNC: 4.4 G/DL — SIGNIFICANT CHANGE UP (ref 3.3–5)
ALP SERPL-CCNC: 143 U/L — HIGH (ref 40–120)
ALT FLD-CCNC: 12 U/L — SIGNIFICANT CHANGE UP (ref 10–45)
ANION GAP SERPL CALC-SCNC: 10 MMOL/L — SIGNIFICANT CHANGE UP (ref 5–17)
AST SERPL-CCNC: 18 U/L — SIGNIFICANT CHANGE UP (ref 10–40)
BASOPHILS # BLD AUTO: 0.06 K/UL — SIGNIFICANT CHANGE UP (ref 0–0.2)
BASOPHILS NFR BLD AUTO: 1.1 % — SIGNIFICANT CHANGE UP (ref 0–2)
BILIRUB SERPL-MCNC: 0.2 MG/DL — SIGNIFICANT CHANGE UP (ref 0.2–1.2)
BUN SERPL-MCNC: 11 MG/DL — SIGNIFICANT CHANGE UP (ref 7–23)
CALCIUM SERPL-MCNC: 9.1 MG/DL — SIGNIFICANT CHANGE UP (ref 8.4–10.5)
CHLORIDE SERPL-SCNC: 112 MMOL/L — HIGH (ref 96–108)
CO2 SERPL-SCNC: 24 MMOL/L — SIGNIFICANT CHANGE UP (ref 22–31)
CREAT SERPL-MCNC: 1.04 MG/DL — SIGNIFICANT CHANGE UP (ref 0.5–1.3)
EGFR: 60 ML/MIN/1.73M2 — SIGNIFICANT CHANGE UP
EGFR: 60 ML/MIN/1.73M2 — SIGNIFICANT CHANGE UP
EOSINOPHIL # BLD AUTO: 0.09 K/UL — SIGNIFICANT CHANGE UP (ref 0–0.5)
EOSINOPHIL NFR BLD AUTO: 1.6 % — SIGNIFICANT CHANGE UP (ref 0–6)
GLUCOSE SERPL-MCNC: 90 MG/DL — SIGNIFICANT CHANGE UP (ref 70–99)
HCT VFR BLD CALC: 30.9 % — LOW (ref 34.5–45)
HGB BLD-MCNC: 10.5 G/DL — LOW (ref 11.5–15.5)
IMM GRANULOCYTES NFR BLD AUTO: 0.4 % — SIGNIFICANT CHANGE UP (ref 0–0.9)
LYMPHOCYTES # BLD AUTO: 1.44 K/UL — SIGNIFICANT CHANGE UP (ref 1–3.3)
LYMPHOCYTES # BLD AUTO: 26 % — SIGNIFICANT CHANGE UP (ref 13–44)
MCHC RBC-ENTMCNC: 30.3 PG — SIGNIFICANT CHANGE UP (ref 27–34)
MCHC RBC-ENTMCNC: 34 G/DL — SIGNIFICANT CHANGE UP (ref 32–36)
MCV RBC AUTO: 89 FL — SIGNIFICANT CHANGE UP (ref 80–100)
MONOCYTES # BLD AUTO: 0.46 K/UL — SIGNIFICANT CHANGE UP (ref 0–0.9)
MONOCYTES NFR BLD AUTO: 8.3 % — SIGNIFICANT CHANGE UP (ref 2–14)
NEUTROPHILS # BLD AUTO: 3.47 K/UL — SIGNIFICANT CHANGE UP (ref 1.8–7.4)
NEUTROPHILS NFR BLD AUTO: 62.6 % — SIGNIFICANT CHANGE UP (ref 43–77)
NRBC BLD AUTO-RTO: 0 /100 WBCS — SIGNIFICANT CHANGE UP (ref 0–0)
PLATELET # BLD AUTO: 261 K/UL — SIGNIFICANT CHANGE UP (ref 150–400)
POTASSIUM SERPL-MCNC: 3.5 MMOL/L — SIGNIFICANT CHANGE UP (ref 3.5–5.3)
POTASSIUM SERPL-SCNC: 3.5 MMOL/L — SIGNIFICANT CHANGE UP (ref 3.5–5.3)
PROT SERPL-MCNC: 7.9 G/DL — SIGNIFICANT CHANGE UP (ref 6–8.3)
RBC # BLD: 3.47 M/UL — LOW (ref 3.8–5.2)
RBC # FLD: 18 % — HIGH (ref 10.3–14.5)
SODIUM SERPL-SCNC: 146 MMOL/L — HIGH (ref 135–145)
WBC # BLD: 5.54 K/UL — SIGNIFICANT CHANGE UP (ref 3.8–10.5)
WBC # FLD AUTO: 5.54 K/UL — SIGNIFICANT CHANGE UP (ref 3.8–10.5)

## 2025-03-06 PROCEDURE — G2211 COMPLEX E/M VISIT ADD ON: CPT

## 2025-03-06 PROCEDURE — 99214 OFFICE O/P EST MOD 30 MIN: CPT

## 2025-03-07 LAB
CANCER AG27-29 SERPL-ACNC: 62.2 U/ML — HIGH (ref 0–38.6)
CEA SERPL-MCNC: 2.5 NG/ML — SIGNIFICANT CHANGE UP (ref 0–3.8)

## 2025-03-20 ENCOUNTER — APPOINTMENT (OUTPATIENT)
Dept: HEMATOLOGY ONCOLOGY | Facility: CLINIC | Age: 65
End: 2025-03-20
Payer: COMMERCIAL

## 2025-03-20 ENCOUNTER — RESULT REVIEW (OUTPATIENT)
Age: 65
End: 2025-03-20

## 2025-03-20 ENCOUNTER — APPOINTMENT (OUTPATIENT)
Dept: HEMATOLOGY ONCOLOGY | Facility: CLINIC | Age: 65
End: 2025-03-20

## 2025-03-20 ENCOUNTER — APPOINTMENT (OUTPATIENT)
Dept: INFUSION THERAPY | Facility: HOSPITAL | Age: 65
End: 2025-03-20

## 2025-03-20 VITALS
WEIGHT: 154.32 LBS | BODY MASS INDEX: 30.7 KG/M2 | RESPIRATION RATE: 61 BRPM | DIASTOLIC BLOOD PRESSURE: 80 MMHG | SYSTOLIC BLOOD PRESSURE: 131 MMHG | TEMPERATURE: 97.1 F | OXYGEN SATURATION: 99 % | HEART RATE: 54 BPM

## 2025-03-20 DIAGNOSIS — C50.911 MALIGNANT NEOPLASM OF UNSPECIFIED SITE OF RIGHT FEMALE BREAST: ICD-10-CM

## 2025-03-20 DIAGNOSIS — Z79.899 OTHER LONG TERM (CURRENT) DRUG THERAPY: ICD-10-CM

## 2025-03-20 DIAGNOSIS — R74.8 ABNORMAL LEVELS OF OTHER SERUM ENZYMES: ICD-10-CM

## 2025-03-20 LAB
ALBUMIN SERPL ELPH-MCNC: 4.2 G/DL — SIGNIFICANT CHANGE UP (ref 3.3–5)
ALP SERPL-CCNC: 156 U/L — HIGH (ref 40–120)
ALT FLD-CCNC: 11 U/L — SIGNIFICANT CHANGE UP (ref 10–45)
ANION GAP SERPL CALC-SCNC: 10 MMOL/L — SIGNIFICANT CHANGE UP (ref 5–17)
AST SERPL-CCNC: 18 U/L — SIGNIFICANT CHANGE UP (ref 10–40)
BASOPHILS # BLD AUTO: 0.04 K/UL — SIGNIFICANT CHANGE UP (ref 0–0.2)
BASOPHILS NFR BLD AUTO: 0.5 % — SIGNIFICANT CHANGE UP (ref 0–2)
BILIRUB SERPL-MCNC: 0.2 MG/DL — SIGNIFICANT CHANGE UP (ref 0.2–1.2)
BUN SERPL-MCNC: 15 MG/DL — SIGNIFICANT CHANGE UP (ref 7–23)
CALCIUM SERPL-MCNC: 9.4 MG/DL — SIGNIFICANT CHANGE UP (ref 8.4–10.5)
CHLORIDE SERPL-SCNC: 111 MMOL/L — HIGH (ref 96–108)
CO2 SERPL-SCNC: 24 MMOL/L — SIGNIFICANT CHANGE UP (ref 22–31)
CREAT SERPL-MCNC: 1.09 MG/DL — SIGNIFICANT CHANGE UP (ref 0.5–1.3)
EGFR: 57 ML/MIN/1.73M2 — LOW
EGFR: 57 ML/MIN/1.73M2 — LOW
EOSINOPHIL # BLD AUTO: 0.09 K/UL — SIGNIFICANT CHANGE UP (ref 0–0.5)
EOSINOPHIL NFR BLD AUTO: 1.2 % — SIGNIFICANT CHANGE UP (ref 0–6)
GLUCOSE SERPL-MCNC: 102 MG/DL — HIGH (ref 70–99)
HCT VFR BLD CALC: 33 % — LOW (ref 34.5–45)
HGB BLD-MCNC: 11.3 G/DL — LOW (ref 11.5–15.5)
IMM GRANULOCYTES NFR BLD AUTO: 0.8 % — SIGNIFICANT CHANGE UP (ref 0–0.9)
LYMPHOCYTES # BLD AUTO: 1.17 K/UL — SIGNIFICANT CHANGE UP (ref 1–3.3)
LYMPHOCYTES # BLD AUTO: 15.3 % — SIGNIFICANT CHANGE UP (ref 13–44)
MCHC RBC-ENTMCNC: 30.3 PG — SIGNIFICANT CHANGE UP (ref 27–34)
MCHC RBC-ENTMCNC: 34.2 G/DL — SIGNIFICANT CHANGE UP (ref 32–36)
MCV RBC AUTO: 88.5 FL — SIGNIFICANT CHANGE UP (ref 80–100)
MONOCYTES # BLD AUTO: 0.37 K/UL — SIGNIFICANT CHANGE UP (ref 0–0.9)
MONOCYTES NFR BLD AUTO: 4.8 % — SIGNIFICANT CHANGE UP (ref 2–14)
NEUTROPHILS # BLD AUTO: 5.91 K/UL — SIGNIFICANT CHANGE UP (ref 1.8–7.4)
NEUTROPHILS NFR BLD AUTO: 77.4 % — HIGH (ref 43–77)
NRBC BLD AUTO-RTO: 0 /100 WBCS — SIGNIFICANT CHANGE UP (ref 0–0)
PLATELET # BLD AUTO: 237 K/UL — SIGNIFICANT CHANGE UP (ref 150–400)
POTASSIUM SERPL-MCNC: 3.6 MMOL/L — SIGNIFICANT CHANGE UP (ref 3.5–5.3)
POTASSIUM SERPL-SCNC: 3.6 MMOL/L — SIGNIFICANT CHANGE UP (ref 3.5–5.3)
PROT SERPL-MCNC: 8 G/DL — SIGNIFICANT CHANGE UP (ref 6–8.3)
RBC # BLD: 3.73 M/UL — LOW (ref 3.8–5.2)
RBC # FLD: 17.7 % — HIGH (ref 10.3–14.5)
SODIUM SERPL-SCNC: 145 MMOL/L — SIGNIFICANT CHANGE UP (ref 135–145)
WBC # BLD: 7.64 K/UL — SIGNIFICANT CHANGE UP (ref 3.8–10.5)
WBC # FLD AUTO: 7.64 K/UL — SIGNIFICANT CHANGE UP (ref 3.8–10.5)

## 2025-03-20 PROCEDURE — G2211 COMPLEX E/M VISIT ADD ON: CPT

## 2025-03-20 PROCEDURE — 99214 OFFICE O/P EST MOD 30 MIN: CPT

## 2025-03-21 LAB
CANCER AG27-29 SERPL-ACNC: 55.8 U/ML — HIGH (ref 0–38.6)
CEA SERPL-MCNC: 2.4 NG/ML — SIGNIFICANT CHANGE UP (ref 0–3.8)

## 2025-04-03 ENCOUNTER — APPOINTMENT (OUTPATIENT)
Dept: HEMATOLOGY ONCOLOGY | Facility: CLINIC | Age: 65
End: 2025-04-03

## 2025-04-03 ENCOUNTER — APPOINTMENT (OUTPATIENT)
Dept: INFUSION THERAPY | Facility: HOSPITAL | Age: 65
End: 2025-04-03

## 2025-04-03 ENCOUNTER — RESULT REVIEW (OUTPATIENT)
Age: 65
End: 2025-04-03

## 2025-04-03 LAB
ALBUMIN SERPL ELPH-MCNC: 4.3 G/DL — SIGNIFICANT CHANGE UP (ref 3.3–5)
ALP SERPL-CCNC: 201 U/L — HIGH (ref 40–120)
ALT FLD-CCNC: 10 U/L — SIGNIFICANT CHANGE UP (ref 10–45)
ANION GAP SERPL CALC-SCNC: 11 MMOL/L — SIGNIFICANT CHANGE UP (ref 5–17)
AST SERPL-CCNC: 19 U/L — SIGNIFICANT CHANGE UP (ref 10–40)
BASOPHILS # BLD AUTO: 0.06 K/UL — SIGNIFICANT CHANGE UP (ref 0–0.2)
BASOPHILS NFR BLD AUTO: 0.4 % — SIGNIFICANT CHANGE UP (ref 0–2)
BILIRUB SERPL-MCNC: 0.2 MG/DL — SIGNIFICANT CHANGE UP (ref 0.2–1.2)
BUN SERPL-MCNC: 11 MG/DL — SIGNIFICANT CHANGE UP (ref 7–23)
CALCIUM SERPL-MCNC: 9.3 MG/DL — SIGNIFICANT CHANGE UP (ref 8.4–10.5)
CHLORIDE SERPL-SCNC: 112 MMOL/L — HIGH (ref 96–108)
CO2 SERPL-SCNC: 25 MMOL/L — SIGNIFICANT CHANGE UP (ref 22–31)
CREAT SERPL-MCNC: 0.97 MG/DL — SIGNIFICANT CHANGE UP (ref 0.5–1.3)
EGFR: 65 ML/MIN/1.73M2 — SIGNIFICANT CHANGE UP
EGFR: 65 ML/MIN/1.73M2 — SIGNIFICANT CHANGE UP
EOSINOPHIL # BLD AUTO: 0.09 K/UL — SIGNIFICANT CHANGE UP (ref 0–0.5)
EOSINOPHIL NFR BLD AUTO: 0.6 % — SIGNIFICANT CHANGE UP (ref 0–6)
GLUCOSE SERPL-MCNC: 116 MG/DL — HIGH (ref 70–99)
HCT VFR BLD CALC: 34.4 % — LOW (ref 34.5–45)
HGB BLD-MCNC: 11.7 G/DL — SIGNIFICANT CHANGE UP (ref 11.5–15.5)
IMM GRANULOCYTES NFR BLD AUTO: 0.6 % — SIGNIFICANT CHANGE UP (ref 0–0.9)
LYMPHOCYTES # BLD AUTO: 1.59 K/UL — SIGNIFICANT CHANGE UP (ref 1–3.3)
LYMPHOCYTES # BLD AUTO: 10.8 % — LOW (ref 13–44)
MCHC RBC-ENTMCNC: 31 PG — SIGNIFICANT CHANGE UP (ref 27–34)
MCHC RBC-ENTMCNC: 34 G/DL — SIGNIFICANT CHANGE UP (ref 32–36)
MCV RBC AUTO: 91 FL — SIGNIFICANT CHANGE UP (ref 80–100)
MONOCYTES # BLD AUTO: 0.52 K/UL — SIGNIFICANT CHANGE UP (ref 0–0.9)
MONOCYTES NFR BLD AUTO: 3.5 % — SIGNIFICANT CHANGE UP (ref 2–14)
NEUTROPHILS # BLD AUTO: 12.42 K/UL — HIGH (ref 1.8–7.4)
NEUTROPHILS NFR BLD AUTO: 84.1 % — HIGH (ref 43–77)
NRBC BLD AUTO-RTO: 0 /100 WBCS — SIGNIFICANT CHANGE UP (ref 0–0)
PLATELET # BLD AUTO: 276 K/UL — SIGNIFICANT CHANGE UP (ref 150–400)
POTASSIUM SERPL-MCNC: 3.6 MMOL/L — SIGNIFICANT CHANGE UP (ref 3.5–5.3)
POTASSIUM SERPL-SCNC: 3.6 MMOL/L — SIGNIFICANT CHANGE UP (ref 3.5–5.3)
PROT SERPL-MCNC: 7.9 G/DL — SIGNIFICANT CHANGE UP (ref 6–8.3)
RBC # BLD: 3.78 M/UL — LOW (ref 3.8–5.2)
RBC # FLD: 17.6 % — HIGH (ref 10.3–14.5)
SODIUM SERPL-SCNC: 147 MMOL/L — HIGH (ref 135–145)
WBC # BLD: 14.77 K/UL — HIGH (ref 3.8–10.5)
WBC # FLD AUTO: 14.77 K/UL — HIGH (ref 3.8–10.5)

## 2025-04-04 LAB
CANCER AG27-29 SERPL-ACNC: 51.9 U/ML — HIGH (ref 0–38.6)
CEA SERPL-MCNC: 2.6 NG/ML — SIGNIFICANT CHANGE UP (ref 0–3.8)

## 2025-04-17 ENCOUNTER — RESULT REVIEW (OUTPATIENT)
Age: 65
End: 2025-04-17

## 2025-04-17 ENCOUNTER — APPOINTMENT (OUTPATIENT)
Dept: HEMATOLOGY ONCOLOGY | Facility: CLINIC | Age: 65
End: 2025-04-17

## 2025-04-17 ENCOUNTER — APPOINTMENT (OUTPATIENT)
Dept: INFUSION THERAPY | Facility: HOSPITAL | Age: 65
End: 2025-04-17

## 2025-04-17 VITALS
TEMPERATURE: 97 F | WEIGHT: 154.32 LBS | SYSTOLIC BLOOD PRESSURE: 126 MMHG | RESPIRATION RATE: 16 BRPM | OXYGEN SATURATION: 98 % | HEART RATE: 80 BPM | DIASTOLIC BLOOD PRESSURE: 82 MMHG | BODY MASS INDEX: 30.7 KG/M2

## 2025-04-17 DIAGNOSIS — Z79.899 OTHER LONG TERM (CURRENT) DRUG THERAPY: ICD-10-CM

## 2025-04-17 PROCEDURE — 99214 OFFICE O/P EST MOD 30 MIN: CPT

## 2025-05-01 ENCOUNTER — APPOINTMENT (OUTPATIENT)
Dept: INFUSION THERAPY | Facility: HOSPITAL | Age: 65
End: 2025-05-01

## 2025-05-01 ENCOUNTER — RESULT REVIEW (OUTPATIENT)
Age: 65
End: 2025-05-01

## 2025-05-01 ENCOUNTER — APPOINTMENT (OUTPATIENT)
Dept: HEMATOLOGY ONCOLOGY | Facility: CLINIC | Age: 65
End: 2025-05-01

## 2025-05-02 ENCOUNTER — RESULT REVIEW (OUTPATIENT)
Age: 65
End: 2025-05-02

## 2025-05-15 ENCOUNTER — RESULT REVIEW (OUTPATIENT)
Age: 65
End: 2025-05-15

## 2025-05-15 ENCOUNTER — APPOINTMENT (OUTPATIENT)
Dept: HEMATOLOGY ONCOLOGY | Facility: CLINIC | Age: 65
End: 2025-05-15
Payer: COMMERCIAL

## 2025-05-15 ENCOUNTER — APPOINTMENT (OUTPATIENT)
Dept: HEMATOLOGY ONCOLOGY | Facility: CLINIC | Age: 65
End: 2025-05-15

## 2025-05-15 ENCOUNTER — APPOINTMENT (OUTPATIENT)
Dept: INFUSION THERAPY | Facility: HOSPITAL | Age: 65
End: 2025-05-15

## 2025-05-15 VITALS
HEIGHT: 61.02 IN | BODY MASS INDEX: 28.51 KG/M2 | HEART RATE: 65 BPM | OXYGEN SATURATION: 99 % | TEMPERATURE: 97.4 F | SYSTOLIC BLOOD PRESSURE: 134 MMHG | WEIGHT: 150.99 LBS | DIASTOLIC BLOOD PRESSURE: 77 MMHG | RESPIRATION RATE: 16 BRPM

## 2025-05-15 DIAGNOSIS — Z79.69 LONG TERM (CURRENT) USE OF OTHER IMMUNOMODULATORS AND IMMUNOSUPPRESSANTS: ICD-10-CM

## 2025-05-15 DIAGNOSIS — C50.911 MALIGNANT NEOPLASM OF UNSPECIFIED SITE OF RIGHT FEMALE BREAST: ICD-10-CM

## 2025-05-15 PROCEDURE — 99214 OFFICE O/P EST MOD 30 MIN: CPT

## 2025-05-16 ENCOUNTER — RESULT REVIEW (OUTPATIENT)
Age: 65
End: 2025-05-16

## 2025-05-20 ENCOUNTER — APPOINTMENT (OUTPATIENT)
Dept: NUCLEAR MEDICINE | Facility: CLINIC | Age: 65
End: 2025-05-20

## 2025-05-20 PROCEDURE — 78815 PET IMAGE W/CT SKULL-THIGH: CPT | Mod: PS

## 2025-05-20 PROCEDURE — A9552: CPT

## 2025-05-21 NOTE — PATIENT PROFILE ADULT - FALL HARM RISK - CONCLUSION
transcription errors may be present.  
Fall with Harm Risk

## 2025-05-29 ENCOUNTER — APPOINTMENT (OUTPATIENT)
Dept: INFUSION THERAPY | Facility: HOSPITAL | Age: 65
End: 2025-05-29

## 2025-05-29 ENCOUNTER — RESULT REVIEW (OUTPATIENT)
Age: 65
End: 2025-05-29

## 2025-05-29 ENCOUNTER — APPOINTMENT (OUTPATIENT)
Dept: HEMATOLOGY ONCOLOGY | Facility: CLINIC | Age: 65
End: 2025-05-29

## 2025-05-30 ENCOUNTER — APPOINTMENT (OUTPATIENT)
Dept: CARDIOLOGY | Facility: CLINIC | Age: 65
End: 2025-05-30
Payer: COMMERCIAL

## 2025-05-30 VITALS
HEIGHT: 61 IN | SYSTOLIC BLOOD PRESSURE: 134 MMHG | BODY MASS INDEX: 28.32 KG/M2 | DIASTOLIC BLOOD PRESSURE: 75 MMHG | OXYGEN SATURATION: 99 % | HEART RATE: 63 BPM | WEIGHT: 150 LBS

## 2025-05-30 DIAGNOSIS — I82.409 ACUTE EMBOLISM AND THROMBOSIS OF UNSPECIFIED DEEP VEINS OF UNSPECIFIED LOWER EXTREMITY: ICD-10-CM

## 2025-05-30 PROCEDURE — 99214 OFFICE O/P EST MOD 30 MIN: CPT

## 2025-05-30 PROCEDURE — G2211 COMPLEX E/M VISIT ADD ON: CPT

## 2025-06-06 ENCOUNTER — APPOINTMENT (OUTPATIENT)
Dept: INTERNAL MEDICINE | Facility: CLINIC | Age: 65
End: 2025-06-06

## 2025-06-06 VITALS
DIASTOLIC BLOOD PRESSURE: 90 MMHG | HEIGHT: 61 IN | SYSTOLIC BLOOD PRESSURE: 140 MMHG | BODY MASS INDEX: 29.27 KG/M2 | WEIGHT: 155 LBS | RESPIRATION RATE: 18 BRPM | HEART RATE: 70 BPM | OXYGEN SATURATION: 99 %

## 2025-06-06 PROCEDURE — 99214 OFFICE O/P EST MOD 30 MIN: CPT

## 2025-06-06 PROCEDURE — G2211 COMPLEX E/M VISIT ADD ON: CPT

## 2025-06-09 LAB
ALBUMIN SERPL ELPH-MCNC: 4.4 G/DL
ALP BLD-CCNC: 425 U/L
ALT SERPL-CCNC: 18 U/L
ANION GAP SERPL CALC-SCNC: 20 MMOL/L
AST SERPL-CCNC: 17 U/L
BASOPHILS # BLD AUTO: 0.18 K/UL
BASOPHILS NFR BLD AUTO: 0.8 %
BILIRUB SERPL-MCNC: <0.2 MG/DL
BUN SERPL-MCNC: 13 MG/DL
CALCIUM SERPL-MCNC: 9.7 MG/DL
CHLORIDE SERPL-SCNC: 106 MMOL/L
CHOLEST SERPL-MCNC: 146 MG/DL
CO2 SERPL-SCNC: 20 MMOL/L
CREAT SERPL-MCNC: 1.07 MG/DL
EGFRCR SERPLBLD CKD-EPI 2021: 58 ML/MIN/1.73M2
EOSINOPHIL # BLD AUTO: 0.1 K/UL
EOSINOPHIL NFR BLD AUTO: 0.5 %
ESTIMATED AVERAGE GLUCOSE: 117 MG/DL
GLUCOSE SERPL-MCNC: 45 MG/DL
HBA1C MFR BLD HPLC: 5.7 %
HCT VFR BLD CALC: 35.5 %
HDLC SERPL-MCNC: 35 MG/DL
HGB BLD-MCNC: 11.4 G/DL
IMM GRANULOCYTES NFR BLD AUTO: 1.3 %
LDLC SERPL-MCNC: 69 MG/DL
LYMPHOCYTES # BLD AUTO: 1.37 K/UL
LYMPHOCYTES NFR BLD AUTO: 6.3 %
MAN DIFF?: NORMAL
MCHC RBC-ENTMCNC: 30.5 PG
MCHC RBC-ENTMCNC: 32.1 G/DL
MCV RBC AUTO: 94.9 FL
MONOCYTES # BLD AUTO: 0.91 K/UL
MONOCYTES NFR BLD AUTO: 4.2 %
NEUTROPHILS # BLD AUTO: 18.77 K/UL
NEUTROPHILS NFR BLD AUTO: 86.9 %
NONHDLC SERPL-MCNC: 111 MG/DL
PLATELET # BLD AUTO: 291 K/UL
POTASSIUM SERPL-SCNC: 4.4 MMOL/L
PROT SERPL-MCNC: 7.7 G/DL
RBC # BLD: 3.74 M/UL
RBC # FLD: 17.2 %
SODIUM SERPL-SCNC: 145 MMOL/L
T4 FREE SERPL-MCNC: 1 NG/DL
TRIGL SERPL-MCNC: 262 MG/DL
TSH SERPL-ACNC: 2.3 UIU/ML
WBC # FLD AUTO: 21.61 K/UL

## 2025-06-12 ENCOUNTER — RESULT REVIEW (OUTPATIENT)
Age: 65
End: 2025-06-12

## 2025-06-12 ENCOUNTER — APPOINTMENT (OUTPATIENT)
Dept: HEMATOLOGY ONCOLOGY | Facility: CLINIC | Age: 65
End: 2025-06-12
Payer: COMMERCIAL

## 2025-06-12 ENCOUNTER — APPOINTMENT (OUTPATIENT)
Dept: INFUSION THERAPY | Facility: HOSPITAL | Age: 65
End: 2025-06-12

## 2025-06-12 VITALS
TEMPERATURE: 97.5 F | OXYGEN SATURATION: 99 % | HEART RATE: 56 BPM | RESPIRATION RATE: 16 BRPM | BODY MASS INDEX: 29.16 KG/M2 | SYSTOLIC BLOOD PRESSURE: 157 MMHG | WEIGHT: 154.32 LBS | DIASTOLIC BLOOD PRESSURE: 84 MMHG

## 2025-06-12 PROCEDURE — G2211 COMPLEX E/M VISIT ADD ON: CPT

## 2025-06-12 PROCEDURE — 99214 OFFICE O/P EST MOD 30 MIN: CPT

## 2025-06-13 LAB
M TB IFN-G BLD-IMP: NEGATIVE
QUANTIFERON TB PLUS MITOGEN MINUS NIL: 3.1 IU/ML
QUANTIFERON TB PLUS NIL: 0.03 IU/ML
QUANTIFERON TB PLUS TB1 MINUS NIL: 0 IU/ML
QUANTIFERON TB PLUS TB2 MINUS NIL: 0 IU/ML

## 2025-06-20 NOTE — ASU DISCHARGE PLAN (ADULT/PEDIATRIC) - CARE PROVIDER_API CALL
[facility]  [address] [place] [id number]  Phone: [phone]  Fax: [phone]  Follow Up Time:    No

## 2025-06-26 ENCOUNTER — RESULT REVIEW (OUTPATIENT)
Age: 65
End: 2025-06-26

## 2025-06-26 ENCOUNTER — APPOINTMENT (OUTPATIENT)
Dept: HEMATOLOGY ONCOLOGY | Facility: CLINIC | Age: 65
End: 2025-06-26

## 2025-06-26 ENCOUNTER — APPOINTMENT (OUTPATIENT)
Dept: INFUSION THERAPY | Facility: HOSPITAL | Age: 65
End: 2025-06-26

## 2025-07-10 ENCOUNTER — RESULT REVIEW (OUTPATIENT)
Age: 65
End: 2025-07-10

## 2025-07-10 ENCOUNTER — APPOINTMENT (OUTPATIENT)
Dept: INFUSION THERAPY | Facility: HOSPITAL | Age: 65
End: 2025-07-10

## 2025-07-10 ENCOUNTER — APPOINTMENT (OUTPATIENT)
Dept: HEMATOLOGY ONCOLOGY | Facility: CLINIC | Age: 65
End: 2025-07-10

## 2025-07-18 ENCOUNTER — NON-APPOINTMENT (OUTPATIENT)
Age: 65
End: 2025-07-18

## 2025-07-24 ENCOUNTER — RESULT REVIEW (OUTPATIENT)
Age: 65
End: 2025-07-24

## 2025-07-24 ENCOUNTER — APPOINTMENT (OUTPATIENT)
Dept: HEMATOLOGY ONCOLOGY | Facility: CLINIC | Age: 65
End: 2025-07-24
Payer: COMMERCIAL

## 2025-07-24 ENCOUNTER — APPOINTMENT (OUTPATIENT)
Dept: HEMATOLOGY ONCOLOGY | Facility: CLINIC | Age: 65
End: 2025-07-24

## 2025-07-24 ENCOUNTER — APPOINTMENT (OUTPATIENT)
Dept: INFUSION THERAPY | Facility: HOSPITAL | Age: 65
End: 2025-07-24

## 2025-07-24 ENCOUNTER — NON-APPOINTMENT (OUTPATIENT)
Age: 65
End: 2025-07-24

## 2025-07-24 VITALS
OXYGEN SATURATION: 97 % | WEIGHT: 158.73 LBS | HEART RATE: 64 BPM | TEMPERATURE: 97.1 F | BODY MASS INDEX: 29.99 KG/M2 | DIASTOLIC BLOOD PRESSURE: 84 MMHG | SYSTOLIC BLOOD PRESSURE: 150 MMHG | RESPIRATION RATE: 16 BRPM

## 2025-07-24 DIAGNOSIS — C79.2 SECONDARY MALIGNANT NEOPLASM OF SKIN: ICD-10-CM

## 2025-07-24 DIAGNOSIS — Z79.69 LONG TERM (CURRENT) USE OF OTHER IMMUNOMODULATORS AND IMMUNOSUPPRESSANTS: ICD-10-CM

## 2025-07-24 PROCEDURE — 99214 OFFICE O/P EST MOD 30 MIN: CPT

## 2025-08-07 ENCOUNTER — RESULT REVIEW (OUTPATIENT)
Age: 65
End: 2025-08-07

## 2025-08-07 ENCOUNTER — APPOINTMENT (OUTPATIENT)
Dept: HEMATOLOGY ONCOLOGY | Facility: CLINIC | Age: 65
End: 2025-08-07

## 2025-08-07 ENCOUNTER — APPOINTMENT (OUTPATIENT)
Dept: INFUSION THERAPY | Facility: HOSPITAL | Age: 65
End: 2025-08-07

## 2025-08-15 ENCOUNTER — RX RENEWAL (OUTPATIENT)
Age: 65
End: 2025-08-15

## 2025-08-18 DIAGNOSIS — C50.911 MALIGNANT NEOPLASM OF UNSPECIFIED SITE OF RIGHT FEMALE BREAST: ICD-10-CM

## 2025-08-20 ENCOUNTER — APPOINTMENT (OUTPATIENT)
Dept: CT IMAGING | Facility: CLINIC | Age: 65
End: 2025-08-20

## 2025-08-21 ENCOUNTER — RESULT REVIEW (OUTPATIENT)
Age: 65
End: 2025-08-21

## 2025-08-21 ENCOUNTER — LABORATORY RESULT (OUTPATIENT)
Age: 65
End: 2025-08-21

## 2025-08-21 ENCOUNTER — APPOINTMENT (OUTPATIENT)
Dept: HEMATOLOGY ONCOLOGY | Facility: CLINIC | Age: 65
End: 2025-08-21
Payer: COMMERCIAL

## 2025-08-21 ENCOUNTER — APPOINTMENT (OUTPATIENT)
Dept: HEMATOLOGY ONCOLOGY | Facility: CLINIC | Age: 65
End: 2025-08-21

## 2025-08-21 ENCOUNTER — APPOINTMENT (OUTPATIENT)
Dept: INFUSION THERAPY | Facility: HOSPITAL | Age: 65
End: 2025-08-21

## 2025-08-21 VITALS
OXYGEN SATURATION: 99 % | SYSTOLIC BLOOD PRESSURE: 146 MMHG | DIASTOLIC BLOOD PRESSURE: 83 MMHG | RESPIRATION RATE: 16 BRPM | TEMPERATURE: 98.1 F | HEART RATE: 67 BPM | WEIGHT: 159.17 LBS | BODY MASS INDEX: 30.08 KG/M2

## 2025-08-21 DIAGNOSIS — R05.9 COUGH, UNSPECIFIED: ICD-10-CM

## 2025-08-21 PROCEDURE — 99214 OFFICE O/P EST MOD 30 MIN: CPT

## 2025-08-21 RX ORDER — BENZONATATE 200 MG/1
200 CAPSULE ORAL 3 TIMES DAILY
Qty: 30 | Refills: 1 | Status: ACTIVE | COMMUNITY
Start: 2025-08-21 | End: 1900-01-01

## 2025-08-26 ENCOUNTER — APPOINTMENT (OUTPATIENT)
Dept: CT IMAGING | Facility: CLINIC | Age: 65
End: 2025-08-26
Payer: COMMERCIAL

## 2025-08-26 PROCEDURE — 71250 CT THORAX DX C-: CPT

## (undated) DEVICE — DRSG TELFA 3 X 8

## (undated) DEVICE — SOL INJ NS 0.9% 500ML 2 PORT

## (undated) DEVICE — DRSG MAMMARY SUPPORT SM SIZE 1

## (undated) DEVICE — DRSG MAMMARY SUPPORT XL SIZE 5

## (undated) DEVICE — NDL HYPO SAFE 25G X 1" (ORANGE)

## (undated) DEVICE — BITE BLOCK ADULT 20 X 27MM (GREEN)

## (undated) DEVICE — BALLOON US ENDO

## (undated) DEVICE — DRSG BIOPATCH DISK W CHG 1" W 4.0MM HOLE

## (undated) DEVICE — SUCTION YANKAUER NO CONTROL VENT

## (undated) DEVICE — ELCTR BOVIE TIP BLADE INSULATED 4" EDGE

## (undated) DEVICE — TUBING SUCTION CONN 6FT STERILE

## (undated) DEVICE — VENODYNE/SCD SLEEVE CALF MEDIUM

## (undated) DEVICE — ELCTR GROUNDING PAD ADULT COVIDIEN

## (undated) DEVICE — FOLEY HOLDER STATLOCK 2 WAY ADULT

## (undated) DEVICE — DRSG MAMMARY SUPPORT MED SIZE 2

## (undated) DEVICE — SUT MONOCRYL 3-0 18" PS-2 UNDYED

## (undated) DEVICE — GOWN LG

## (undated) DEVICE — LIGASURE SMALL JAW

## (undated) DEVICE — SUT PLAIN GUT FAST ABSORBING 5-0 PC-1

## (undated) DEVICE — CATH IV SAFE BC 22G X 1" (BLUE)

## (undated) DEVICE — Device

## (undated) DEVICE — TUBING SUCTION 20FT

## (undated) DEVICE — DRSG MAMMARY SUPPORT LG SIZE 4

## (undated) DEVICE — LAP PAD W RING 18 X 18"

## (undated) DEVICE — POSITIONER PATIENT SAFETY STRAP 3X60"

## (undated) DEVICE — CATH IV SAFE BC 20G X 1.16" (PINK)

## (undated) DEVICE — DRSG MAMMARY SUPPORT MED SIZE 3

## (undated) DEVICE — DRAPE 3/4 SHEET 52X76"

## (undated) DEVICE — SUT SILK 2-0 18" FS

## (undated) DEVICE — GLV 7 PROTEXIS (WHITE)

## (undated) DEVICE — SYR ALLIANCE II INFLATION 60ML

## (undated) DEVICE — GLV 7.5 PROTEXIS (WHITE)

## (undated) DEVICE — SOL IRR POUR NS 0.9% 500ML

## (undated) DEVICE — DRAPE CHEST BREAST 106" X 122"

## (undated) DEVICE — SUT MONOCRYL 4-0 27" PS-2 UNDYED

## (undated) DEVICE — POSITIONER FOAM EGG CRATE ULNAR 2PCS (PINK)

## (undated) DEVICE — DRAPE INSTRUMENT POUCH 6.75" X 11"

## (undated) DEVICE — PACK IV START WITH CHG

## (undated) DEVICE — SOL IRR POUR H2O 500ML

## (undated) DEVICE — RADIOGRAPHY DVC SPEC TRANSPEC

## (undated) DEVICE — SENSOR O2 FINGER ADULT

## (undated) DEVICE — DRAPE TOWEL BLUE 17" X 24"

## (undated) DEVICE — ELCTR ROCKER SWITCH PENCIL BLUE 10FT

## (undated) DEVICE — WARMING BLANKET LOWER ADULT

## (undated) DEVICE — PACK MINOR NO DRAPE

## (undated) DEVICE — TUBING IV SET GRAVITY 3Y 100" MACRO

## (undated) DEVICE — DRSG TEGADERM 4X4.75"

## (undated) DEVICE — DRSG STERISTRIPS 0.5 X 4"